# Patient Record
Sex: FEMALE | Race: WHITE | NOT HISPANIC OR LATINO | Employment: OTHER | URBAN - METROPOLITAN AREA
[De-identification: names, ages, dates, MRNs, and addresses within clinical notes are randomized per-mention and may not be internally consistent; named-entity substitution may affect disease eponyms.]

---

## 2017-09-07 ENCOUNTER — SURGERY - HEALTHEAST (OUTPATIENT)
Dept: SURGERY | Facility: CLINIC | Age: 72
End: 2017-09-07

## 2017-09-07 ENCOUNTER — ANESTHESIA - HEALTHEAST (OUTPATIENT)
Dept: SURGERY | Facility: CLINIC | Age: 72
End: 2017-09-07

## 2017-09-22 ASSESSMENT — MIFFLIN-ST. JEOR: SCORE: 1176.74

## 2017-09-23 ASSESSMENT — MIFFLIN-ST. JEOR: SCORE: 1432.11

## 2017-09-24 ENCOUNTER — SURGERY - HEALTHEAST (OUTPATIENT)
Dept: GASTROENTEROLOGY | Facility: CLINIC | Age: 72
End: 2017-09-24

## 2017-09-24 ASSESSMENT — MIFFLIN-ST. JEOR: SCORE: 1456.15

## 2017-09-26 ASSESSMENT — MIFFLIN-ST. JEOR: SCORE: 1453.43

## 2017-09-28 ENCOUNTER — SURGERY - HEALTHEAST (OUTPATIENT)
Dept: SURGERY | Facility: CLINIC | Age: 72
End: 2017-09-28

## 2017-09-28 ENCOUNTER — ANESTHESIA - HEALTHEAST (OUTPATIENT)
Dept: SURGERY | Facility: CLINIC | Age: 72
End: 2017-09-28

## 2017-09-28 ASSESSMENT — MIFFLIN-ST. JEOR: SCORE: 1428.48

## 2017-10-23 ENCOUNTER — HOSPITAL ENCOUNTER (OUTPATIENT)
Dept: MAMMOGRAPHY | Facility: CLINIC | Age: 72
Discharge: HOME | End: 2017-10-23

## 2017-10-23 DIAGNOSIS — Z12.31 VISIT FOR SCREENING MAMMOGRAM: ICD-10-CM

## 2019-03-13 ENCOUNTER — RECORDS - HEALTHEAST (OUTPATIENT)
Dept: ADMINISTRATIVE | Facility: OTHER | Age: 74
End: 2019-03-13

## 2019-03-14 ENCOUNTER — RECORDS - HEALTHEAST (OUTPATIENT)
Dept: ADMINISTRATIVE | Facility: OTHER | Age: 74
End: 2019-03-14

## 2019-03-18 ASSESSMENT — MIFFLIN-ST. JEOR: SCORE: 1376.63

## 2019-03-20 ENCOUNTER — SURGERY - HEALTHEAST (OUTPATIENT)
Dept: SURGERY | Facility: CLINIC | Age: 74
End: 2019-03-20

## 2019-03-20 ENCOUNTER — ANESTHESIA - HEALTHEAST (OUTPATIENT)
Dept: SURGERY | Facility: CLINIC | Age: 74
End: 2019-03-20

## 2019-03-20 ASSESSMENT — MIFFLIN-ST. JEOR: SCORE: 1353.64

## 2019-03-21 ENCOUNTER — HOME CARE/HOSPICE - HEALTHEAST (OUTPATIENT)
Dept: HOME HEALTH SERVICES | Facility: HOME HEALTH | Age: 74
End: 2019-03-21

## 2019-03-25 ENCOUNTER — RECORDS - HEALTHEAST (OUTPATIENT)
Dept: ADMINISTRATIVE | Facility: OTHER | Age: 74
End: 2019-03-25

## 2019-03-25 ENCOUNTER — HOME CARE/HOSPICE - HEALTHEAST (OUTPATIENT)
Dept: HOME HEALTH SERVICES | Facility: HOME HEALTH | Age: 74
End: 2019-03-25

## 2019-03-27 ENCOUNTER — HOME CARE/HOSPICE - HEALTHEAST (OUTPATIENT)
Dept: HOME HEALTH SERVICES | Facility: HOME HEALTH | Age: 74
End: 2019-03-27

## 2019-03-29 ENCOUNTER — HOME CARE/HOSPICE - HEALTHEAST (OUTPATIENT)
Dept: HOME HEALTH SERVICES | Facility: HOME HEALTH | Age: 74
End: 2019-03-29

## 2019-04-01 ENCOUNTER — HOME CARE/HOSPICE - HEALTHEAST (OUTPATIENT)
Dept: HOME HEALTH SERVICES | Facility: HOME HEALTH | Age: 74
End: 2019-04-01

## 2019-04-02 ENCOUNTER — HOME CARE/HOSPICE - HEALTHEAST (OUTPATIENT)
Dept: HOME HEALTH SERVICES | Facility: HOME HEALTH | Age: 74
End: 2019-04-02

## 2019-04-03 ENCOUNTER — HOME CARE/HOSPICE - HEALTHEAST (OUTPATIENT)
Dept: HOME HEALTH SERVICES | Facility: HOME HEALTH | Age: 74
End: 2019-04-03

## 2019-04-04 ENCOUNTER — HOME CARE/HOSPICE - HEALTHEAST (OUTPATIENT)
Dept: HOME HEALTH SERVICES | Facility: HOME HEALTH | Age: 74
End: 2019-04-04

## 2019-04-11 ENCOUNTER — COMMUNICATION - HEALTHEAST (OUTPATIENT)
Dept: PHYSICAL THERAPY | Age: 74
End: 2019-04-11

## 2019-04-11 ENCOUNTER — HOME CARE/HOSPICE - HEALTHEAST (OUTPATIENT)
Dept: HOME HEALTH SERVICES | Facility: HOME HEALTH | Age: 74
End: 2019-04-11

## 2019-04-12 ENCOUNTER — HOME CARE/HOSPICE - HEALTHEAST (OUTPATIENT)
Dept: HOME HEALTH SERVICES | Facility: HOME HEALTH | Age: 74
End: 2019-04-12

## 2019-04-15 ENCOUNTER — HOME CARE/HOSPICE - HEALTHEAST (OUTPATIENT)
Dept: HOME HEALTH SERVICES | Facility: HOME HEALTH | Age: 74
End: 2019-04-15

## 2019-04-17 ENCOUNTER — HOME CARE/HOSPICE - HEALTHEAST (OUTPATIENT)
Dept: HOME HEALTH SERVICES | Facility: HOME HEALTH | Age: 74
End: 2019-04-17

## 2021-03-09 ENCOUNTER — AMBULATORY - HEALTHEAST (OUTPATIENT)
Dept: NURSING | Facility: CLINIC | Age: 76
End: 2021-03-09

## 2021-03-30 ENCOUNTER — AMBULATORY - HEALTHEAST (OUTPATIENT)
Dept: NURSING | Facility: CLINIC | Age: 76
End: 2021-03-30

## 2021-05-24 ENCOUNTER — RECORDS - HEALTHEAST (OUTPATIENT)
Dept: ADMINISTRATIVE | Facility: CLINIC | Age: 76
End: 2021-05-24

## 2021-05-25 ENCOUNTER — RECORDS - HEALTHEAST (OUTPATIENT)
Dept: ADMINISTRATIVE | Facility: CLINIC | Age: 76
End: 2021-05-25

## 2021-05-26 ENCOUNTER — RECORDS - HEALTHEAST (OUTPATIENT)
Dept: ADMINISTRATIVE | Facility: CLINIC | Age: 76
End: 2021-05-26

## 2021-05-28 ENCOUNTER — RECORDS - HEALTHEAST (OUTPATIENT)
Dept: ADMINISTRATIVE | Facility: CLINIC | Age: 76
End: 2021-05-28

## 2021-05-29 ENCOUNTER — RECORDS - HEALTHEAST (OUTPATIENT)
Dept: ADMINISTRATIVE | Facility: CLINIC | Age: 76
End: 2021-05-29

## 2021-05-30 ENCOUNTER — RECORDS - HEALTHEAST (OUTPATIENT)
Dept: ADMINISTRATIVE | Facility: CLINIC | Age: 76
End: 2021-05-30

## 2021-05-31 VITALS — HEIGHT: 68 IN | WEIGHT: 194 LBS | BODY MASS INDEX: 29.4 KG/M2

## 2021-05-31 VITALS — WEIGHT: 165 LBS

## 2021-05-31 VITALS — BODY MASS INDEX: 29.12 KG/M2 | HEIGHT: 69 IN | WEIGHT: 196.6 LBS

## 2021-06-01 ENCOUNTER — RECORDS - HEALTHEAST (OUTPATIENT)
Dept: ADMINISTRATIVE | Facility: CLINIC | Age: 76
End: 2021-06-01

## 2021-06-02 VITALS — WEIGHT: 181 LBS | HEIGHT: 67 IN | BODY MASS INDEX: 28.41 KG/M2

## 2021-06-03 ENCOUNTER — RECORDS - HEALTHEAST (OUTPATIENT)
Dept: ADMINISTRATIVE | Facility: CLINIC | Age: 76
End: 2021-06-03

## 2021-06-12 NOTE — ANESTHESIA PREPROCEDURE EVALUATION
Anesthesia Evaluation      Patient summary reviewed   No history of anesthetic complications     Airway   Mallampati: I  Neck ROM: full   Pulmonary - normal exam    breath sounds clear to auscultation                         Cardiovascular - normal exam  (+) hypertension well controlled, ,     Rhythm: regular  Rate: normal,         Neuro/Psych - negative ROS     Endo/Other    (+) diabetes mellitus type 2 well controlled, hypothyroidism,      GI/Hepatic/Renal - negative ROS           Dental - normal exam   (+) caps                       Anesthesia Plan  Planned anesthetic: MAC    ASA 2     Anesthetic plan and risks discussed with: patient    Post-op plan: routine recovery

## 2021-06-12 NOTE — ANESTHESIA CARE TRANSFER NOTE
Last vitals:   Vitals:    09/07/17 1440   BP: 128/61   Pulse: 74   Resp:    Temp: 37.1  C (98.8  F)   SpO2: 99%     Patient's level of consciousness is awake  Spontaneous respirations: yes  Maintains airway independently: yes  Dentition unchanged: yes  Oropharynx: oropharynx clear of all foreign objects    QCDR Measures:  ASA# 20 - Surgical Safety Checklist: WHO surgical safety checklist completed prior to induction  PQRS# 430 - Adult PONV Prevention: 4558F - Pt received => 2 anti-emetic agents (different classes) preop & intraop  ASA# 8 - Peds PONV Prevention: NA - Not pediatric patient, not GA or 2 or more risk factors NOT present  PQRS# 424 - Melinda-op Temp Management: 4559F - At least one body temp DOCUMENTED => 35.5C or 95.9F within required timeframe  PQRS# 426 - PACU Transfer Protocol: - Transfer of care checklist used  ASA# 14 - Acute Post-op Pain: ASA14B - Patient did NOT experience pain >= 7 out of 10     Transferred to pacu phase 2 with nasal cannula 3 L/min.  VSS

## 2021-06-12 NOTE — ANESTHESIA POSTPROCEDURE EVALUATION
Patient: Belgica Salvador  #3 INTERSTIM STAGE 1  Anesthesia type: MAC    Patient location: Phase II Recovery  Last vitals:   Vitals:    09/07/17 1500   BP: 154/70   Pulse: 74   Resp:    Temp:    SpO2: 95%     Post vital signs: stable  Level of consciousness: awake and responds to simple questions  Post-anesthesia pain: pain controlled  Post-anesthesia nausea and vomiting: no  Pulmonary: unassisted, return to baseline  Cardiovascular: stable and blood pressure at baseline  Hydration: adequate  Anesthetic events: no    QCDR Measures:  ASA# 11 - Melinda-op Cardiac Arrest: ASA11B - Patient did NOT experience unanticipated cardiac arrest  ASA# 12 - Melinda-op Mortality Rate: ASA12B - Patient did NOT die  ASA# 13 - PACU Re-Intubation Rate: NA - No ETT / LMA used for case  ASA# 10 - Composite Anes Safety: ASA10A - No serious adverse event    Additional Notes:RN report

## 2021-06-13 NOTE — ANESTHESIA PREPROCEDURE EVALUATION
Anesthesia Evaluation      No history of anesthetic complications     Airway   Mallampati: II  Neck ROM: full   Pulmonary - negative ROS and normal exam                          Cardiovascular - normal exam  (+) hypertension, ,   Received beta blockers in last 24 hours prior to incision.  ,   Neuro/Psych - negative ROS     Endo/Other    (+) diabetes mellitus type 2 well controlled, hypothyroidism,      GI/Hepatic/Renal - negative ROS           Dental - normal exam                        Anesthesia Plan  Planned anesthetic: MAC    ASA 2   Induction: intravenous   Anesthetic plan and risks discussed with: patient    Post-op plan: routine recovery

## 2021-06-13 NOTE — ANESTHESIA CARE TRANSFER NOTE
Last vitals:   Vitals:    09/28/17 1105   BP: 118/62   Pulse: 65   Resp: 14   Temp: 37.3  C (99.1  F)   SpO2: 97%     Patient's level of consciousness is drowsy  Spontaneous respirations: yes  Maintains airway independently: yes  Dentition unchanged: yes  Oropharynx: oropharynx clear of all foreign objects    QCDR Measures:  ASA# 20 - Surgical Safety Checklist: WHO surgical safety checklist completed prior to induction  PQRS# 430 - Adult PONV Prevention: 4558F - Pt received => 2 anti-emetic agents (different classes) preop & intraop  ASA# 8 - Peds PONV Prevention: NA - Not pediatric patient, not GA or 2 or more risk factors NOT present  PQRS# 424 - Melinda-op Temp Management: 4559F - At least one body temp DOCUMENTED => 35.5C or 95.9F within required timeframe  PQRS# 426 - PACU Transfer Protocol: - Transfer of care checklist used  ASA# 14 - Acute Post-op Pain: ASA14B - Patient did NOT experience pain >= 7 out of 10

## 2021-06-13 NOTE — ANESTHESIA POSTPROCEDURE EVALUATION
Patient: Belgica Salvador  [facility] STAGE 2  Anesthesia type: MAC    Patient location: Phase II Recovery  Last vitals:   Vitals:    09/28/17 1130   BP: 122/59   Pulse: 63   Resp: 16   Temp: 37.3  C (99.1  F)   SpO2: 99%     Post vital signs: stable  Level of consciousness: awake and responds to simple questions  Post-anesthesia pain: pain controlled  Post-anesthesia nausea and vomiting: no  Pulmonary: unassisted, return to baseline  Cardiovascular: stable and blood pressure at baseline  Hydration: adequate  Anesthetic events: no    QCDR Measures:  ASA# 11 - Melinda-op Cardiac Arrest: ASA11B - Patient did NOT experience unanticipated cardiac arrest  ASA# 12 - Melinda-op Mortality Rate: ASA12B - Patient did NOT die  ASA# 13 - PACU Re-Intubation Rate: NA - No ETT / LMA used for case  ASA# 10 - Composite Anes Safety: ASA10A - No serious adverse event    Additional Notes:

## 2021-06-16 PROBLEM — M54.12 CERVICAL RADICULOPATHY AT C8: Status: ACTIVE | Noted: 2019-03-20

## 2021-06-16 PROBLEM — E11.9 DM2 (DIABETES MELLITUS, TYPE 2) (H): Status: ACTIVE | Noted: 2019-03-20

## 2021-06-16 PROBLEM — Z98.84 S/P GASTRIC BYPASS: Chronic | Status: ACTIVE | Noted: 2017-09-22

## 2021-06-16 PROBLEM — K92.1 BLACK STOOL: Status: ACTIVE | Noted: 2017-09-22

## 2021-06-16 PROBLEM — K92.2 UPPER GI BLEED: Status: ACTIVE | Noted: 2017-09-22

## 2021-06-16 PROBLEM — K92.1 MELENA: Status: ACTIVE | Noted: 2017-09-22

## 2021-06-25 NOTE — ANESTHESIA CARE TRANSFER NOTE
Last vitals:   Vitals:    03/20/19 1101   BP: 137/62   Pulse: 67   Resp: 10   Temp: 36.1  C (97  F)   SpO2: 99%     Patient's level of consciousness is awake  Spontaneous respirations: yes  Maintains airway independently: yes  Dentition unchanged: yes  Oropharynx: oropharynx clear of all foreign objects    QCDR Measures:  ASA# 20 - Surgical Safety Checklist: WHO surgical safety checklist completed prior to induction    PQRS# 430 - Adult PONV Prevention: 4558F - Pt received => 2 anti-emetic agents (different classes) preop & intraop  ASA# 8 - Peds PONV Prevention: 4558F - Pt received => 2 anti-emetic agents (different classes) preop & intraop  PQRS# 424 - Melinda-op Temp Management: 4559F - At least one body temp DOCUMENTED => 35.5C or 95.9F within required timeframe  PQRS# 426 - PACU Transfer Protocol: - Transfer of care checklist used  ASA# 14 - Acute Post-op Pain: ASA14B - Patient did NOT experience pain >= 7 out of 10   The patient is Spont Breathing, responding to commands,  Reflexes  Intact.  VSS

## 2021-06-25 NOTE — ANESTHESIA POSTPROCEDURE EVALUATION
Patient: Belgica Salvador  2. LEFT CERVICAL 7 - THORACIC 1 ANTERIOR CERVICAL DECOMPRESSION FUSION REMOVAL OF PLATE AT CERVICAL 5-6 CERVICAL 6-7  Anesthesia type: general    Patient location: PACU  Last vitals:   Vitals:    03/20/19 1320   BP: 109/55   Pulse: 67   Resp: 19   Temp:    SpO2: 93%     Post vital signs: stable  Level of consciousness: awake and responds to simple questions  Post-anesthesia pain: pain controlled  Post-anesthesia nausea and vomiting: no  Pulmonary: unassisted, return to baseline  Cardiovascular: stable and blood pressure at baseline  Hydration: adequate  Anesthetic events: no    QCDR Measures:  ASA# 11 - Melinda-op Cardiac Arrest: ASA11B - Patient did NOT experience unanticipated cardiac arrest  ASA# 12 - Melinda-op Mortality Rate: ASA12B - Patient did NOT die  ASA# 13 - PACU Re-Intubation Rate: ASA13B - Patient did NOT require a new airway mgmt  ASA# 10 - Composite Anes Safety: ASA10A - No serious adverse event    Additional Notes: patient has chronic neck and back pain, and despite multiple pain medications (Dilaudid, Tylenol, Morphine, Magnesium) in PACU, she states her pain has only improved slightly despite that her non-verbals are not consistent with that pain scale rating; on the upside, she has no nausea or vomiting; voice is normal-no dysphonia

## 2021-07-13 ENCOUNTER — RECORDS - HEALTHEAST (OUTPATIENT)
Dept: ADMINISTRATIVE | Facility: CLINIC | Age: 76
End: 2021-07-13

## 2021-07-21 ENCOUNTER — RECORDS - HEALTHEAST (OUTPATIENT)
Dept: ADMINISTRATIVE | Facility: CLINIC | Age: 76
End: 2021-07-21

## 2022-01-31 ENCOUNTER — TRANSFERRED RECORDS (OUTPATIENT)
Dept: HEALTH INFORMATION MANAGEMENT | Facility: CLINIC | Age: 77
End: 2022-01-31
Payer: COMMERCIAL

## 2022-01-31 LAB
ALBUMIN (URINE) MG/SPEC: 10.4 MG/L
ALBUMIN/CREATININE RATIO: 13.5 MG/G CREAT
CREATININE (EXTERNAL): 0.83 MG/DL (ref 0.57–1.11)
CREATININE (URINE): 0.77 G/L
GFR ESTIMATED (EXTERNAL): >60 ML/MIN/1.73M2
GFR ESTIMATED (IF AFRICAN AMERICAN) (EXTERNAL): >60 ML/MIN/1.73M2
GLUCOSE (EXTERNAL): 79 MG/DL (ref 65–100)
HBA1C MFR BLD: 5.8 %
POTASSIUM (EXTERNAL): 4.8 MMOL/L (ref 3.5–5)

## 2022-02-03 ENCOUNTER — MEDICAL CORRESPONDENCE (OUTPATIENT)
Dept: HEALTH INFORMATION MANAGEMENT | Facility: CLINIC | Age: 77
End: 2022-02-03
Payer: COMMERCIAL

## 2022-02-03 ENCOUNTER — TRANSFERRED RECORDS (OUTPATIENT)
Dept: HEALTH INFORMATION MANAGEMENT | Facility: CLINIC | Age: 77
End: 2022-02-03
Payer: COMMERCIAL

## 2022-02-04 ENCOUNTER — MEDICAL CORRESPONDENCE (OUTPATIENT)
Dept: HEALTH INFORMATION MANAGEMENT | Facility: CLINIC | Age: 77
End: 2022-02-04
Payer: COMMERCIAL

## 2022-05-16 ENCOUNTER — HOSPITAL ENCOUNTER (OUTPATIENT)
Facility: CLINIC | Age: 77
End: 2022-05-16
Attending: UROLOGY | Admitting: UROLOGY

## 2022-10-24 RX ORDER — BACLOFEN 10 MG/1
10 TABLET ORAL 3 TIMES DAILY
COMMUNITY

## 2022-10-24 RX ORDER — ROSUVASTATIN CALCIUM 5 MG/1
5 TABLET, COATED ORAL AT BEDTIME
COMMUNITY

## 2022-10-24 RX ORDER — FAMOTIDINE 20 MG/1
20 TABLET, FILM COATED ORAL 2 TIMES DAILY
COMMUNITY

## 2022-10-24 RX ORDER — BUSPIRONE HYDROCHLORIDE 10 MG/1
10 TABLET ORAL 2 TIMES DAILY
COMMUNITY

## 2022-10-24 RX ORDER — LOPERAMIDE HCL 2 MG
2-4 CAPSULE ORAL 3 TIMES DAILY PRN
COMMUNITY
Start: 2023-01-09

## 2022-10-24 RX ORDER — FUROSEMIDE 20 MG
20 TABLET ORAL DAILY
Status: ON HOLD | COMMUNITY
End: 2022-11-10

## 2022-10-24 RX ORDER — DULOXETIN HYDROCHLORIDE 60 MG/1
60 CAPSULE, DELAYED RELEASE ORAL DAILY
COMMUNITY

## 2022-10-24 RX ORDER — ACETAMINOPHEN 325 MG/1
325-650 TABLET ORAL EVERY 6 HOURS PRN
Status: ON HOLD | COMMUNITY
End: 2022-11-10

## 2022-10-24 RX ORDER — CELECOXIB 100 MG/1
100 CAPSULE ORAL DAILY
COMMUNITY
End: 2023-01-10

## 2022-11-01 NOTE — PROVIDER NOTIFICATION
Discharge plan according to [facility]:       10/24/22 1013   Discharge Planning   Patient/Family Anticipates Transition to home   Living Arrangements   People in Home child(debbie), adult   Type of Residence Private Residence   Is your private residence a single family home or apartment? Single family home   Stair Railings, Within Home, Primary railings safe and in good condition;other (see comments)  (Uses chair lift from 1st to 2nd floor)   Bathroom Shower/Tub Walk-in shower   Equipment Currently Used at Home other (see comments)  (chair lift)   Support System   Support Systems Children   Medical Clearance   [facility] Name [facility]

## 2022-11-08 ENCOUNTER — APPOINTMENT (OUTPATIENT)
Dept: RADIOLOGY | Facility: HOSPITAL | Age: 77
End: 2022-11-08
Attending: EMERGENCY MEDICINE
Payer: COMMERCIAL

## 2022-11-08 ENCOUNTER — HOSPITAL ENCOUNTER (EMERGENCY)
Facility: HOSPITAL | Age: 77
Discharge: HOME | End: 2022-11-09
Attending: EMERGENCY MEDICINE | Admitting: EMERGENCY MEDICINE
Payer: COMMERCIAL

## 2022-11-08 DIAGNOSIS — E83.42 HYPOMAGNESEMIA: ICD-10-CM

## 2022-11-08 DIAGNOSIS — E87.5 HYPERKALEMIA: ICD-10-CM

## 2022-11-08 LAB
ANION GAP SERPL CALCULATED.3IONS-SCNC: 11 MMOL/L (ref 7–15)
BASOPHILS # BLD AUTO: 0.1 10E3/UL (ref 0–0.2)
BASOPHILS NFR BLD AUTO: 0 %
BUN SERPL-MCNC: 18.1 MG/DL (ref 8–23)
CALCIUM SERPL-MCNC: 9.1 MG/DL (ref 8.8–10.2)
CHLORIDE SERPL-SCNC: 99 MMOL/L (ref 98–107)
CREAT SERPL-MCNC: 0.84 MG/DL (ref 0.51–0.95)
DEPRECATED HCO3 PLAS-SCNC: 27 MMOL/L (ref 22–29)
EOSINOPHIL # BLD AUTO: 0.1 10E3/UL (ref 0–0.7)
EOSINOPHIL NFR BLD AUTO: 1 %
ERYTHROCYTE [DISTWIDTH] IN BLOOD BY AUTOMATED COUNT: 14.2 % (ref 10–15)
GFR SERPL CREATININE-BSD FRML MDRD: 72 ML/MIN/1.73M2
GLUCOSE BLDC GLUCOMTR-MCNC: 122 MG/DL (ref 70–99)
GLUCOSE BLDC GLUCOMTR-MCNC: 130 MG/DL (ref 70–99)
GLUCOSE BLDC GLUCOMTR-MCNC: 179 MG/DL (ref 70–99)
GLUCOSE SERPL-MCNC: 137 MG/DL (ref 70–99)
HCT VFR BLD AUTO: 40.4 % (ref 35–47)
HGB BLD-MCNC: 13.8 G/DL (ref 11.7–15.7)
HOLD SPECIMEN: NORMAL
HOLD SPECIMEN: NORMAL
IMM GRANULOCYTES # BLD: 0.1 10E3/UL
IMM GRANULOCYTES NFR BLD: 0 %
LYMPHOCYTES # BLD AUTO: 3.1 10E3/UL (ref 0.8–5.3)
LYMPHOCYTES NFR BLD AUTO: 24 %
MAGNESIUM SERPL-MCNC: 1.5 MG/DL (ref 1.7–2.3)
MCH RBC QN AUTO: 32.9 PG (ref 26.5–33)
MCHC RBC AUTO-ENTMCNC: 34.2 G/DL (ref 31.5–36.5)
MCV RBC AUTO: 96 FL (ref 78–100)
MONOCYTES # BLD AUTO: 1.4 10E3/UL (ref 0–1.3)
MONOCYTES NFR BLD AUTO: 11 %
NEUTROPHILS # BLD AUTO: 8.3 10E3/UL (ref 1.6–8.3)
NEUTROPHILS NFR BLD AUTO: 64 %
NRBC # BLD AUTO: 0 10E3/UL
NRBC BLD AUTO-RTO: 0 /100
NT-PROBNP SERPL-MCNC: 1620 PG/ML (ref 0–1800)
PLATELET # BLD AUTO: 364 10E3/UL (ref 150–450)
POTASSIUM SERPL-SCNC: 5.7 MMOL/L (ref 3.4–5.3)
RBC # BLD AUTO: 4.2 10E6/UL (ref 3.8–5.2)
SODIUM SERPL-SCNC: 137 MMOL/L (ref 136–145)
TROPONIN T SERPL HS-MCNC: 15 NG/L
WBC # BLD AUTO: 12.9 10E3/UL (ref 4–11)

## 2022-11-08 PROCEDURE — 83735 ASSAY OF MAGNESIUM: CPT | Performed by: EMERGENCY MEDICINE

## 2022-11-08 PROCEDURE — 36415 COLL VENOUS BLD VENIPUNCTURE: CPT | Performed by: EMERGENCY MEDICINE

## 2022-11-08 PROCEDURE — 93005 ELECTROCARDIOGRAM TRACING: CPT | Performed by: EMERGENCY MEDICINE

## 2022-11-08 PROCEDURE — 85025 COMPLETE CBC W/AUTO DIFF WBC: CPT | Performed by: EMERGENCY MEDICINE

## 2022-11-08 PROCEDURE — 71045 X-RAY EXAM CHEST 1 VIEW: CPT

## 2022-11-08 PROCEDURE — 250N000011 HC RX IP 250 OP 636: Performed by: EMERGENCY MEDICINE

## 2022-11-08 PROCEDURE — 258N000003 HC RX IP 258 OP 636: Performed by: EMERGENCY MEDICINE

## 2022-11-08 PROCEDURE — 84484 ASSAY OF TROPONIN QUANT: CPT | Performed by: EMERGENCY MEDICINE

## 2022-11-08 PROCEDURE — 96361 HYDRATE IV INFUSION ADD-ON: CPT

## 2022-11-08 PROCEDURE — 250N000012 HC RX MED GY IP 250 OP 636 PS 637: Performed by: EMERGENCY MEDICINE

## 2022-11-08 PROCEDURE — 96375 TX/PRO/DX INJ NEW DRUG ADDON: CPT

## 2022-11-08 PROCEDURE — 99285 EMERGENCY DEPT VISIT HI MDM: CPT | Mod: CS,25

## 2022-11-08 PROCEDURE — 80048 BASIC METABOLIC PNL TOTAL CA: CPT | Performed by: EMERGENCY MEDICINE

## 2022-11-08 PROCEDURE — 96365 THER/PROPH/DIAG IV INF INIT: CPT

## 2022-11-08 PROCEDURE — 83880 ASSAY OF NATRIURETIC PEPTIDE: CPT | Performed by: EMERGENCY MEDICINE

## 2022-11-08 PROCEDURE — 258N000001 HC RX 258: Performed by: EMERGENCY MEDICINE

## 2022-11-08 RX ORDER — SODIUM POLYSTYRENE SULFONATE 15 G/60ML
15 SUSPENSION ORAL; RECTAL ONCE
Status: DISCONTINUED | OUTPATIENT
Start: 2022-11-08 | End: 2022-11-09 | Stop reason: HOSPADM

## 2022-11-08 RX ORDER — SODIUM CHLORIDE 9 MG/ML
INJECTION, SOLUTION INTRAVENOUS CONTINUOUS
Status: DISCONTINUED | OUTPATIENT
Start: 2022-11-08 | End: 2022-11-09 | Stop reason: HOSPADM

## 2022-11-08 RX ORDER — DEXTROSE MONOHYDRATE 25 G/50ML
50 INJECTION, SOLUTION INTRAVENOUS ONCE
Status: COMPLETED | OUTPATIENT
Start: 2022-11-08 | End: 2022-11-08

## 2022-11-08 RX ORDER — MAGNESIUM SULFATE HEPTAHYDRATE 40 MG/ML
2 INJECTION, SOLUTION INTRAVENOUS ONCE
Status: COMPLETED | OUTPATIENT
Start: 2022-11-08 | End: 2022-11-08

## 2022-11-08 RX ORDER — FUROSEMIDE 10 MG/ML
20 INJECTION INTRAMUSCULAR; INTRAVENOUS ONCE
Status: COMPLETED | OUTPATIENT
Start: 2022-11-08 | End: 2022-11-08

## 2022-11-08 RX ADMIN — FUROSEMIDE 20 MG: 10 INJECTION, SOLUTION INTRAMUSCULAR; INTRAVENOUS at 22:49

## 2022-11-08 RX ADMIN — SODIUM CHLORIDE 10 UNITS: 9 INJECTION, SOLUTION INTRAVENOUS at 22:49

## 2022-11-08 RX ADMIN — SODIUM CHLORIDE 1000 ML: 9 INJECTION, SOLUTION INTRAVENOUS at 22:48

## 2022-11-08 RX ADMIN — MAGNESIUM SULFATE HEPTAHYDRATE 2 G: 40 INJECTION, SOLUTION INTRAVENOUS at 21:38

## 2022-11-08 RX ADMIN — DEXTROSE MONOHYDRATE 50 ML: 500 INJECTION PARENTERAL at 22:49

## 2022-11-08 ASSESSMENT — ACTIVITIES OF DAILY LIVING (ADL)
ADLS_ACUITY_SCORE: 35
ADLS_ACUITY_SCORE: 35

## 2022-11-08 ASSESSMENT — ENCOUNTER SYMPTOMS
SHORTNESS OF BREATH: 1
APPETITE CHANGE: 0
ABDOMINAL PAIN: 0
NAUSEA: 0
FATIGUE: 1
FLANK PAIN: 0
VOMITING: 0

## 2022-11-09 VITALS
WEIGHT: 179.9 LBS | RESPIRATION RATE: 17 BRPM | DIASTOLIC BLOOD PRESSURE: 67 MMHG | TEMPERATURE: 98.4 F | OXYGEN SATURATION: 98 % | BODY MASS INDEX: 29.04 KG/M2 | SYSTOLIC BLOOD PRESSURE: 144 MMHG | HEART RATE: 66 BPM

## 2022-11-09 LAB
GLUCOSE BLDC GLUCOMTR-MCNC: 114 MG/DL (ref 70–99)
GLUCOSE BLDC GLUCOMTR-MCNC: 80 MG/DL (ref 70–99)
POTASSIUM SERPL-SCNC: 4.7 MMOL/L (ref 3.4–5.3)

## 2022-11-09 PROCEDURE — 96361 HYDRATE IV INFUSION ADD-ON: CPT

## 2022-11-09 PROCEDURE — 84132 ASSAY OF SERUM POTASSIUM: CPT | Performed by: EMERGENCY MEDICINE

## 2022-11-09 PROCEDURE — 258N000003 HC RX IP 258 OP 636: Performed by: EMERGENCY MEDICINE

## 2022-11-09 PROCEDURE — 36415 COLL VENOUS BLD VENIPUNCTURE: CPT | Performed by: EMERGENCY MEDICINE

## 2022-11-09 RX ORDER — SODIUM POLYSTYRENE SULFONATE 15 G/60ML
7.5 SUSPENSION ORAL; RECTAL ONCE
Qty: 30 ML | Refills: 0 | Status: ON HOLD | OUTPATIENT
Start: 2022-11-09 | End: 2022-11-10

## 2022-11-09 RX ORDER — ASPIRIN 81 MG/1
325 TABLET ORAL DAILY
COMMUNITY
Start: 2023-01-09 | End: 2023-08-13

## 2022-11-09 RX ADMIN — SODIUM CHLORIDE: 9 INJECTION, SOLUTION INTRAVENOUS at 00:12

## 2022-11-09 ASSESSMENT — ACTIVITIES OF DAILY LIVING (ADL): ADLS_ACUITY_SCORE: 35

## 2022-11-09 NOTE — TREATMENT PLAN
Orthopedic Surgery Pre-Op Plan: Belgica Salvador  pre-op review. This is NOT an H&P   Surgeon: Dr. Peter    Hospital: [facility]  Name of Surgery: Left Total Knee Arthroplasty   Date of Surgery: 11/10/22  H&P: Completed on 11/7/22 by Dr. Kassie Thompson at [facility].   History of ASA, NSAIDS, vitamin and/or herbal supplements within 10 days: Yes - on ASA 81 mg and Celebrex-ok to remain on these medications judd-operatively as instructed by Dr. Peter.  Fish Oil, Multivitamins- patient instructed to hold these supplements for 7 days before surgery.   History of blood thinners: No    Plan:   1) Discharge Plan: Home morning of POD 1 with assist of Adult Children . Please see Discharge Planning section near bottom of this note for further details.     2) ED visit 11/8/22 - 11/9/22 for hyperkalemia and chest pain: - Patient seen at [facility] for preop H&P on 11/7/22:  Potassium level at that visit came back critically high at 6.4. Patient had potassium of 6.1 on re-check at [facility] on morning of 11/8/22. Patient was advised to go to ED for further evaluation for hyperkalemia. Potassium 5.7 at 8:25 pm in [facility] ED. Patient also complained of some intermittent, sharp, stabbing chest pain lasting less than a minute on presentation to ED. Per patient, this chest pain has been re-occurring for about the last month. (Patient had negative recent NM Stress Test at [facility] on 11/4/22 and unremarkable Echo on 11/7/22).  EKG in ED on 11/8/22: showed sinus rhythm with right bundle branch block. No significant change when compared to previous EKG from 9/24/2017. Troponin T, High Sensitivity: very slightly elevated at 15 in ED.  Magnesium low at 1.5. Patient refused kayexalate in ED so was given 10 units regular Insulin to lower potassium. Potassium level normalized to 4.7 on re-check in ED at 12:09 am on 11/9/22. Patient discharged home from ED in stable condition with recommendation for close  follow up with primary care.  - Update 11/10/22 at 11:00 am: Received addended pre-op H&P from PCP today clearing patient for surgery since potassium level has now normalized to 4.7 following ED visit and cardiac workup for chest pain was negative. Dr. Peter's team updated and we will plan to proceed with surgery tomorrow as planned. Cardiology gave patient prescription to start on ASA 81 mg after surgery.      3) Coronary Artery Disease: Patient had Cardiology Consult on 10/26/22 with Dr. Blancas at [facility] due to finding of coronary calcifications on recent CT of chest, abdomen, pelvis along with patient complaint of intermittent, sharp, stabbing pain to central chest. Dr. Blancas recommended stress test and echocardiogram prior to proceeding with knee surgery. Stress Test and Echo results below:    NM MPI Stress Test 11/4/22:  FINAL CONCLUSIONS    Normal pharmacologic stress perfusion imaging study.    No significant ischemia was suggested by this study.    Normal left ventricular size and systolic function with a calculated LVEF of 63%.    Stress ECG did not show any ST segment change. Stress ECG is negative for myocardial ischemia.    Rare PACs noted during monitored period.     Transthoracic Echo 11/7/22:   1. Normal left ventricular chamber size. Normal left ventricular wall thickness. Normal left   ventricular systolic function. Estimated left    ventricular ejection fraction is 55-60%. Calculated left ventricular ejection fraction   (modified Tijerina technique) is 55 %. No regional    wall motion abnormalities.    2. Normal right ventricular chamber size. Normal right ventricular systolic function.   Estimated right ventricular systolic pressure is 33    mmHg.    3. No significant valvular heart disease.    4. No pericardial effusion.    5. Normal inferior vena cava with normal inspiratory collapse.    6. Aortic sinus of Valsalva is normal in size (3.4 cm) Normal indexed ascending aorta    dimension (3.4 cm, 1.8 cm/m ).    7. When compared to the previous echocardiographic report of 10/16/2018, there has been no   significant change.     Patient cleared for surgery by Cardiology and PCP after negative stress test and normal echocardiogram results above. Cardiology gave patient prescription for ASA 81 mg to start after surgery.     4) Hyperlipidemia: on simvastatin.    5) Hypertension: Well-controlled. Previously on hydrochlorothiazide and furosemide but per PCP notes patient's BP's have been lower since she lost weight so she has been taken off hydrochlorothiazide, furosemide and potassium supplement.     6) Type 2 Diabetes Mellitus: Good recent control on metformin.  Most recent hemoglobin A1c 6.4 on 9/30/2022. I recommend blood glucose checks at least three times a day and at bedtime during hospital stay. Goal BG < 180 to decrease risk for infection and wound healing complications. Nursing to please notify Hospitalist if BG > 180.      7) S/P Gastric Bypass: Agus-en-Y: in 2004.     8) History of Graves' Disease:S/P Partial Thyroid Excision in 2008, now with Hypothyroidism: On levothyroxine.    9) Essential Tremor: on propanolol.     10) GERD: on omeprazole.     Patient appears medically optimized for upcoming surgery. I would recommend Hospitalist Consult to assist with medical management. Please call me below with any questions on this patient.       Review of Systems Notable for: ED visit 11/8/2022 for hyperkalemia and chest pain, coronary artery disease-cleared for surgery by cardiology and PCP after negative stress test and normal echo, hyperlipidemia, hypertension, type 2 diabetes mellitus-good recent control on metformin, s/p gastric bypass in 2004, history of Graves' disease-s/p partial thyroid excision in 2008, now with hypothyroidism, essential tremor, GERD.    Past Medical History:   Past Medical History:   Diagnosis Date     Anxiety      Arthritis      Coronary artery disease       Diabetes (H)      Gastroesophageal reflux disease      Graves disease      Hypertension      Migraine      Muscle spasm      Renal disease      Thrombosis      Thyroid disease      Past Surgical History:   Procedure Laterality Date     ANKLE SURGERY Right     hardware in place     APPENDECTOMY       CERVICAL FUSION N/A 3/20/2019    Procedure: LEFT CERVICAL 7 - THORACIC 1 ANTERIOR CERVICAL DECOMPRESSION FUSION REMOVAL OF PLATE AT CERVICAL 5-6 CERVICAL 6-7;  Surgeon: Richard Talley MD;  Location: [facility] OR;  Service: Spine     ESOPHAGOSCOPY, GASTROSCOPY, DUODENOSCOPY (EGD), COMBINED N/A 9/24/2017    Procedure: ESOPHAGOGASTRODUODENOSCOPY (EGD) with biopsies;  Surgeon: Jorge Palm MD;  Location: [facility] GI;  Service:      HC REVISE MEDIAN N/CARPAL TUNNEL SURG      Description: Neuroplasty Decompression Median Nerve At Carpal Tunnel;  Proc Date: 01/01/2002;     IR AORTIC ARCH 4 VESSEL ANGIOGRAM  5/22/2012     IR CAROTID ANGIOGRAM  5/22/2012     IR CAROTID ANGIOGRAM  5/22/2012     JOINT REPLACEMENT Right 2009    knee     OTHER SURGICAL HISTORY  2013    spleenectomy     OTHER SURGICAL HISTORY Left     melanoma ear     OTHER SURGICAL HISTORY      bladder interstimstage 1 & 2     PANCREAS SURGERY N/A     tail and part of body removed     AR IMPLANT PERIPH/GASTRIC NEUROSTIM/ N/A 9/28/2017    Procedure: INTERSTIM STAGE 2;  Surgeon: Catarino Hurt MD;  Location: [facility] OR;  Service: Gynecology     AR PARTIAL EXCISION THYROID,UNILAT      Description: Thyroid Surgery Sub-Total Thyroidectomy;  Recorded: 09/13/2008;  Comments: for grave's     RELEASE CARPAL TUNNEL Left 06/2018     Z CERV SPINE FUSN,ANTER,BELOW C2      Description: Cervical Vertebral Fusion;  Proc Date: 05/01/2001;  Comments: c5-c7     Z GASTRIC BYPASS,OBESE<100CM JAROD-EN-Y      Description: Gastric Surgery For Morbid Obesity Bypass With Jarod-en-Y;  Proc Date: 09/01/2004;     [facility] KNEE SCOPE,AID POST CRUC REPAIR       Description: Knee Arthroscopy With Posterior Cruciate Ligament Repair;  Recorded: 09/13/2008;       Current Medications:  Patient's Medications   New Prescriptions    No medications on file   Previous Medications    ACETAMINOPHEN (TYLENOL) 325 MG TABLET    Take 325-650 mg by mouth every 6 hours as needed for mild pain    ASPIRIN 81 MG EC TABLET    Take 81 mg by mouth daily    BACLOFEN (LIORESAL) 10 MG TABLET    Take 10 mg by mouth    BUSPIRONE (BUSPAR) 10 MG TABLET    Take 10 mg by mouth    CALCIUM-MAGNESIUM-ZINC ORAL    [CALCIUM-MAGNESIUM-ZINC ORAL] Take 1 tablet by mouth daily.    CELECOXIB (CELEBREX) 100 MG CAPSULE    Take 100 mg by mouth daily Take 1 capsule the day before surgery, skip day of surgery, resume day after surgery until gone    CYANOCOBALAMIN 1,000 MCG/ML INJECTION    [CYANOCOBALAMIN 1,000 MCG/ML INJECTION] Inject 1,000 mcg into the shoulder, thigh, or buttocks every 30 (thirty) days.    CYCLOBENZAPRINE (FLEXERIL) 10 MG TABLET    [CYCLOBENZAPRINE (FLEXERIL) 10 MG TABLET] Take 10 mg by mouth 3 (three) times a day as needed for muscle spasms.    DULOXETINE (CYMBALTA) 60 MG CAPSULE    Take 60 mg by mouth daily    EPINEPHRINE (EPIPEN 2-SHERRI) 0.3 MG/0.3 ML ATIN    [EPINEPHRINE (EPIPEN 2-SHERRI) 0.3 MG/0.3 ML ATIN] Inject 0.3 mg into the shoulder, thigh, or buttocks once as needed (Anaphylaxis).    FAMOTIDINE (PEPCID) 20 MG TABLET    Take 20 mg by mouth    FERROUS SULFATE 325 (65 FE) MG TABLET    [FERROUS SULFATE 325 (65 FE) MG TABLET] Take 1 tablet by mouth daily with breakfast.           FLUTICASONE (FLONASE) 50 MCG/ACTUATION NASAL SPRAY    [FLUTICASONE (FLONASE) 50 MCG/ACTUATION NASAL SPRAY] Apply 2 sprays into each nostril every evening.           FOLIC ACID (FOLVITE) 1 MG TABLET    [FOLIC ACID (FOLVITE) 1 MG TABLET] Take 1 mg by mouth daily.    FUROSEMIDE (LASIX) 20 MG TABLET    Take 20 mg by mouth daily    HYDROCHLOROTHIAZIDE (HYDRODIURIL) 25 MG TABLET    [HYDROCHLOROTHIAZIDE (HYDRODIURIL) 25 MG TABLET]  Take 25 mg by mouth daily.    KETOTIFEN (ZADITOR) 0.025 % OPHTHALMIC SOLUTION    1 drop 2 times daily    LEVOTHYROXINE (SYNTHROID, LEVOTHROID) 88 MCG TABLET    [LEVOTHYROXINE (SYNTHROID, LEVOTHROID) 88 MCG TABLET] Take 88 mcg by mouth Daily at 6:00 am.     LOPERAMIDE (IMODIUM) 2 MG CAPSULE    Take 2 mg by mouth    LORAZEPAM (ATIVAN) 1 MG TABLET    [LORAZEPAM (ATIVAN) 1 MG TABLET] Take 0.5 mg by mouth every morning.           LORAZEPAM (ATIVAN) 1 MG TABLET    [LORAZEPAM (ATIVAN) 1 MG TABLET] Take 0.5 mg by mouth daily as needed for anxiety ((in addition to the 0.5 mg she takes EVERY morning)).           MAGNESIUM OXIDE (MAG-OX) 400 MG (241.3 MG MAGNESIUM) TABLET    [MAGNESIUM OXIDE (MAG-OX) 400 MG (241.3 MG MAGNESIUM) TABLET] Take 800 mg by mouth 2 (two) times a day.    MAGNESIUM OXIDE 250 MG TABS    Take 1 tablet (250 mg) by mouth daily for 7 days    METFORMIN (GLUCOPHAGE) 500 MG TABLET    [METFORMIN (GLUCOPHAGE) 500 MG TABLET] Take 1,000 mg by mouth 2 (two) times a day with meals.    MULTIVITAMIN WITH MINERALS (THERA-M) 9 MG IRON-400 MCG TAB TABLET    [MULTIVITAMIN WITH MINERALS (THERA-M) 9 MG IRON-400 MCG TAB TABLET] Take 1 tablet by mouth daily.    OMEGA-3/DHA/EPA/FISH OIL (FISH OIL-OMEGA-3 FATTY ACIDS) 300-1,000 MG CAPSULE    [OMEGA-3/DHA/EPA/FISH OIL (FISH OIL-OMEGA-3 FATTY ACIDS) 300-1,000 MG CAPSULE] Take 1 g by mouth daily.    OMEPRAZOLE (PRILOSEC) 20 MG CAPSULE    [OMEPRAZOLE (PRILOSEC) 20 MG CAPSULE] Take 1 capsule (20 mg total) by mouth 2 (two) times a day before meals.    OXYCODONE-ACETAMINOPHEN (PERCOCET/ENDOCET) 5-325 MG PER TABLET    [OXYCODONE-ACETAMINOPHEN (PERCOCET/ENDOCET) 5-325 MG PER TABLET] Take 1-2 tablets by mouth every 4 (four) hours as needed for pain.    PROPRANOLOL (INDERAL LA) 160 MG SR CAPSULE    [PROPRANOLOL (INDERAL LA) 160 MG SR CAPSULE] Take 160 mg by mouth daily.           ROSUVASTATIN (CRESTOR) 5 MG TABLET    Take 5 mg by mouth daily    SENNA-DOCUSATE (PERICOLACE) 8.6-50 MG TABLET     [SENNA-DOCUSATE (PERICOLACE) 8.6-50 MG TABLET] Take 1 tablet by mouth 2 (two) times a day.    SIMVASTATIN (ZOCOR) 40 MG TABLET    [SIMVASTATIN (ZOCOR) 40 MG TABLET] Take 40 mg by mouth at bedtime.    SODIUM POLYSTYRENE (KAYEXALATE) 15 GM/60ML SUSPENSION    Take 30 mLs (7.5 g) by mouth once for 1 dose Avoid taking other oral medications 3 hours before or after this medication.    SUCRALFATE (CARAFATE) 1 GRAM TABLET    [SUCRALFATE (CARAFATE) 1 GRAM TABLET] Take 1 g by mouth 2 (two) times a day.           VALACYCLOVIR (VALTREX) 1000 MG TABLET    [VALACYCLOVIR (VALTREX) 1000 MG TABLET] Take 1,000 mg by mouth every 12 (twelve) hours as needed (Cold sore).     VENLAFAXINE (EFFEXOR-XR) 150 MG 24 HR CAPSULE    [VENLAFAXINE (EFFEXOR-XR) 150 MG 24 HR CAPSULE] Take 150 mg by mouth daily.   Modified Medications    No medications on file   Discontinued Medications    No medications on file       ALLERGIES:  Allergies   Allergen Reactions     Glucosamine Anaphylaxis and Rash     Cardiac arrest     Iodine Anaphylaxis     Shellfish Containing Products [Shellfish-Derived Products] Anaphylaxis and Hives     Amitriptyline Other (See Comments)     hallucinations     Buspirone Other (See Comments)     Hallucinations     Ibuprofen Hives     Nsaids      Tramadol Other (See Comments)     hallucinations     Aleve [Naproxen] Hives and Rash     Chlorpheniramine Rash     Pseudoephedrine Rash       Social History  Social History     Tobacco Use     Smoking status: Former     Smokeless tobacco: Never   Substance Use Topics     Alcohol use: Not Currently     Alcohol/week: 1.0 - 2.0 standard drink     Types: 1 - 2 Standard drinks or equivalent per week     Drug use: No       Any Abnormal Recent Diagnostics? Yes  Potassium critically high at 6.4 on 11/7/2022 at [facility], potassium recheck on 11/8/2022 at [facility] was 6.1, referred to ED for further evaluation of hyperkalemia.  Potassium 5.7 on arrival in ED on evening of 11/8/2022.  Potassium  normalized to 4.7 at 12:09 AM on 11/9/2022 following treatment with insulin to decrease potassium level: PCP cleared patient for surgery now that potassium level has normalized.  I recommend close monitoring of potassium level during hospital stay.  Hemoglobin A1c 6.4 on 9/30/2022: Shows good recent control of type 2 diabetes on metformin.  We will monitor BG's closely during hospital stay.  Goal BG <180.    Discharge Planning:   Discharge plan according to [facility]:      Home morning of POD 1 with assist of Adult Children     10/24/22 1013   Discharge Planning   Patient/Family Anticipates Transition to home   Living Arrangements   People in Home child(debbie), adult   Type of Residence Private Residence   Is your private residence a single family home or apartment? Single family home   Stair Railings, Within Home, Primary railings safe and in good condition;other (see comments)  (Uses chair lift from 1st to 2nd floor)   Bathroom Shower/Tub Walk-in shower   Equipment Currently Used at Home other (see comments)  (chair lift)   Support System   Support Systems Children   Medical Clearance   Clinic Name [facility], CNP   Advanced Practice Nurse Navigator- Orthopedics  [facility]   Phone: [phone]

## 2022-11-09 NOTE — ED TRIAGE NOTES
Pt states she had blood drawn yesterday which showed a potassium level of 6.4. Pt states she's had intermittent chest pain and shortness of breath for past month.     Triage Assessment     Row Name 11/08/22 9795       Triage Assessment (Adult)    Airway WDL WDL       Respiratory WDL    Respiratory WDL X  Intermittent shortness of breath       Skin Circulation/Temperature WDL    Skin Circulation/Temperature WDL WDL       Cardiac WDL    Cardiac WDL X;chest pain       Peripheral/Neurovascular WDL    Peripheral Neurovascular WDL WDL       Cognitive/Neuro/Behavioral WDL    Cognitive/Neuro/Behavioral WDL WDL

## 2022-11-09 NOTE — DISCHARGE INSTRUCTIONS
I prescribed the Kayexalate suspension that was ordered in the ER.  This helps to decrease the level of potassium in your body.  You could take the dose tomorrow but make sure that you contact your doctor as he will need to look into the cause of why you are potassium level is elevated today.

## 2022-11-09 NOTE — ED NOTES
"ED Triage Provider Note  [facility] EMERGENCY DEPARTMENT  Encounter Date: Nov 8, 2022    History:  Chief Complaint   Patient presents with     Abnormal Labs     Chest Pain     Shortness of Breath     Belgica Salvador is a 77 year old female who presents to the ED with reported elevated potassium that was drawn for preop knee surgery.  Also with intermittent cp and sob.  CP and sob past month.  Comes and goes.  Being worked up outpatient.  Was also told she had \"clogged arteries\".  Does not know further specifics.  With increased K was recommended she go to ED.  Having cp currently.    Review of Systems:  Cp sob    Exam:  BP (!) 142/66   Pulse 77   Temp 98.4  F (36.9  C) (Oral)   Resp 16   Wt 81.6 kg (179 lb 14.3 oz)   SpO2 98%   BMI 29.04 kg/m    General: No acute distress. Appears stated age.   Cardio: normal rate, extremities well perfused  Resp: Normal work of breathing, grossly normal respiratory rate  Neuro: Alert. Facial movement grossly symmetric. Grossly intact strength.       Medical Decision Making:  Patient arriving to the ED with problem as above. A medical screening exam was performed. Initial differential diagnosis includes but not limited to acs, metabolic derangement, hyperk.    Orders Placed This Encounter   Procedures     Basic metabolic panel     Magnesium     Troponin T, High Sensitivity     Nt probnp inpatient (BNP)     ECG 12-LEAD WITH MUSE (LHE)     Peripheral IV catheter     CBC with platelets differential   nitiated from Triage. The patient is most appropriate to be immediately roomed.       Marino Riggs MD  11/8/2022 at 6:39 PM     Marino Riggs MD  11/08/22 1851    "

## 2022-11-09 NOTE — PROGRESS NOTES
I am evaluating this patient for upcoming Left Total Knee Arthroplasty with Dr. Peter at [facility] on 11/10/22:    - Patient seen at [facility] for preop H&P on 11/7/22:  Potassium level at that visit came back critically high at 6.4. Patient had potassium of 6.1 on re-check at [facility] on morning of 11/8/22. Patient was advised to go to ED for further evaluation for hyperkalemia. Potassium 5.7 at 8:25 pm in [facility] ED. Patient also complained of some intermittent, sharp, stabbing chest pain lasting less than a minute on presentation to ED. Per patient, this chest pain has been re-occurring for about the last month. (Patient had negative recent NM Stress Test at [facility] on 11/4/22 and unremarkable Echo on 11/7/22).  EKG in ED on 11/8/22: showed sinus rhythm with right bundle branch block. No significant change when compared to previous EKG from 9/24/2017. Troponin T, High Sensitivity very slightly elevated at 15 in ED.  Magnesium low at 1.5. Patient refused kayexalate in ED so appears was given 10 units regular Insulin to lower potassium. Potassium level normalized to 4.7 on re-check in ED at 12:09 am on 11/9/22. Patient was discharged home from ED in stable condition with recommendation for close follow up with primary care.  I left message for patient's PCP, Dr. Kassie Thompson, at [facility], to clarify if patient's total knee arthroplasty surgery scheduled for 0730 on 11/10/22 at [facility], should be postponed or if patient is cleared from her standpoint to proceed with surgery. I have asked for call back from PCP clinic today. Waiting for response. Call me below if any questions.     - Update 11/10/22 at 11:00 am: Received addended pre-op H&P from PCP today clearing patient for surgery since potassium level has now normalized to 4.7 following ED visit and cardiac workup for chest pain was negative (negative recent NM Stress Test on 11/4/22 and normal Echo on  11/7/22 both at [place]) as well as ECG and troponin that were stable in ED on 11/8/22. Dr. Peter's team updated and we will plan to proceed with surgery tomorrow as planned. Call me if any questions. Full Treatment Plan note to follow.       FELIZ Clay, CNP   Advanced Practice Nurse Navigator- Orthopedics  [facility]   Office Phone: [phone]  Direct Fax: [phone]

## 2022-11-10 ENCOUNTER — HOSPITAL ENCOUNTER (OUTPATIENT)
Facility: CLINIC | Age: 77
Discharge: HOME | End: 2022-11-12
Attending: ORTHOPAEDIC SURGERY | Admitting: ORTHOPAEDIC SURGERY
Payer: COMMERCIAL

## 2022-11-10 ENCOUNTER — ANESTHESIA (OUTPATIENT)
Dept: SURGERY | Facility: CLINIC | Age: 77
End: 2022-11-10
Payer: COMMERCIAL

## 2022-11-10 ENCOUNTER — APPOINTMENT (OUTPATIENT)
Dept: PHYSICAL THERAPY | Facility: CLINIC | Age: 77
End: 2022-11-10
Payer: COMMERCIAL

## 2022-11-10 ENCOUNTER — ANESTHESIA EVENT (OUTPATIENT)
Dept: SURGERY | Facility: CLINIC | Age: 77
End: 2022-11-10
Payer: COMMERCIAL

## 2022-11-10 DIAGNOSIS — Z96.652 S/P TOTAL KNEE ARTHROPLASTY, LEFT: Primary | ICD-10-CM

## 2022-11-10 PROBLEM — G62.9 NEUROPATHY: Status: ACTIVE | Noted: 2022-11-10

## 2022-11-10 PROBLEM — Z98.890 S/P KNEE SURGERY: Status: ACTIVE | Noted: 2022-11-10

## 2022-11-10 PROBLEM — E78.5 HYPERLIPIDEMIA LDL GOAL <100: Status: ACTIVE | Noted: 2022-11-10

## 2022-11-10 LAB
ANION GAP SERPL CALCULATED.3IONS-SCNC: 13 MMOL/L (ref 5–18)
BUN SERPL-MCNC: 16 MG/DL (ref 8–28)
CALCIUM SERPL-MCNC: 8.3 MG/DL (ref 8.5–10.5)
CHLORIDE BLD-SCNC: 96 MMOL/L (ref 98–107)
CO2 SERPL-SCNC: 23 MMOL/L (ref 22–31)
CREAT SERPL-MCNC: 0.79 MG/DL (ref 0.6–1.1)
CREAT SERPL-MCNC: 0.88 MG/DL (ref 0.6–1.1)
ERYTHROCYTE [DISTWIDTH] IN BLOOD BY AUTOMATED COUNT: 14.2 % (ref 10–15)
GFR SERPL CREATININE-BSD FRML MDRD: 68 ML/MIN/1.73M2
GFR SERPL CREATININE-BSD FRML MDRD: 77 ML/MIN/1.73M2
GLUCOSE BLD-MCNC: 251 MG/DL (ref 70–125)
GLUCOSE BLDC GLUCOMTR-MCNC: 126 MG/DL (ref 70–99)
GLUCOSE BLDC GLUCOMTR-MCNC: 142 MG/DL (ref 70–99)
HCT VFR BLD AUTO: 39.1 % (ref 35–47)
HGB BLD-MCNC: 13.4 G/DL (ref 11.7–15.7)
INR PPP: 0.97 (ref 0.85–1.15)
MCH RBC QN AUTO: 32.4 PG (ref 26.5–33)
MCHC RBC AUTO-ENTMCNC: 34.3 G/DL (ref 31.5–36.5)
MCV RBC AUTO: 94 FL (ref 78–100)
PLATELET # BLD AUTO: 348 10E3/UL (ref 150–450)
POTASSIUM BLD-SCNC: 4.3 MMOL/L (ref 3.5–5)
POTASSIUM BLD-SCNC: 5.2 MMOL/L (ref 3.5–5)
RBC # BLD AUTO: 4.14 10E6/UL (ref 3.8–5.2)
SODIUM SERPL-SCNC: 132 MMOL/L (ref 136–145)
WBC # BLD AUTO: 12.7 10E3/UL (ref 4–11)

## 2022-11-10 PROCEDURE — 710N000010 HC RECOVERY PHASE 1, LEVEL 2, PER MIN: Performed by: ORTHOPAEDIC SURGERY

## 2022-11-10 PROCEDURE — 97161 PT EVAL LOW COMPLEX 20 MIN: CPT | Mod: GP

## 2022-11-10 PROCEDURE — 80048 BASIC METABOLIC PNL TOTAL CA: CPT | Performed by: PHYSICIAN ASSISTANT

## 2022-11-10 PROCEDURE — 250N000013 HC RX MED GY IP 250 OP 250 PS 637: Performed by: STUDENT IN AN ORGANIZED HEALTH CARE EDUCATION/TRAINING PROGRAM

## 2022-11-10 PROCEDURE — 99220 PR INITIAL OBSERVATION CARE,LEVEL III: CPT | Performed by: STUDENT IN AN ORGANIZED HEALTH CARE EDUCATION/TRAINING PROGRAM

## 2022-11-10 PROCEDURE — 258N000001 HC RX 258: Performed by: PHYSICIAN ASSISTANT

## 2022-11-10 PROCEDURE — C1713 ANCHOR/SCREW BN/BN,TIS/BN: HCPCS | Performed by: ORTHOPAEDIC SURGERY

## 2022-11-10 PROCEDURE — 999N000127 HC STATISTIC PERIPHERAL IV START W US GUIDANCE

## 2022-11-10 PROCEDURE — 250N000009 HC RX 250: Performed by: PHYSICIAN ASSISTANT

## 2022-11-10 PROCEDURE — 370N000017 HC ANESTHESIA TECHNICAL FEE, PER MIN: Performed by: ORTHOPAEDIC SURGERY

## 2022-11-10 PROCEDURE — 250N000011 HC RX IP 250 OP 636

## 2022-11-10 PROCEDURE — C1776 JOINT DEVICE (IMPLANTABLE): HCPCS | Performed by: ORTHOPAEDIC SURGERY

## 2022-11-10 PROCEDURE — 250N000013 HC RX MED GY IP 250 OP 250 PS 637

## 2022-11-10 PROCEDURE — 272N000001 HC OR GENERAL SUPPLY STERILE: Performed by: ORTHOPAEDIC SURGERY

## 2022-11-10 PROCEDURE — 258N000003 HC RX IP 258 OP 636

## 2022-11-10 PROCEDURE — 85610 PROTHROMBIN TIME: CPT | Performed by: PHYSICIAN ASSISTANT

## 2022-11-10 PROCEDURE — 258N000003 HC RX IP 258 OP 636: Performed by: ANESTHESIOLOGY

## 2022-11-10 PROCEDURE — 250N000009 HC RX 250: Performed by: NURSE ANESTHETIST, CERTIFIED REGISTERED

## 2022-11-10 PROCEDURE — 250N000009 HC RX 250: Performed by: ANESTHESIOLOGY

## 2022-11-10 PROCEDURE — 97116 GAIT TRAINING THERAPY: CPT | Mod: GP

## 2022-11-10 PROCEDURE — 250N000011 HC RX IP 250 OP 636: Performed by: ANESTHESIOLOGY

## 2022-11-10 PROCEDURE — 85027 COMPLETE CBC AUTOMATED: CPT | Performed by: PHYSICIAN ASSISTANT

## 2022-11-10 PROCEDURE — 250N000013 HC RX MED GY IP 250 OP 250 PS 637: Performed by: PHYSICIAN ASSISTANT

## 2022-11-10 PROCEDURE — 82962 GLUCOSE BLOOD TEST: CPT

## 2022-11-10 PROCEDURE — 360N000077 HC SURGERY LEVEL 4, PER MIN: Performed by: ORTHOPAEDIC SURGERY

## 2022-11-10 PROCEDURE — 97110 THERAPEUTIC EXERCISES: CPT | Mod: GP

## 2022-11-10 PROCEDURE — 36415 COLL VENOUS BLD VENIPUNCTURE: CPT | Performed by: PHYSICIAN ASSISTANT

## 2022-11-10 PROCEDURE — 84132 ASSAY OF SERUM POTASSIUM: CPT | Performed by: PHYSICIAN ASSISTANT

## 2022-11-10 PROCEDURE — 250N000011 HC RX IP 250 OP 636: Performed by: PHYSICIAN ASSISTANT

## 2022-11-10 PROCEDURE — 82565 ASSAY OF CREATININE: CPT | Mod: 91

## 2022-11-10 PROCEDURE — 250N000013 HC RX MED GY IP 250 OP 250 PS 637: Performed by: INTERNAL MEDICINE

## 2022-11-10 PROCEDURE — 36415 COLL VENOUS BLD VENIPUNCTURE: CPT

## 2022-11-10 PROCEDURE — 999N000141 HC STATISTIC PRE-PROCEDURE NURSING ASSESSMENT: Performed by: ORTHOPAEDIC SURGERY

## 2022-11-10 PROCEDURE — 250N000011 HC RX IP 250 OP 636: Performed by: NURSE ANESTHETIST, CERTIFIED REGISTERED

## 2022-11-10 DEVICE — UNIVERSAL TIBIAL BASEPLATE
Type: IMPLANTABLE DEVICE | Site: KNEE | Status: FUNCTIONAL
Brand: TRIATHLON

## 2022-11-10 DEVICE — CRUCIATE RETAINING FEMORAL
Type: IMPLANTABLE DEVICE | Site: KNEE | Status: FUNCTIONAL
Brand: TRIATHLON

## 2022-11-10 DEVICE — PATELLA
Type: IMPLANTABLE DEVICE | Site: KNEE | Status: FUNCTIONAL
Brand: TRIATHLON

## 2022-11-10 DEVICE — TOBRA FULL DOSE ANTIBIOTIC BONE CEMENT, 10 PACK CATALOG NUMBER IS 6197-9-010
Type: IMPLANTABLE DEVICE | Site: KNEE | Status: FUNCTIONAL
Brand: SIMPLEX

## 2022-11-10 DEVICE — TIBIAL BEARING INSERT
Type: IMPLANTABLE DEVICE | Site: KNEE | Status: FUNCTIONAL
Brand: TRIATHLON

## 2022-11-10 RX ORDER — ACETAMINOPHEN 325 MG/1
975 TABLET ORAL ONCE
Status: COMPLETED | OUTPATIENT
Start: 2022-11-10 | End: 2022-11-10

## 2022-11-10 RX ORDER — PROPRANOLOL HYDROCHLORIDE 40 MG/1
80 TABLET ORAL 2 TIMES DAILY
Status: DISCONTINUED | OUTPATIENT
Start: 2022-11-10 | End: 2022-11-10 | Stop reason: DRUGHIGH

## 2022-11-10 RX ORDER — LIDOCAINE 40 MG/G
CREAM TOPICAL
Status: DISCONTINUED | OUTPATIENT
Start: 2022-11-10 | End: 2022-11-12 | Stop reason: HOSPADM

## 2022-11-10 RX ORDER — BISACODYL 10 MG
10 SUPPOSITORY, RECTAL RECTAL DAILY PRN
Status: DISCONTINUED | OUTPATIENT
Start: 2022-11-10 | End: 2022-11-12 | Stop reason: HOSPADM

## 2022-11-10 RX ORDER — ROSUVASTATIN CALCIUM 5 MG/1
5 TABLET, COATED ORAL EVERY EVENING
Status: DISCONTINUED | OUTPATIENT
Start: 2022-11-10 | End: 2022-11-12 | Stop reason: HOSPADM

## 2022-11-10 RX ORDER — ONDANSETRON 2 MG/ML
INJECTION INTRAMUSCULAR; INTRAVENOUS PRN
Status: DISCONTINUED | OUTPATIENT
Start: 2022-11-10 | End: 2022-11-10

## 2022-11-10 RX ORDER — HYDROMORPHONE HCL IN WATER/PF 6 MG/30 ML
0.2 PATIENT CONTROLLED ANALGESIA SYRINGE INTRAVENOUS
Status: DISCONTINUED | OUTPATIENT
Start: 2022-11-10 | End: 2022-11-12 | Stop reason: HOSPADM

## 2022-11-10 RX ORDER — HYDROXYZINE HYDROCHLORIDE 10 MG/1
10 TABLET, FILM COATED ORAL EVERY 6 HOURS PRN
Qty: 30 TABLET | Refills: 0 | Status: SHIPPED | OUTPATIENT
Start: 2022-11-10 | End: 2023-01-10

## 2022-11-10 RX ORDER — LEVOTHYROXINE SODIUM 88 UG/1
88 TABLET ORAL DAILY
Status: DISCONTINUED | OUTPATIENT
Start: 2022-11-10 | End: 2022-11-12 | Stop reason: HOSPADM

## 2022-11-10 RX ORDER — SODIUM CHLORIDE, SODIUM LACTATE, POTASSIUM CHLORIDE, CALCIUM CHLORIDE 600; 310; 30; 20 MG/100ML; MG/100ML; MG/100ML; MG/100ML
INJECTION, SOLUTION INTRAVENOUS CONTINUOUS
Status: DISCONTINUED | OUTPATIENT
Start: 2022-11-10 | End: 2022-11-11

## 2022-11-10 RX ORDER — PROPRANOLOL HYDROCHLORIDE 80 MG/1
160 CAPSULE, EXTENDED RELEASE ORAL DAILY
Status: DISCONTINUED | OUTPATIENT
Start: 2022-11-11 | End: 2022-11-12 | Stop reason: HOSPADM

## 2022-11-10 RX ORDER — NICOTINE POLACRILEX 4 MG
15-30 LOZENGE BUCCAL
Status: DISCONTINUED | OUTPATIENT
Start: 2022-11-10 | End: 2022-11-12 | Stop reason: HOSPADM

## 2022-11-10 RX ORDER — PROPOFOL 10 MG/ML
INJECTION, EMULSION INTRAVENOUS CONTINUOUS PRN
Status: DISCONTINUED | OUTPATIENT
Start: 2022-11-10 | End: 2022-11-10

## 2022-11-10 RX ORDER — LABETALOL HYDROCHLORIDE 5 MG/ML
10 INJECTION, SOLUTION INTRAVENOUS
Status: DISCONTINUED | OUTPATIENT
Start: 2022-11-10 | End: 2022-11-10 | Stop reason: HOSPADM

## 2022-11-10 RX ORDER — DEXAMETHASONE SODIUM PHOSPHATE 4 MG/ML
INJECTION, SOLUTION INTRA-ARTICULAR; INTRALESIONAL; INTRAMUSCULAR; INTRAVENOUS; SOFT TISSUE PRN
Status: DISCONTINUED | OUTPATIENT
Start: 2022-11-10 | End: 2022-11-10

## 2022-11-10 RX ORDER — AMOXICILLIN 250 MG
1-2 CAPSULE ORAL 2 TIMES DAILY
Qty: 30 TABLET | Refills: 0 | Status: SHIPPED | OUTPATIENT
Start: 2022-11-10 | End: 2023-01-10

## 2022-11-10 RX ORDER — HYDROXYZINE HYDROCHLORIDE 10 MG/1
10 TABLET, FILM COATED ORAL EVERY 6 HOURS PRN
Status: DISCONTINUED | OUTPATIENT
Start: 2022-11-10 | End: 2022-11-12 | Stop reason: HOSPADM

## 2022-11-10 RX ORDER — CEFAZOLIN SODIUM 2 G/100ML
2 INJECTION, SOLUTION INTRAVENOUS EVERY 8 HOURS
Status: COMPLETED | OUTPATIENT
Start: 2022-11-10 | End: 2022-11-11

## 2022-11-10 RX ORDER — PROCHLORPERAZINE MALEATE 5 MG
5 TABLET ORAL EVERY 6 HOURS PRN
Status: DISCONTINUED | OUTPATIENT
Start: 2022-11-10 | End: 2022-11-12 | Stop reason: HOSPADM

## 2022-11-10 RX ORDER — HYDRALAZINE HYDROCHLORIDE 20 MG/ML
2.5-5 INJECTION INTRAMUSCULAR; INTRAVENOUS EVERY 10 MIN PRN
Status: DISCONTINUED | OUTPATIENT
Start: 2022-11-10 | End: 2022-11-10 | Stop reason: HOSPADM

## 2022-11-10 RX ORDER — MECLIZINE HCL 12.5 MG 12.5 MG/1
12.5 TABLET ORAL 3 TIMES DAILY PRN
COMMUNITY
Start: 2023-01-09

## 2022-11-10 RX ORDER — BACLOFEN 10 MG/1
10 TABLET ORAL 3 TIMES DAILY
Status: DISCONTINUED | OUTPATIENT
Start: 2022-11-10 | End: 2022-11-12 | Stop reason: HOSPADM

## 2022-11-10 RX ORDER — ONDANSETRON 4 MG/1
4 TABLET, ORALLY DISINTEGRATING ORAL EVERY 6 HOURS PRN
Status: DISCONTINUED | OUTPATIENT
Start: 2022-11-10 | End: 2022-11-12 | Stop reason: HOSPADM

## 2022-11-10 RX ORDER — HYDROMORPHONE HCL IN WATER/PF 6 MG/30 ML
0.2 PATIENT CONTROLLED ANALGESIA SYRINGE INTRAVENOUS EVERY 5 MIN PRN
Status: DISCONTINUED | OUTPATIENT
Start: 2022-11-10 | End: 2022-11-10 | Stop reason: HOSPADM

## 2022-11-10 RX ORDER — ACETAMINOPHEN 325 MG/1
650 TABLET ORAL EVERY 4 HOURS PRN
Status: DISCONTINUED | OUTPATIENT
Start: 2022-11-13 | End: 2022-11-12 | Stop reason: HOSPADM

## 2022-11-10 RX ORDER — NALOXONE HYDROCHLORIDE 0.4 MG/ML
0.4 INJECTION, SOLUTION INTRAMUSCULAR; INTRAVENOUS; SUBCUTANEOUS
Status: DISCONTINUED | OUTPATIENT
Start: 2022-11-10 | End: 2022-11-12 | Stop reason: HOSPADM

## 2022-11-10 RX ORDER — FENTANYL CITRATE 50 UG/ML
50 INJECTION, SOLUTION INTRAMUSCULAR; INTRAVENOUS EVERY 5 MIN PRN
Status: DISCONTINUED | OUTPATIENT
Start: 2022-11-10 | End: 2022-11-10 | Stop reason: HOSPADM

## 2022-11-10 RX ORDER — HYDROMORPHONE HCL IN WATER/PF 6 MG/30 ML
0.4 PATIENT CONTROLLED ANALGESIA SYRINGE INTRAVENOUS
Status: DISCONTINUED | OUTPATIENT
Start: 2022-11-10 | End: 2022-11-12 | Stop reason: HOSPADM

## 2022-11-10 RX ORDER — SENNOSIDES 8.6 MG
650 CAPSULE ORAL
Status: ON HOLD | COMMUNITY
End: 2022-11-11

## 2022-11-10 RX ORDER — OXYCODONE HYDROCHLORIDE 5 MG/1
5 TABLET ORAL EVERY 4 HOURS PRN
Status: DISCONTINUED | OUTPATIENT
Start: 2022-11-10 | End: 2022-11-10 | Stop reason: HOSPADM

## 2022-11-10 RX ORDER — OXYCODONE HYDROCHLORIDE 5 MG/1
10 TABLET ORAL EVERY 4 HOURS PRN
Status: DISCONTINUED | OUTPATIENT
Start: 2022-11-10 | End: 2022-11-12 | Stop reason: HOSPADM

## 2022-11-10 RX ORDER — LIDOCAINE 40 MG/G
CREAM TOPICAL
Status: DISCONTINUED | OUTPATIENT
Start: 2022-11-10 | End: 2022-11-10 | Stop reason: HOSPADM

## 2022-11-10 RX ORDER — ACETAMINOPHEN 325 MG/1
650 TABLET ORAL EVERY 4 HOURS PRN
Qty: 100 TABLET | Refills: 0 | Status: SHIPPED | OUTPATIENT
Start: 2022-11-10 | End: 2023-01-10 | Stop reason: CLARIF

## 2022-11-10 RX ORDER — BUPIVACAINE HYDROCHLORIDE 5 MG/ML
INJECTION, SOLUTION EPIDURAL; INTRACAUDAL
Status: COMPLETED | OUTPATIENT
Start: 2022-11-10 | End: 2022-11-10

## 2022-11-10 RX ORDER — MULTIVIT-MIN/IRON/FOLIC ACID/K 18-600-40
2000 CAPSULE ORAL DAILY
COMMUNITY
Start: 2023-01-09

## 2022-11-10 RX ORDER — NALOXONE HYDROCHLORIDE 0.4 MG/ML
0.2 INJECTION, SOLUTION INTRAMUSCULAR; INTRAVENOUS; SUBCUTANEOUS
Status: DISCONTINUED | OUTPATIENT
Start: 2022-11-10 | End: 2022-11-12 | Stop reason: HOSPADM

## 2022-11-10 RX ORDER — SENNOSIDES 8.6 MG
1300 CAPSULE ORAL 2 TIMES DAILY
Status: ON HOLD | COMMUNITY
End: 2022-11-11

## 2022-11-10 RX ORDER — DEXTROSE MONOHYDRATE 25 G/50ML
25-50 INJECTION, SOLUTION INTRAVENOUS
Status: DISCONTINUED | OUTPATIENT
Start: 2022-11-10 | End: 2022-11-12 | Stop reason: HOSPADM

## 2022-11-10 RX ORDER — HALOPERIDOL 5 MG/ML
1 INJECTION INTRAMUSCULAR
Status: DISCONTINUED | OUTPATIENT
Start: 2022-11-10 | End: 2022-11-10 | Stop reason: HOSPADM

## 2022-11-10 RX ORDER — DULOXETIN HYDROCHLORIDE 60 MG/1
60 CAPSULE, DELAYED RELEASE ORAL DAILY
Status: DISCONTINUED | OUTPATIENT
Start: 2022-11-10 | End: 2022-11-12 | Stop reason: HOSPADM

## 2022-11-10 RX ORDER — MECLIZINE HCL 12.5 MG 12.5 MG/1
12.5-25 TABLET ORAL 3 TIMES DAILY PRN
Status: DISCONTINUED | OUTPATIENT
Start: 2022-11-10 | End: 2022-11-12 | Stop reason: HOSPADM

## 2022-11-10 RX ORDER — KETOROLAC TROMETHAMINE 15 MG/ML
15 INJECTION, SOLUTION INTRAMUSCULAR; INTRAVENOUS EVERY 6 HOURS
Status: ACTIVE | OUTPATIENT
Start: 2022-11-10 | End: 2022-11-11

## 2022-11-10 RX ORDER — ONDANSETRON 2 MG/ML
4 INJECTION INTRAMUSCULAR; INTRAVENOUS EVERY 30 MIN PRN
Status: DISCONTINUED | OUTPATIENT
Start: 2022-11-10 | End: 2022-11-10 | Stop reason: HOSPADM

## 2022-11-10 RX ORDER — SODIUM CHLORIDE, SODIUM LACTATE, POTASSIUM CHLORIDE, CALCIUM CHLORIDE 600; 310; 30; 20 MG/100ML; MG/100ML; MG/100ML; MG/100ML
INJECTION, SOLUTION INTRAVENOUS CONTINUOUS
Status: DISCONTINUED | OUTPATIENT
Start: 2022-11-10 | End: 2022-11-10 | Stop reason: HOSPADM

## 2022-11-10 RX ORDER — TRANEXAMIC ACID 650 MG/1
1950 TABLET ORAL ONCE
Status: COMPLETED | OUTPATIENT
Start: 2022-11-10 | End: 2022-11-10

## 2022-11-10 RX ORDER — CALCIUM CARBONATE 500 MG/1
500 TABLET, CHEWABLE ORAL 4 TIMES DAILY PRN
Status: DISCONTINUED | OUTPATIENT
Start: 2022-11-10 | End: 2022-11-12 | Stop reason: HOSPADM

## 2022-11-10 RX ORDER — OXYCODONE HYDROCHLORIDE 5 MG/1
5 TABLET ORAL EVERY 6 HOURS PRN
Qty: 30 TABLET | Refills: 0 | Status: SHIPPED | OUTPATIENT
Start: 2022-11-10 | End: 2023-01-10

## 2022-11-10 RX ORDER — MAGNESIUM OXIDE 400 MG/1
800 TABLET ORAL 2 TIMES DAILY
Status: DISCONTINUED | OUTPATIENT
Start: 2022-11-10 | End: 2022-11-12 | Stop reason: HOSPADM

## 2022-11-10 RX ORDER — ONDANSETRON 4 MG/1
4 TABLET, ORALLY DISINTEGRATING ORAL EVERY 30 MIN PRN
Status: DISCONTINUED | OUTPATIENT
Start: 2022-11-10 | End: 2022-11-10 | Stop reason: HOSPADM

## 2022-11-10 RX ORDER — CEFAZOLIN SODIUM/WATER 2 G/20 ML
2 SYRINGE (ML) INTRAVENOUS
Status: COMPLETED | OUTPATIENT
Start: 2022-11-10 | End: 2022-11-10

## 2022-11-10 RX ORDER — POLYETHYLENE GLYCOL 3350 17 G/17G
17 POWDER, FOR SOLUTION ORAL DAILY
Status: DISCONTINUED | OUTPATIENT
Start: 2022-11-11 | End: 2022-11-12 | Stop reason: HOSPADM

## 2022-11-10 RX ORDER — OXYCODONE HYDROCHLORIDE 5 MG/1
5 TABLET ORAL EVERY 4 HOURS PRN
Status: DISCONTINUED | OUTPATIENT
Start: 2022-11-10 | End: 2022-11-12 | Stop reason: HOSPADM

## 2022-11-10 RX ORDER — AMOXICILLIN 250 MG
1 CAPSULE ORAL 2 TIMES DAILY
Status: DISCONTINUED | OUTPATIENT
Start: 2022-11-10 | End: 2022-11-12 | Stop reason: HOSPADM

## 2022-11-10 RX ORDER — ONDANSETRON 2 MG/ML
4 INJECTION INTRAMUSCULAR; INTRAVENOUS EVERY 6 HOURS PRN
Status: DISCONTINUED | OUTPATIENT
Start: 2022-11-10 | End: 2022-11-12 | Stop reason: HOSPADM

## 2022-11-10 RX ORDER — FAMOTIDINE 20 MG/1
20 TABLET, FILM COATED ORAL 2 TIMES DAILY
Status: DISCONTINUED | OUTPATIENT
Start: 2022-11-10 | End: 2022-11-12 | Stop reason: HOSPADM

## 2022-11-10 RX ORDER — PANTOPRAZOLE SODIUM 20 MG/1
40 TABLET, DELAYED RELEASE ORAL
Status: DISCONTINUED | OUTPATIENT
Start: 2022-11-10 | End: 2022-11-12 | Stop reason: HOSPADM

## 2022-11-10 RX ORDER — ONDANSETRON 4 MG/1
4-8 TABLET, ORALLY DISINTEGRATING ORAL EVERY 8 HOURS PRN
Qty: 10 TABLET | Refills: 0 | Status: SHIPPED | OUTPATIENT
Start: 2022-11-10 | End: 2023-01-10

## 2022-11-10 RX ORDER — POLYETHYLENE GLYCOL 3350 17 G/17G
1 POWDER, FOR SOLUTION ORAL DAILY
Qty: 7 PACKET | Refills: 0 | Status: SHIPPED | OUTPATIENT
Start: 2022-11-10 | End: 2023-01-10

## 2022-11-10 RX ORDER — ACETAMINOPHEN 325 MG/1
975 TABLET ORAL EVERY 8 HOURS
Status: DISCONTINUED | OUTPATIENT
Start: 2022-11-10 | End: 2022-11-12 | Stop reason: HOSPADM

## 2022-11-10 RX ORDER — FENTANYL CITRATE 50 UG/ML
100 INJECTION, SOLUTION INTRAMUSCULAR; INTRAVENOUS ONCE
Status: COMPLETED | OUTPATIENT
Start: 2022-11-10 | End: 2022-11-10

## 2022-11-10 RX ORDER — LIDOCAINE HYDROCHLORIDE 10 MG/ML
INJECTION, SOLUTION INFILTRATION; PERINEURAL PRN
Status: DISCONTINUED | OUTPATIENT
Start: 2022-11-10 | End: 2022-11-10

## 2022-11-10 RX ORDER — CELECOXIB 100 MG/1
100 CAPSULE ORAL ONCE
Status: COMPLETED | OUTPATIENT
Start: 2022-11-10 | End: 2022-11-10

## 2022-11-10 RX ORDER — CEFAZOLIN SODIUM/WATER 2 G/20 ML
2 SYRINGE (ML) INTRAVENOUS SEE ADMIN INSTRUCTIONS
Status: DISCONTINUED | OUTPATIENT
Start: 2022-11-10 | End: 2022-11-10 | Stop reason: HOSPADM

## 2022-11-10 RX ADMIN — RIVAROXABAN 10 MG: 10 TABLET, FILM COATED ORAL at 21:56

## 2022-11-10 RX ADMIN — SODIUM CHLORIDE, POTASSIUM CHLORIDE, SODIUM LACTATE AND CALCIUM CHLORIDE: 600; 310; 30; 20 INJECTION, SOLUTION INTRAVENOUS at 14:35

## 2022-11-10 RX ADMIN — PROPOFOL 100 MCG/KG/MIN: 10 INJECTION, EMULSION INTRAVENOUS at 10:20

## 2022-11-10 RX ADMIN — CEFAZOLIN SODIUM 2 G: 2 INJECTION, SOLUTION INTRAVENOUS at 18:57

## 2022-11-10 RX ADMIN — POLYETHYLENE GLYCOL AND PROPYLENE GLYCOL 1 DROP: 4; 3 SOLUTION/ DROPS OPHTHALMIC at 19:59

## 2022-11-10 RX ADMIN — BACLOFEN 10 MG: 10 TABLET ORAL at 20:03

## 2022-11-10 RX ADMIN — PANTOPRAZOLE SODIUM 40 MG: 20 TABLET, DELAYED RELEASE ORAL at 16:05

## 2022-11-10 RX ADMIN — TRANEXAMIC ACID 1950 MG: 650 TABLET ORAL at 08:43

## 2022-11-10 RX ADMIN — ROSUVASTATIN CALCIUM 5 MG: 5 TABLET, FILM COATED ORAL at 20:19

## 2022-11-10 RX ADMIN — METFORMIN HYDROCHLORIDE 1000 MG: 500 TABLET, FILM COATED ORAL at 16:06

## 2022-11-10 RX ADMIN — OXYCODONE HYDROCHLORIDE 10 MG: 5 TABLET ORAL at 16:06

## 2022-11-10 RX ADMIN — LIDOCAINE HYDROCHLORIDE 2 ML: 10 INJECTION, SOLUTION INFILTRATION; PERINEURAL at 10:20

## 2022-11-10 RX ADMIN — BACLOFEN 10 MG: 10 TABLET ORAL at 14:36

## 2022-11-10 RX ADMIN — Medication 2 G: at 09:59

## 2022-11-10 RX ADMIN — BUPIVACAINE HYDROCHLORIDE 15 ML: 5 INJECTION, SOLUTION EPIDURAL; INTRACAUDAL; PERINEURAL at 09:53

## 2022-11-10 RX ADMIN — ACETAMINOPHEN 975 MG: 325 TABLET, FILM COATED ORAL at 08:42

## 2022-11-10 RX ADMIN — LEVOTHYROXINE SODIUM 88 MCG: 0.09 TABLET ORAL at 14:36

## 2022-11-10 RX ADMIN — FAMOTIDINE 20 MG: 20 TABLET ORAL at 20:03

## 2022-11-10 RX ADMIN — KETOROLAC TROMETHAMINE 15 MG: 15 INJECTION, SOLUTION INTRAMUSCULAR; INTRAVENOUS at 20:04

## 2022-11-10 RX ADMIN — SENNOSIDES AND DOCUSATE SODIUM 1 TABLET: 50; 8.6 TABLET ORAL at 20:03

## 2022-11-10 RX ADMIN — ONDANSETRON 4 MG: 2 INJECTION INTRAMUSCULAR; INTRAVENOUS at 10:05

## 2022-11-10 RX ADMIN — FENTANYL CITRATE 100 MCG: 50 INJECTION, SOLUTION INTRAMUSCULAR; INTRAVENOUS at 09:52

## 2022-11-10 RX ADMIN — DEXAMETHASONE SODIUM PHOSPHATE 4 MG: 4 INJECTION, SOLUTION INTRA-ARTICULAR; INTRALESIONAL; INTRAMUSCULAR; INTRAVENOUS; SOFT TISSUE at 10:45

## 2022-11-10 RX ADMIN — SODIUM CHLORIDE, POTASSIUM CHLORIDE, SODIUM LACTATE AND CALCIUM CHLORIDE: 600; 310; 30; 20 INJECTION, SOLUTION INTRAVENOUS at 11:34

## 2022-11-10 RX ADMIN — CELECOXIB 100 MG: 100 CAPSULE ORAL at 08:42

## 2022-11-10 RX ADMIN — HYDROMORPHONE HYDROCHLORIDE 0.4 MG: 0.2 INJECTION, SOLUTION INTRAMUSCULAR; INTRAVENOUS; SUBCUTANEOUS at 20:19

## 2022-11-10 RX ADMIN — ONDANSETRON 4 MG: 2 INJECTION INTRAMUSCULAR; INTRAVENOUS at 17:20

## 2022-11-10 RX ADMIN — ACETAMINOPHEN 975 MG: 325 TABLET, FILM COATED ORAL at 19:05

## 2022-11-10 RX ADMIN — SODIUM CHLORIDE, POTASSIUM CHLORIDE, SODIUM LACTATE AND CALCIUM CHLORIDE: 600; 310; 30; 20 INJECTION, SOLUTION INTRAVENOUS at 08:45

## 2022-11-10 RX ADMIN — DULOXETINE HYDROCHLORIDE 60 MG: 60 CAPSULE, DELAYED RELEASE PELLETS ORAL at 14:36

## 2022-11-10 RX ADMIN — MAGNESIUM OXIDE TAB 400 MG (241.3 MG ELEMENTAL MG) 800 MG: 400 (241.3 MG) TAB at 20:03

## 2022-11-10 RX ADMIN — BUPIVACAINE HYDROCHLORIDE 2.4 ML: 5 INJECTION, SOLUTION EPIDURAL; INTRACAUDAL at 10:05

## 2022-11-10 ASSESSMENT — ACTIVITIES OF DAILY LIVING (ADL)
ADLS_ACUITY_SCORE: 35
ADLS_ACUITY_SCORE: 20
ADLS_ACUITY_SCORE: 25
ADLS_ACUITY_SCORE: 21
ADLS_ACUITY_SCORE: 20

## 2022-11-10 NOTE — OP NOTE
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  Severe left knee pain  Type II IDDM    POSTOPERATIVE DIAGNOSIS   Same    PROCEDURE PERFORMED   left total knee arthroplasty, cruciate retaining, universal tibial baseplate, Megan triathlon    IMPLANTS  Bent triathlon #4 cruciate retaining cemented femoral component  Bent triathlon #5 universal tibial baseplate  Megan triathlon #5 x 9 mm CS polyinsert  Megan triathlon A 35 patellar dome    SURGEON  Arnold Peter MD     ASSISTANT   Luis Eduardo Jay, ANASTASIYA  PA-C assist required for patient positioning, soft tissue retraction, instrumentation assist, and patient's safety    ANESTHESIA  Choice      ESTIMATED BLOOD LOSS  Less than 20 cc  Tourniquet time 33 minutes at 225 mmHg    ANTIBIOTICS:  Ancef 2 g IVPB on induction  Ancef 3 g and 3 L for irrigation  Tobramycin PMMA    COMPLICATIONS   None.    Indications:  The patient is a  77 year-old with end-stage arthritis of the  knee and failed the necessary treatments required, meeting the guidelines for medical necessity for treatment with total knee replacement.  We discussed the surgical options as well as the nonoperative care in detail with the use of their history, physical examination, imaging studies, and joint model of a normal knee and a total knee replacement.  They have been provided with appropriate documentation and the hospital-based education guidebook.  After the patient and/or family member read the guidebook I answered their questions.  I instructed them to make an appointment to attend the free preoperative class on joint replacement at [facility].  They received a package containing antiseptic agents including chlorhexidine soap and washcloths for body bathing, and underwent nasal povidone iodine swabbing preoperatively.  The patient acknowledged they understood the risks, benefit and potential  benefits, alternatives, shortcomings, limitations and complications of operative and nonoperative treatment and informed consent for the following surgical procedure was obtained.  Valgus knee with 12 degree flexion contracture and severe tricompartmental degenerative arthrosis.  Aspirin preop      FINDINGS: Grade 4 degenerative changes were noted in all compartments with exposed bone and multiple osteophytes.    Specimens: Bone per routine TKA    DESCRIPTION OF PROCEDURE   After informed consent was obtained from the patient, the operative limb was marked, received IV antibiotics, was taken to the operating room, received anesthetic, and was positioned.  Labs checked.  Consent signed.  Leg marked.  Chart checked.  All pressure points were well padded.      Bump placed under the ipsilateral buttock.  Lower extremity suspended prepped x8 minutes, alcohol washed and ChloraPrep in its entirety including foot and ankle.  Stockinette.  Ioban drapes placed on ALL exposed skin.  Padded tourniquet cuff applied.    We paused for patient identification, limb and procedure confirmation.    After gravity exsanguination tourniquet cuff inflated to 225 mmHg with the knee flexed.  Straight anterior incision made.  Medial parapatellar capsulotomy.  Tricompartmental degenerative change seen.  Synovectomy.  Patella everted.  Lateral partial facetectomy.  Patella measured and osteotomized parallel to the quad tendon/patellar ligament origin.  Sized to a 35 mm.  Patellar plate applied protecting the bone cut.    This was followed by the use of [employer] triathlon jig technique to perform the distal femoral cut.  The femoral cut was made in 3 degrees of valgus with an 8 mm resection taking into account kerf.  Alignment and rotation, and distal resection  were compared to plan and were in agreement.  The cut was made at the level of the trochlear groove.  Distal femur measured to a #4 femoral component.  The axis was determined in the  transverse plane and the 4-in-1 cutting block applied in proper rotation alignment in the transverse plane.  Cuts were optimal.    The external tibial alignment jig was applied to the lower extremity referencing for mm below the high side medially and proper posterior inclination.  The tibial cut was subsequently performed protecting the PCL insertion.  I measured the extension gap and assured that it was rectangular and balanced.  Trial reduction with appropriate components.  The knee extended fully, flexed fully, with appropriate PCL tension, and anatomic patellofemoral tracking.    This was followed by further exposure of the Tibia. The tibia was then sized #5 and marked in appropriate rotation and the Tibia prepared. Lamina spreaders were used to test the balance of the flexion and extension gaps. The posterior osteophytes were cleared and meniscal remnants removed.  Analgesic/anesthetic solution was injected in 0.5 cc aliquots throughout the pericapsular tissue, subperiosteal, VMO and subcutaneous tissue, with care posteriorly.    I then did a trial reduction and made sure that with the final trial the knee would come to full extension but not recurvatum, that it was stable in extension to varus/valgus stressing as well as stable in mid flexion and full flexion.  The tibial component keel was drilled and punched relative to the midportion of the tibial tubercle and proper rotational alignment in the transverse plane.  There was no gapping, no liftoff of the components. The patella tracked well, no tilt or lift off.   I therefore removed the components.  Bleeders and potential bleeders including the inferior lateral geniculate were cauterized.  The knee was then copiously thoroughly irrigated with multiple liters of antibiotic laden irrigant cleaned washed and dried.    I mixed 80 g medium viscosity Simplex tobramycin laden PMMA cement x60 seconds, and PMMA was applied to the undersurface of all components and  all exposed cancellous bone cuts.  I then impacted the Tibia and removed excess cement, impacted the poly insert and confirmed that it was seated and locked. I then cemented and impacted the femur and removed excess cement, lastly the patella was cemented and clamped. Held in 20  of flexion with axial longitudinal compression applied, absolutely still.  Excessive cement was removed.  Tourniquet cuff deflated at 7 minutes cement time.  4 minutes soak with 3.5% aqueous Betadine solution without reaction.  Thorough lavage with multiple liters of antibiotic laden irrigant.    After the cement hardened,  I inspected and removed any retained cement fragments, thoroughly pulse lavaged. I obtained hemostasis and rechecked potential bleeders..   Thorough lavage. Drain was not placed. I closed the wound, in flexion,  with #1 Vicryl in interrupted figure-of-eight fasion on the quadriceps, oversewed this with 0 Vicryl,2-0 vicryl was used to approximate the subcutaneous tissue, and then standard skin closure. Standard dressing was applied, and patient was taken to recovery in stable condition.      COMMENTS: Balanced stable TKA with intact PCL and anatomic patellofemoral tracking without lateral retinacular release     Implant Name Type Inv. Item Serial No.  Lot No. LRB No. Used Action   IMP COMP PATELLA HOWM TRI 35 10MM 5551-L-350 - [id number] Total Joint Component/Insert IMP COMP PATELLA HOWM TRI 35 10MM 5551-L-350  [employer] [id number] Left 1 Implanted   INSERT TIB 5 9MM KN PE CNDRL STAB BRNG TRTHLN STRL LF - [id number] Total Joint Component/Insert INSERT TIB 5 9MM KN PE CNDRL STAB BRNG TRTHLN STRL LF  [employer] [place] [employer] [id number] Left 1 Implanted   IMP INSERT BASEPLATE TIBIAL HOWM TRI 5 5521-B-500 - [id number] Total Joint Component/Insert IMP INSERT BASEPLATE TIBIAL HOWM TRI 5 5521-B-500  [employer] [place] [employer] [id number] Left 1 Implanted   IMP COMP FEM STRK TRIATHLN CR LT 4 5510-F-401 - [id number] Total Joint  Component/Insert IMP COMP FEM STRK TRIATHLN CR LT 4 5510-F-401  [employer] [id number] Left 1 Implanted   BONE CEMENT SIMPLEX W/TOBRAMYCIN 6197-9-001 - [id number] Cement, Bone BONE CEMENT SIMPLEX W/TOBRAMYCIN 6197-9-001  [employer] [id number] Left 2 Implanted       Arnold Peter MD

## 2022-11-10 NOTE — ANESTHESIA PROCEDURE NOTES
Adductor canal Procedure Note    Pre-Procedure   Staff -        Anesthesiologist:  Jodi Ortez MD       Performed By: anesthesiologist       Location: pre-op       Procedure Start/Stop Times: 11/10/2022 9:53 AM and 11/10/2022 9:57 AM       Pre-Anesthestic Checklist: patient identified, IV checked, site marked, risks and benefits discussed, informed consent, monitors and equipment checked, pre-op evaluation, at physician/surgeon's request and post-op pain management  Timeout:       Correct Patient: Yes        Correct Procedure: Yes        Correct Site: Yes        Correct Position: Yes        Correct Laterality: Yes        Site Marked: Yes  Procedure Documentation  Procedure: Adductor canal       Laterality: left       Patient Position: supine       Patient Prep/Sterile Barriers: sterile gloves, mask       Skin prep: Chloraprep       Needle Type: short bevel       Needle Gauge: 21.        Needle Length (Inches): 4        Ultrasound guided       1. Ultrasound was used to identify targeted nerve, plexus, vascular marker, or fascial plane and place a needle adjacent to it in real-time.       2. Ultrasound was used to visualize the spread of anesthetic in close proximity to the above referenced structure.       3. A permanent image is entered into the patient's record.       4. The visualized anatomic structures appeared normal.       5. There were no apparent abnormal pathologic findings.    Assessment/Narrative         The placement was negative for: blood aspirated, painful injection and site bleeding       Paresthesias: No.       Bolus given via needle..        Secured via.        Insertion/Infusion Method: Single Shot       Complications: none       Injection made incrementally with aspirations every 5 mL.    Medication(s) Administered   Bupivacaine 0.5% PF (Infiltration) - Infiltration   15 mL - 11/10/2022 9:53:00 AM  Medication Administration Time: 11/10/2022 9:53 AM      FOR [facility] ([facility])  "ONLY:   Pain Team Contact information: please page the Pain Team Via [facility]. Search \"Pain\". During daytime hours, please page the attending first. At night please page the resident first.    "

## 2022-11-10 NOTE — PROGRESS NOTES
11/10/22 1620   Appointment Info   Signing Clinician's Name / Credentials (PT) Heather Sanabria PT   Quick Adds   Quick Adds Certification   Living Environment   People in Home alone   Current Living Arrangements house  (2 level [facility])   Home Accessibility no concerns;other (see comments)  (stair lift to bedroom on 2nd level; no stairs to enter)   Transportation Anticipated family or friend will provide   Living Environment Comments dtr will be available to assist at d/c   Self-Care   Equipment Currently Used at Home none;other (see comments)  (has cane, rw, 4WW available)   Activity/Exercise/Self-Care Comment independent ADL's/IADL's; has friend to assist with cleaning   General Information   Onset of Illness/Injury or Date of Surgery 11/10/22   Referring Physician Dr. Peter   Patient/Family Therapy Goals Statement (PT) play with my grandkids   Pertinent History of Current Problem (include personal factors and/or comorbidities that impact the POC) s/p L TKA 11/10; PMH of Graves disease, perip. neurop. due to DM, HTN, CAD, HLD, tremor, GERD, gastric bypass   Weight-Bearing Status - LLE weight-bearing as tolerated   Cognition   Affect/Mental Status (Cognition) WFL   Orientation Status (Cognition) oriented x 4   Follows Commands (Cognition) WFL   Range of Motion (ROM)   ROM Comment L knee AROM in supine 10-94   Strength (Manual Muscle Testing)   Strength Comments decreased L knee strength s/p TKA   Bed Mobility   Bed Mobility no deficits identified   Transfers   Transfers sit-stand transfer   Sit-Stand Transfer   Sit-Stand [place] (Transfers) contact guard;verbal cues;nonverbal cues (demo/gesture)   Assistive Device (Sit-Stand Transfers) walker, front-wheeled   Gait/Stairs (Locomotion)   [place] Level (Gait) contact guard;verbal cues   Assistive Device (Gait) walker, front-wheeled   Distance in Feet (Required for LE Total Joints) 10   Pattern (Gait) step-to;3-point   Deviations/Abnormal Patterns (Gait)  antalgic;gait speed decreased;stride length decreased;weight shifting decreased   Clinical Impression   Criteria for Skilled Therapeutic Intervention Yes, treatment indicated   PT Diagnosis (PT) impaired functional mobility   Influenced by the following impairments decreased rom, strength, balance; pain   Functional limitations due to impairments transfers, amb.   Clinical Presentation (PT Evaluation Complexity) Stable/Uncomplicated   Clinical Presentation Rationale pt presents as medically diagnosed   Clinical Decision Making (Complexity) low complexity   Planned Therapy Interventions (PT) balance training;gait training;home exercise program;patient/family education;ROM (range of motion);strengthening;stretching;transfer training   Anticipated Equipment Needs at Discharge (PT) walker, rolling   Risk & Benefits of therapy have been explained evaluation/treatment results reviewed;patient;daughter   PT Total Evaluation Time   PT Eval, Low Complexity Minutes (08147) 10   Plan of Care Review   Plan of Care Reviewed With patient;daughter   Therapy Certification   Start of care date 11/10/22   Certification date from 11/10/22   Certification date to 11/17/22   Medical Diagnosis s/p L TKA   Physical Therapy Goals   PT Frequency 2x/day   PT Predicted Duration/Target Date for Goal Attainment 11/17/22   PT Goals Transfers;Gait;PT Goal 1   PT: Transfers Modified independent;Sit to/from stand;Assistive device   PT: Gait Modified independent;Rolling walker;150 feet   PT: Goal 1 Pt will demonstrate TKA HEP independently with handout.   PT Discharge Planning   PT Plan gait with rw, TKA HEP   PT Discharge Recommendation (DC Rec) home with assist;home with outpatient physical therapy   PT Rationale for DC Rec dtr to assist with IADL's as needed   PT Brief overview of current status cga amb. with rw   Total Session Time   Total Session Time (sum of timed and untimed services) 10   [facility]  OUTPATIENT  PHYSICAL THERAPY EVALUATION  PLAN OF TREATMENT FOR OUTPATIENT REHABILITATION  (COMPLETE FOR INITIAL CLAIMS ONLY)  Patient's Last Name, First Name, M.I.  YOB: 1945  Belgica Salvador                        Provider's Name  [facility] Medical Record No.  [id number]                             Onset Date:  11/10/22   Start of Care Date:  11/10/22   Type:     _X_PT   ___OT   ___SLP Medical Diagnosis:  s/p L TKA              PT Diagnosis:  impaired functional mobility Visits from SOC:  1     See note for plan of treatment, functional goals and certification details    I CERTIFY THE NEED FOR THESE SERVICES FURNISHED UNDER        THIS PLAN OF TREATMENT AND WHILE UNDER MY CARE     (Physician co-signature of this document indicates review and certification of the therapy plan).

## 2022-11-10 NOTE — INTERVAL H&P NOTE
"I have reviewed the surgical (or preoperative) H&P that is linked to this encounter, and examined the patient. There are no significant changes    Clinical Conditions Present on Arrival:  Clinically Significant Risk Factors Present on Admission          # Hyperkalemia: Highest K = 5.7 mmol/L (Ref range: 3.4-5.3) in last 2 days, will monitor as appropriate     # Hypomagnesemia: Lowest Mg = 1.5 mg/dL (Ref range: 1.7-2.3) in last 2 days, will replace as needed       # Overweight: Estimated body mass index is 28.89 kg/m  as calculated from the following:    Height as of this encounter: 1.676 m (5' 6\").    Weight as of this encounter: 81.2 kg (179 lb).       "

## 2022-11-10 NOTE — PROGRESS NOTES
Patient vital signs are at baseline: Yes  Patient able to ambulate as they were prior to admission or with assist devices provided by therapies during their stay:  No,  Reason:  Due to walk - still numb  Patient MUST void prior to discharge:  No,  Reason:  Due to void  Patient able to tolerate oral intake:  Yes  Pain has adequate pain control using Oral analgesics:  Yes  Does patient have an identified :  Yes  Has goal D/C date and time been discussed with patient:  Yes     Pleasant A&O x4. Able to make needs known. Denies pain. Able to move legs now but still numb in lower extremities. Denies urge to void. Last bladder scan 170ml. Tolerating PO intake. Surgical dressing clean, dry and intact.

## 2022-11-10 NOTE — ANESTHESIA PROCEDURE NOTES
"Intrathecal Procedure Note    Pre-Procedure   Staff -        Anesthesiologist:  Jodi Ortez MD       Performed By: anesthesiologist       Location: OR       Procedure Start/Stop Times: 11/10/2022 10:05 AM and 11/10/2022 10:15 AM       Pre-Anesthestic Checklist: patient identified, IV checked, risks and benefits discussed, informed consent, monitors and equipment checked, pre-op evaluation and at physician/surgeon's request  Timeout:       Correct Patient: Yes        Correct Procedure: Yes        Correct Site: Yes        Correct Position: Yes   Procedure Documentation  Procedure: intrathecal       Patient Position: sitting       Patient Prep/Sterile Barriers: sterile gloves, mask, patient draped       Skin prep: Chloraprep       Insertion Site: L2-3. (midline approach).       Needle Gauge: 24.        Spinal Needle Type: Quincke       Introducer used       Introducer: 20 G       # of attempts: 3 and  # of redirects:     Assessment/Narrative         Paresthesias: No.       CSF fluid: clear.       Opening pressure was cmH2O while  Sitting.      Medication(s) Administered   0.5% Bupivacaine PF (Intrathecal) - Intrathecal   2.4 mL - 11/10/2022 10:05:00 AM  Medication Administration Time: 11/10/2022 10:05 AM     Comments:  Multiple attempts at L2/3 and L3/4 with redirections midline and paramedian.      FOR [facility] ([facility]) ONLY:   Pain Team Contact information: please page the Pain Team Via [employer]. Search \"Pain\". During daytime hours, please page the attending first. At night please page the resident first.    "

## 2022-11-10 NOTE — ANESTHESIA POSTPROCEDURE EVALUATION
Patient: Belgica Salvador    Procedure: Procedure(s):  LEFT TOTAL KNEE ARTHROPLASTY       Anesthesia Type:  Spinal    Note:     Postop Pain Control: Uneventful            Sign Out: Well controlled pain   PONV: No   Neuro/Psych: Uneventful            Sign Out: Acceptable/Baseline neuro status   Airway/Respiratory: Uneventful            Sign Out: Acceptable/Baseline resp. status   CV/Hemodynamics: Uneventful            Sign Out: Acceptable CV status; No obvious hypovolemia; No obvious fluid overload   Other NRE: NONE   DID A NON-ROUTINE EVENT OCCUR? No           Last vitals:  Vitals Value Taken Time   /65 11/10/22 1250   Temp     Pulse 54 11/10/22 1256   Resp 24 11/10/22 1256   SpO2 94 % 11/10/22 1256   Vitals shown include unvalidated device data.    Electronically Signed By: Jodi Ortez MD  November 10, 2022  1:31 PM

## 2022-11-10 NOTE — ANESTHESIA CARE TRANSFER NOTE
Patient: Belgica Salvador    Procedure: Procedure(s):  LEFT TOTAL KNEE ARTHROPLASTY       Diagnosis: Osteoarthritis of left knee [M17.12]  Diagnosis Additional Information: No value filed.    Anesthesia Type:   Spinal     Note:  Anesthesia Care Transfer Notewriter  Vitals:  Vitals Value Taken Time   /63    Temp 97.5    Pulse 59 11/10/22 1206   Resp 13 11/10/22 1206   SpO2 97 % 11/10/22 1206   Vitals shown include unvalidated device data.    Electronically Signed By: FELIZ MUNOZ CRNA  November 10, 2022  12:08 PM

## 2022-11-10 NOTE — CONSULTS
[facility]  Consult Note - Hospitalist Service  Date of Admission:  11/10/2022  Consult Requested by: Dr. Peter  Reason for Consult: Medical issues     Assessment & Plan   Belgica Salvadro is a 77 year old female admitted on 11/10/2022. She has a past medical history of Graves' disease, hypothyroidism, peripheral neuropathy 2/2 diabetes, hypertension, status post left total knee arthroplasty by Dr. Peter on 11/10/2022 with an estimated blood loss of 20ccs. internal medicine consulted for chronic issues.      S/P knee surgery    Assessment: a bit hypertensive likely in setting of pain, otherwise stable.     Plan:   -DVT prophylaxis and pain management per primary service  -We will monitor for complications including respiratory concerns, urinary retention, ileus, skin reactions, infections, medication side effects, etc.  -Incentive spirometry   -discharge disposition per primary service  -continue duloxetine  -hold buspar for tonight given prior hx of hallucinations - resume POD1      Graves' Disease    Hypothyroidism (postablative)    Assessment: on levothyroxine 88mcg daily    Plan: -continue      CAD    Assessment: on daily baby asa. 11/4 nuclear medicine test was WNL.    Plan: - hold ASA; dvt ppdx per surgery    - monitor for any chest pain; if so, then Stat Troponin and EKG      Diabetic Peripheral Neuropathy    Type 2 diabetes mellitus (H)    Assessment: on metformin 1000mg bid. Renal function wnl.     Plan: - continue metformin, hypoglycemia protocol      Essential hypertension    Assessment: on propranolol 160mg SR; pressures likely a bit hypertensive in setting of pain but acceptable for inpatient setting; SBPs 150s postoperatively; no headaches or vision changes    Plan: -continue        Hyperlipidemia LDL goal <100    Assessment: on statin    Plan: -continue        The patient's care was discussed with the Bedside Nurse, Patient and Patient's Family.    MD JEFFY Coon  "[facility]  Securely message with the [employer] Web Console (learn more here)  Text page via [facility] Paging/[facility]       Hospitalist Service    Clinically Significant Risk Factors Present on Admission         # Hyperkalemia: Highest K = 5.7 mmol/L (Ref range: 3.4-5.3) in last 2 days, will monitor as appropriate     # Hypomagnesemia: Lowest Mg = 1.5 mg/dL (Ref range: 1.7-2.3) in last 2 days, will replace as needed           # Overweight: Estimated body mass index is 28.89 kg/m  as calculated from the following:    Height as of this encounter: 1.676 m (5' 6\").    Weight as of this encounter: 81.2 kg (179 lb).           ______________________________________________________________________    Chief Complaint   Knee pain     History is obtained from the patient    History of Present Illness   Belgica Salvador is a 77 year old female who has a past medical history of Graves' disease, hypothyroidism, peripheral neuropathy 2/2 diabetes, hypertension, status post left total knee arthroplasty by Dr. Peter on 11/10/2022 with an estimated blood loss of 20ccs. internal medicine consulted for chronic issues.    On evaluation, Patient had no new symptoms or concerns.  Her pain was controlled.  She has no particular questions.  She is on room air.  Patient carlos was at bedside as well.  We discussed home medications.    She is a former smoker. No current EtOH use.     Review of Systems   The 10 point Review of Systems is negative other than noted in the HPI or here.      Past Medical History    I have reviewed this patient's medical history and updated it with pertinent information if needed.   Past Medical History:   Diagnosis Date     Anxiety      Arthritis      Coronary artery disease      Diabetes (H)      Gastroesophageal reflux disease      Graves disease      Hypertension      Migraine      Muscle spasm      Renal disease      Thrombosis      Thyroid disease        Past Surgical History   I have " reviewed this patient's surgical history and updated it with pertinent information if needed.  Past Surgical History:   Procedure Laterality Date     ANKLE SURGERY Right     hardware in place     APPENDECTOMY       CERVICAL FUSION N/A 3/20/2019    Procedure: LEFT CERVICAL 7 - THORACIC 1 ANTERIOR CERVICAL DECOMPRESSION FUSION REMOVAL OF PLATE AT CERVICAL 5-6 CERVICAL 6-7;  Surgeon: Richard Talley MD;  Location: [facility];  Service: Spine     ESOPHAGOSCOPY, GASTROSCOPY, DUODENOSCOPY (EGD), COMBINED N/A 9/24/2017    Procedure: ESOPHAGOGASTRODUODENOSCOPY (EGD) with biopsies;  Surgeon: Jorge Palm MD;  Location: [facility] GI;  Service:      HC REVISE MEDIAN N/CARPAL TUNNEL SURG      Description: Neuroplasty Decompression Median Nerve At Carpal Tunnel;  Proc Date: 01/01/2002;     IR AORTIC ARCH 4 VESSEL ANGIOGRAM  5/22/2012     IR CAROTID ANGIOGRAM  5/22/2012     IR CAROTID ANGIOGRAM  5/22/2012     JOINT REPLACEMENT Right 2009    knee     OTHER SURGICAL HISTORY  2013    spleenectomy     OTHER SURGICAL HISTORY Left     melanoma ear     OTHER SURGICAL HISTORY      bladder interstimstage 1 & 2     PANCREAS SURGERY N/A     tail and part of body removed     IN IMPLANT PERIPH/GASTRIC NEUROSTIM/ N/A 9/28/2017    Procedure: INTERSTIM STAGE 2;  Surgeon: Catarino Hurt MD;  Location: [facility];  Service: Gynecology     IN PARTIAL EXCISION THYROID,UNILAT      Description: Thyroid Surgery Sub-Total Thyroidectomy;  Recorded: 09/13/2008;  Comments: for grave's     RELEASE CARPAL TUNNEL Left 06/2018     Z CERV SPINE FUSN,ANTER,BELOW C2      Description: Cervical Vertebral Fusion;  Proc Date: 05/01/2001;  Comments: c5-c7     ZZ GASTRIC BYPASS,OBESE<100CM JAROD-EN-Y      Description: Gastric Surgery For Morbid Obesity Bypass With Jarod-en-Y;  Proc Date: 09/01/2004;     [facility] KNEE SCOPE,AID POST CRUC REPAIR      Description: Knee Arthroscopy With Posterior Cruciate Ligament Repair;  Recorded:  09/13/2008;       Social History   I have reviewed this patient's social history and updated it with pertinent information if needed.  Social History     Tobacco Use     Smoking status: Former     Smokeless tobacco: Never   Substance Use Topics     Alcohol use: Not Currently     Alcohol/week: 1.0 - 2.0 standard drink     Types: 1 - 2 Standard drinks or equivalent per week     Drug use: No       Family History   I have reviewed this patient's family history and updated it with pertinent information if needed.  Family History   Problem Relation Age of Onset     Breast Cancer Paternal Aunt        Medications   I have reviewed this patient's current medications    Allergies   Allergies   Allergen Reactions     Glucosamine Anaphylaxis and Rash     Cardiac arrest     Iodine Anaphylaxis     Patient can uses skin products such as betadine     Shellfish Containing Products [Shellfish-Derived Products] Anaphylaxis and Hives     Amitriptyline Other (See Comments)     hallucinations     Buspirone Other (See Comments)     Hallucinations     Ibuprofen Hives     Nsaids      Tramadol Other (See Comments)     hallucinations     Aleve [Naproxen] Hives and Rash     Chlorpheniramine Rash     Pseudoephedrine Rash       Physical Exam   Vital Signs: Temp: 97.6  F (36.4  C) Temp src: Oral BP: (!) 155/67 Pulse: 58   Resp: 17 SpO2: 92 % O2 Device: None (Room air) Oxygen Delivery: 8 LPM  Weight: 179 lbs 0 oz      GENERAL:  Alert, appears comfortable, in no acute distress, appears stated age   HEAD:  Normocephalic, without obvious abnormality, atraumatic   EYES:  PERRL, conjunctiva/corneas clear, no scleral icterus, EOM's intact   NOSE: Nares normal, septum midline, mucosa normal, no drainage   THROAT: Lips, mucosa, and tongue normal; teeth and gums normal, mouth moist   NECK: Supple, symmetrical, trachea midline   BACK:   Symmetric, no curvature, ROM normal   LUNGS:   Clear to auscultation bilaterally, no rales, rhonchi, or wheezing,  "symmetric chest rise on inhalation, respirations unlabored, on room air    CHEST WALL:  No tenderness or conspicuous deformity   HEART:  Regular rate and rhythm, S1 and S2 normal, no murmur, rub, or gallop, no conspicuous jugular venous distention noted, peripheral pulses intact    ABDOMEN:   Soft, non-tender, bowel sounds auscultated in all four quadrants, no masses, no organomegaly, no rebound or guarding   EXTREMITIES: S/p left TKA; wrapped and c/d/i; Extremities normal, atraumatic, no cyanosis or edema    SKIN: Dry to touch, no exanthems in the visualized areas   NEURO: Alert, oriented x3, moves all four extremities of their own volition    PSYCH: Cooperative and behavior is appropriate          Data   Results for orders placed or performed during the hospital encounter of 11/10/22 (from the past 24 hour(s))   POC US Guidance Needle Placement    Narrative    Ultrasound was performed as guidance to an anesthesia procedure.  Click   \"PACS images\" hyperlink below to view any stored images.  For specific   procedure details, view procedure note authored by anesthesia.   CBC with platelets   Result Value Ref Range    WBC Count 12.7 (H) 4.0 - 11.0 10e3/uL    RBC Count 4.14 3.80 - 5.20 10e6/uL    Hemoglobin 13.4 11.7 - 15.7 g/dL    Hematocrit 39.1 35.0 - 47.0 %    MCV 94 78 - 100 fL    MCH 32.4 26.5 - 33.0 pg    MCHC 34.3 31.5 - 36.5 g/dL    RDW 14.2 10.0 - 15.0 %    Platelet Count 348 150 - 450 10e3/uL   Potassium   Result Value Ref Range    Potassium 4.3 3.5 - 5.0 mmol/L   Creatinine   Result Value Ref Range    Creatinine 0.79 0.60 - 1.10 mg/dL    GFR Estimate 77 >60 mL/min/1.73m2   INR   Result Value Ref Range    INR 0.97 0.85 - 1.15   Glucose by meter   Result Value Ref Range    GLUCOSE BY METER POCT 142 (H) 70 - 99 mg/dL   Glucose by meter   Result Value Ref Range    GLUCOSE BY METER POCT 126 (H) 70 - 99 mg/dL     "

## 2022-11-10 NOTE — ANESTHESIA PREPROCEDURE EVALUATION
Anesthesia Pre-Procedure Evaluation    Patient: Belgica Salvador   MRN: [id number] : 1945        Procedure : Procedure(s):  LEFT TOTAL KNEE ARTHROPLASTY          Past Medical History:   Diagnosis Date     Anxiety      Arthritis      Coronary artery disease      Diabetes (H)      Gastroesophageal reflux disease      Graves disease      Hypertension      Migraine      Muscle spasm      Renal disease      Thrombosis      Thyroid disease       Past Surgical History:   Procedure Laterality Date     ANKLE SURGERY Right     hardware in place     APPENDECTOMY       CERVICAL FUSION N/A 3/20/2019    Procedure: LEFT CERVICAL 7 - THORACIC 1 ANTERIOR CERVICAL DECOMPRESSION FUSION REMOVAL OF PLATE AT CERVICAL 5-6 CERVICAL 6-7;  Surgeon: Richard Talley MD;  Location: [facility] OR;  Service: Spine     ESOPHAGOSCOPY, GASTROSCOPY, DUODENOSCOPY (EGD), COMBINED N/A 2017    Procedure: ESOPHAGOGASTRODUODENOSCOPY (EGD) with biopsies;  Surgeon: Jorge Palm MD;  Location: [facility] GI;  Service:      HC REVISE MEDIAN N/CARPAL TUNNEL SURG      Description: Neuroplasty Decompression Median Nerve At Carpal Tunnel;  Proc Date: 2002;     IR AORTIC ARCH 4 VESSEL ANGIOGRAM  2012     IR CAROTID ANGIOGRAM  2012     IR CAROTID ANGIOGRAM  2012     JOINT REPLACEMENT Right 2009    knee     OTHER SURGICAL HISTORY  2013    spleenectomy     OTHER SURGICAL HISTORY Left     melanoma ear     OTHER SURGICAL HISTORY      bladder interstimstage 1 & 2     PANCREAS SURGERY N/A     tail and part of body removed     [place] IMPLANT PERIPH/GASTRIC NEUROSTIM/ N/A 2017    Procedure: INTERSTIM STAGE 2;  Surgeon: Catairno Hurt MD;  Location: [facility] OR;  Service: Gynecology     [place] PARTIAL EXCISION THYROID,UNILAT      Description: Thyroid Surgery Sub-Total Thyroidectomy;  Recorded: 2008;  Comments: for grave's     RELEASE CARPAL TUNNEL Left 2018     ZZC CERV SPINE FUSN,ANTER,BELOW C2       Description: Cervical Vertebral Fusion;  Proc Date: 05/01/2001;  Comments: c5-c7     [facility] GASTRIC BYPASS,OBESE<100CM AGUS-EN-Y      Description: Gastric Surgery For Morbid Obesity Bypass With Agus-en-Y;  Proc Date: 09/01/2004;     [facility] KNEE SCOPE,AID POST CRUC REPAIR      Description: Knee Arthroscopy With Posterior Cruciate Ligament Repair;  Recorded: 09/13/2008;      Allergies   Allergen Reactions     Glucosamine Anaphylaxis and Rash     Cardiac arrest     Iodine Anaphylaxis     Shellfish Containing Products [Shellfish-Derived Products] Anaphylaxis and Hives     Amitriptyline Other (See Comments)     hallucinations     Buspirone Other (See Comments)     Hallucinations     Ibuprofen Hives     Nsaids      Tramadol Other (See Comments)     hallucinations     Aleve [Naproxen] Hives and Rash     Chlorpheniramine Rash     Pseudoephedrine Rash      Social History     Tobacco Use     Smoking status: Former     Smokeless tobacco: Never   Substance Use Topics     Alcohol use: Not Currently     Alcohol/week: 1.0 - 2.0 standard drink     Types: 1 - 2 Standard drinks or equivalent per week      Wt Readings from Last 1 Encounters:   11/10/22 81.2 kg (179 lb)        Anesthesia Evaluation            ROS/MED HX  ENT/Pulmonary:  - neg pulmonary ROS     Neurologic:  - neg neurologic ROS     Cardiovascular: Comment: Stess test negative 11/4/22  ECHO 11/7/22:  LVEF 55-60%  RVSP 33    (+) hypertension-----    METS/Exercise Tolerance:     Hematologic:  - neg hematologic  ROS     Musculoskeletal:   (+) arthritis,     GI/Hepatic: Comment: S/p gastric bypass    (+) GERD,     Renal/Genitourinary: Comment: Hyperkalemia: unknown origin, normal today      Endo:     (+) type II DM, thyroid problem,     Psychiatric/Substance Use:  - neg psychiatric ROS     Infectious Disease:  - neg infectious disease ROS     Malignancy:       Other:            Physical Exam    Airway  airway exam normal           Respiratory Devices and Support         Dental  no  notable dental history         Cardiovascular             Pulmonary   pulmonary exam normal                OUTSIDE LABS:  CBC:   Lab Results   Component Value Date    WBC 12.7 (H) 11/10/2022    WBC 12.9 (H) 11/08/2022    HGB 13.4 11/10/2022    HGB 13.8 11/08/2022    HCT 39.1 11/10/2022    HCT 40.4 11/08/2022     11/10/2022     11/08/2022     BMP:   Lab Results   Component Value Date     11/08/2022    POTASSIUM 4.3 11/10/2022    POTASSIUM 4.7 11/09/2022    CHLORIDE 99 11/08/2022    CO2 27 11/08/2022    BUN 18.1 11/08/2022    CR 0.79 11/10/2022    CR 0.84 11/08/2022     (H) 11/09/2022    GLC 80 11/09/2022     COAGS:   Lab Results   Component Value Date    INR 0.97 11/10/2022     POC: No results found for: BGM, HCG, HCGS  HEPATIC: No results found for: ALBUMIN, PROTTOTAL, ALT, AST, GGT, ALKPHOS, BILITOTAL, BILIDIRECT, ARLEEN  OTHER:   Lab Results   Component Value Date    A1C 6.1 03/21/2019    OPHELIA 9.1 11/08/2022    MAG 1.5 (L) 11/08/2022       Anesthesia Plan    ASA Status:  3      Anesthesia Type: Spinal.              Consents    Anesthesia Plan(s) and associated risks, benefits, and realistic alternatives discussed. Questions answered and patient/representative(s) expressed understanding.     - Discussed: Risks, Benefits and Alternatives for BOTH SEDATION and the PROCEDURE were discussed     - Discussed with:  Patient         Postoperative Care    Pain management: Peripheral nerve block (Continuous).        Comments:    Other Comments: Adductor block            Jodi Ortez MD

## 2022-11-10 NOTE — PHARMACY-ADMISSION MEDICATION HISTORY
Pharmacy Note - Admission Medication History    Pertinent Provider Information:    ______________________________________________________________________    Prior To Admission (PTA) med list completed and updated in EMR.       PTA Med List   Medication Sig Last Dose     acetaminophen (TYLENOL 8 HOUR ARTHRITIS PAIN) 650 MG CR tablet Take 1,300 mg by mouth 2 times daily 11/10/2022     acetaminophen (TYLENOL 8 HOUR ARTHRITIS PAIN) 650 MG CR tablet Take 650 mg by mouth daily (before supper) 11/9/2022     aspirin (ASA) 325 MG EC tablet Take 325 mg by mouth daily 11/8/2022     baclofen (LIORESAL) 10 MG tablet Take 10 mg by mouth 3 times daily 11/9/2022     busPIRone (BUSPAR) 10 MG tablet Take 10 mg by mouth 2 times daily 11/9/2022 at AM     CALCIUM-MAGNESIUM-ZINC ORAL [CALCIUM-MAGNESIUM-ZINC ORAL] Take 1 tablet by mouth daily. 11/9/2022 at Am     celecoxib (CELEBREX) 100 MG capsule Take 100 mg by mouth daily Take 1 capsule the day before surgery, skip day of surgery, resume day after surgery until gone 11/9/2022 at AM     DULoxetine (CYMBALTA) 60 MG capsule Take 60 mg by mouth daily 11/9/2022 at AM     EPINEPHrine (EPIPEN 2-SHERRI) 0.3 mg/0.3 mL atIn [EPINEPHRINE (EPIPEN 2-SHERRI) 0.3 MG/0.3 ML ATIN] Inject 0.3 mg into the shoulder, thigh, or buttocks once as needed (Anaphylaxis).      famotidine (PEPCID) 20 MG tablet Take 20 mg by mouth 2 times daily 11/9/2022     ferrous sulfate 325 (65 FE) MG tablet [FERROUS SULFATE 325 (65 FE) MG TABLET] Take 1 tablet by mouth daily with breakfast.        11/9/2022 at AM     fluticasone (FLONASE) 50 mcg/actuation nasal spray Spray 2 sprays into both nostrils daily as needed      folic acid (FOLVITE) 1 MG tablet [FOLIC ACID (FOLVITE) 1 MG TABLET] Take 1 mg by mouth daily. 11/9/2022     ketotifen (ZADITOR) 0.025 % ophthalmic solution Place 1 drop into both eyes 2 times daily as needed      levothyroxine (SYNTHROID, LEVOTHROID) 88 MCG tablet [LEVOTHYROXINE (SYNTHROID, LEVOTHROID) 88 MCG  TABLET] Take 88 mcg by mouth Daily at 6:00 am.  11/9/2022 at AM     loperamide (IMODIUM) 2 MG capsule Take 2 mg by mouth 2 times daily as needed      magnesium oxide (MAG-OX) 400 mg (241.3 mg magnesium) tablet [MAGNESIUM OXIDE (MAG-OX) 400 MG (241.3 MG MAGNESIUM) TABLET] Take 800 mg by mouth 2 (two) times a day. 11/9/2022     meclizine (ANTIVERT) 12.5 MG tablet Take 12.5-25 mg by mouth 3 times daily as needed for dizziness      metFORMIN (GLUCOPHAGE) 500 MG tablet [METFORMIN (GLUCOPHAGE) 500 MG TABLET] Take 1,000 mg by mouth 2 (two) times a day with meals. 11/9/2022     multivitamin with minerals (THERA-M) 9 mg iron-400 mcg Tab tablet [MULTIVITAMIN WITH MINERALS (THERA-M) 9 MG IRON-400 MCG TAB TABLET] Take 1 tablet by mouth daily. 11/9/2022     OMEGA-3/DHA/EPA/FISH OIL (FISH OIL-OMEGA-3 FATTY ACIDS) 300-1,000 mg capsule [OMEGA-3/DHA/EPA/FISH OIL (FISH OIL-OMEGA-3 FATTY ACIDS) 300-1,000 MG CAPSULE] Take 1 g by mouth daily. 10/27/2022     omeprazole (PRILOSEC) 20 MG capsule [OMEPRAZOLE (PRILOSEC) 20 MG CAPSULE] Take 1 capsule (20 mg total) by mouth 2 (two) times a day before meals. 11/9/2022 at AM     propranolol (INDERAL LA) 160 mg SR capsule [PROPRANOLOL (INDERAL LA) 160 MG SR CAPSULE] Take 160 mg by mouth daily.        11/10/2022 at AM     rosuvastatin (CRESTOR) 5 MG tablet Take 5 mg by mouth daily 11/9/2022     valACYclovir (VALTREX) 1000 MG tablet [VALACYCLOVIR (VALTREX) 1000 MG TABLET] Take 1,000 mg by mouth every 12 (twelve) hours as needed (Cold sore).       vitamin B-12 (CYANOCOBALAMIN) 1000 MCG tablet Take 1,000 mcg by mouth daily 11/9/2022 at AM     Vitamin D, Cholecalciferol, 25 MCG (1000 UT) TABS Take 2,000 Units by mouth daily 11/9/2022       Information source(s): Patient and CareEverywhere/SureScripts    Method of interview communication: in-person    Patient was asked about OTC/herbal products specifically.  PTA med list reflects this.    Based on the pharmacist's assessment, the PTA med list  information appears reliable    Allergies were reviewed, assessed, and updated with the patient.      Patient does not use any multi-dose medications prior to admission.     Thank you for the opportunity to participate in the care of this patient.      Renetta Griffin RPH     11/10/2022     10:00 AM

## 2022-11-11 ENCOUNTER — APPOINTMENT (OUTPATIENT)
Dept: PHYSICAL THERAPY | Facility: CLINIC | Age: 77
End: 2022-11-11
Attending: ORTHOPAEDIC SURGERY
Payer: COMMERCIAL

## 2022-11-11 ENCOUNTER — APPOINTMENT (OUTPATIENT)
Dept: OCCUPATIONAL THERAPY | Facility: CLINIC | Age: 77
End: 2022-11-11
Attending: ORTHOPAEDIC SURGERY
Payer: COMMERCIAL

## 2022-11-11 PROBLEM — E87.5 HYPERKALEMIA: Status: ACTIVE | Noted: 2022-11-11

## 2022-11-11 PROBLEM — E87.1 HYPONATREMIA: Status: ACTIVE | Noted: 2022-11-11

## 2022-11-11 LAB
CREAT SERPL-MCNC: 0.82 MG/DL (ref 0.6–1.1)
FASTING STATUS PATIENT QL REPORTED: ABNORMAL
GFR SERPL CREATININE-BSD FRML MDRD: 74 ML/MIN/1.73M2
GLUCOSE BLD-MCNC: 211 MG/DL (ref 70–125)
HGB BLD-MCNC: 11 G/DL (ref 11.7–15.7)
OSMOLALITY UR: 365 MMOL/KG (ref 100–1200)
SODIUM SERPL-SCNC: 128 MMOL/L (ref 136–145)
SODIUM SERPL-SCNC: 129 MMOL/L (ref 136–145)
SODIUM SERPL-SCNC: 130 MMOL/L (ref 136–145)
SODIUM UR-SCNC: 20 MMOL/L

## 2022-11-11 PROCEDURE — 36415 COLL VENOUS BLD VENIPUNCTURE: CPT

## 2022-11-11 PROCEDURE — 84300 ASSAY OF URINE SODIUM: CPT | Performed by: STUDENT IN AN ORGANIZED HEALTH CARE EDUCATION/TRAINING PROGRAM

## 2022-11-11 PROCEDURE — 97116 GAIT TRAINING THERAPY: CPT | Mod: GP

## 2022-11-11 PROCEDURE — 83935 ASSAY OF URINE OSMOLALITY: CPT | Performed by: STUDENT IN AN ORGANIZED HEALTH CARE EDUCATION/TRAINING PROGRAM

## 2022-11-11 PROCEDURE — 97166 OT EVAL MOD COMPLEX 45 MIN: CPT | Mod: GO

## 2022-11-11 PROCEDURE — 250N000011 HC RX IP 250 OP 636

## 2022-11-11 PROCEDURE — 85018 HEMOGLOBIN: CPT

## 2022-11-11 PROCEDURE — 82947 ASSAY GLUCOSE BLOOD QUANT: CPT | Performed by: ORTHOPAEDIC SURGERY

## 2022-11-11 PROCEDURE — 97535 SELF CARE MNGMENT TRAINING: CPT | Mod: GO

## 2022-11-11 PROCEDURE — 99226 PR SUBSEQUENT OBSERVATION CARE,LEVEL III: CPT | Performed by: STUDENT IN AN ORGANIZED HEALTH CARE EDUCATION/TRAINING PROGRAM

## 2022-11-11 PROCEDURE — 36415 COLL VENOUS BLD VENIPUNCTURE: CPT | Performed by: STUDENT IN AN ORGANIZED HEALTH CARE EDUCATION/TRAINING PROGRAM

## 2022-11-11 PROCEDURE — 250N000013 HC RX MED GY IP 250 OP 250 PS 637: Performed by: STUDENT IN AN ORGANIZED HEALTH CARE EDUCATION/TRAINING PROGRAM

## 2022-11-11 PROCEDURE — 82565 ASSAY OF CREATININE: CPT

## 2022-11-11 PROCEDURE — 250N000013 HC RX MED GY IP 250 OP 250 PS 637

## 2022-11-11 PROCEDURE — 97110 THERAPEUTIC EXERCISES: CPT | Mod: GP

## 2022-11-11 PROCEDURE — 84295 ASSAY OF SERUM SODIUM: CPT | Performed by: STUDENT IN AN ORGANIZED HEALTH CARE EDUCATION/TRAINING PROGRAM

## 2022-11-11 RX ADMIN — METFORMIN HYDROCHLORIDE 1000 MG: 500 TABLET, FILM COATED ORAL at 17:09

## 2022-11-11 RX ADMIN — CEFAZOLIN SODIUM 2 G: 2 INJECTION, SOLUTION INTRAVENOUS at 02:34

## 2022-11-11 RX ADMIN — BACLOFEN 10 MG: 10 TABLET ORAL at 21:20

## 2022-11-11 RX ADMIN — HYDROXYZINE HYDROCHLORIDE 10 MG: 10 TABLET ORAL at 21:20

## 2022-11-11 RX ADMIN — HYDROXYZINE HYDROCHLORIDE 10 MG: 10 TABLET ORAL at 00:03

## 2022-11-11 RX ADMIN — BACLOFEN 10 MG: 10 TABLET ORAL at 08:12

## 2022-11-11 RX ADMIN — ACETAMINOPHEN 975 MG: 325 TABLET, FILM COATED ORAL at 02:33

## 2022-11-11 RX ADMIN — MAGNESIUM OXIDE TAB 400 MG (241.3 MG ELEMENTAL MG) 800 MG: 400 (241.3 MG) TAB at 08:12

## 2022-11-11 RX ADMIN — FAMOTIDINE 20 MG: 20 TABLET ORAL at 21:20

## 2022-11-11 RX ADMIN — POLYETHYLENE GLYCOL 3350 17 G: 17 POWDER, FOR SOLUTION ORAL at 08:13

## 2022-11-11 RX ADMIN — PANTOPRAZOLE SODIUM 40 MG: 20 TABLET, DELAYED RELEASE ORAL at 17:09

## 2022-11-11 RX ADMIN — OXYCODONE HYDROCHLORIDE 10 MG: 5 TABLET ORAL at 21:27

## 2022-11-11 RX ADMIN — OXYCODONE HYDROCHLORIDE 10 MG: 5 TABLET ORAL at 17:12

## 2022-11-11 RX ADMIN — FAMOTIDINE 20 MG: 20 TABLET ORAL at 08:12

## 2022-11-11 RX ADMIN — PROPRANOLOL HYDROCHLORIDE 160 MG: 80 CAPSULE, EXTENDED RELEASE ORAL at 09:32

## 2022-11-11 RX ADMIN — ACETAMINOPHEN 975 MG: 325 TABLET, FILM COATED ORAL at 17:09

## 2022-11-11 RX ADMIN — LEVOTHYROXINE SODIUM 88 MCG: 0.09 TABLET ORAL at 06:34

## 2022-11-11 RX ADMIN — BACLOFEN 10 MG: 10 TABLET ORAL at 14:53

## 2022-11-11 RX ADMIN — SENNOSIDES AND DOCUSATE SODIUM 1 TABLET: 50; 8.6 TABLET ORAL at 21:20

## 2022-11-11 RX ADMIN — ACETAMINOPHEN 975 MG: 325 TABLET, FILM COATED ORAL at 09:32

## 2022-11-11 RX ADMIN — DULOXETINE HYDROCHLORIDE 60 MG: 60 CAPSULE, DELAYED RELEASE PELLETS ORAL at 08:12

## 2022-11-11 RX ADMIN — MAGNESIUM OXIDE TAB 400 MG (241.3 MG ELEMENTAL MG) 800 MG: 400 (241.3 MG) TAB at 21:20

## 2022-11-11 RX ADMIN — OXYCODONE HYDROCHLORIDE 10 MG: 5 TABLET ORAL at 00:03

## 2022-11-11 RX ADMIN — OXYCODONE HYDROCHLORIDE 10 MG: 5 TABLET ORAL at 06:34

## 2022-11-11 RX ADMIN — KETOROLAC TROMETHAMINE 15 MG: 15 INJECTION, SOLUTION INTRAMUSCULAR; INTRAVENOUS at 02:34

## 2022-11-11 RX ADMIN — ROSUVASTATIN CALCIUM 5 MG: 5 TABLET, FILM COATED ORAL at 21:20

## 2022-11-11 RX ADMIN — RIVAROXABAN 10 MG: 10 TABLET, FILM COATED ORAL at 17:09

## 2022-11-11 RX ADMIN — SENNOSIDES AND DOCUSATE SODIUM 1 TABLET: 50; 8.6 TABLET ORAL at 08:12

## 2022-11-11 RX ADMIN — METFORMIN HYDROCHLORIDE 1000 MG: 500 TABLET, FILM COATED ORAL at 08:12

## 2022-11-11 RX ADMIN — KETOROLAC TROMETHAMINE 15 MG: 15 INJECTION, SOLUTION INTRAMUSCULAR; INTRAVENOUS at 09:32

## 2022-11-11 RX ADMIN — PANTOPRAZOLE SODIUM 40 MG: 20 TABLET, DELAYED RELEASE ORAL at 06:34

## 2022-11-11 ASSESSMENT — ACTIVITIES OF DAILY LIVING (ADL)
ADLS_ACUITY_SCORE: 27
ADLS_ACUITY_SCORE: 25
ADLS_ACUITY_SCORE: 27
ADLS_ACUITY_SCORE: 27
ADLS_ACUITY_SCORE: 25

## 2022-11-11 NOTE — PROGRESS NOTES
Paged Dr. Marc Rodriguez for new orders for sodium level and what parameters patient has to meet before she can discharge.  Awaiting a call back.  Pt is currently 128 for sodium level.

## 2022-11-11 NOTE — DISCHARGE SUMMARY
ORTHOPEDIC DISCHARGE SUMMARY       Belgica Salvador,  1945, MRN [id number]    Admission Date: 11/10/2022      Admission Diagnoses: Osteoarthritis of left knee [M17.12]  S/P total knee arthroplasty, left [Z96.652]     Discharge Date:  2022    Post-operative Day:  2 Day Post-Op    Reason for Admission: The patient was admitted for the following: Procedure(s):  LEFT TOTAL KNEE ARTHROPLASTY    BRIEF HOSPITAL COURSE   Belgica Salvador is a pleasant 77 year old female who underwent the aforementioned procedure with Dr. Peter on 11/10/2022. There were no intraoperative complications and the patient was transferred to the recovery room and later the orthopedic unit in stable condition. Once the patient reached the orthopedic floor our orthopedic pain protocol was implemented along with the following:    Anticoagulation Medications: Xarelto  Therapy: PT and OT  Activity: WBAT  Bracing: None    Consultations during Admission: Hospitalist service for medical management. Acute blood loss anemia. Hgb 11.0 (pre-op 13.4). Asymptomatic. Follow-up with PCP as outpatient and get repeat hemoglobin in 7-10 days. Patient also had hyponatremia with sodium at 129 on POD #1. Felt to be due to SIADH. It was rechecked prior to discharge and is 131. Patient's potassium was also mildly low at 5.2. Recommend rechecking with PCP at follow up.    COMPLICATIONS/SIGNIFICANT FINDINGS    None    DISCHARGE INFORMATION   Condition at discharge: Good  Discharge destination: Home  Patient was seen by myself on the date of discharge.    FOLLOW UP CARE   Follow up with orthopedics in 7-10 days or sooner should the need arise. Ortho will continue to manage pain control, post op anticoagulation and incision care.     Follow up with your PCP for management of chronic medical problems and to evaluate for post op medical complications including constipation, nausea/vomiting, DVT/PE, anemia, changes in blood pressure, fevers/chills, urinary  "retention and atelectasis/pneumonia.     Subjective   Patient is doing well on POD #1. Pain is well controlled with oral medications. Ambulating. Tolerating oral intake. Voiding.    Physical Exam   /60 (BP Location: Left arm)   Pulse 75   Temp 97.4  F (36.3  C) (Oral)   Resp 18   Ht 1.676 m (5' 6\")   Wt 81.2 kg (179 lb)   SpO2 92%   BMI 28.89 kg/m    The patient is A&Ox3. Appears comfortable, sitting up at bedside.  Sensation is intact to light touch & equal bilaterally in the L2 through S1 dermatomes.  Calves are soft and non-tender.  Dorsiflexion and plantar flexion is intact bilaterally.  Appropriate flexion and extension of the toes bilaterally.   Brisk capillary refill in the toes bilaterally.   Palpable Left dorsalis pedis pulse.  Left knee dressing C/D/I.     Pertinent Results at Discharge     Hemoglobin   Date/Time Value Ref Range Status   11/11/2022 05:46 AM 11.0 (L) 11.7 - 15.7 g/dL Final   11/10/2022 08:01 AM 13.4 11.7 - 15.7 g/dL Final   11/08/2022 08:25 PM 13.8 11.7 - 15.7 g/dL Final     INR   Date/Time Value Ref Range Status   11/10/2022 08:01 AM 0.97 0.85 - 1.15 Final     Platelet Count   Date/Time Value Ref Range Status   11/10/2022 08:01  150 - 450 10e3/uL Final   11/08/2022 08:25  150 - 450 10e3/uL Final       Problem List   Principal Problem:    S/P knee surgery  Active Problems:    Graves' Disease    Hypothyroidism    Diabetic Peripheral Neuropathy    Essential hypertension    Type 2 diabetes mellitus (H)    Hyperlipidemia LDL goal <100    Neuropathy    Hyponatremia      Inna George PA-C/Dr. Peter  [facility]  [phone]  Date: 11/11/2022  Time: 8:45 AM    "

## 2022-11-11 NOTE — PROGRESS NOTES
[facility]    discharge order for home PT placed    Riri Mercedes PA-C  [facility]   [phone]   November 11, 2022 at 12:49 PM

## 2022-11-11 NOTE — PLAN OF CARE
Occupational Therapy Discharge Summary    Reason for therapy discharge:    All goals and outcomes met, no further needs identified.    Progress towards therapy goal(s). See goals on Care Plan in [facility] electronic health record for goal details.  Goals met    Therapy recommendation(s):    No further therapy is recommended.

## 2022-11-11 NOTE — PROGRESS NOTES
Care Management Discharge Note    Discharge Date: 11/11/2022       Discharge Disposition:  Home with OUTPT at home    Discharge Transportation: family or friend will provide    Additional Information:  Reviewed with Muriel CASTORENA. Pt has OUTPT and does not need Home PT, this was in error. Pt to return home. No further needs for discharge.       BAILEE Garnica

## 2022-11-11 NOTE — PROGRESS NOTES
11/11/22 0822   Appointment Info   Signing Clinician's Name / Credentials (OT) SLY Garibay   Quick Adds   Quick Adds Certification   Living Environment   People in Home alone   Current Living Arrangements house   Home Accessibility no concerns  (stair lift)   Transportation Anticipated family or friend will provide   Living Environment Comments Daughter staying w/ pt for almost 2 weeks. Pt has FWW, walkin shower w/ shower chair, RTS   Self-Care   Usual Activity Tolerance good   Current Activity Tolerance good   Equipment Currently Used at Home grab bar, toilet;raised toilet seat;shower chair;walker, rolling   Activity/Exercise/Self-Care Comment Pt IND w/ ADLs and IADLs except heavy housekeeping at baseline   General Information   Onset of Illness/Injury or Date of Surgery 11/10/22   Referring Physician Arnold Peter MD   Additional Occupational Profile Info/Pertinent History of Current Problem TKA   Existing Precautions/Restrictions no known precautions/restrictions   Left Lower Extremity (Weight-bearing Status) weight-bearing as tolerated (WBAT)   Right Lower Extremity (Weight-bearing Status) full weight-bearing (FWB)   Cognitive Status Examination   Orientation Status orientation to person, place and time   Visual Perception   Visual Impairment/Limitations corrective lenses full-time   Sensory   Sensory Comments numbness/neuropathy in BLEs at baseline   Pain Assessment   Patient Currently in Pain Yes, see Vital Sign flowsheet   Posture   Posture not impaired   Range of Motion Comprehensive   General Range of Motion no range of motion deficits identified   Strength Comprehensive (MMT)   General Manual Muscle Testing (MMT) Assessment no strength deficits identified   Muscle Tone Assessment   Muscle Tone Quick Adds No deficits were identified   Coordination   Upper Extremity Coordination No deficits were identified   Bed Mobility   Bed Mobility scooting/bridging;supine-sit;sit-supine   Comment (Bed Mobility)  Pt has adjustable bed - SBA   Transfers   Transfers bed-chair transfer;sit-stand transfer;toilet transfer;shower transfer   Transfer Comments SBA-CGA for transfers   Activities of Daily Living   BADL Assessment/Intervention bathing;lower body dressing;toileting   Bathing Assessment/Intervention   [place] Level (Bathing) contact guard assist   Lower Body Dressing Assessment/Training   [place] Level (Lower Body Dressing) supervision   Toileting   [place] Level (Toileting) supervision   Clinical Impression   Criteria for Skilled Therapeutic Interventions Met (OT) Yes, treatment indicated   OT Diagnosis decreased ADLs   Influenced by the following impairments TKA   OT Problem List-Impairments impacting ADL activity tolerance impaired;mobility;pain   Assessment of Occupational Performance 3-5 Performance Deficits   Identified Performance Deficits LE dressing, bed mobility, all transfers   Planned Therapy Interventions (OT) ADL retraining;bed mobility training;transfer training   Clinical Decision Making Complexity (OT) moderate complexity   Risk & Benefits of therapy have been explained evaluation/treatment results reviewed;patient   OT Total Evaluation Time   OT Eval, Moderate Complexity Minutes (17935) 10   Therapy Certification   Medical Diagnosis TKA   Start of Care Date 11/11/22   Certification date from 11/11/22   Certification date to 12/11/22   OT Goals   Therapy Frequency (OT) One time eval and treatment   OT Predicted Duration/Target Date for Goal Attainment 11/11/22   OT Goals Lower Body Dressing;Transfers;Toilet Transfer/Toileting   OT: Lower Body Dressing Modified independent;Goal Met;Completed   OT: Transfer Modified independent;with assistive device;Goal Met;Completed  (walkin shower)   OT: Toilet Transfer/Toileting Modified independent;toilet transfer;cleaning and garment management;Goal Met;Completed   Interventions   Interventions Quick Adds Self-Care/Home Management   Self-Care/Home  Management   Self-Care/Home Mgmt/ADL, Compensatory, Meal Prep Minutes (34633) 25   Symptoms Noted During/After Treatment (Meal Preparation/Planning Training) increased pain   Treatment Detail/Skilled Intervention Pt instructed on bed mobility techniques w/ HOB elevated to simulate home environment - completed Mod I. Edu on compensatory strategies for LE dressing - completed Mod I. STS w/ SBA and FWW - amb. 10 ft to BR w/ FWW and SBA. Edu on safety technique for RTS/walkin shower transfer - completed Mod I after initial cueing. Pt edu on sleeping position and car transfers - pt verbalized understanding   OT Discharge Planning   OT Plan DC OT   OT Discharge Recommendation (DC Rec) (S)  home with assist   OT Rationale for DC Rec Pt tolerating therapy well. Pt has good setup at home and dtr will be staying for nearly 2 weeks to assist as needed w/ ADLs and IADLs   OT Brief overview of current status Mod I w/ ADLs   Total Session Time   Timed Code Treatment Minutes 25   Total Session Time (sum of timed and untimed services) 35      [facility]  OUTPATIENT OCCUPATIONAL THERAPY  EVALUATION  PLAN OF TREATMENT FOR OUTPATIENT REHABILITATION  (COMPLETE FOR INITIAL CLAIMS ONLY)  Patient's Last Name, First Name, M.I.  YOB: 1945  Belgica Salvador                          Provider's Name  [facility] Medical Record No.  [id number]                             Onset Date:  11/10/22   Start of Care Date:  11/11/22   Type:     ___PT   _X_OT   ___SLP Medical Diagnosis:  TKA                    OT Diagnosis:  decreased ADLs Visits from SOC:  1     See note for plan of treatment, functional goals and certification details    I CERTIFY THE NEED FOR THESE SERVICES FURNISHED UNDER        THIS PLAN OF TREATMENT AND WHILE UNDER MY CARE     (Physician co-signature of this document indicates review and certification of the therapy plan).

## 2022-11-11 NOTE — PROGRESS NOTES
"Addedum: Hospitalist not recommending discharge today. Sodium cont to downtrend to 129. Fluid restriction. Rechecking sodium.    Inna George PA-C on 11/11/2022 at 2:07 PM      Orthopedic Progress Note      Assessment: 1 Day Post-Op  S/P Procedure(s):  LEFT TOTAL KNEE ARTHROPLASTY     Plan:   - Continue PT/OT.   - Weightbearing status: WBAT.  - Anticoagulation: Xarelto in addition to SCDs, zander stockings and early ambulation.  - Acute blood loss anemia. Hgb 11.0 (pre-op 13.4). Asymptomatic. Follow-up with PCP as outpatient.  - Appreciate hospitalist recs. Hyponatremia- repeat sodium this afternoon.  - Discharge planning: Discharge to home today pending progress with PT/OT, improved sodium, clearance from hospitalist. Patient understands not to take aspirin while taking Xarelto. She will take Xarelto x14 days, then resume aspirin.      Subjective:  Pain: Well controlled on Tylenol, oxycodone, baclofen, atarax.  Nausea, Vomiting:  No  Chest pain: No  Lightheadedness, Dizziness:  No  Neuro:  Patient has chronic neuropathy at baseline, but denies new onset numbness or paresthesias in Left lower extremity     Patient is doing well on POD #1. Ambulated in hallways x2 yesterday, tolerating oral intake, voiding & pain is controlled with oral medication. Hgb 11.0 (13.4 pre-op).     Objective:  /60 (BP Location: Left arm)   Pulse 75   Temp 97.4  F (36.3  C) (Oral)   Resp 18   Ht 1.676 m (5' 6\")   Wt 81.2 kg (179 lb)   SpO2 92%   BMI 28.89 kg/m    The patient is A&Ox3. Appears comfortable, sitting up at bedside.  Sensation is intact to light touch & equal bilaterally in the L2 through S1 dermatomes.  Calves are soft and non-tender.  Dorsiflexion and plantar flexion is intact bilaterally.  Appropriate flexion and extension of the toes bilaterally.   Brisk capillary refill in the toes bilaterally.   Palpable Left dorsalis pedis pulse.  Left knee dressing C/D/I.     Pertinent Labs   Lab Results: personally reviewed. "   Lab Results   Component Value Date    INR 0.97 11/10/2022     Lab Results   Component Value Date    WBC 12.7 (H) 11/10/2022    HGB 11.0 (L) 11/11/2022    HCT 39.1 11/10/2022    MCV 94 11/10/2022     11/10/2022     Lab Results   Component Value Date     (L) 11/10/2022    CO2 23 11/10/2022       Report completed by:  Inna George PA-C  [facility]    Date: 11/11/2022  Time: 8:43 AM

## 2022-11-11 NOTE — PROGRESS NOTES
[facility]    Medicine Progress Note - Hospitalist Service    Date of Admission:  11/10/2022    Assessment & Plan   Belgica Salvador is a 77 year old female admitted on 11/10/2022. She has a past medical history of Graves' disease, hypothyroidism, peripheral neuropathy 2/2 diabetes, hypertension, status post left total knee arthroplasty by Dr. Peter on 11/10/2022 with an estimated blood loss of 20ccs. internal medicine consulted for chronic issues. New Hyponatremia noted; fluids stopped as could be SIADH. Rechecking Sodium at 10am; if trend is clearly stable or impoving, then agree with discharge and close follow up and rechecking labs. Otherwise keep trending (ordered for 2pm thereafter). Also recommend rechecking potassium at follow up.    Addendum- Na continued to downtrend to 129; would not recommend discharge.  -Pending urine studies  -in addition to below, adding fluid restriction 1.2L  -follow Na q6hrs after 2p check    Hyponatremia  Assessment: may be SIADH in setting of surgery vs decreased po intake; pt is on LR 125cc/hr per ortho. Will pause and check urine studies.  Plan:  - stop fluids  - check urine osm and urine Na  - may need fluid restriction if SIADH based on above  - check Na at 10am and again at 2pm    - if trend is clearly stable or impoving, then agree with discharge    Hyperkalemia  A/p- mildly at 5.2; recheck at follow up with PCP and/or surgery      S/P knee surgery    Assessment: a bit hypertensive likely in setting of pain, otherwise stable.     Plan:   -DVT prophylaxis and pain management per primary service  -We will monitor for complications including respiratory concerns, urinary retention, ileus, skin reactions, infections, medication side effects, etc.  -Incentive spirometry   -discharge disposition per primary service  -continue duloxetine  -hold buspar for tonight given prior hx of hallucinations - resume POD1      Graves' Disease    Hypothyroidism  "(postablative)    Assessment: on levothyroxine 88mcg daily    Plan: -continue      CAD    Assessment: on daily baby asa. 11/4 nuclear medicine test was WNL.    Plan: - hold ASA; dvt ppdx per surgery    - monitor for any chest pain; if so, then Stat Troponin and EKG      Diabetic Peripheral Neuropathy    Type 2 diabetes mellitus (H)    Assessment: on metformin 1000mg bid. Renal function wnl.     Plan: - continue metformin, hypoglycemia protocol      Essential hypertension    Assessment: on propranolol 160mg SR; pressures likely a bit hypertensive in setting of pain but acceptable for inpatient setting; SBPs 150s postoperatively; no headaches or vision changes    Plan: -continue        Hyperlipidemia LDL goal <100    Assessment: on statin    Plan: -continue            Diet: Advance Diet as Tolerated: Regular Diet Adult  Discharge Instruction - Regular Diet Adult    DVT Prophylaxis: Defer to primary service  Sierra Catheter: Not present  Central Lines: None  Cardiac Monitoring: None  Code Status: Full Code      Disposition Plan      Expected Discharge Date: 11/11/2022,  9:00 AM              The patient's care was discussed with the Patient and physical therapy at bedside while we discussed sodium.    Marc Rodriguez MD  Hospitalist Service  [facility]  Securely message with the Vocera Web Console (learn more here)  Text page via [employer] Paging/Directory         Clinically Significant Risk Factors Present on Admission          # Hyponatremia: Lowest Na = 132 mmol/L (Ref range: 136-145) in last 2 days, will monitor as appropriate              # Overweight: Estimated body mass index is 28.89 kg/m  as calculated from the following:    Height as of this encounter: 1.676 m (5' 6\").    Weight as of this encounter: 81.2 kg (179 lb).           ______________________________________________________________________    Interval History   -no acute interval events  -seen in early morning, no new symptoms or " shortness of breath.  -We discussed disposition per primary team, however we discussed her sodium and importance of ensuring it is not further dropping; noted she had been receiving 125cc/hr of MIVFs (stopped by writer)    -pt agreed with checking sodium and seeing the trend as writer recommended   -passing gas   -All questions answered     Data reviewed today: I reviewed all medications, new labs and imaging results over the last 24 hours. I personally reviewed no images or EKG's today.    Physical Exam   Vital Signs: Temp: 97.8  F (36.6  C) Temp src: Oral BP: 121/58 Pulse: 67   Resp: 17 SpO2: 95 % O2 Device: None (Room air)    Weight: 179 lbs 0 oz    General: alert, oriented, and in no acute distress  Pulmonary: clear to auscultation bilaterally, normal respiratory effort, on room air, no rales or wheezes or evidence of accessory muscle use  CVS: regular rate and rhythm, no murmurs, rubs, or gallops; no blatant jugular venous distention; no extremity edema and extremities are warm to the touch  GI: soft, nontender, BS+, no rebound or guarding, no conspicuous organomegaly   Neuro: nonfocal, moves all extremities of own volition  Psych: appropriate  Msk: s/p left knee surgery, wrapped dressing c/d/i        Data   Recent Labs   Lab 11/11/22  0546 11/10/22  1535 11/10/22  1346 11/10/22  1206 11/10/22  0801 11/09/22  0022 11/09/22  0009 11/08/22  2246 11/08/22 2025   WBC  --   --   --   --  12.7*  --   --   --  12.9*   HGB 11.0*  --   --   --  13.4  --   --   --  13.8   MCV  --   --   --   --  94  --   --   --  96   PLT  --   --   --   --  348  --   --   --  364   INR  --   --   --   --  0.97  --   --   --   --    NA  --  132*  --   --   --   --   --   --  137   POTASSIUM  --  5.2*  --   --  4.3  --  4.7  --  5.7*   CHLORIDE  --  96*  --   --   --   --   --   --  99   CO2  --  23  --   --   --   --   --   --  27   BUN  --  16  --   --   --   --   --   --  18.1   CR 0.82 0.88  --   --  0.79  --   --   --  0.84    ANIONGAP  --  13  --   --   --   --   --   --  11   OPHELIA  --  8.3*  --   --   --   --   --   --  9.1   * 251* 126*   < >  --    < >  --    < > 137*    < > = values in this interval not displayed.     No results found for this or any previous visit (from the past 24 hour(s)).  Medications       acetaminophen  975 mg Oral Q8H     baclofen  10 mg Oral TID     DULoxetine  60 mg Oral Daily     famotidine  20 mg Oral BID     ketorolac  15 mg Intravenous Q6H     levothyroxine  88 mcg Oral Daily     magnesium oxide  800 mg Oral BID     metFORMIN  1,000 mg Oral BID w/meals     pantoprazole  40 mg Oral BID AC     polyethylene glycol  17 g Oral Daily     propranolol ER  160 mg Oral Daily     rivaroxaban ANTICOAGULANT  10 mg Oral Daily with supper     rosuvastatin  5 mg Oral QPM     senna-docusate  1 tablet Oral BID     sodium chloride (PF)  3 mL Intracatheter Q8H

## 2022-11-11 NOTE — PROGRESS NOTES
Physical Therapy Discharge Summary    Reason for therapy discharge:    All goals and outcomes met, no further needs identified.    Progress towards therapy goal(s). See goals on Care Plan in [facility] electronic health record for goal details.  Goals met    Therapy recommendation(s):    Continued therapy is recommended.  Rationale/Recommendations:  OP PT.  Continue home exercise program.

## 2022-11-11 NOTE — PLAN OF CARE
Patient vital signs are at baseline: Yes  Patient able to ambulate as they were prior to admission or with assist devices provided by therapies during their stay:  Yes  Patient MUST void prior to discharge:  Yes  Patient able to tolerate oral intake:  Yes  Pain has adequate pain control using Oral analgesics:  No,  Reason:  Pt reported pain 10/10 in surgical left knee  PRN Oxycodone and  IV dilaudid given with some relief.   Does patient have an identified :  Yes  Has goal D/C date and time been discussed with patient:  Yes  Goal Outcome Evaluation:       Pt A & O X 4. Pain controled with PRN and scheduled pain  medication. Numbness present  in lower extremities as her baseline. Tolerating PO intake. Zofran given once for nausea. Surgical dressing CDI. Voiding and walking. Continue to monitor.

## 2022-11-11 NOTE — PLAN OF CARE
Problem: Plan of Care - These are the overarching goals to be used throughout the patient stay.    Goal: Optimal Comfort and Wellbeing  Outcome: Adequate for Care Transition  Intervention: Monitor Pain and Promote Comfort  Taken 11/11/2022 0810 by Jodi Mora RN  Pain Management Interventions:   medication (see MAR)   distraction   emotional support   repositioned   rest   Goal Outcome Evaluation:  Pt is eager to discharge however her sodium level has yet to level out.  Pt has been ingesting sodium heavy foods in hopes it will help her.  Appears pt will remain admitted until tomorrow or whenever her levels even out.  Currently sodium is 128, she dropped from 129 earlier today and 132 from yesterday.  Pt was asked to move to the PT gym, but she became very anxious and tearful at the thought of sharing a room and not getting good sleep again tonight.  This writer did therapeutic and supportive listening.

## 2022-11-11 NOTE — PLAN OF CARE
Patient vital signs are at baseline: Yes  Patient able to ambulate as they were prior to admission or with assist devices provided by therapies during their stay:  Yes  Patient MUST void prior to discharge:  Yes  Patient able to tolerate oral intake:  Yes  Pain has adequate pain control using Oral analgesics:  Yes  Does patient have an identified :  Yes  Has goal D/C date and time been discussed with patient:  Yes - Alert and oriented, perhaps forgetful at times. CMS at baseline, she has bilateral lower extremity neuropathy and has no sensation to her feet. Otherwise strength and pulses intact. ACE wrap kept in place this AM - per order. TEDS on the right leg. Plan to discharge home with outpatient therapies today.

## 2022-11-12 VITALS
RESPIRATION RATE: 18 BRPM | WEIGHT: 179 LBS | SYSTOLIC BLOOD PRESSURE: 120 MMHG | BODY MASS INDEX: 28.77 KG/M2 | HEART RATE: 70 BPM | OXYGEN SATURATION: 94 % | HEIGHT: 66 IN | DIASTOLIC BLOOD PRESSURE: 72 MMHG | TEMPERATURE: 97.8 F

## 2022-11-12 LAB
FASTING STATUS PATIENT QL REPORTED: YES
GLUCOSE BLD-MCNC: 157 MG/DL (ref 70–125)
GLUCOSE BLDC GLUCOMTR-MCNC: 102 MG/DL (ref 70–99)
HGB BLD-MCNC: 9.6 G/DL (ref 11.7–15.7)
HOLD SPECIMEN: NORMAL
SODIUM SERPL-SCNC: 128 MMOL/L (ref 136–145)
SODIUM SERPL-SCNC: 130 MMOL/L (ref 136–145)
SODIUM SERPL-SCNC: 131 MMOL/L (ref 136–145)

## 2022-11-12 PROCEDURE — 82947 ASSAY GLUCOSE BLOOD QUANT: CPT | Performed by: ORTHOPAEDIC SURGERY

## 2022-11-12 PROCEDURE — 250N000013 HC RX MED GY IP 250 OP 250 PS 637: Performed by: STUDENT IN AN ORGANIZED HEALTH CARE EDUCATION/TRAINING PROGRAM

## 2022-11-12 PROCEDURE — 85018 HEMOGLOBIN: CPT

## 2022-11-12 PROCEDURE — 84295 ASSAY OF SERUM SODIUM: CPT | Performed by: STUDENT IN AN ORGANIZED HEALTH CARE EDUCATION/TRAINING PROGRAM

## 2022-11-12 PROCEDURE — 82962 GLUCOSE BLOOD TEST: CPT

## 2022-11-12 PROCEDURE — 36415 COLL VENOUS BLD VENIPUNCTURE: CPT

## 2022-11-12 PROCEDURE — 36415 COLL VENOUS BLD VENIPUNCTURE: CPT | Performed by: STUDENT IN AN ORGANIZED HEALTH CARE EDUCATION/TRAINING PROGRAM

## 2022-11-12 PROCEDURE — 250N000013 HC RX MED GY IP 250 OP 250 PS 637

## 2022-11-12 PROCEDURE — 99225 PR SUBSEQUENT OBSERVATION CARE,LEVEL II: CPT | Performed by: STUDENT IN AN ORGANIZED HEALTH CARE EDUCATION/TRAINING PROGRAM

## 2022-11-12 RX ADMIN — BACLOFEN 10 MG: 10 TABLET ORAL at 14:58

## 2022-11-12 RX ADMIN — ACETAMINOPHEN 975 MG: 325 TABLET, FILM COATED ORAL at 11:23

## 2022-11-12 RX ADMIN — LEVOTHYROXINE SODIUM 88 MCG: 0.09 TABLET ORAL at 06:36

## 2022-11-12 RX ADMIN — METFORMIN HYDROCHLORIDE 1000 MG: 500 TABLET, FILM COATED ORAL at 09:18

## 2022-11-12 RX ADMIN — FAMOTIDINE 20 MG: 20 TABLET ORAL at 09:17

## 2022-11-12 RX ADMIN — ACETAMINOPHEN 975 MG: 325 TABLET, FILM COATED ORAL at 03:30

## 2022-11-12 RX ADMIN — SENNOSIDES AND DOCUSATE SODIUM 1 TABLET: 50; 8.6 TABLET ORAL at 09:18

## 2022-11-12 RX ADMIN — DULOXETINE HYDROCHLORIDE 60 MG: 60 CAPSULE, DELAYED RELEASE PELLETS ORAL at 09:18

## 2022-11-12 RX ADMIN — OXYCODONE HYDROCHLORIDE 10 MG: 5 TABLET ORAL at 14:57

## 2022-11-12 RX ADMIN — HYDROXYZINE HYDROCHLORIDE 10 MG: 10 TABLET ORAL at 11:23

## 2022-11-12 RX ADMIN — BACLOFEN 10 MG: 10 TABLET ORAL at 09:18

## 2022-11-12 RX ADMIN — PANTOPRAZOLE SODIUM 40 MG: 20 TABLET, DELAYED RELEASE ORAL at 06:36

## 2022-11-12 RX ADMIN — MAGNESIUM OXIDE TAB 400 MG (241.3 MG ELEMENTAL MG) 800 MG: 400 (241.3 MG) TAB at 09:18

## 2022-11-12 RX ADMIN — OXYCODONE HYDROCHLORIDE 10 MG: 5 TABLET ORAL at 09:35

## 2022-11-12 ASSESSMENT — ACTIVITIES OF DAILY LIVING (ADL)
ADLS_ACUITY_SCORE: 21
ADLS_ACUITY_SCORE: 27
ADLS_ACUITY_SCORE: 21
ADLS_ACUITY_SCORE: 27
ADLS_ACUITY_SCORE: 27
ADLS_ACUITY_SCORE: 21
ADLS_ACUITY_SCORE: 21
ADLS_ACUITY_SCORE: 27
ADLS_ACUITY_SCORE: 23

## 2022-11-12 NOTE — PROGRESS NOTES
Care Management Discharge Note    Discharge Date: 11/12/2022     Discharge Disposition:  home    Discharge Services:  Outpatient PT    Discharge Transportation: family or friend will provide    Education Provided on the Discharge Plan: AVS per bedside RN     Persons Notified of Discharge Plans:patient      Additional Information:  Chart reviewed. Patient will be discharge per bedside RN.         Alysha Flores RN

## 2022-11-12 NOTE — PROGRESS NOTES
[facility]    Medicine Progress Note - Hospitalist Service    Date of Admission:  11/10/2022    Assessment & Plan   Belgica Salvador is a 77 year old female admitted on 11/10/2022. She has a past medical history of Graves' disease, hypothyroidism, peripheral neuropathy 2/2 diabetes, hypertension, status post left total knee arthroplasty by Dr. Peter on 11/10/2022 with an estimated blood loss of 20ccs. internal medicine consulted for chronic issues. New Hyponatremia noted AM 11/11; fluids stopped as could be SIADH.  Also recommend rechecking potassium at follow up. Urine studies returned confirming SIADH with urine osmolality 365 and urine Na 20. Discussed with patient if trend is clearly improving then would be okay for discharge with close follow up. Noon recheck pending (it dropped from 132--> 128 yesterday at 3pm; overnight starting to come back up 130->128->130). If noon is still uptrending then okay.     Hyponatremia  Assessment: may be SIADH in setting of surgery vs decreased po intake; pt is on LR 125cc/hr per ortho. Will pause and check urine studies; fluids stopped preliminarily early AM 11/11; labs do confirm SIADH.   Plan:  - stopped fluids (AM 11/11 when Na resulted)  - checked urine osm and urine Na --> consistent with SIADH.  - INCREASE fluid restriction to only 1L (1.2L restrictionstarted yesterday afternoon)  - recheck sodium at noon    - if trend is clearly stable or impoving, then agree with discharge   - if worsening then may get nephrology input regarding safest disposition     Hyperkalemia  A/p- mildly at 5.2; recheck at follow up with PCP and/or surgery      S/P knee surgery    Assessment: a bit hypertensive likely in setting of pain, otherwise stable.     Plan:   -DVT prophylaxis and pain management per primary service  -We will monitor for complications including respiratory concerns, urinary retention, ileus, skin reactions, infections, medication side effects,  etc.  -Incentive spirometry   -discharge disposition per primary service  -continue duloxetine  -hold buspar for tonight given prior hx of hallucinations - resume POD1      Graves' Disease    Hypothyroidism (postablative)    Assessment: on levothyroxine 88mcg daily    Plan: -continue      CAD    Assessment: on daily baby asa. 11/4 nuclear medicine test was WNL.    Plan: - hold ASA; dvt ppdx per surgery    - monitor for any chest pain; if so, then Stat Troponin and EKG      Diabetic Peripheral Neuropathy    Type 2 diabetes mellitus (H)    Assessment: on metformin 1000mg bid. Renal function wnl.     Plan: - continue metformin, hypoglycemia protocol  - low corrective sliding scale       Essential hypertension    Assessment: on propranolol 160mg SR; pressures likely a bit hypertensive in setting of pain but acceptable for inpatient setting; SBPs 150s postoperatively; no headaches or vision changes    Plan: -continue        Hyperlipidemia LDL goal <100    Assessment: on statin    Plan: -continue       Diet: Advance Diet as Tolerated: Regular Diet Adult  Discharge Instruction - Regular Diet Adult  Discharge Instruction - Regular Diet Adult  Fluid restriction 1200 ML FLUID    DVT Prophylaxis: Defer to primary service  Sierra Catheter: Not present  Central Lines: None  Cardiac Monitoring: None  Code Status: Full Code      Disposition Plan      Expected Discharge Date: 11/12/2022,  9:00 AM      Discharge Comments: Na low; stay one more day per [facility]        The patient's care was discussed with the Patient and physical therapy at bedside while we discussed sodium.    Marc Rodriguez MD  Hospitalist Service  [facility]  Securely message with the Vocera Web Console (learn more here)  Text page via [employer] Paging/Directory         Clinically Significant Risk Factors Present on Admission          # Hyponatremia: Lowest Na = 128 mmol/L (Ref range: 136-145) in last 2 days, will monitor as appropriate             "  # Overweight: Estimated body mass index is 28.89 kg/m  as calculated from the following:    Height as of this encounter: 1.676 m (5' 6\").    Weight as of this encounter: 81.2 kg (179 lb).           ______________________________________________________________________    Interval History   -no acute interval events  -seen in early morning, no new symptoms or shortness of breath.  -We discussed disposition per primary team, however we again discussed at-length about her sodium and importance of ensuring it is not further dropping and also about what SIADH means   -explained SIADH in-detail to both patient and her daughter visiting at bedside.   -passing gas   -All questions answered     Data reviewed today: I reviewed all medications, new labs and imaging results over the last 24 hours. I personally reviewed no images or EKG's today.    Physical Exam   Vital Signs: Temp: 97.4  F (36.3  C) Temp src: Oral BP: 130/60 Pulse: 62   Resp: 17 SpO2: 91 % O2 Device: None (Room air)    Weight: 179 lbs 0 oz    General: alert, oriented, and in no acute distress  Pulmonary: clear to auscultation bilaterally, normal respiratory effort, on room air, no rales or wheezes or evidence of accessory muscle use  CVS: regular rate and rhythm, no murmurs, rubs, or gallops; no blatant jugular venous distention; no extremity edema and extremities are warm to the touch  GI: soft, nontender, BS+, no rebound or guarding, no conspicuous organomegaly   Neuro: nonfocal, moves all extremities of own volition  Psych: appropriate  Msk: s/p left knee surgery, wrapped dressing c/d/i        Data   Recent Labs   Lab 11/12/22  0558 11/11/22  2346 11/11/22  2004 11/11/22  1140 11/11/22  0546 11/10/22  1535 11/10/22  1206 11/10/22  0801 11/09/22  0022 11/09/22  0009 11/08/22  2246 11/08/22 2025   WBC  --   --   --   --   --   --   --  12.7*  --   --   --  12.9*   HGB 9.6*  --   --   --  11.0*  --   --  13.4  --   --   --  13.8   MCV  --   --   --   --   -- "   --   --  94  --   --   --  96   PLT  --   --   --   --   --   --   --  348  --   --   --  364   INR  --   --   --   --   --   --   --  0.97  --   --   --   --    * 128* 130*   < >  --  132*  --   --   --   --   --  137   POTASSIUM  --   --   --   --   --  5.2*  --  4.3  --  4.7  --  5.7*   CHLORIDE  --   --   --   --   --  96*  --   --   --   --   --  99   CO2  --   --   --   --   --  23  --   --   --   --   --  27   BUN  --   --   --   --   --  16  --   --   --   --   --  18.1   CR  --   --   --   --  0.82 0.88  --  0.79  --   --   --  0.84   ANIONGAP  --   --   --   --   --  13  --   --   --   --   --  11   OPHELIA  --   --   --   --   --  8.3*  --   --   --   --   --  9.1   *  --   --   --  211* 251*   < >  --    < >  --    < > 137*    < > = values in this interval not displayed.     No results found for this or any previous visit (from the past 24 hour(s)).  Medications       acetaminophen  975 mg Oral Q8H     baclofen  10 mg Oral TID     DULoxetine  60 mg Oral Daily     famotidine  20 mg Oral BID     levothyroxine  88 mcg Oral Daily     magnesium oxide  800 mg Oral BID     metFORMIN  1,000 mg Oral BID w/meals     pantoprazole  40 mg Oral BID AC     polyethylene glycol  17 g Oral Daily     propranolol ER  160 mg Oral Daily     rivaroxaban ANTICOAGULANT  10 mg Oral Daily with supper     rosuvastatin  5 mg Oral QPM     senna-docusate  1 tablet Oral BID     sodium chloride (PF)  3 mL Intracatheter Q8H

## 2022-11-12 NOTE — PLAN OF CARE
Patient vital signs are at baseline: Yes  Patient able to ambulate as they were prior to admission or with assist devices provided by therapies during their stay:  Yes  Patient MUST void prior to discharge:  Yes  Patient able to tolerate oral intake:  Yes  Pain has adequate pain control using Oral analgesics:  Yes  Does patient have an identified :  Yes  Has goal D/C date and time been discussed with patient:  Yes    NA at 130 last check at 6am. Plan is home for discharge. Alert and oriented x4. Sometimes forgetful. PRN oxycodone, ice pack and atarax used for pain management. VSS.

## 2022-11-12 NOTE — PLAN OF CARE
PT DISCHARGED HOME WITH DAUGHTER. STOP LIGHT TOOL AND DISCHARGE INSTRUCTIONS. IV REMOVED BY PATIENT.

## 2022-11-12 NOTE — PROGRESS NOTES
"Ortho Progress Note      Post-operative Day: 2 Days Post-Op  S/P Procedure(s):  LEFT TOTAL KNEE ARTHROPLASTY      Subjective:  Pain: mild  Chest pain, SOB:  no  Nausea, vomiting:  no  Lightheadedness, dizziness:  no  Neuro:  Patient denies new onset numbness or paresthesias    Up in bed, waiting for lunch.  Family in the room.  Pain well controlled.  Has been up ambulating and tolerating well.  Repeat Na draw at noon. No other complaints.     Objective:  /56 (BP Location: Right arm)   Pulse 73   Temp 98  F (36.7  C) (Oral)   Resp 18   Ht 1.676 m (5' 6\")   Wt 81.2 kg (179 lb)   SpO2 94%   BMI 28.89 kg/m    Gen: A&O x 3. NAD. Appears comfortable.   Left knee incision covered.  C/d/i.  No surrounding erythema.  Mild appropriate swelling.   Intact plantar/dorsiflexion of ankle and EHL  Tolerates gentle knee motion  Sensation grossly intact in sural, saphenous, dp, sp and tibial nerve distributions  Distal pulses 2+ and equal bilaterally  Foot warm and well perfused  Brisk cap refill  Calf soft and nontender    Pertinent Labs   Lab Results: personally reviewed.   Lab Results   Component Value Date    INR 0.97 11/10/2022     Lab Results   Component Value Date    WBC 12.7 (H) 11/10/2022    HGB 9.6 (L) 11/12/2022    HCT 39.1 11/10/2022    MCV 94 11/10/2022     11/10/2022     Lab Results   Component Value Date     (L) 11/12/2022    CO2 23 11/10/2022       Assessment: POD2 left TKA by Dr. Peter on 11/10    Plan:   Continue PT/OT  Weightbearing status: WBAT  Pain control as needed  Hyponatremia - trending up 131 (130,128), medicine okay with discharge and close follow up  Anticoagulation: Xarelto in addition to SCDs, zander stockings and early ambulation.  Discharge planning: home today       Report completed by:  Yamila Mullen PA-C  Date: 11/12/2022  Time: 12:52 PM    "

## 2022-12-13 ENCOUNTER — LAB REQUISITION (OUTPATIENT)
Dept: LAB | Facility: CLINIC | Age: 77
End: 2022-12-13
Payer: COMMERCIAL

## 2022-12-13 DIAGNOSIS — Z47.1 AFTERCARE FOLLOWING JOINT REPLACEMENT SURGERY: ICD-10-CM

## 2022-12-13 DIAGNOSIS — M17.12 UNILATERAL PRIMARY OSTEOARTHRITIS, LEFT KNEE: ICD-10-CM

## 2022-12-13 LAB
ALBUMIN SERPL-MCNC: 4 G/DL (ref 3.5–5)
C REACTIVE PROTEIN LHE: 0.5 MG/DL (ref 0–?)
D DIMER PPP FEU-MCNC: 2.44 UG/ML FEU (ref 0–0.5)
ERYTHROCYTE [SEDIMENTATION RATE] IN BLOOD BY WESTERGREN METHOD: 9 MM/HR (ref 0–20)

## 2022-12-13 PROCEDURE — 86140 C-REACTIVE PROTEIN: CPT | Mod: ORL | Performed by: ORTHOPAEDIC SURGERY

## 2022-12-13 PROCEDURE — 36415 COLL VENOUS BLD VENIPUNCTURE: CPT | Mod: ORL | Performed by: ORTHOPAEDIC SURGERY

## 2022-12-13 PROCEDURE — 85379 FIBRIN DEGRADATION QUANT: CPT | Mod: ORL | Performed by: ORTHOPAEDIC SURGERY

## 2022-12-13 PROCEDURE — 82040 ASSAY OF SERUM ALBUMIN: CPT | Mod: ORL | Performed by: ORTHOPAEDIC SURGERY

## 2022-12-13 PROCEDURE — 85652 RBC SED RATE AUTOMATED: CPT | Mod: ORL | Performed by: ORTHOPAEDIC SURGERY

## 2022-12-21 ENCOUNTER — APPOINTMENT (OUTPATIENT)
Dept: RADIOLOGY | Facility: CLINIC | Age: 77
End: 2022-12-21
Attending: EMERGENCY MEDICINE
Payer: COMMERCIAL

## 2022-12-21 ENCOUNTER — HOSPITAL ENCOUNTER (EMERGENCY)
Facility: CLINIC | Age: 77
Discharge: HOME | End: 2022-12-21
Attending: EMERGENCY MEDICINE | Admitting: EMERGENCY MEDICINE
Payer: COMMERCIAL

## 2022-12-21 VITALS
SYSTOLIC BLOOD PRESSURE: 148 MMHG | BODY MASS INDEX: 26.79 KG/M2 | RESPIRATION RATE: 18 BRPM | TEMPERATURE: 98.9 F | HEART RATE: 100 BPM | WEIGHT: 166 LBS | DIASTOLIC BLOOD PRESSURE: 68 MMHG | OXYGEN SATURATION: 97 %

## 2022-12-21 DIAGNOSIS — Z48.89 ENCOUNTER FOR POST SURGICAL WOUND CHECK: ICD-10-CM

## 2022-12-21 LAB
ANION GAP SERPL CALCULATED.3IONS-SCNC: 15 MMOL/L (ref 5–18)
BASOPHILS # BLD AUTO: 0.1 10E3/UL (ref 0–0.2)
BASOPHILS NFR BLD AUTO: 1 %
BUN SERPL-MCNC: 16 MG/DL (ref 8–28)
C REACTIVE PROTEIN LHE: 0.8 MG/DL (ref 0–?)
CALCIUM SERPL-MCNC: 9.8 MG/DL (ref 8.5–10.5)
CHLORIDE BLD-SCNC: 101 MMOL/L (ref 98–107)
CO2 SERPL-SCNC: 23 MMOL/L (ref 22–31)
CREAT SERPL-MCNC: 0.71 MG/DL (ref 0.6–1.1)
EOSINOPHIL # BLD AUTO: 0.1 10E3/UL (ref 0–0.7)
EOSINOPHIL NFR BLD AUTO: 1 %
ERYTHROCYTE [DISTWIDTH] IN BLOOD BY AUTOMATED COUNT: 16.1 % (ref 10–15)
ERYTHROCYTE [SEDIMENTATION RATE] IN BLOOD BY WESTERGREN METHOD: 8 MM/HR (ref 0–20)
GFR SERPL CREATININE-BSD FRML MDRD: 88 ML/MIN/1.73M2
GLUCOSE BLD-MCNC: 121 MG/DL (ref 70–125)
HCT VFR BLD AUTO: 39.1 % (ref 35–47)
HGB BLD-MCNC: 12.6 G/DL (ref 11.7–15.7)
IMM GRANULOCYTES # BLD: 0 10E3/UL
IMM GRANULOCYTES NFR BLD: 0 %
LYMPHOCYTES # BLD AUTO: 2.9 10E3/UL (ref 0.8–5.3)
LYMPHOCYTES NFR BLD AUTO: 28 %
MCH RBC QN AUTO: 32.6 PG (ref 26.5–33)
MCHC RBC AUTO-ENTMCNC: 32.2 G/DL (ref 31.5–36.5)
MCV RBC AUTO: 101 FL (ref 78–100)
MONOCYTES # BLD AUTO: 1.2 10E3/UL (ref 0–1.3)
MONOCYTES NFR BLD AUTO: 11 %
NEUTROPHILS # BLD AUTO: 6.1 10E3/UL (ref 1.6–8.3)
NEUTROPHILS NFR BLD AUTO: 59 %
NRBC # BLD AUTO: 0 10E3/UL
NRBC BLD AUTO-RTO: 0 /100
PLATELET # BLD AUTO: 438 10E3/UL (ref 150–450)
POTASSIUM BLD-SCNC: 4.3 MMOL/L (ref 3.5–5)
RBC # BLD AUTO: 3.86 10E6/UL (ref 3.8–5.2)
SODIUM SERPL-SCNC: 139 MMOL/L (ref 136–145)
WBC # BLD AUTO: 10.4 10E3/UL (ref 4–11)

## 2022-12-21 PROCEDURE — 36415 COLL VENOUS BLD VENIPUNCTURE: CPT | Performed by: EMERGENCY MEDICINE

## 2022-12-21 PROCEDURE — 73562 X-RAY EXAM OF KNEE 3: CPT | Mod: LT

## 2022-12-21 PROCEDURE — 99284 EMERGENCY DEPT VISIT MOD MDM: CPT

## 2022-12-21 PROCEDURE — 85652 RBC SED RATE AUTOMATED: CPT | Performed by: EMERGENCY MEDICINE

## 2022-12-21 PROCEDURE — 85025 COMPLETE CBC W/AUTO DIFF WBC: CPT | Performed by: EMERGENCY MEDICINE

## 2022-12-21 PROCEDURE — 86140 C-REACTIVE PROTEIN: CPT | Performed by: EMERGENCY MEDICINE

## 2022-12-21 PROCEDURE — 82310 ASSAY OF CALCIUM: CPT | Performed by: EMERGENCY MEDICINE

## 2022-12-21 ASSESSMENT — ACTIVITIES OF DAILY LIVING (ADL): ADLS_ACUITY_SCORE: 35

## 2022-12-21 NOTE — ED TRIAGE NOTES
Pt arrives to ED via EMS with c/o ongoing wound to her left due after a total knee replacement on 11/11 by Dr Peter with [facility]. Pt states the swelling, redness, and puss is getting worse. Pt saw Dr Peter and pt states that he didn't think anything needed to be done. Pt has pictures on her cell phone of wound and appears to be wound dehiscence. No fever. Swelling is significant. Rates pain 7/10.      Triage Assessment     Row Name 12/21/22 7105       Triage Assessment (Adult)    Airway WDL WDL       Respiratory WDL    Respiratory WDL WDL       Skin Circulation/Temperature WDL    Skin Circulation/Temperature WDL WDL       Cardiac WDL    Cardiac WDL WDL       Peripheral/Neurovascular WDL    Peripheral Neurovascular WDL WDL       Cognitive/Neuro/Behavioral WDL    Cognitive/Neuro/Behavioral WDL WDL

## 2022-12-22 NOTE — ED PROVIDER NOTES
EMERGENCY DEPARTMENT ENCOUNTER      NAME: Belgica Salvador  AGE: 77 year old female  YOB: 1945  MRN: [id number]  EVALUATION DATE & TIME: 2022  5:42 PM    PCP: Kassie Thompson    ED PROVIDER: Pablo Dunne M.D.      Chief Complaint   Patient presents with     Wound Check     Post-op Problem       FINAL IMPRESSION:  1. Encounter for post surgical wound check        ED COURSE & MEDICAL DECISION MAKIN year old female presents to the Emergency Department for evaluation of concerns about a wound on her left knee.  She is 6 weeks postop from a left total knee replacement with orthopedics here.  She presents with concerns about a wound on her left knee.  This has been developing after some wound dehiscence essentially since immediately after her surgery.  She has normal range of motion of the left knee.  There is a small rim of erythema surrounding multiple sites of wound dehiscence with overlying eschar and fibrinous material at the base.  Minimal beginning of these look to be overtly infected.  Imaging was uploaded to the media tab.  Labs here including white blood cell count, ESR, CRP are all stable and reassuring.  X-rays reveal intact positioning of the hardware and a small effusion.  I do not see any evidence that this is extending to within the joint.  It is difficult to envision that this is significantly changed from her visit to the orthopedics office yesterday.  I did discuss things briefly with our orthopedist on-call who is going to send a message to the patient's primary surgeon to make sure they can follow-up on her concerns but otherwise I do not think there should be any acute change to the management plan from the emergency department here.  Discussed all this with the patient and she was comfortable with the plan for discharge and follow-up.    5:52 PM I met with the patient, obtained history, performed an initial exam, and discussed options and plan for diagnostics and  treatment here in the ED. PPE worn: N95, eye protection, gloves.  6:52 PM Discussed with Dr. Ashby, [facility].  6:55 PM Rechecked and reevaluated the patient. Updated on results and plan for discharge.    Medical Decision Making    History:    Supplemental history from: Documented in HPI, if applicable    External Record(s) reviewed: Documented in HPI, if applicable.    Work Up:    Chart documentation includes differential considered and any EKGs or imaging interpreted by provider.    In additional to work up documented, I considered the following work up: See chart documentation, if applicable.    External consultation:    Discussion of management with another provider: See chart documentation, if applicable    Complicating factors:    Care impacted by chronic illness: None    Care affected by social determinants of health: N/A    Disposition considerations: Discharge. No recommendations on prescription strength medication(s). N/A.        MEDICATIONS GIVEN IN THE EMERGENCY:  Medications - No data to display    NEW PRESCRIPTIONS STARTED AT TODAY'S ER VISIT  New Prescriptions    No medications on file     =================================================================    [facility]    Patient information was obtained from: patient    Use of : N/A    Belgica Salvador is a 77 year old female with a pertinent history of type II DM, HLD, HTN, CAD, s/p left knee arthroplasty 11/10/22, who presents to this ED by EMS for evaluation of right knee wound, concern for infection. Patient s/p total left knee arthroplasty with Dr. Peter of [facility]. Patient reports that over the last few weeks she has been having worsening redness and swelling around the surgical wound on her left knee. Endorses mild associated pain. Patient is concerned about an infection in the knee, but states that she was seen by Dr. Peter's PA yesterday and they were reportedly not too concerned about the wound. She denies any fevers.  Denies any other complaints or concerns.      REVIEW OF SYSTEMS  All systems reviewed and negative except as noted in HPI.    PAST MEDICAL HISTORY:  Past Medical History:   Diagnosis Date     Anxiety      Arthritis      Coronary artery disease      Diabetes (H)      Gastroesophageal reflux disease      Graves disease      Hypertension      Migraine      Muscle spasm      Renal disease      Thrombosis      Thyroid disease        PAST SURGICAL HISTORY:  Past Surgical History:   Procedure Laterality Date     ANKLE SURGERY Right     hardware in place     APPENDECTOMY       ARTHROPLASTY KNEE Left 11/10/2022    Procedure: LEFT TOTAL KNEE ARTHROPLASTY;  Surgeon: Arnold Peter MD;  Location: [facility] Main OR     CERVICAL FUSION N/A 3/20/2019    Procedure: LEFT CERVICAL 7 - THORACIC 1 ANTERIOR CERVICAL DECOMPRESSION FUSION REMOVAL OF PLATE AT CERVICAL 5-6 CERVICAL 6-7;  Surgeon: Richard Talley MD;  Location: [facility] Main OR;  Service: Spine     ESOPHAGOSCOPY, GASTROSCOPY, DUODENOSCOPY (EGD), COMBINED N/A 9/24/2017    Procedure: ESOPHAGOGASTRODUODENOSCOPY (EGD) with biopsies;  Surgeon: Jorge Palm MD;  Location: [facility] GI;  Service:      HC REVISE MEDIAN N/CARPAL TUNNEL SURG      Description: Neuroplasty Decompression Median Nerve At Carpal Tunnel;  Proc Date: 01/01/2002;     IR AORTIC ARCH 4 VESSEL ANGIOGRAM  5/22/2012     IR CAROTID ANGIOGRAM  5/22/2012     IR CAROTID ANGIOGRAM  5/22/2012     JOINT REPLACEMENT Right 2009    knee     OTHER SURGICAL HISTORY  2013    spleenectomy     OTHER SURGICAL HISTORY Left     melanoma ear     OTHER SURGICAL HISTORY      bladder interstimstage 1 & 2     PANCREAS SURGERY N/A     tail and part of body removed     KS IMPLANT PERIPH/GASTRIC NEUROSTIM/ N/A 9/28/2017    Procedure: INTERSTIM STAGE 2;  Surgeon: Catarino Hurt MD;  Location: [facility] OR;  Service: Gynecology     KS PARTIAL EXCISION THYROID,UNILAT      Description: Thyroid Surgery Sub-Total  Thyroidectomy;  Recorded: 09/13/2008;  Comments: for grave's     RELEASE CARPAL TUNNEL Left 06/2018     [facility] CERV SPINE FUSN,ANTER,BELOW C2      Description: Cervical Vertebral Fusion;  Proc Date: 05/01/2001;  Comments: c5-c7     [facility] GASTRIC BYPASS,OBESE<100CM AGUS-EN-Y      Description: Gastric Surgery For Morbid Obesity Bypass With Agus-en-Y;  Proc Date: 09/01/2004;     [facility] KNEE SCOPE,AID POST CRUC REPAIR      Description: Knee Arthroscopy With Posterior Cruciate Ligament Repair;  Recorded: 09/13/2008;       CURRENT MEDICATIONS:    No current facility-administered medications for this encounter.     Current Outpatient Medications   Medication     acetaminophen (TYLENOL) 325 MG tablet     aspirin (ASA) 325 MG EC tablet     baclofen (LIORESAL) 10 MG tablet     busPIRone (BUSPAR) 10 MG tablet     CALCIUM-MAGNESIUM-ZINC ORAL     celecoxib (CELEBREX) 100 MG capsule     DULoxetine (CYMBALTA) 60 MG capsule     EPINEPHrine (EPIPEN 2-SHERRI) 0.3 mg/0.3 mL atIn     famotidine (PEPCID) 20 MG tablet     ferrous sulfate 325 (65 FE) MG tablet     fluticasone (FLONASE) 50 mcg/actuation nasal spray     folic acid (FOLVITE) 1 MG tablet     hydrOXYzine (ATARAX) 10 MG tablet     ketotifen (ZADITOR) 0.025 % ophthalmic solution     levothyroxine (SYNTHROID, LEVOTHROID) 88 MCG tablet     loperamide (IMODIUM) 2 MG capsule     magnesium oxide (MAG-OX) 400 mg (241.3 mg magnesium) tablet     meclizine (ANTIVERT) 12.5 MG tablet     metFORMIN (GLUCOPHAGE) 500 MG tablet     multivitamin with minerals (THERA-M) 9 mg iron-400 mcg Tab tablet     OMEGA-3/DHA/EPA/FISH OIL (FISH OIL-OMEGA-3 FATTY ACIDS) 300-1,000 mg capsule     omeprazole (PRILOSEC) 20 MG capsule     ondansetron (ZOFRAN ODT) 4 MG ODT tab     oxyCODONE (ROXICODONE) 5 MG tablet     polyethylene glycol (MIRALAX) 17 g packet     propranolol (INDERAL LA) 160 mg SR capsule     rivaroxaban ANTICOAGULANT (XARELTO) 10 MG TABS tablet     rosuvastatin (CRESTOR) 5 MG tablet     senna-docusate  (SENOKOT-S/PERICOLACE) 8.6-50 MG tablet     valACYclovir (VALTREX) 1000 MG tablet     vitamin B-12 (CYANOCOBALAMIN) 1000 MCG tablet     Vitamin D, Cholecalciferol, 25 MCG (1000 UT) TABS       ALLERGIES:  Allergies   Allergen Reactions     Glucosamine Anaphylaxis and Rash     Cardiac arrest     Iodine Anaphylaxis     Patient can uses skin products such as betadine     Shellfish Allergy Anaphylaxis, Hives and Rash     Other reaction(s): Cardiac Arrest  Very severe       Shellfish Containing Products [Shellfish-Derived Products] Anaphylaxis and Hives     Amitriptyline Other (See Comments)     hallucinations     Buspirone Other (See Comments)     Hallucinations     Ibuprofen Hives     Nsaids      Nystatin Diarrhea     Stomach cramps       Tramadol Other (See Comments)     hallucinations     Aleve [Naproxen] Hives and Rash     Chlorpheniramine Rash     Pseudoephedrine Rash       FAMILY HISTORY:  Family History   Problem Relation Age of Onset     Breast Cancer Paternal Aunt        SOCIAL HISTORY:   Social History     Socioeconomic History     Marital status:    Tobacco Use     Smoking status: Former     Smokeless tobacco: Never   Substance and Sexual Activity     Alcohol use: Not Currently     Alcohol/week: 1.0 - 2.0 standard drink     Types: 1 - 2 Standard drinks or equivalent per week     Drug use: No       VITALS:  BP (!) 148/68   Pulse 100   Temp 98.4  F (36.9  C) (Oral)   Resp 18   Wt 75.3 kg (166 lb)   SpO2 97%   BMI 26.79 kg/m      PHYSICAL EXAM    Constitutional: Well developed, Well nourished, NAD  HENT: Normocephalic, Atraumatic. Neck Supple.  Eyes: EOMI, Conjunctiva normal.  Respiratory: Breathing comfortably on room air. Speaks full sentences easily. Lungs clear to ascultation.  Cardiovascular: Normal heart rate, Regular rhythm. No peripheral edema.  Abdomen: Soft, nontender  Musculoskeletal: Status post left knee replacement with an anterior incision.  There are a couple of areas of wound  dehiscence and chronic appearing wounds the largest of which is about 2 cm near the midportion of the incision overlying the patella.  There is a fibrinous base and an overlying black eschar in this area.  There is a mild rim of erythema surrounding the entire incision but no warmth induration or fluctuance.  Patient is able to range the affected knee normally.  There is no palpable effusion.  Integument: Warm, Dry.  Neurologic: Alert & awake, Normal motor function, Normal sensory function, No focal deficits noted.   Psychiatric: Cooperative. Affect appropriate.     LAB:  All pertinent labs reviewed and interpreted.  Labs Ordered and Resulted from Time of ED Arrival to Time of ED Departure   CRP INFLAMMATION - Abnormal       Result Value    CRP 0.8 (*)    CBC WITH PLATELETS AND DIFFERENTIAL - Abnormal    WBC Count 10.4      RBC Count 3.86      Hemoglobin 12.6      Hematocrit 39.1       (*)     MCH 32.6      MCHC 32.2      RDW 16.1 (*)     Platelet Count 438      % Neutrophils 59      % Lymphocytes 28      % Monocytes 11      % Eosinophils 1      % Basophils 1      % Immature Granulocytes 0      NRBCs per 100 WBC 0      Absolute Neutrophils 6.1      Absolute Lymphocytes 2.9      Absolute Monocytes 1.2      Absolute Eosinophils 0.1      Absolute Basophils 0.1      Absolute Immature Granulocytes 0.0      Absolute NRBCs 0.0     ERYTHROCYTE SEDIMENTATION RATE AUTO - Normal    Erythrocyte Sedimentation Rate 8     BASIC METABOLIC PANEL - Normal    Sodium 139      Potassium 4.3      Chloride 101      Carbon Dioxide (CO2) 23      Anion Gap 15      Urea Nitrogen 16      Creatinine 0.71      Calcium 9.8      Glucose 121      GFR Estimate 88         RADIOLOGY:  Reviewed all pertinent imaging. Please see official radiology report.  XR Knee Left 3 Views   Final Result   IMPRESSION:   1.  Left total knee arthroplasty. The components are well seated. No fracture or joint malalignment. Small joint effusion.   2.  Soft tissue  irregularity at the anterior margin of the patella, likely representing a wound or ulcer. No soft tissue gas or radiodense foreign body.   3.  Atherosclerotic calcification.          I, Clement Agee, am serving as a scribe to document services personally performed by Dr. Pablo Dunne, based on my observation and the provider's statements to me. I, Pablo Dunne MD attest that Clement Agee is acting in a scribe capacity, has observed my performance of the services and has documented them in accordance with my direction.    Pablo Dunne M.D.  Emergency Medicine  [facility] EMERGENCY ROOM  [address] [id number] [phone]  Dept: [phone]     Pablo Dunne MD  12/21/22 1930

## 2022-12-22 NOTE — DISCHARGE INSTRUCTIONS
You were seen in the emergency department at [facility] for concerns about a left knee postoperative wound.  We obtained some lab tests and an x-ray here.  The x-ray showed good position of the hardware and your inflammatory markers and lab tests all appear stable at this time.  We did discuss your case briefly with the orthopedist on-call Dr. Ashby who is going to try to route a message to Dr. Simms's clinic to get back to you about these concerns.  Right now we do not see any evidence of severe infection extending into the knee joint and the rest of your evaluation looks stable.  We the neck steps would be to follow-up in the Ortho clinic to decide on a wound care plan.  If you have not heard anything  by the end of the day tomorrow, please call them to set up follow-up

## 2023-01-06 PROBLEM — R10.13 EPIGASTRIC PAIN: Status: ACTIVE | Noted: 2017-12-12

## 2023-01-06 PROBLEM — K57.30 DIVERTICULAR DISEASE OF LARGE INTESTINE: Status: ACTIVE | Noted: 2017-10-13

## 2023-01-06 PROBLEM — E11.42 DIABETIC PERIPHERAL NEUROPATHY (H): Status: ACTIVE | Noted: 2018-02-16

## 2023-01-06 PROBLEM — K28.9 GASTROJEJUNAL ULCER WITHOUT HEMORRHAGE, PERFORATION, OR OBSTRUCTION: Status: ACTIVE | Noted: 2020-09-08

## 2023-01-06 PROBLEM — G63 POLYNEUROPATHY ASSOCIATED WITH UNDERLYING DISEASE (H): Status: ACTIVE | Noted: 2019-01-07

## 2023-01-06 PROBLEM — I89.0 LYMPHEDEMA OF RIGHT ARM: Status: ACTIVE | Noted: 2021-04-16

## 2023-01-06 PROBLEM — N17.9 ACUTE RENAL FAILURE (H): Status: ACTIVE | Noted: 2023-01-06

## 2023-01-06 PROBLEM — G56.03 CARPAL TUNNEL SYNDROME, BILATERAL UPPER LIMBS: Status: ACTIVE | Noted: 2018-05-22

## 2023-01-06 PROBLEM — M17.9 OSTEOARTHRITIS OF KNEE: Status: ACTIVE | Noted: 2023-01-06

## 2023-01-06 PROBLEM — M41.24 OTHER IDIOPATHIC SCOLIOSIS, THORACIC REGION: Status: ACTIVE | Noted: 2021-04-16

## 2023-01-06 PROBLEM — Z86.718 HISTORY OF DVT OF LOWER EXTREMITY: Status: ACTIVE | Noted: 2020-07-11

## 2023-01-06 PROBLEM — N32.81 OVERACTIVE BLADDER: Status: ACTIVE | Noted: 2017-06-28

## 2023-01-06 PROBLEM — K21.00 GASTRO-ESOPHAGEAL REFLUX DISEASE WITH ESOPHAGITIS: Status: ACTIVE | Noted: 2020-09-10

## 2023-01-06 PROBLEM — E11.3293 MILD NONPROLIFERATIVE DIABETIC RETINOPATHY OF BOTH EYES WITHOUT MACULAR EDEMA ASSOCIATED WITH TYPE 2 DIABETES MELLITUS (H): Status: ACTIVE | Noted: 2022-06-15

## 2023-01-06 PROBLEM — M62.561 RIGHT CALF ATROPHY: Status: ACTIVE | Noted: 2021-04-16

## 2023-01-06 PROBLEM — K52.9 JEJUNITIS: Status: ACTIVE | Noted: 2017-09-25

## 2023-01-06 PROBLEM — M79.18 MYOFASCIAL PAIN SYNDROME: Status: ACTIVE | Noted: 2020-02-27

## 2023-01-06 PROBLEM — I25.10 CAD IN NATIVE ARTERY: Status: ACTIVE | Noted: 2022-11-07

## 2023-01-06 PROBLEM — M70.60 GREATER TROCHANTERIC BURSITIS: Status: ACTIVE | Noted: 2019-12-16

## 2023-01-06 PROBLEM — Z96.651 HISTORY OF TOTAL KNEE REPLACEMENT, RIGHT: Status: ACTIVE | Noted: 2019-12-16

## 2023-01-06 PROBLEM — M75.02 ADHESIVE CAPSULITIS OF BOTH SHOULDERS: Status: ACTIVE | Noted: 2017-03-09

## 2023-01-06 PROBLEM — E65 CENTRAL OBESITY: Status: ACTIVE | Noted: 2023-01-06

## 2023-01-06 PROBLEM — E11.8 COMPLICATION OF DIABETES MELLITUS (H): Status: ACTIVE | Noted: 2023-01-06

## 2023-01-06 PROBLEM — K52.9 NONINFECTIOUS GASTROENTERITIS: Status: ACTIVE | Noted: 2017-09-25

## 2023-01-06 PROBLEM — R19.5 ABNORMAL FECES: Status: ACTIVE | Noted: 2017-10-17

## 2023-01-06 PROBLEM — M25.532 LEFT WRIST PAIN: Status: ACTIVE | Noted: 2018-06-01

## 2023-01-06 PROBLEM — K21.9 GASTROESOPHAGEAL REFLUX DISEASE WITHOUT ESOPHAGITIS: Status: ACTIVE | Noted: 2020-09-08

## 2023-01-06 PROBLEM — C43.9 MALIGNANT MELANOMA (H): Status: ACTIVE | Noted: 2023-01-06

## 2023-01-06 PROBLEM — M75.01 ADHESIVE CAPSULITIS OF BOTH SHOULDERS: Status: ACTIVE | Noted: 2017-03-09

## 2023-01-09 ENCOUNTER — TRANSITIONAL CARE UNIT VISIT (OUTPATIENT)
Dept: GERIATRICS | Facility: CLINIC | Age: 78
End: 2023-01-09
Payer: COMMERCIAL

## 2023-01-09 VITALS
OXYGEN SATURATION: 98 % | DIASTOLIC BLOOD PRESSURE: 63 MMHG | BODY MASS INDEX: 26.79 KG/M2 | SYSTOLIC BLOOD PRESSURE: 130 MMHG | TEMPERATURE: 97.7 F | RESPIRATION RATE: 18 BRPM | HEIGHT: 66 IN | HEART RATE: 60 BPM

## 2023-01-09 DIAGNOSIS — E11.01 TYPE 2 DIABETES MELLITUS WITH HYPEROSMOLAR COMA, WITHOUT LONG-TERM CURRENT USE OF INSULIN (H): ICD-10-CM

## 2023-01-09 DIAGNOSIS — G89.4 CHRONIC PAIN SYNDROME: ICD-10-CM

## 2023-01-09 DIAGNOSIS — I10 PRIMARY HYPERTENSION: ICD-10-CM

## 2023-01-09 DIAGNOSIS — E02 SUBCLINICAL IODINE-DEFICIENCY HYPOTHYROIDISM: ICD-10-CM

## 2023-01-09 DIAGNOSIS — I25.10 CORONARY ARTERY DISEASE INVOLVING NATIVE CORONARY ARTERY OF NATIVE HEART WITHOUT ANGINA PECTORIS: ICD-10-CM

## 2023-01-09 DIAGNOSIS — K21.9 GASTROESOPHAGEAL REFLUX DISEASE WITHOUT ESOPHAGITIS: ICD-10-CM

## 2023-01-09 DIAGNOSIS — S81.802D WOUND OF LEFT LOWER EXTREMITY, SUBSEQUENT ENCOUNTER: Primary | ICD-10-CM

## 2023-01-09 DIAGNOSIS — Z96.652 HISTORY OF TOTAL KNEE ARTHROPLASTY, LEFT: ICD-10-CM

## 2023-01-09 PROBLEM — S81.802A LEG WOUND, LEFT: Status: ACTIVE | Noted: 2022-12-30

## 2023-01-09 PROCEDURE — 99309 SBSQ NF CARE MODERATE MDM 30: CPT | Performed by: NURSE PRACTITIONER

## 2023-01-09 RX ORDER — DOXYCYCLINE HYCLATE 100 MG
100 TABLET ORAL 2 TIMES DAILY
COMMUNITY
Start: 2023-01-09 | End: 2023-01-21

## 2023-01-09 RX ORDER — SUCRALFATE 1 G/1
1 TABLET ORAL 4 TIMES DAILY
COMMUNITY
Start: 2023-01-09 | End: 2023-08-13

## 2023-01-09 RX ORDER — ACETAMINOPHEN 325 MG/1
650 TABLET ORAL 3 TIMES DAILY
COMMUNITY
Start: 2023-01-09 | End: 2023-08-13

## 2023-01-09 RX ORDER — HYDROCODONE BITARTRATE AND ACETAMINOPHEN 5; 325 MG/1; MG/1
1 TABLET ORAL EVERY 4 HOURS PRN
COMMUNITY
Start: 2023-01-09 | End: 2023-08-13

## 2023-01-09 RX ORDER — LISINOPRIL 20 MG/1
20 TABLET ORAL DAILY
Status: ON HOLD | COMMUNITY
Start: 2023-01-09 | End: 2023-08-18

## 2023-01-09 RX ORDER — MULTIVITAMIN
1 TABLET ORAL DAILY
COMMUNITY
Start: 2023-01-09 | End: 2023-10-31

## 2023-01-09 NOTE — LETTER
1/9/2023        RE: Belgica Salvador  [address] [address]         HEALTH GERIATRIC SERVICES    Code Status:  FULL CODE   Visit Type:   Chief Complaint   Patient presents with     TCU Admission     [facility] 12/30/2022 - 1/6/2023     [place]  [facility] [id number]               HPI: Belgica Salvador is a 77 year old female who I am seeing today for admit to the TCU.  Patient recently hospitalized on 12/30/2022 at [facility] for left leg wound.  Past medical history includes type 2 diabetes mellitus, GERD, anxiety, coronary artery disease, tremor, hypothyroidism and hypertension.  Patient with a history of left total knee replacement on November 11, 2022.  Postoperatively she had persistent wound dehiscence and nonhealing wound.  She underwent I&D and was discharged with VAC placement for wound management.  She continues on oral antibiotics.    Transitional Care Course:  Today patient sitting up in bed.  She continues with wound dehiscence of the left knee status post I&D with VAC placement in place.  Pain control with Vicodin.  She does have underlying chronic pain syndrome and has been taking Vicodin at home.  Appetite varies.  She is having regular bowel movements.  Currently afebrile.  She is somewhat discouraged that this is gone on for quite some time.      Assessment/Plan:     (S81.132D) Wound of left lower extremity, subsequent encounter  (primary encounter diagnosis)  (Z96.652) History of total knee arthroplasty, left  Comment: Patient with a history of left total knee arthroplasty on 11/11/2022.  Patient underwent I&D of the left knee wound.  She continues on oral antibiotics.  She has a wound VAC in place.  Plan: Continue Augmentin and doxycycline until 1/14/2023.  Follow-up CBC, CRP and sed rate and BMP.  Weightbearing as tolerated.  Continue wound VAC changing 3 times weekly.  Pain control with Vicodin.    (E11.01) Type 2 diabetes mellitus with hyperosmolar  coma, without long-term current use of insulin (H)  Comment: Blood sugar satisfactory.  Hemoglobin A1c 5.1 on 12/31/2022.  Plan: Continue metformin.  Monitor daily.    (G89.4) Chronic pain syndrome  Comment: Patient took Vicodin at home.    Plan: Continue.    (K21.9) Gastroesophageal reflux disease without esophagitis  Plan: Continue omeprazole daily.  She has as needed omeprazole which I will discontinue.  Also on Carafate.    (I10) Primary hypertension  Comment: Blood pressure appears satisfactory.  Plan: Continue lisinopril.  Follow-up BMP.    (I25.10) Coronary artery disease involving native coronary artery of native heart without angina pectoris  Comment: Denies any shortness of breath or chest pain.  Plan: Continue aspirin and statin.    (E02) Subclinical iodine-deficiency hypothyroidism  Comment: TSH on 9/30 0.50.  Plan: Continue supplement.      Active Ambulatory Problems     Diagnosis Date Noted     Type 2 Diabetes Mellitus      Malignant Melanoma Of The Ear      Graves' Disease      Hypothyroidism      Diabetic Peripheral Neuropathy      Hyperlipidemia      Allergic Reaction To Shellfish      Upper GI bleed 09/22/2017     Black stool 09/22/2017     S/P gastric bypass 09/22/2017     Melena 09/22/2017     Cervical radiculopathy at C8 03/20/2019     DM2 (diabetes mellitus, type 2) (H) 03/20/2019     Essential hypertension      S/P knee surgery 11/10/2022     Type 2 diabetes mellitus (H) 11/10/2022     Hyperlipidemia LDL goal <100 11/10/2022     Neuropathy 11/10/2022     Hyponatremia 11/11/2022     Hyperkalemia 11/11/2022     Abnormal feces 10/17/2017     Acute renal failure (H) 01/06/2023     Anxiety disorder 07/26/2011     Adhesive capsulitis of both shoulders 03/09/2017     B12 deficiency 07/26/2011     CAD in native artery 11/07/2022     Carpal tunnel syndrome, bilateral upper limbs 05/22/2018     Central obesity 01/06/2023     Chronic pain disorder 09/07/2012     Complication of diabetes mellitus (H)  01/06/2023     Diabetic peripheral neuropathy (H) 02/16/2018     Diverticular disease of large intestine 10/13/2017     Epigastric pain 12/12/2017     Gastro-esophageal reflux disease with esophagitis 09/10/2020     Gastroesophageal reflux disease without esophagitis 09/08/2020     Gastrojejunal ulcer without hemorrhage, perforation, or obstruction 09/08/2020     Greater trochanteric bursitis 12/16/2019     Mild nonproliferative diabetic retinopathy of both eyes without macular edema associated with type 2 diabetes mellitus (H) 06/15/2022     Malignant melanoma (H) 01/06/2023     Lymphedema of right arm 04/16/2021     Low magnesium levels 12/15/2014     Left wrist pain 06/01/2018     Jejunitis 09/25/2017     Iron deficiency 12/15/2014     History of total knee replacement, right 12/16/2019     History of DVT of lower extremity 07/11/2020     Myofascial pain syndrome 02/27/2020     Noninfectious gastroenteritis 09/25/2017     Osteoarthritis of knee 01/06/2023     Other idiopathic scoliosis, thoracic region 04/16/2021     Overactive bladder 06/28/2017     Pancreatic cyst 04/18/2013     Polyneuropathy associated with underlying disease (H) 01/07/2019     Postablative hypothyroidism 07/26/2011     Right calf atrophy 04/16/2021     Vitamin D deficiency 06/12/2015     Leg wound, left 12/30/2022     Resolved Ambulatory Problems     Diagnosis Date Noted     No Resolved Ambulatory Problems     Past Medical History:   Diagnosis Date     Anxiety      Arthritis      Coronary artery disease      Diabetes (H)      Gastroesophageal reflux disease      Graves disease      Hypertension      Migraine      Muscle spasm      Renal disease      Thrombosis      Thyroid disease      Allergies   Allergen Reactions     Glucosamine Anaphylaxis and Rash     Cardiac arrest     Ibuprofen Hives     Iodine Angioedema, Anaphylaxis and Hives     Patient can uses skin products such as betadine - See shellfish allergy       Naproxen Hives and Rash      Shellfish Allergy Anaphylaxis, Hives and Rash     Cardiac arrest - very severe     Shellfish-Derived Products Anaphylaxis, Hives and Rash     Cardiac arrest - very severe       Amitriptyline Visual Disturbance     Hallucinations     Buspirone Visual Disturbance     Hallucinations     Nystatin Nausea, Diarrhea, Cramps and GI Disturbance     Tramadol Visual Disturbance     Hallucinations     Chlorpheniramine Hives and Rash     Nsaids Unknown     Pseudoephedrine Hives and Rash       All Meds and Allergies reviewed in the record at the facility and is the most up-to-date.    Post Discharge Medication Reconciliation Status: discharge medications reconciled, continue medications without change  Current Outpatient Medications   Medication Sig     acetaminophen (TYLENOL) 325 MG tablet Take 650 mg by mouth 3 times daily     amoxicillin-clavulanate (AUGMENTIN) 875-125 MG tablet Take 1 tablet by mouth 2 times daily For 8 days - ending 1/14/2023     aspirin 81 MG EC tablet Take 325 mg by mouth daily     baclofen (LIORESAL) 10 MG tablet Take 10 mg by mouth 3 times daily     busPIRone (BUSPAR) 10 MG tablet Take 10 mg by mouth 2 times daily     Carboxymethylcellulose Sodium 0.25 % SOLN Place 1 drop into both eyes 2 times daily     doxycycline hyclate (VIBRA-TABS) 100 MG tablet Take 100 mg by mouth 2 times daily     DULoxetine (CYMBALTA) 60 MG capsule Take 60 mg by mouth daily     EPINEPHrine (EPIPEN 2-SHERRI) 0.3 mg/0.3 mL atIn [EPINEPHRINE (EPIPEN 2-SHERRI) 0.3 MG/0.3 ML ATIN] Inject 0.3 mg into the shoulder, thigh, or buttocks once as needed (Anaphylaxis).     famotidine (PEPCID) 20 MG tablet Take 20 mg by mouth 2 times daily     ferrous sulfate 325 (65 FE) MG tablet [FERROUS SULFATE 325 (65 FE) MG TABLET] Take 1 tablet by mouth daily with breakfast.            fluticasone (FLONASE) 50 mcg/actuation nasal spray Spray 2 sprays into both nostrils daily     folic acid (FOLVITE) 1 MG tablet [FOLIC ACID (FOLVITE) 1 MG TABLET] Take 1 mg  by mouth daily.     HYDROcodone-acetaminophen (NORCO) 5-325 MG tablet Take 1 tablet by mouth every 4 hours as needed for pain     levothyroxine (SYNTHROID, LEVOTHROID) 88 MCG tablet [LEVOTHYROXINE (SYNTHROID, LEVOTHROID) 88 MCG TABLET] Take 88 mcg by mouth Daily at 6:00 am.      lisinopril (ZESTRIL) 20 MG tablet Take 20 mg by mouth daily     loperamide (IMODIUM) 2 MG capsule Take 2-4 mg by mouth 3 times daily as needed for diarrhea Take 1 capsule with first loose stool and take 2 capsules after each subsequent loose stool     magnesium oxide (MAG-OX) 400 mg (241.3 mg magnesium) tablet [MAGNESIUM OXIDE (MAG-OX) 400 MG (241.3 MG MAGNESIUM) TABLET] Take 800 mg by mouth 2 (two) times a day.     meclizine (ANTIVERT) 12.5 MG tablet Take 12.5 mg by mouth 3 times daily as needed for dizziness     metFORMIN (GLUCOPHAGE) 500 MG tablet [METFORMIN (GLUCOPHAGE) 500 MG TABLET] Take 1,000 mg by mouth 2 (two) times a day with meals.     Multiple vitamin TABS Take 1 tablet by mouth daily     omeprazole (PRILOSEC) 20 MG DR capsule Take 20 mg by mouth daily as needed     polyethylene glycol-propylene glycol (SYSTANE ULTRA) 0.4-0.3 % SOLN ophthalmic solution Place 1 drop into both eyes 2 times daily     propranolol (INDERAL LA) 160 mg SR capsule [PROPRANOLOL (INDERAL LA) 160 MG SR CAPSULE] Take 160 mg by mouth daily.            rosuvastatin (CRESTOR) 5 MG tablet Take 5 mg by mouth daily     sucralfate (CARAFATE) 1 GM tablet Take 1 g by mouth 4 times daily     valACYclovir (VALTREX) 1000 MG tablet Take 2,000 mg by mouth 2 times daily as needed     Vitamin D (Cholecalciferol) 50 MCG (2000 UT) CAPS Take 2,000 Units by mouth daily     acetaminophen (TYLENOL) 325 MG tablet Take 2 tablets (650 mg) by mouth every 4 hours as needed for other (mild pain) (Patient not taking: Reported on 1/9/2023)     CALCIUM-MAGNESIUM-ZINC ORAL [CALCIUM-MAGNESIUM-ZINC ORAL] Take 1 tablet by mouth daily. (Patient not taking: Reported on 1/9/2023)     celecoxib  (CELEBREX) 100 MG capsule Take 100 mg by mouth daily Take 1 capsule the day before surgery, skip day of surgery, resume day after surgery until gone (Patient not taking: Reported on 1/9/2023)     hydrOXYzine (ATARAX) 10 MG tablet Take 1 tablet (10 mg) by mouth every 6 hours as needed for other (with pain, moderate pain) (Patient not taking: Reported on 1/9/2023)     ketotifen (ZADITOR) 0.025 % ophthalmic solution Place 1 drop into both eyes 2 times daily as needed (Patient not taking: Reported on 1/9/2023)     multivitamin with minerals (THERA-M) 9 mg iron-400 mcg Tab tablet [MULTIVITAMIN WITH MINERALS (THERA-M) 9 MG IRON-400 MCG TAB TABLET] Take 1 tablet by mouth daily. (Patient not taking: Reported on 1/9/2023)     OMEGA-3/DHA/EPA/FISH OIL (FISH OIL-OMEGA-3 FATTY ACIDS) 300-1,000 mg capsule [OMEGA-3/DHA/EPA/FISH OIL (FISH OIL-OMEGA-3 FATTY ACIDS) 300-1,000 MG CAPSULE] Take 1 g by mouth daily. (Patient not taking: Reported on 1/9/2023)     omeprazole (PRILOSEC) 20 MG capsule [OMEPRAZOLE (PRILOSEC) 20 MG CAPSULE] Take 1 capsule (20 mg total) by mouth 2 (two) times a day before meals. (Patient not taking: Reported on 1/9/2023)     ondansetron (ZOFRAN ODT) 4 MG ODT tab Take 1-2 tablets (4-8 mg) by mouth every 8 hours as needed for nausea Dissolve ON the tongue. (Patient not taking: Reported on 1/9/2023)     oxyCODONE (ROXICODONE) 5 MG tablet Take 1 tablet (5 mg) by mouth every 6 hours as needed for severe pain (Severe Pain Only.  MDD: 6 tablets max per day.) (Patient not taking: Reported on 1/9/2023)     polyethylene glycol (MIRALAX) 17 g packet Take 17 g by mouth daily (Patient not taking: Reported on 1/9/2023)     rivaroxaban ANTICOAGULANT (XARELTO) 10 MG TABS tablet Take 1 tablet (10 mg) by mouth daily (with dinner) (Patient not taking: Reported on 1/9/2023)     senna-docusate (SENOKOT-S/PERICOLACE) 8.6-50 MG tablet Take 1-2 tablets by mouth 2 times daily Take while on oral narcotics to prevent or treat  "constipation. (Patient not taking: Reported on 1/9/2023)     vitamin B-12 (CYANOCOBALAMIN) 1000 MCG tablet Take 1,000 mcg by mouth daily (Patient not taking: Reported on 1/9/2023)     No current facility-administered medications for this visit.       REVIEW OF SYSTEMS:   10 point review of systems reviewed and pertinent positives in the HPI.     PHYSICAL EXAMINATION:  Physical Exam     Vital signs: /63   Pulse 60   Temp 97.7  F (36.5  C)   Resp 18   Ht 1.676 m (5' 6\")   SpO2 98%   BMI 26.79 kg/m    General: Awake, Alert, lying in bed, oriented x3, appropriately, follows simple commands, conversant  HEENT: Pink conjunctiva, anicteric sclerae, moist oral mucosa  NECK: Supple  CVS:  S1  S2, without murmur or gallop.   LUNG: Clear to auscultation, No wheezes, rales or rhonci.  BACK: No kyphosis of the thoracic spine  ABDOMEN: Soft, nontender to palpation, with positive bowel sounds  EXTREMITIES: Moves both upper and lower extremities with some limitation to the left lower extremity, trace pedal edema greater on the operative side, no calf tenderness  SKIN: Wound VAC removed to the left knee with 4 areas of dehiscence.  Area appears clean.  Some granulation and some slough noted.  No periwound redness.  No warmth.  NEUROLOGIC: Intact, pulses palpable  PSYCHIATRIC: Cognition intact      Labs:  All labs reviewed in the nursing home record and Epic   @  Lab Results   Component Value Date    WBC 10.4 12/21/2022     Lab Results   Component Value Date    RBC 3.86 12/21/2022     Lab Results   Component Value Date    HGB 12.6 12/21/2022     Lab Results   Component Value Date    HCT 39.1 12/21/2022     Lab Results   Component Value Date     12/21/2022     Lab Results   Component Value Date    MCH 32.6 12/21/2022     Lab Results   Component Value Date    MCHC 32.2 12/21/2022     Lab Results   Component Value Date    RDW 16.1 12/21/2022     Lab Results   Component Value Date     12/21/2022        @Last " Comprehensive Metabolic Panel:  Sodium   Date Value Ref Range Status   12/21/2022 139 136 - 145 mmol/L Final     Potassium   Date Value Ref Range Status   12/21/2022 4.3 3.5 - 5.0 mmol/L Final     Chloride   Date Value Ref Range Status   12/21/2022 101 98 - 107 mmol/L Final     Carbon Dioxide (CO2)   Date Value Ref Range Status   12/21/2022 23 22 - 31 mmol/L Final     Anion Gap   Date Value Ref Range Status   12/21/2022 15 5 - 18 mmol/L Final     Glucose   Date Value Ref Range Status   12/21/2022 121 70 - 125 mg/dL Final     Urea Nitrogen   Date Value Ref Range Status   12/21/2022 16 8 - 28 mg/dL Final     Creatinine   Date Value Ref Range Status   12/21/2022 0.71 0.60 - 1.10 mg/dL Final     GFR Estimate   Date Value Ref Range Status   12/21/2022 88 >60 mL/min/1.73m2 Final     Comment:     Effective December 21, 2021 eGFRcr in adults is calculated using the 2021 CKD-EPI creatinine equation which includes age and gender (Raiza et al., NEJM, DOI: 10.1056/RFTZie6180729)     Calcium   Date Value Ref Range Status   12/21/2022 9.8 8.5 - 10.5 mg/dL Final     Okay for PT OT eval and treat.  Follow-up CBC, ESR and CRP and BMP.    35 minutes spent for this visit with greater than 50% time spent face-to-face with patient as well as extensively reviewing the records and collaborating with nursing staff and therapy.    This note has been dictated using voice recognition software. Any grammatical or context distortions are unintentional and inherent to the software    Electronically signed by: Neeta Brito CNP         Sincerely,        Neeta Brito NP

## 2023-01-09 NOTE — PROGRESS NOTES
[facility] GERIATRIC SERVICES    Code Status:  FULL CODE   Visit Type:   Chief Complaint   Patient presents with     TCU Admission     Gratis 12/30/2022 - 1/6/2023     Facility:  [facility] ([facility]) [[id number]]               HPI: Belgica Salvador is a 77 year old female who I am seeing today for admit to the TCU.  Patient recently hospitalized on 12/30/2022 at [facility] for left leg wound.  Past medical history includes type 2 diabetes mellitus, GERD, anxiety, coronary artery disease, tremor, hypothyroidism and hypertension.  Patient with a history of left total knee replacement on November 11, 2022.  Postoperatively she had persistent wound dehiscence and nonhealing wound.  She underwent I&D and was discharged with VAC placement for wound management.  She continues on oral antibiotics.    Transitional Care Course:  Today patient sitting up in bed.  She continues with wound dehiscence of the left knee status post I&D with VAC placement in place.  Pain control with Vicodin.  She does have underlying chronic pain syndrome and has been taking Vicodin at home.  Appetite varies.  She is having regular bowel movements.  Currently afebrile.  She is somewhat discouraged that this is gone on for quite some time.      Assessment/Plan:     (S81.802D) Wound of left lower extremity, subsequent encounter  (primary encounter diagnosis)  (Z96.652) History of total knee arthroplasty, left  Comment: Patient with a history of left total knee arthroplasty on 11/11/2022.  Patient underwent I&D of the left knee wound.  She continues on oral antibiotics.  She has a wound VAC in place.  Plan: Continue Augmentin and doxycycline until 1/14/2023.  Follow-up CBC, CRP and sed rate and BMP.  Weightbearing as tolerated.  Continue wound VAC changing 3 times weekly.  Pain control with Vicodin.    (E11.01) Type 2 diabetes mellitus with hyperosmolar coma, without long-term current use of insulin (H)  Comment: Blood sugar satisfactory.   Hemoglobin A1c 5.1 on 12/31/2022.  Plan: Continue metformin.  Monitor daily.    (G89.4) Chronic pain syndrome  Comment: Patient took Vicodin at home.    Plan: Continue.    (K21.9) Gastroesophageal reflux disease without esophagitis  Plan: Continue omeprazole daily.  She has as needed omeprazole which I will discontinue.  Also on Carafate.    (I10) Primary hypertension  Comment: Blood pressure appears satisfactory.  Plan: Continue lisinopril.  Follow-up BMP.    (I25.10) Coronary artery disease involving native coronary artery of native heart without angina pectoris  Comment: Denies any shortness of breath or chest pain.  Plan: Continue aspirin and statin.    (E02) Subclinical iodine-deficiency hypothyroidism  Comment: TSH on 9/30 0.50.  Plan: Continue supplement.      Active Ambulatory Problems     Diagnosis Date Noted     Type 2 Diabetes Mellitus      Malignant Melanoma Of The Ear      Graves' Disease      Hypothyroidism      Diabetic Peripheral Neuropathy      Hyperlipidemia      Allergic Reaction To Shellfish      Upper GI bleed 09/22/2017     Black stool 09/22/2017     S/P gastric bypass 09/22/2017     Melena 09/22/2017     Cervical radiculopathy at C8 03/20/2019     DM2 (diabetes mellitus, type 2) (H) 03/20/2019     Essential hypertension      S/P knee surgery 11/10/2022     Type 2 diabetes mellitus (H) 11/10/2022     Hyperlipidemia LDL goal <100 11/10/2022     Neuropathy 11/10/2022     Hyponatremia 11/11/2022     Hyperkalemia 11/11/2022     Abnormal feces 10/17/2017     Acute renal failure (H) 01/06/2023     Anxiety disorder 07/26/2011     Adhesive capsulitis of both shoulders 03/09/2017     B12 deficiency 07/26/2011     CAD in native artery 11/07/2022     Carpal tunnel syndrome, bilateral upper limbs 05/22/2018     Central obesity 01/06/2023     Chronic pain disorder 09/07/2012     Complication of diabetes mellitus (H) 01/06/2023     Diabetic peripheral neuropathy (H) 02/16/2018     Diverticular disease of  large intestine 10/13/2017     Epigastric pain 12/12/2017     Gastro-esophageal reflux disease with esophagitis 09/10/2020     Gastroesophageal reflux disease without esophagitis 09/08/2020     Gastrojejunal ulcer without hemorrhage, perforation, or obstruction 09/08/2020     Greater trochanteric bursitis 12/16/2019     Mild nonproliferative diabetic retinopathy of both eyes without macular edema associated with type 2 diabetes mellitus (H) 06/15/2022     Malignant melanoma (H) 01/06/2023     Lymphedema of right arm 04/16/2021     Low magnesium levels 12/15/2014     Left wrist pain 06/01/2018     Jejunitis 09/25/2017     Iron deficiency 12/15/2014     History of total knee replacement, right 12/16/2019     History of DVT of lower extremity 07/11/2020     Myofascial pain syndrome 02/27/2020     Noninfectious gastroenteritis 09/25/2017     Osteoarthritis of knee 01/06/2023     Other idiopathic scoliosis, thoracic region 04/16/2021     Overactive bladder 06/28/2017     Pancreatic cyst 04/18/2013     Polyneuropathy associated with underlying disease (H) 01/07/2019     Postablative hypothyroidism 07/26/2011     Right calf atrophy 04/16/2021     Vitamin D deficiency 06/12/2015     Leg wound, left 12/30/2022     Resolved Ambulatory Problems     Diagnosis Date Noted     No Resolved Ambulatory Problems     Past Medical History:   Diagnosis Date     Anxiety      Arthritis      Coronary artery disease      Diabetes (H)      Gastroesophageal reflux disease      Graves disease      Hypertension      Migraine      Muscle spasm      Renal disease      Thrombosis      Thyroid disease      Allergies   Allergen Reactions     Glucosamine Anaphylaxis and Rash     Cardiac arrest     Ibuprofen Hives     Iodine Angioedema, Anaphylaxis and Hives     Patient can uses skin products such as betadine - See shellfish allergy       Naproxen Hives and Rash     Shellfish Allergy Anaphylaxis, Hives and Rash     Cardiac arrest - very severe      Shellfish-Derived Products Anaphylaxis, Hives and Rash     Cardiac arrest - very severe       Amitriptyline Visual Disturbance     Hallucinations     Buspirone Visual Disturbance     Hallucinations     Nystatin Nausea, Diarrhea, Cramps and GI Disturbance     Tramadol Visual Disturbance     Hallucinations     Chlorpheniramine Hives and Rash     Nsaids Unknown     Pseudoephedrine Hives and Rash       All Meds and Allergies reviewed in the record at the facility and is the most up-to-date.    Post Discharge Medication Reconciliation Status: discharge medications reconciled, continue medications without change  Current Outpatient Medications   Medication Sig     acetaminophen (TYLENOL) 325 MG tablet Take 650 mg by mouth 3 times daily     amoxicillin-clavulanate (AUGMENTIN) 875-125 MG tablet Take 1 tablet by mouth 2 times daily For 8 days - ending 1/14/2023     aspirin 81 MG EC tablet Take 325 mg by mouth daily     baclofen (LIORESAL) 10 MG tablet Take 10 mg by mouth 3 times daily     busPIRone (BUSPAR) 10 MG tablet Take 10 mg by mouth 2 times daily     Carboxymethylcellulose Sodium 0.25 % SOLN Place 1 drop into both eyes 2 times daily     doxycycline hyclate (VIBRA-TABS) 100 MG tablet Take 100 mg by mouth 2 times daily     DULoxetine (CYMBALTA) 60 MG capsule Take 60 mg by mouth daily     EPINEPHrine (EPIPEN 2-SHERRI) 0.3 mg/0.3 mL atIn [EPINEPHRINE (EPIPEN 2-SHERRI) 0.3 MG/0.3 ML ATIN] Inject 0.3 mg into the shoulder, thigh, or buttocks once as needed (Anaphylaxis).     famotidine (PEPCID) 20 MG tablet Take 20 mg by mouth 2 times daily     ferrous sulfate 325 (65 FE) MG tablet [FERROUS SULFATE 325 (65 FE) MG TABLET] Take 1 tablet by mouth daily with breakfast.            fluticasone (FLONASE) 50 mcg/actuation nasal spray Spray 2 sprays into both nostrils daily     folic acid (FOLVITE) 1 MG tablet [FOLIC ACID (FOLVITE) 1 MG TABLET] Take 1 mg by mouth daily.     HYDROcodone-acetaminophen (NORCO) 5-325 MG tablet Take 1 tablet by  mouth every 4 hours as needed for pain     levothyroxine (SYNTHROID, LEVOTHROID) 88 MCG tablet [LEVOTHYROXINE (SYNTHROID, LEVOTHROID) 88 MCG TABLET] Take 88 mcg by mouth Daily at 6:00 am.      lisinopril (ZESTRIL) 20 MG tablet Take 20 mg by mouth daily     loperamide (IMODIUM) 2 MG capsule Take 2-4 mg by mouth 3 times daily as needed for diarrhea Take 1 capsule with first loose stool and take 2 capsules after each subsequent loose stool     magnesium oxide (MAG-OX) 400 mg (241.3 mg magnesium) tablet [MAGNESIUM OXIDE (MAG-OX) 400 MG (241.3 MG MAGNESIUM) TABLET] Take 800 mg by mouth 2 (two) times a day.     meclizine (ANTIVERT) 12.5 MG tablet Take 12.5 mg by mouth 3 times daily as needed for dizziness     metFORMIN (GLUCOPHAGE) 500 MG tablet [METFORMIN (GLUCOPHAGE) 500 MG TABLET] Take 1,000 mg by mouth 2 (two) times a day with meals.     Multiple vitamin TABS Take 1 tablet by mouth daily     omeprazole (PRILOSEC) 20 MG DR capsule Take 20 mg by mouth daily as needed     polyethylene glycol-propylene glycol (SYSTANE ULTRA) 0.4-0.3 % SOLN ophthalmic solution Place 1 drop into both eyes 2 times daily     propranolol (INDERAL LA) 160 mg SR capsule [PROPRANOLOL (INDERAL LA) 160 MG SR CAPSULE] Take 160 mg by mouth daily.            rosuvastatin (CRESTOR) 5 MG tablet Take 5 mg by mouth daily     sucralfate (CARAFATE) 1 GM tablet Take 1 g by mouth 4 times daily     valACYclovir (VALTREX) 1000 MG tablet Take 2,000 mg by mouth 2 times daily as needed     Vitamin D (Cholecalciferol) 50 MCG (2000 UT) CAPS Take 2,000 Units by mouth daily     acetaminophen (TYLENOL) 325 MG tablet Take 2 tablets (650 mg) by mouth every 4 hours as needed for other (mild pain) (Patient not taking: Reported on 1/9/2023)     CALCIUM-MAGNESIUM-ZINC ORAL [CALCIUM-MAGNESIUM-ZINC ORAL] Take 1 tablet by mouth daily. (Patient not taking: Reported on 1/9/2023)     celecoxib (CELEBREX) 100 MG capsule Take 100 mg by mouth daily Take 1 capsule the day before  surgery, skip day of surgery, resume day after surgery until gone (Patient not taking: Reported on 1/9/2023)     hydrOXYzine (ATARAX) 10 MG tablet Take 1 tablet (10 mg) by mouth every 6 hours as needed for other (with pain, moderate pain) (Patient not taking: Reported on 1/9/2023)     ketotifen (ZADITOR) 0.025 % ophthalmic solution Place 1 drop into both eyes 2 times daily as needed (Patient not taking: Reported on 1/9/2023)     multivitamin with minerals (THERA-M) 9 mg iron-400 mcg Tab tablet [MULTIVITAMIN WITH MINERALS (THERA-M) 9 MG IRON-400 MCG TAB TABLET] Take 1 tablet by mouth daily. (Patient not taking: Reported on 1/9/2023)     OMEGA-3/DHA/EPA/FISH OIL (FISH OIL-OMEGA-3 FATTY ACIDS) 300-1,000 mg capsule [OMEGA-3/DHA/EPA/FISH OIL (FISH OIL-OMEGA-3 FATTY ACIDS) 300-1,000 MG CAPSULE] Take 1 g by mouth daily. (Patient not taking: Reported on 1/9/2023)     omeprazole (PRILOSEC) 20 MG capsule [OMEPRAZOLE (PRILOSEC) 20 MG CAPSULE] Take 1 capsule (20 mg total) by mouth 2 (two) times a day before meals. (Patient not taking: Reported on 1/9/2023)     ondansetron (ZOFRAN ODT) 4 MG ODT tab Take 1-2 tablets (4-8 mg) by mouth every 8 hours as needed for nausea Dissolve ON the tongue. (Patient not taking: Reported on 1/9/2023)     oxyCODONE (ROXICODONE) 5 MG tablet Take 1 tablet (5 mg) by mouth every 6 hours as needed for severe pain (Severe Pain Only.  MDD: 6 tablets max per day.) (Patient not taking: Reported on 1/9/2023)     polyethylene glycol (MIRALAX) 17 g packet Take 17 g by mouth daily (Patient not taking: Reported on 1/9/2023)     rivaroxaban ANTICOAGULANT (XARELTO) 10 MG TABS tablet Take 1 tablet (10 mg) by mouth daily (with dinner) (Patient not taking: Reported on 1/9/2023)     senna-docusate (SENOKOT-S/PERICOLACE) 8.6-50 MG tablet Take 1-2 tablets by mouth 2 times daily Take while on oral narcotics to prevent or treat constipation. (Patient not taking: Reported on 1/9/2023)     vitamin B-12 (CYANOCOBALAMIN)  "1000 MCG tablet Take 1,000 mcg by mouth daily (Patient not taking: Reported on 1/9/2023)     No current facility-administered medications for this visit.       REVIEW OF SYSTEMS:   10 point review of systems reviewed and pertinent positives in the HPI.     PHYSICAL EXAMINATION:  Physical Exam     Vital signs: /63   Pulse 60   Temp 97.7  F (36.5  C)   Resp 18   Ht 1.676 m (5' 6\")   SpO2 98%   BMI 26.79 kg/m    General: Awake, Alert, lying in bed, oriented x3, appropriately, follows simple commands, conversant  HEENT: Pink conjunctiva, anicteric sclerae, moist oral mucosa  NECK: Supple  CVS:  S1  S2, without murmur or gallop.   LUNG: Clear to auscultation, No wheezes, rales or rhonci.  BACK: No kyphosis of the thoracic spine  ABDOMEN: Soft, nontender to palpation, with positive bowel sounds  EXTREMITIES: Moves both upper and lower extremities with some limitation to the left lower extremity, trace pedal edema greater on the operative side, no calf tenderness  SKIN: Wound VAC removed to the left knee with 4 areas of dehiscence.  Area appears clean.  Some granulation and some slough noted.  No periwound redness.  No warmth.  NEUROLOGIC: Intact, pulses palpable  PSYCHIATRIC: Cognition intact      Labs:  All labs reviewed in the nursing home record and [facility]   @  Lab Results   Component Value Date    WBC 10.4 12/21/2022     Lab Results   Component Value Date    RBC 3.86 12/21/2022     Lab Results   Component Value Date    HGB 12.6 12/21/2022     Lab Results   Component Value Date    HCT 39.1 12/21/2022     Lab Results   Component Value Date     12/21/2022     Lab Results   Component Value Date    MCH 32.6 12/21/2022     Lab Results   Component Value Date    MCHC 32.2 12/21/2022     Lab Results   Component Value Date    RDW 16.1 12/21/2022     Lab Results   Component Value Date     12/21/2022        @Last Comprehensive Metabolic Panel:  Sodium   Date Value Ref Range Status   12/21/2022 139 136 - " 145 mmol/L Final     Potassium   Date Value Ref Range Status   12/21/2022 4.3 3.5 - 5.0 mmol/L Final     Chloride   Date Value Ref Range Status   12/21/2022 101 98 - 107 mmol/L Final     Carbon Dioxide (CO2)   Date Value Ref Range Status   12/21/2022 23 22 - 31 mmol/L Final     Anion Gap   Date Value Ref Range Status   12/21/2022 15 5 - 18 mmol/L Final     Glucose   Date Value Ref Range Status   12/21/2022 121 70 - 125 mg/dL Final     Urea Nitrogen   Date Value Ref Range Status   12/21/2022 16 8 - 28 mg/dL Final     Creatinine   Date Value Ref Range Status   12/21/2022 0.71 0.60 - 1.10 mg/dL Final     GFR Estimate   Date Value Ref Range Status   12/21/2022 88 >60 mL/min/1.73m2 Final     Comment:     Effective December 21, 2021 eGFRcr in adults is calculated using the 2021 CKD-EPI creatinine equation which includes age and gender (Raiza et al., NEJM, DOI: 10.1056/MWBFxe9044561)     Calcium   Date Value Ref Range Status   12/21/2022 9.8 8.5 - 10.5 mg/dL Final     Okay for PT OT eval and treat.  Follow-up CBC, ESR and CRP and BMP.    35 minutes spent for this visit with greater than 50% time spent face-to-face with patient as well as extensively reviewing the records and collaborating with nursing staff and therapy.    This note has been dictated using voice recognition software. Any grammatical or context distortions are unintentional and inherent to the software    Electronically signed by: Neeta Brito, CNP

## 2023-01-10 ENCOUNTER — LAB REQUISITION (OUTPATIENT)
Dept: LAB | Facility: CLINIC | Age: 78
End: 2023-01-10
Payer: COMMERCIAL

## 2023-01-10 DIAGNOSIS — Z47.89 ENCOUNTER FOR OTHER ORTHOPEDIC AFTERCARE: ICD-10-CM

## 2023-01-11 ENCOUNTER — DISCHARGE SUMMARY NURSING HOME (OUTPATIENT)
Dept: GERIATRICS | Facility: CLINIC | Age: 78
End: 2023-01-11
Payer: COMMERCIAL

## 2023-01-11 VITALS
DIASTOLIC BLOOD PRESSURE: 62 MMHG | OXYGEN SATURATION: 97 % | TEMPERATURE: 97.6 F | HEART RATE: 54 BPM | RESPIRATION RATE: 16 BRPM | SYSTOLIC BLOOD PRESSURE: 137 MMHG | HEIGHT: 66 IN | WEIGHT: 170.6 LBS | BODY MASS INDEX: 27.42 KG/M2

## 2023-01-11 DIAGNOSIS — G89.4 CHRONIC PAIN SYNDROME: ICD-10-CM

## 2023-01-11 DIAGNOSIS — E02 SUBCLINICAL IODINE-DEFICIENCY HYPOTHYROIDISM: ICD-10-CM

## 2023-01-11 DIAGNOSIS — I25.10 CORONARY ARTERY DISEASE INVOLVING NATIVE CORONARY ARTERY OF NATIVE HEART WITHOUT ANGINA PECTORIS: ICD-10-CM

## 2023-01-11 DIAGNOSIS — I10 PRIMARY HYPERTENSION: ICD-10-CM

## 2023-01-11 DIAGNOSIS — K21.9 GASTROESOPHAGEAL REFLUX DISEASE WITHOUT ESOPHAGITIS: ICD-10-CM

## 2023-01-11 DIAGNOSIS — S81.802D WOUND OF LEFT LOWER EXTREMITY, SUBSEQUENT ENCOUNTER: Primary | ICD-10-CM

## 2023-01-11 DIAGNOSIS — Z96.652 HISTORY OF TOTAL KNEE ARTHROPLASTY, LEFT: ICD-10-CM

## 2023-01-11 DIAGNOSIS — E11.01 TYPE 2 DIABETES MELLITUS WITH HYPEROSMOLAR COMA, WITHOUT LONG-TERM CURRENT USE OF INSULIN (H): ICD-10-CM

## 2023-01-11 PROCEDURE — 99316 NF DSCHRG MGMT 30 MIN+: CPT | Performed by: NURSE PRACTITIONER

## 2023-01-11 RX ORDER — LACTOBACILLUS RHAMNOSUS GG 10B CELL
1 CAPSULE ORAL 2 TIMES DAILY
COMMUNITY
End: 2023-08-13

## 2023-01-11 NOTE — LETTER
1/11/2023        RE: Belgica Salvador  [address] [facility] [id number]         [facility]    Code Status:  FULL CODE   Visit Type:   Chief Complaint   Patient presents with     TCU Discharge     Facility:  [facility]) [[id number]]           HPI: Belgica Salvador is a 77 year old female who I am seeing today for discharge from the TCU.  Patient recently hospitalized on 12/30/2022 at [facility] for left leg wound.  Past medical history includes type 2 diabetes mellitus, GERD, anxiety, coronary artery disease, tremor, hypothyroidism and hypertension.  Patient with a history of left total knee replacement on November 11, 2022.  Postoperatively she had persistent wound dehiscence and nonhealing wound.  She underwent I&D and was discharged with VAC placement for wound management.  She continues on oral antibiotics until 1/14/23.     Transitional Care Course:  Today patient sitting up in bed.  Left TKA with wound dehiscence who underwent I&D.  She continues on VAC placement.  Pain controlled with Vicodin and Tylenol.  She reports some upset stomach.  No diarrhea.  She is on doxycycline and Augmentin until 1/14/2023.  Currently afebrile.  Laboratory pending for tomorrow.  Insurance has cut her so she will need to discharge.      Assessment/Plan: Reviewed with changes    (S81.952D) Wound of left lower extremity, subsequent encounter  (primary encounter diagnosis)  (Z96.652) History of total knee arthroplasty, left  Comment: Patient with a history of left total knee arthroplasty on 11/11/2022.  Patient underwent I&D of the left knee wound.  She continues on oral antibiotics.  She has a wound VAC in place.  Today patient complaining of some upset stomach with the antibiotics.  She continues on this until 1/14/2023.  She does have GERD.  Continues on PPI.  Plan: Continue Augmentin and doxycycline until 1/14/2023.  Follow-up CBC, CRP and sed rate and BMP in the a.m.   Weightbearing as tolerated.  Continue wound VAC changing 3 times weekly.  Pain control with Vicodin.  I will send with 10 tabs.  Continue Tylenol 3 times daily.    (E11.01) Type 2 diabetes mellitus with hyperosmolar coma, without long-term current use of insulin (H)  Comment: Blood sugar satisfactory.  Hemoglobin A1c 5.1 on 12/31/2022.  Plan: Continue metformin.  Monitor daily.    (G89.4) Chronic pain syndrome  Plan: Continue Vicodin.    (K21.9) Gastroesophageal reflux disease without esophagitis  Plan: Continue omeprazole daily.  She has as needed omeprazole which I will discontinue.  Also on Carafate.  I will also add probiotic given that she is on antibiotics.    (I10) Primary hypertension  Comment: Blood pressure appears satisfactory.  Plan: Continue lisinopril.     (I25.10) Coronary artery disease involving native coronary artery of native heart without angina pectoris  Comment: Denies any shortness of breath or chest pain.  Plan: Continue aspirin and statin.    (E02) Subclinical iodine-deficiency hypothyroidism  Comment: TSH on 9/30 0.50.  Plan: Continue supplement.      Active Ambulatory Problems     Diagnosis Date Noted     Type 2 Diabetes Mellitus      Malignant Melanoma Of The Ear      Graves' Disease      Hypothyroidism      Diabetic Peripheral Neuropathy      Hyperlipidemia      Allergic Reaction To Shellfish      Upper GI bleed 09/22/2017     Black stool 09/22/2017     S/P gastric bypass 09/22/2017     Melena 09/22/2017     Cervical radiculopathy at C8 03/20/2019     DM2 (diabetes mellitus, type 2) (H) 03/20/2019     Essential hypertension      S/P knee surgery 11/10/2022     Type 2 diabetes mellitus (H) 11/10/2022     Hyperlipidemia LDL goal <100 11/10/2022     Neuropathy 11/10/2022     Hyponatremia 11/11/2022     Hyperkalemia 11/11/2022     Abnormal feces 10/17/2017     Acute renal failure (H) 01/06/2023     Anxiety disorder 07/26/2011     Adhesive capsulitis of both shoulders 03/09/2017     B12  deficiency 07/26/2011     CAD in native artery 11/07/2022     Carpal tunnel syndrome, bilateral upper limbs 05/22/2018     Central obesity 01/06/2023     Chronic pain disorder 09/07/2012     Complication of diabetes mellitus (H) 01/06/2023     Diabetic peripheral neuropathy (H) 02/16/2018     Diverticular disease of large intestine 10/13/2017     Epigastric pain 12/12/2017     Gastro-esophageal reflux disease with esophagitis 09/10/2020     Gastroesophageal reflux disease without esophagitis 09/08/2020     Gastrojejunal ulcer without hemorrhage, perforation, or obstruction 09/08/2020     Greater trochanteric bursitis 12/16/2019     Mild nonproliferative diabetic retinopathy of both eyes without macular edema associated with type 2 diabetes mellitus (H) 06/15/2022     Malignant melanoma (H) 01/06/2023     Lymphedema of right arm 04/16/2021     Low magnesium levels 12/15/2014     Left wrist pain 06/01/2018     Jejunitis 09/25/2017     Iron deficiency 12/15/2014     History of total knee replacement, right 12/16/2019     History of DVT of lower extremity 07/11/2020     Myofascial pain syndrome 02/27/2020     Noninfectious gastroenteritis 09/25/2017     Osteoarthritis of knee 01/06/2023     Other idiopathic scoliosis, thoracic region 04/16/2021     Overactive bladder 06/28/2017     Pancreatic cyst 04/18/2013     Polyneuropathy associated with underlying disease (H) 01/07/2019     Postablative hypothyroidism 07/26/2011     Right calf atrophy 04/16/2021     Vitamin D deficiency 06/12/2015     Leg wound, left 12/30/2022     Resolved Ambulatory Problems     Diagnosis Date Noted     No Resolved Ambulatory Problems     Past Medical History:   Diagnosis Date     Anxiety      Arthritis      Coronary artery disease      Diabetes (H)      Gastroesophageal reflux disease      Graves disease      Hypertension      Migraine      Muscle spasm      Renal disease      Thrombosis      Thyroid disease      Allergies   Allergen Reactions      Glucosamine Anaphylaxis and Rash     Cardiac arrest     Ibuprofen Hives     Iodine Angioedema, Anaphylaxis and Hives     Patient can uses skin products such as betadine - See shellfish allergy       Naproxen Hives and Rash     Shellfish Allergy Anaphylaxis, Hives and Rash     Cardiac arrest - very severe     Shellfish-Derived Products Anaphylaxis, Hives and Rash     Cardiac arrest - very severe       Amitriptyline Visual Disturbance     Hallucinations     Buspirone Visual Disturbance     Hallucinations     Nystatin Nausea, Diarrhea, Cramps and GI Disturbance     Tramadol Visual Disturbance     Hallucinations     Chlorpheniramine Hives and Rash     Nsaids Unknown     Pseudoephedrine Hives and Rash       All Meds and Allergies reviewed in the record at the facility and is the most up-to-date.    Post Discharge Medication Reconciliation Status: discharge medications reconciled, continue medications without change  Current Outpatient Medications   Medication Sig     lactobacillus rhamnosus, GG, (CULTURELL) capsule Take 1 capsule by mouth 2 times daily     acetaminophen (TYLENOL) 325 MG tablet Take 650 mg by mouth 3 times daily     amoxicillin-clavulanate (AUGMENTIN) 875-125 MG tablet Take 1 tablet by mouth 2 times daily For 8 days - ending 1/14/2023     aspirin 81 MG EC tablet Take 325 mg by mouth daily     baclofen (LIORESAL) 10 MG tablet Take 10 mg by mouth 3 times daily     busPIRone (BUSPAR) 10 MG tablet Take 10 mg by mouth 2 times daily     Carboxymethylcellulose Sodium 0.25 % SOLN Place 1 drop into both eyes 2 times daily     doxycycline hyclate (VIBRA-TABS) 100 MG tablet Take 100 mg by mouth 2 times daily     DULoxetine (CYMBALTA) 60 MG capsule Take 60 mg by mouth daily     EPINEPHrine (EPIPEN 2-SHERRI) 0.3 mg/0.3 mL atIn [EPINEPHRINE (EPIPEN 2-SHERRI) 0.3 MG/0.3 ML ATIN] Inject 0.3 mg into the shoulder, thigh, or buttocks once as needed (Anaphylaxis).     famotidine (PEPCID) 20 MG tablet Take 20 mg by mouth 2  times daily     ferrous sulfate 325 (65 FE) MG tablet [FERROUS SULFATE 325 (65 FE) MG TABLET] Take 1 tablet by mouth daily with breakfast.            fluticasone (FLONASE) 50 mcg/actuation nasal spray Spray 2 sprays into both nostrils daily     folic acid (FOLVITE) 1 MG tablet [FOLIC ACID (FOLVITE) 1 MG TABLET] Take 1 mg by mouth daily.     HYDROcodone-acetaminophen (NORCO) 5-325 MG tablet Take 1 tablet by mouth every 4 hours as needed for pain     levothyroxine (SYNTHROID, LEVOTHROID) 88 MCG tablet [LEVOTHYROXINE (SYNTHROID, LEVOTHROID) 88 MCG TABLET] Take 88 mcg by mouth Daily at 6:00 am.      lisinopril (ZESTRIL) 20 MG tablet Take 20 mg by mouth daily     loperamide (IMODIUM) 2 MG capsule Take 2-4 mg by mouth 3 times daily as needed for diarrhea Take 1 capsule with first loose stool and take 2 capsules after each subsequent loose stool     magnesium oxide (MAG-OX) 400 mg (241.3 mg magnesium) tablet [MAGNESIUM OXIDE (MAG-OX) 400 MG (241.3 MG MAGNESIUM) TABLET] Take 800 mg by mouth 2 (two) times a day.     meclizine (ANTIVERT) 12.5 MG tablet Take 12.5 mg by mouth 3 times daily as needed for dizziness     metFORMIN (GLUCOPHAGE) 500 MG tablet [METFORMIN (GLUCOPHAGE) 500 MG TABLET] Take 1,000 mg by mouth 2 (two) times a day with meals.     Multiple vitamin TABS Take 1 tablet by mouth daily     omeprazole (PRILOSEC) 20 MG capsule [OMEPRAZOLE (PRILOSEC) 20 MG CAPSULE] Take 1 capsule (20 mg total) by mouth 2 (two) times a day before meals. (Patient not taking: Reported on 1/9/2023)     omeprazole (PRILOSEC) 20 MG DR capsule Take 20 mg by mouth daily as needed     polyethylene glycol-propylene glycol (SYSTANE ULTRA) 0.4-0.3 % SOLN ophthalmic solution Place 1 drop into both eyes 2 times daily     propranolol (INDERAL LA) 160 mg SR capsule [PROPRANOLOL (INDERAL LA) 160 MG SR CAPSULE] Take 160 mg by mouth daily.            rosuvastatin (CRESTOR) 5 MG tablet Take 5 mg by mouth daily     sucralfate (CARAFATE) 1 GM tablet Take  "1 g by mouth 4 times daily     valACYclovir (VALTREX) 1000 MG tablet Take 2,000 mg by mouth 2 times daily as needed     Vitamin D (Cholecalciferol) 50 MCG (2000 UT) CAPS Take 2,000 Units by mouth daily     No current facility-administered medications for this visit.       REVIEW OF SYSTEMS:   10 point review of systems reviewed and pertinent positives in the HPI.     PHYSICAL EXAMINATION:  Physical Exam     Vital signs: /62   Pulse 54   Temp 97.6  F (36.4  C)   Resp 16   Ht 1.676 m (5' 6\")   Wt 77.4 kg (170 lb 9.6 oz)   SpO2 97%   BMI 27.54 kg/m    General: Awake, Alert, lying in bed, oriented x3, appropriately, follows simple commands, conversant  HEENT: Pink conjunctiva, anicteric sclerae, moist oral mucosa  NECK: Supple  CVS:  S1  S2, without murmur or gallop.   LUNG: Clear to auscultation, No wheezes, rales or rhonci.  BACK: No kyphosis of the thoracic spine  ABDOMEN: Soft, nontender to palpation, with positive bowel sounds  EXTREMITIES: Moves both upper and lower extremities with some limitation to the left lower extremity, trace pedal edema greater on the operative side, no calf tenderness  SKIN: Wound VAC removed to the left knee with 4 areas of dehiscence.  Area appears clean.  Some granulation and some slough noted.  No periwound redness.  No warmth.  NEUROLOGIC: Intact, pulses palpable  PSYCHIATRIC: Cognition intact      Labs:  All labs reviewed in the nursing home record and Epic   @  Lab Results   Component Value Date    WBC 10.4 12/21/2022     Lab Results   Component Value Date    RBC 3.86 12/21/2022     Lab Results   Component Value Date    HGB 12.6 12/21/2022     Lab Results   Component Value Date    HCT 39.1 12/21/2022     Lab Results   Component Value Date     12/21/2022     Lab Results   Component Value Date    MCH 32.6 12/21/2022     Lab Results   Component Value Date    MCHC 32.2 12/21/2022     Lab Results   Component Value Date    RDW 16.1 12/21/2022     Lab Results "   Component Value Date     12/21/2022        @Last Comprehensive Metabolic Panel:  Sodium   Date Value Ref Range Status   12/21/2022 139 136 - 145 mmol/L Final     Potassium   Date Value Ref Range Status   12/21/2022 4.3 3.5 - 5.0 mmol/L Final     Chloride   Date Value Ref Range Status   12/21/2022 101 98 - 107 mmol/L Final     Carbon Dioxide (CO2)   Date Value Ref Range Status   12/21/2022 23 22 - 31 mmol/L Final     Anion Gap   Date Value Ref Range Status   12/21/2022 15 5 - 18 mmol/L Final     Glucose   Date Value Ref Range Status   12/21/2022 121 70 - 125 mg/dL Final     Urea Nitrogen   Date Value Ref Range Status   12/21/2022 16 8 - 28 mg/dL Final     Creatinine   Date Value Ref Range Status   12/21/2022 0.71 0.60 - 1.10 mg/dL Final     GFR Estimate   Date Value Ref Range Status   12/21/2022 88 >60 mL/min/1.73m2 Final     Comment:     Effective December 21, 2021 eGFRcr in adults is calculated using the 2021 CKD-EPI creatinine equation which includes age and gender (Raiza et al., NEJM, DOI: 10.1056/[id number])     Calcium   Date Value Ref Range Status   12/21/2022 9.8 8.5 - 10.5 mg/dL Final     Patient's insurance has cut her.  Okay to DC home with current meds and treatments.  Home PT, OT, home health aide and RN for medication management/ wound VAC change 3/weekly.  Follow-up with primary care provider in 1 week.  Follow-up outpatient with Dr. Griffin on 1/17/2023.  Continue VAC dressing changes 3 times weekly.    DISCHARGE PLAN/FACE TO FACE:  I certify that this patient is under my care and that I, or a nurse practitioner or physician's assistant working with me, had a face-to-face encounter that meets the physician face-to-face encounter requirements with this patient.       I certify that, based on my findings, the following services are medically necessary home health services.    My clinical findings support the need for the above skilled services.    This patient is homebound because: Patient  status post left TKA with wound dehiscence who recently underwent I&D.  She continues on VAC therapy.    The patient is, or has been, under my care and I have initiated the establishment of the plan of care. This patient will be followed by a physician who will periodically review the plan of care.    Total time spent for this visit was 35 minutes greater than 50% time spent face-to-face patient reviewing discharge medications, home care services and follow-ups as well as collaborating with nursing staff and .    This note has been dictated using voice recognition software. Any grammatical or context distortions are unintentional and inherent to the software    Electronically signed by: Neeta Brito CNP         Sincerely,        Neeta Brito NP

## 2023-01-12 ENCOUNTER — LAB REQUISITION (OUTPATIENT)
Dept: LAB | Facility: CLINIC | Age: 78
End: 2023-01-12
Payer: COMMERCIAL

## 2023-01-12 ENCOUNTER — TRANSITIONAL CARE UNIT VISIT (OUTPATIENT)
Dept: GERIATRICS | Facility: CLINIC | Age: 78
End: 2023-01-12
Payer: COMMERCIAL

## 2023-01-12 VITALS
BODY MASS INDEX: 27.42 KG/M2 | DIASTOLIC BLOOD PRESSURE: 80 MMHG | OXYGEN SATURATION: 99 % | HEIGHT: 66 IN | HEART RATE: 54 BPM | SYSTOLIC BLOOD PRESSURE: 144 MMHG | TEMPERATURE: 97.7 F | RESPIRATION RATE: 16 BRPM | WEIGHT: 170.6 LBS

## 2023-01-12 DIAGNOSIS — I10 PRIMARY HYPERTENSION: ICD-10-CM

## 2023-01-12 DIAGNOSIS — E02 SUBCLINICAL IODINE-DEFICIENCY HYPOTHYROIDISM: ICD-10-CM

## 2023-01-12 DIAGNOSIS — I25.10 CORONARY ARTERY DISEASE INVOLVING NATIVE CORONARY ARTERY OF NATIVE HEART WITHOUT ANGINA PECTORIS: ICD-10-CM

## 2023-01-12 DIAGNOSIS — E11.01 TYPE 2 DIABETES MELLITUS WITH HYPEROSMOLAR COMA, WITHOUT LONG-TERM CURRENT USE OF INSULIN (H): ICD-10-CM

## 2023-01-12 DIAGNOSIS — G89.4 CHRONIC PAIN SYNDROME: ICD-10-CM

## 2023-01-12 DIAGNOSIS — Z96.652 HISTORY OF TOTAL KNEE ARTHROPLASTY, LEFT: ICD-10-CM

## 2023-01-12 DIAGNOSIS — S81.802D WOUND OF LEFT LOWER EXTREMITY, SUBSEQUENT ENCOUNTER: Primary | ICD-10-CM

## 2023-01-12 DIAGNOSIS — T84.54XD INFECTION AND INFLAMMATORY REACTION DUE TO INTERNAL LEFT KNEE PROSTHESIS, SUBSEQUENT ENCOUNTER: ICD-10-CM

## 2023-01-12 DIAGNOSIS — K21.9 GASTROESOPHAGEAL REFLUX DISEASE WITHOUT ESOPHAGITIS: ICD-10-CM

## 2023-01-12 LAB
ANION GAP SERPL CALCULATED.3IONS-SCNC: 13 MMOL/L (ref 7–15)
ATRIAL RATE - MUSE: 71 BPM
BASOPHILS # BLD AUTO: 0.1 10E3/UL (ref 0–0.2)
BASOPHILS NFR BLD AUTO: 1 %
BUN SERPL-MCNC: 14.3 MG/DL (ref 8–23)
CALCIUM SERPL-MCNC: 9.1 MG/DL (ref 8.8–10.2)
CHLORIDE SERPL-SCNC: 106 MMOL/L (ref 98–107)
CREAT SERPL-MCNC: 0.71 MG/DL (ref 0.51–0.95)
CRP SERPL-MCNC: <3 MG/L
DEPRECATED HCO3 PLAS-SCNC: 24 MMOL/L (ref 22–29)
DIASTOLIC BLOOD PRESSURE - MUSE: NORMAL MMHG
EOSINOPHIL # BLD AUTO: 0.2 10E3/UL (ref 0–0.7)
EOSINOPHIL NFR BLD AUTO: 1 %
ERYTHROCYTE [DISTWIDTH] IN BLOOD BY AUTOMATED COUNT: 14.5 % (ref 10–15)
GFR SERPL CREATININE-BSD FRML MDRD: 88 ML/MIN/1.73M2
GLUCOSE SERPL-MCNC: 100 MG/DL (ref 70–99)
HCT VFR BLD AUTO: 40.1 % (ref 35–47)
HGB BLD-MCNC: 12.8 G/DL (ref 11.7–15.7)
IMM GRANULOCYTES # BLD: 0 10E3/UL
IMM GRANULOCYTES NFR BLD: 0 %
INTERPRETATION ECG - MUSE: NORMAL
LYMPHOCYTES # BLD AUTO: 3 10E3/UL (ref 0.8–5.3)
LYMPHOCYTES NFR BLD AUTO: 27 %
MCH RBC QN AUTO: 32.5 PG (ref 26.5–33)
MCHC RBC AUTO-ENTMCNC: 31.9 G/DL (ref 31.5–36.5)
MCV RBC AUTO: 102 FL (ref 78–100)
MONOCYTES # BLD AUTO: 1.4 10E3/UL (ref 0–1.3)
MONOCYTES NFR BLD AUTO: 12 %
NEUTROPHILS # BLD AUTO: 6.7 10E3/UL (ref 1.6–8.3)
NEUTROPHILS NFR BLD AUTO: 59 %
NRBC # BLD AUTO: 0 10E3/UL
NRBC BLD AUTO-RTO: 0 /100
P AXIS - MUSE: 60 DEGREES
PLATELET # BLD AUTO: 386 10E3/UL (ref 150–450)
POTASSIUM SERPL-SCNC: 4.2 MMOL/L (ref 3.4–5.3)
PR INTERVAL - MUSE: 150 MS
QRS DURATION - MUSE: 122 MS
QT - MUSE: 430 MS
QTC - MUSE: 467 MS
R AXIS - MUSE: 76 DEGREES
RBC # BLD AUTO: 3.94 10E6/UL (ref 3.8–5.2)
SODIUM SERPL-SCNC: 143 MMOL/L (ref 136–145)
SYSTOLIC BLOOD PRESSURE - MUSE: NORMAL MMHG
T AXIS - MUSE: 62 DEGREES
VENTRICULAR RATE- MUSE: 71 BPM
WBC # BLD AUTO: 11.3 10E3/UL (ref 4–11)

## 2023-01-12 PROCEDURE — 99315 NF DSCHRG MGMT 30 MIN/LESS: CPT | Performed by: NURSE PRACTITIONER

## 2023-01-12 PROCEDURE — 86140 C-REACTIVE PROTEIN: CPT | Mod: ORL | Performed by: NURSE PRACTITIONER

## 2023-01-12 PROCEDURE — P9604 ONE-WAY ALLOW PRORATED TRIP: HCPCS | Mod: ORL | Performed by: NURSE PRACTITIONER

## 2023-01-12 PROCEDURE — 36415 COLL VENOUS BLD VENIPUNCTURE: CPT | Mod: ORL | Performed by: NURSE PRACTITIONER

## 2023-01-12 PROCEDURE — 80048 BASIC METABOLIC PNL TOTAL CA: CPT | Mod: ORL | Performed by: NURSE PRACTITIONER

## 2023-01-12 PROCEDURE — 85025 COMPLETE CBC W/AUTO DIFF WBC: CPT | Mod: ORL | Performed by: NURSE PRACTITIONER

## 2023-01-12 NOTE — LETTER
1/12/2023        RE: Belgica Salvador  [address] [facility] [id number]         HEALTH GERIATRIC SERVICES    Code Status:  FULL CODE   Visit Type:   Chief Complaint   Patient presents with     TCU Follow Up     Facility:  [facility]) [[id number]]           HPI: Belgica Salvador is a 77 year old female who I am seeing today for discharge from the TCU.  Patient recently hospitalized on 12/30/2022 at [facility] for left leg wound.  Past medical history includes type 2 diabetes mellitus, GERD, anxiety, coronary artery disease, tremor, hypothyroidism and hypertension.  Patient with a history of left total knee replacement on November 11, 2022.  Postoperatively she had persistent wound dehiscence and nonhealing wound.  She underwent I&D and was discharged with VAC placement for wound management.  She continues on oral antibiotics until 1/14/23.     Transitional Care Course:  Today patient sitting up in bed. Left TKA with wound dehiscence who underwent I&D.  She continues on VAC placement. Pt with slightly elevated WBC of 11.3 and CRP of 16.0 despite being on dual coverage antibiotics. She is a febrile. She Continues on Doxycyline and Augmentin until 1/14/2023. Dr. Turner initially did her TKA however she has a follow up with plastics (Dr. Griffin) on 1/17/22. I talked with pt daughter today with concerns with laboratory despite antibiotic use. Suggested follow up with ortho and or imaging to rule out bone infection. Her daughter reports she has been seen by Dr. Alex Baker ([facility]).       Assessment/Plan: Reviewed with changes    (S81.262D) Wound of left lower extremity, subsequent encounter  (primary encounter diagnosis)  (Z96.652) History of total knee arthroplasty, left  Comment: Patient with a history of left total knee arthroplasty on 11/11/2022.  Patient underwent I&D of the left knee wound.  She continues on oral antibiotics.  She has a wound VAC in place.  Today patient  complaining of some upset stomach with the antibiotics.  She continues on this until 1/14/2023.  She does have GERD.  Continues on PPI.  Plan: Extend Augmentin and doxycycline until 1/21/2023.  Follow-up CBC, CRP on Monday. Weightbearing as tolerated.  Continue wound VAC changing 3 times weekly.  Pain control with Vicodin.  I will send with 10 tabs.  Continue Tylenol 3 times daily.    (E11.01) Type 2 diabetes mellitus with hyperosmolar coma, without long-term current use of insulin (H)  Comment: Blood sugar satisfactory.  Hemoglobin A1c 5.1 on 12/31/2022.  Plan: Continue metformin.  Monitor daily.    (G89.4) Chronic pain syndrome  Plan: Continue Vicodin.    (K21.9) Gastroesophageal reflux disease without esophagitis  Plan: Continue omeprazole and Carafate daily. Probiotics added while on antibiotics.     (I10) Primary hypertension  Comment: Blood pressure appears satisfactory.  Plan: Continue lisinopril.     (I25.10) Coronary artery disease involving native coronary artery of native heart without angina pectoris  Comment: Denies any shortness of breath or chest pain.  Plan: Continue aspirin and statin.    (E02) Subclinical iodine-deficiency hypothyroidism  Comment: TSH on 9/30 0.50.  Plan: Continue supplement.      Active Ambulatory Problems     Diagnosis Date Noted     Type 2 Diabetes Mellitus      Malignant Melanoma Of The Ear      Graves' Disease      Hypothyroidism      Diabetic Peripheral Neuropathy      Hyperlipidemia      Allergic Reaction To Shellfish      Upper GI bleed 09/22/2017     Black stool 09/22/2017     S/P gastric bypass 09/22/2017     Melena 09/22/2017     Cervical radiculopathy at C8 03/20/2019     DM2 (diabetes mellitus, type 2) (H) 03/20/2019     Essential hypertension      S/P knee surgery 11/10/2022     Type 2 diabetes mellitus (H) 11/10/2022     Hyperlipidemia LDL goal <100 11/10/2022     Neuropathy 11/10/2022     Hyponatremia 11/11/2022     Hyperkalemia 11/11/2022     Abnormal feces  10/17/2017     Acute renal failure (H) 01/06/2023     Anxiety disorder 07/26/2011     Adhesive capsulitis of both shoulders 03/09/2017     B12 deficiency 07/26/2011     CAD in native artery 11/07/2022     Carpal tunnel syndrome, bilateral upper limbs 05/22/2018     Central obesity 01/06/2023     Chronic pain disorder 09/07/2012     Complication of diabetes mellitus (H) 01/06/2023     Diabetic peripheral neuropathy (H) 02/16/2018     Diverticular disease of large intestine 10/13/2017     Epigastric pain 12/12/2017     Gastro-esophageal reflux disease with esophagitis 09/10/2020     Gastroesophageal reflux disease without esophagitis 09/08/2020     Gastrojejunal ulcer without hemorrhage, perforation, or obstruction 09/08/2020     Greater trochanteric bursitis 12/16/2019     Mild nonproliferative diabetic retinopathy of both eyes without macular edema associated with type 2 diabetes mellitus (H) 06/15/2022     Malignant melanoma (H) 01/06/2023     Lymphedema of right arm 04/16/2021     Low magnesium levels 12/15/2014     Left wrist pain 06/01/2018     Jejunitis 09/25/2017     Iron deficiency 12/15/2014     History of total knee replacement, right 12/16/2019     History of DVT of lower extremity 07/11/2020     Myofascial pain syndrome 02/27/2020     Noninfectious gastroenteritis 09/25/2017     Osteoarthritis of knee 01/06/2023     Other idiopathic scoliosis, thoracic region 04/16/2021     Overactive bladder 06/28/2017     Pancreatic cyst 04/18/2013     Polyneuropathy associated with underlying disease (H) 01/07/2019     Postablative hypothyroidism 07/26/2011     Right calf atrophy 04/16/2021     Vitamin D deficiency 06/12/2015     Leg wound, left 12/30/2022     Resolved Ambulatory Problems     Diagnosis Date Noted     No Resolved Ambulatory Problems     Past Medical History:   Diagnosis Date     Anxiety      Arthritis      Coronary artery disease      Diabetes (H)      Gastroesophageal reflux disease      Graves disease       Hypertension      Migraine      Muscle spasm      Renal disease      Thrombosis      Thyroid disease      Allergies   Allergen Reactions     Glucosamine Anaphylaxis and Rash     Cardiac arrest     Ibuprofen Hives     Iodine Angioedema, Anaphylaxis and Hives     Patient can uses skin products such as betadine - See shellfish allergy       Naproxen Hives and Rash     Shellfish Allergy Anaphylaxis, Hives and Rash     Cardiac arrest - very severe     Shellfish-Derived Products Anaphylaxis, Hives and Rash     Cardiac arrest - very severe       Amitriptyline Visual Disturbance     Hallucinations     Buspirone Visual Disturbance     Hallucinations     Nystatin Nausea, Diarrhea, Cramps and GI Disturbance     Tramadol Visual Disturbance     Hallucinations     Chlorpheniramine Hives and Rash     Nsaids Unknown     Pseudoephedrine Hives and Rash       All Meds and Allergies reviewed in the record at the facility and is the most up-to-date.    Post Discharge Medication Reconciliation Status: discharge medications reconciled, continue medications without change  Current Outpatient Medications   Medication Sig     acetaminophen (TYLENOL) 325 MG tablet Take 650 mg by mouth 3 times daily     amoxicillin-clavulanate (AUGMENTIN) 875-125 MG tablet Take 1 tablet by mouth 2 times daily For 8 days - ending 1/14/2023     aspirin 81 MG EC tablet Take 325 mg by mouth daily     baclofen (LIORESAL) 10 MG tablet Take 10 mg by mouth 3 times daily     busPIRone (BUSPAR) 10 MG tablet Take 10 mg by mouth 2 times daily     Carboxymethylcellulose Sodium 0.25 % SOLN Place 1 drop into both eyes 2 times daily     doxycycline hyclate (VIBRA-TABS) 100 MG tablet Take 100 mg by mouth 2 times daily     DULoxetine (CYMBALTA) 60 MG capsule Take 60 mg by mouth daily     EPINEPHrine (EPIPEN 2-SHERRI) 0.3 mg/0.3 mL atIn [EPINEPHRINE (EPIPEN 2-SHERRI) 0.3 MG/0.3 ML ATIN] Inject 0.3 mg into the shoulder, thigh, or buttocks once as needed (Anaphylaxis).      famotidine (PEPCID) 20 MG tablet Take 20 mg by mouth 2 times daily     ferrous sulfate 325 (65 FE) MG tablet [FERROUS SULFATE 325 (65 FE) MG TABLET] Take 1 tablet by mouth daily with breakfast.            fluticasone (FLONASE) 50 mcg/actuation nasal spray Spray 2 sprays into both nostrils daily     folic acid (FOLVITE) 1 MG tablet [FOLIC ACID (FOLVITE) 1 MG TABLET] Take 1 mg by mouth daily.     HYDROcodone-acetaminophen (NORCO) 5-325 MG tablet Take 1 tablet by mouth every 4 hours as needed for pain     lactobacillus rhamnosus, GG, (CULTURELL) capsule Take 1 capsule by mouth 2 times daily     levothyroxine (SYNTHROID, LEVOTHROID) 88 MCG tablet [LEVOTHYROXINE (SYNTHROID, LEVOTHROID) 88 MCG TABLET] Take 88 mcg by mouth Daily at 6:00 am.      lisinopril (ZESTRIL) 20 MG tablet Take 20 mg by mouth daily     loperamide (IMODIUM) 2 MG capsule Take 2-4 mg by mouth 3 times daily as needed for diarrhea Take 1 capsule with first loose stool and take 2 capsules after each subsequent loose stool     magnesium oxide (MAG-OX) 400 mg (241.3 mg magnesium) tablet [MAGNESIUM OXIDE (MAG-OX) 400 MG (241.3 MG MAGNESIUM) TABLET] Take 800 mg by mouth 2 (two) times a day.     meclizine (ANTIVERT) 12.5 MG tablet Take 12.5 mg by mouth 3 times daily as needed for dizziness     metFORMIN (GLUCOPHAGE) 500 MG tablet [METFORMIN (GLUCOPHAGE) 500 MG TABLET] Take 1,000 mg by mouth 2 (two) times a day with meals.     Multiple vitamin TABS Take 1 tablet by mouth daily     omeprazole (PRILOSEC) 20 MG capsule [OMEPRAZOLE (PRILOSEC) 20 MG CAPSULE] Take 1 capsule (20 mg total) by mouth 2 (two) times a day before meals. (Patient not taking: Reported on 1/9/2023)     omeprazole (PRILOSEC) 20 MG DR capsule Take 20 mg by mouth daily as needed     polyethylene glycol-propylene glycol (SYSTANE ULTRA) 0.4-0.3 % SOLN ophthalmic solution Place 1 drop into both eyes 2 times daily     propranolol (INDERAL LA) 160 mg SR capsule [PROPRANOLOL (INDERAL LA) 160 MG SR  "CAPSULE] Take 160 mg by mouth daily.            rosuvastatin (CRESTOR) 5 MG tablet Take 5 mg by mouth daily     sucralfate (CARAFATE) 1 GM tablet Take 1 g by mouth 4 times daily     valACYclovir (VALTREX) 1000 MG tablet Take 2,000 mg by mouth 2 times daily as needed     Vitamin D (Cholecalciferol) 50 MCG (2000 UT) CAPS Take 2,000 Units by mouth daily     No current facility-administered medications for this visit.       REVIEW OF SYSTEMS:   10 point review of systems reviewed and pertinent positives in the HPI.     PHYSICAL EXAMINATION:  Physical Exam     Vital signs: BP (!) 144/80   Pulse 54   Temp 97.7  F (36.5  C)   Resp 16   Ht 1.676 m (5' 6\")   Wt 77.4 kg (170 lb 9.6 oz)   SpO2 99%   BMI 27.54 kg/m    General: Awake, Alert, lying in bed, oriented x3, appropriately, follows simple commands, conversant  HEENT: Pink conjunctiva, anicteric sclerae, moist oral mucosa  NECK: Supple  CVS:  S1  S2, without murmur or gallop.   LUNG: Clear to auscultation, No wheezes, rales or rhonci.  BACK: No kyphosis of the thoracic spine  ABDOMEN: Soft, nontender to palpation, with positive bowel sounds  EXTREMITIES: Moves both upper and lower extremities with some limitation to the left lower extremity, trace pedal edema greater on the operative side, no calf tenderness  SKIN: Wound VAC removed to the left knee with 4 areas of dehiscence.  Area appears clean.  Some granulation and some slough noted.  No periwound redness.  No warmth.  NEUROLOGIC: Intact, pulses palpable  PSYCHIATRIC: Cognition intact      Labs:  All labs reviewed in the nursing home record and Epic   @  Lab Results   Component Value Date    WBC 10.4 12/21/2022     Lab Results   Component Value Date    RBC 3.86 12/21/2022     Lab Results   Component Value Date    HGB 12.6 12/21/2022     Lab Results   Component Value Date    HCT 39.1 12/21/2022     Lab Results   Component Value Date     12/21/2022     Lab Results   Component Value Date    MCH 32.6 " 12/21/2022     Lab Results   Component Value Date    MCHC 32.2 12/21/2022     Lab Results   Component Value Date    RDW 16.1 12/21/2022     Lab Results   Component Value Date     12/21/2022        @Last Comprehensive Metabolic Panel:  Sodium   Date Value Ref Range Status   01/12/2023 143 136 - 145 mmol/L Final     Potassium   Date Value Ref Range Status   01/12/2023 4.2 3.4 - 5.3 mmol/L Final   12/21/2022 4.3 3.5 - 5.0 mmol/L Final     Chloride   Date Value Ref Range Status   01/12/2023 106 98 - 107 mmol/L Final   12/21/2022 101 98 - 107 mmol/L Final     Carbon Dioxide (CO2)   Date Value Ref Range Status   01/12/2023 24 22 - 29 mmol/L Final   12/21/2022 23 22 - 31 mmol/L Final     Anion Gap   Date Value Ref Range Status   01/12/2023 13 7 - 15 mmol/L Final   12/21/2022 15 5 - 18 mmol/L Final     Glucose   Date Value Ref Range Status   01/12/2023 100 (H) 70 - 99 mg/dL Final   12/21/2022 121 70 - 125 mg/dL Final     Urea Nitrogen   Date Value Ref Range Status   01/12/2023 14.3 8.0 - 23.0 mg/dL Final   12/21/2022 16 8 - 28 mg/dL Final     Creatinine   Date Value Ref Range Status   01/12/2023 0.71 0.51 - 0.95 mg/dL Final     GFR Estimate   Date Value Ref Range Status   01/12/2023 88 >60 mL/min/1.73m2 Final     Comment:     Effective December 21, 2021 eGFRcr in adults is calculated using the 2021 CKD-EPI creatinine equation which includes age and gender (Raiza et al., NEJM, DOI: 10.1056/NCLJpb7976841)     Calcium   Date Value Ref Range Status   01/12/2023 9.1 8.8 - 10.2 mg/dL Final     Patient's insurance has cut her.  Okay to DC home with current meds and treatments.  Home PT, OT, home health aide and RN for medication management/ wound VAC change 3/weekly.  Follow-up with primary care provider in 1 week.  Follow-up outpatient with Dr. Griffin on 1/17/2023.  Continue VAC dressing changes 3 times weekly.    DISCHARGE PLAN/FACE TO FACE:  I certify that this patient is under my care and that I, or a nurse  practitioner or physician's assistant working with me, had a face-to-face encounter that meets the physician face-to-face encounter requirements with this patient.       I certify that, based on my findings, the following services are medically necessary home health services.    My clinical findings support the need for the above skilled services.    This patient is homebound because: Patient status post left TKA with wound dehiscence who recently underwent I&D.  She continues on VAC therapy.    The patient is, or has been, under my care and I have initiated the establishment of the plan of care. This patient will be followed by a physician who will periodically review the plan of care.    Total time spent for this visit was 35 minutes greater than 50% time spent face-to-face patient reviewing discharge medications, home care services and follow-ups as well as collaborating with nursing staff and .    This note has been dictated using voice recognition software. Any grammatical or context distortions are unintentional and inherent to the software    Electronically signed by: Neeta Brito CNP         Sincerely,        Neeta Brito NP

## 2023-01-12 NOTE — PROGRESS NOTES
[facility] GERIATRIC SERVICES    Code Status:  FULL CODE   Visit Type:   Chief Complaint   Patient presents with     TCU Discharge     Facility:  [facility] ([facility]) [[id number]]           HPI: Belgica Salvador is a 77 year old female who I am seeing today for discharge from the TCU.  Patient recently hospitalized on 12/30/2022 at [facility] for left leg wound.  Past medical history includes type 2 diabetes mellitus, GERD, anxiety, coronary artery disease, tremor, hypothyroidism and hypertension.  Patient with a history of left total knee replacement on November 11, 2022.  Postoperatively she had persistent wound dehiscence and nonhealing wound.  She underwent I&D and was discharged with VAC placement for wound management.  She continues on oral antibiotics until 1/14/23.     Transitional Care Course:  Today patient sitting up in bed.  Left TKA with wound dehiscence who underwent I&D.  She continues on VAC placement.  Pain controlled with Vicodin and Tylenol.  She reports some upset stomach.  No diarrhea.  She is on doxycycline and Augmentin until 1/14/2023.  Currently afebrile.  Laboratory pending for tomorrow.  Insurance has cut her so she will need to discharge.      Assessment/Plan: Reviewed with changes    (S81.802D) Wound of left lower extremity, subsequent encounter  (primary encounter diagnosis)  (Z96.652) History of total knee arthroplasty, left  Comment: Patient with a history of left total knee arthroplasty on 11/11/2022.  Patient underwent I&D of the left knee wound.  She continues on oral antibiotics.  She has a wound VAC in place.  Today patient complaining of some upset stomach with the antibiotics.  She continues on this until 1/14/2023.  She does have GERD.  Continues on PPI.  Plan: Continue Augmentin and doxycycline until 1/14/2023.  Follow-up CBC, CRP and sed rate and BMP in the a.m.  Weightbearing as tolerated.  Continue wound VAC changing 3 times weekly.  Pain control with Vicodin.  I  will send with 10 tabs.  Continue Tylenol 3 times daily.    (E11.01) Type 2 diabetes mellitus with hyperosmolar coma, without long-term current use of insulin (H)  Comment: Blood sugar satisfactory.  Hemoglobin A1c 5.1 on 12/31/2022.  Plan: Continue metformin.  Monitor daily.    (G89.4) Chronic pain syndrome  Plan: Continue Vicodin.    (K21.9) Gastroesophageal reflux disease without esophagitis  Plan: Continue omeprazole daily.  She has as needed omeprazole which I will discontinue.  Also on Carafate.  I will also add probiotic given that she is on antibiotics.    (I10) Primary hypertension  Comment: Blood pressure appears satisfactory.  Plan: Continue lisinopril.     (I25.10) Coronary artery disease involving native coronary artery of native heart without angina pectoris  Comment: Denies any shortness of breath or chest pain.  Plan: Continue aspirin and statin.    (E02) Subclinical iodine-deficiency hypothyroidism  Comment: TSH on 9/30 0.50.  Plan: Continue supplement.      Active Ambulatory Problems     Diagnosis Date Noted     Type 2 Diabetes Mellitus      Malignant Melanoma Of The Ear      Graves' Disease      Hypothyroidism      Diabetic Peripheral Neuropathy      Hyperlipidemia      Allergic Reaction To Shellfish      Upper GI bleed 09/22/2017     Black stool 09/22/2017     S/P gastric bypass 09/22/2017     Melena 09/22/2017     Cervical radiculopathy at C8 03/20/2019     DM2 (diabetes mellitus, type 2) (H) 03/20/2019     Essential hypertension      S/P knee surgery 11/10/2022     Type 2 diabetes mellitus (H) 11/10/2022     Hyperlipidemia LDL goal <100 11/10/2022     Neuropathy 11/10/2022     Hyponatremia 11/11/2022     Hyperkalemia 11/11/2022     Abnormal feces 10/17/2017     Acute renal failure (H) 01/06/2023     Anxiety disorder 07/26/2011     Adhesive capsulitis of both shoulders 03/09/2017     B12 deficiency 07/26/2011     CAD in native artery 11/07/2022     Carpal tunnel syndrome, bilateral upper limbs  05/22/2018     Central obesity 01/06/2023     Chronic pain disorder 09/07/2012     Complication of diabetes mellitus (H) 01/06/2023     Diabetic peripheral neuropathy (H) 02/16/2018     Diverticular disease of large intestine 10/13/2017     Epigastric pain 12/12/2017     Gastro-esophageal reflux disease with esophagitis 09/10/2020     Gastroesophageal reflux disease without esophagitis 09/08/2020     Gastrojejunal ulcer without hemorrhage, perforation, or obstruction 09/08/2020     Greater trochanteric bursitis 12/16/2019     Mild nonproliferative diabetic retinopathy of both eyes without macular edema associated with type 2 diabetes mellitus (H) 06/15/2022     Malignant melanoma (H) 01/06/2023     Lymphedema of right arm 04/16/2021     Low magnesium levels 12/15/2014     Left wrist pain 06/01/2018     Jejunitis 09/25/2017     Iron deficiency 12/15/2014     History of total knee replacement, right 12/16/2019     History of DVT of lower extremity 07/11/2020     Myofascial pain syndrome 02/27/2020     Noninfectious gastroenteritis 09/25/2017     Osteoarthritis of knee 01/06/2023     Other idiopathic scoliosis, thoracic region 04/16/2021     Overactive bladder 06/28/2017     Pancreatic cyst 04/18/2013     Polyneuropathy associated with underlying disease (H) 01/07/2019     Postablative hypothyroidism 07/26/2011     Right calf atrophy 04/16/2021     Vitamin D deficiency 06/12/2015     Leg wound, left 12/30/2022     Resolved Ambulatory Problems     Diagnosis Date Noted     No Resolved Ambulatory Problems     Past Medical History:   Diagnosis Date     Anxiety      Arthritis      Coronary artery disease      Diabetes (H)      Gastroesophageal reflux disease      Graves disease      Hypertension      Migraine      Muscle spasm      Renal disease      Thrombosis      Thyroid disease      Allergies   Allergen Reactions     Glucosamine Anaphylaxis and Rash     Cardiac arrest     Ibuprofen Hives     Iodine Angioedema,  Anaphylaxis and Hives     Patient can uses skin products such as betadine - See shellfish allergy       Naproxen Hives and Rash     Shellfish Allergy Anaphylaxis, Hives and Rash     Cardiac arrest - very severe     Shellfish-Derived Products Anaphylaxis, Hives and Rash     Cardiac arrest - very severe       Amitriptyline Visual Disturbance     Hallucinations     Buspirone Visual Disturbance     Hallucinations     Nystatin Nausea, Diarrhea, Cramps and GI Disturbance     Tramadol Visual Disturbance     Hallucinations     Chlorpheniramine Hives and Rash     Nsaids Unknown     Pseudoephedrine Hives and Rash       All Meds and Allergies reviewed in the record at the facility and is the most up-to-date.    Post Discharge Medication Reconciliation Status: discharge medications reconciled, continue medications without change  Current Outpatient Medications   Medication Sig     lactobacillus rhamnosus, GG, (CULTURELL) capsule Take 1 capsule by mouth 2 times daily     acetaminophen (TYLENOL) 325 MG tablet Take 650 mg by mouth 3 times daily     amoxicillin-clavulanate (AUGMENTIN) 875-125 MG tablet Take 1 tablet by mouth 2 times daily For 8 days - ending 1/14/2023     aspirin 81 MG EC tablet Take 325 mg by mouth daily     baclofen (LIORESAL) 10 MG tablet Take 10 mg by mouth 3 times daily     busPIRone (BUSPAR) 10 MG tablet Take 10 mg by mouth 2 times daily     Carboxymethylcellulose Sodium 0.25 % SOLN Place 1 drop into both eyes 2 times daily     doxycycline hyclate (VIBRA-TABS) 100 MG tablet Take 100 mg by mouth 2 times daily     DULoxetine (CYMBALTA) 60 MG capsule Take 60 mg by mouth daily     EPINEPHrine (EPIPEN 2-SHERRI) 0.3 mg/0.3 mL atIn [EPINEPHRINE (EPIPEN 2-SHERRI) 0.3 MG/0.3 ML ATIN] Inject 0.3 mg into the shoulder, thigh, or buttocks once as needed (Anaphylaxis).     famotidine (PEPCID) 20 MG tablet Take 20 mg by mouth 2 times daily     ferrous sulfate 325 (65 FE) MG tablet [FERROUS SULFATE 325 (65 FE) MG TABLET] Take 1  tablet by mouth daily with breakfast.            fluticasone (FLONASE) 50 mcg/actuation nasal spray Spray 2 sprays into both nostrils daily     folic acid (FOLVITE) 1 MG tablet [FOLIC ACID (FOLVITE) 1 MG TABLET] Take 1 mg by mouth daily.     HYDROcodone-acetaminophen (NORCO) 5-325 MG tablet Take 1 tablet by mouth every 4 hours as needed for pain     levothyroxine (SYNTHROID, LEVOTHROID) 88 MCG tablet [LEVOTHYROXINE (SYNTHROID, LEVOTHROID) 88 MCG TABLET] Take 88 mcg by mouth Daily at 6:00 am.      lisinopril (ZESTRIL) 20 MG tablet Take 20 mg by mouth daily     loperamide (IMODIUM) 2 MG capsule Take 2-4 mg by mouth 3 times daily as needed for diarrhea Take 1 capsule with first loose stool and take 2 capsules after each subsequent loose stool     magnesium oxide (MAG-OX) 400 mg (241.3 mg magnesium) tablet [MAGNESIUM OXIDE (MAG-OX) 400 MG (241.3 MG MAGNESIUM) TABLET] Take 800 mg by mouth 2 (two) times a day.     meclizine (ANTIVERT) 12.5 MG tablet Take 12.5 mg by mouth 3 times daily as needed for dizziness     metFORMIN (GLUCOPHAGE) 500 MG tablet [METFORMIN (GLUCOPHAGE) 500 MG TABLET] Take 1,000 mg by mouth 2 (two) times a day with meals.     Multiple vitamin TABS Take 1 tablet by mouth daily     omeprazole (PRILOSEC) 20 MG capsule [OMEPRAZOLE (PRILOSEC) 20 MG CAPSULE] Take 1 capsule (20 mg total) by mouth 2 (two) times a day before meals. (Patient not taking: Reported on 1/9/2023)     omeprazole (PRILOSEC) 20 MG DR capsule Take 20 mg by mouth daily as needed     polyethylene glycol-propylene glycol (SYSTANE ULTRA) 0.4-0.3 % SOLN ophthalmic solution Place 1 drop into both eyes 2 times daily     propranolol (INDERAL LA) 160 mg SR capsule [PROPRANOLOL (INDERAL LA) 160 MG SR CAPSULE] Take 160 mg by mouth daily.            rosuvastatin (CRESTOR) 5 MG tablet Take 5 mg by mouth daily     sucralfate (CARAFATE) 1 GM tablet Take 1 g by mouth 4 times daily     valACYclovir (VALTREX) 1000 MG tablet Take 2,000 mg by mouth 2 times  "daily as needed     Vitamin D (Cholecalciferol) 50 MCG (2000 UT) CAPS Take 2,000 Units by mouth daily     No current facility-administered medications for this visit.       REVIEW OF SYSTEMS:   10 point review of systems reviewed and pertinent positives in the HPI.     PHYSICAL EXAMINATION:  Physical Exam     Vital signs: /62   Pulse 54   Temp 97.6  F (36.4  C)   Resp 16   Ht 1.676 m (5' 6\")   Wt 77.4 kg (170 lb 9.6 oz)   SpO2 97%   BMI 27.54 kg/m    General: Awake, Alert, lying in bed, oriented x3, appropriately, follows simple commands, conversant  HEENT: Pink conjunctiva, anicteric sclerae, moist oral mucosa  NECK: Supple  CVS:  S1  S2, without murmur or gallop.   LUNG: Clear to auscultation, No wheezes, rales or rhonci.  BACK: No kyphosis of the thoracic spine  ABDOMEN: Soft, nontender to palpation, with positive bowel sounds  EXTREMITIES: Moves both upper and lower extremities with some limitation to the left lower extremity, trace pedal edema greater on the operative side, no calf tenderness  SKIN: Wound VAC removed to the left knee with 4 areas of dehiscence.  Area appears clean.  Some granulation and some slough noted.  No periwound redness.  No warmth.  NEUROLOGIC: Intact, pulses palpable  PSYCHIATRIC: Cognition intact      Labs:  All labs reviewed in the nursing home record and Epic   @  Lab Results   Component Value Date    WBC 10.4 12/21/2022     Lab Results   Component Value Date    RBC 3.86 12/21/2022     Lab Results   Component Value Date    HGB 12.6 12/21/2022     Lab Results   Component Value Date    HCT 39.1 12/21/2022     Lab Results   Component Value Date     12/21/2022     Lab Results   Component Value Date    MCH 32.6 12/21/2022     Lab Results   Component Value Date    MCHC 32.2 12/21/2022     Lab Results   Component Value Date    RDW 16.1 12/21/2022     Lab Results   Component Value Date     12/21/2022        @Last Comprehensive Metabolic Panel:  Sodium   Date Value " Ref Range Status   12/21/2022 139 136 - 145 mmol/L Final     Potassium   Date Value Ref Range Status   12/21/2022 4.3 3.5 - 5.0 mmol/L Final     Chloride   Date Value Ref Range Status   12/21/2022 101 98 - 107 mmol/L Final     Carbon Dioxide (CO2)   Date Value Ref Range Status   12/21/2022 23 22 - 31 mmol/L Final     Anion Gap   Date Value Ref Range Status   12/21/2022 15 5 - 18 mmol/L Final     Glucose   Date Value Ref Range Status   12/21/2022 121 70 - 125 mg/dL Final     Urea Nitrogen   Date Value Ref Range Status   12/21/2022 16 8 - 28 mg/dL Final     Creatinine   Date Value Ref Range Status   12/21/2022 0.71 0.60 - 1.10 mg/dL Final     GFR Estimate   Date Value Ref Range Status   12/21/2022 88 >60 mL/min/1.73m2 Final     Comment:     Effective December 21, 2021 eGFRcr in adults is calculated using the 2021 CKD-EPI creatinine equation which includes age and gender (Raiza et al., NEJ, DOI: 10.1056/ZNWIlu4090442)     Calcium   Date Value Ref Range Status   12/21/2022 9.8 8.5 - 10.5 mg/dL Final     Patient's insurance has cut her.  Okay to DC home with current meds and treatments.  Home PT, OT, home health aide and RN for medication management/ wound VAC change 3/weekly.  Follow-up with primary care provider in 1 week.  Follow-up outpatient with Dr. Griffin on 1/17/2023.  Continue VAC dressing changes 3 times weekly.    DISCHARGE PLAN/FACE TO FACE:  I certify that this patient is under my care and that I, or a nurse practitioner or physician's assistant working with me, had a face-to-face encounter that meets the physician face-to-face encounter requirements with this patient.       I certify that, based on my findings, the following services are medically necessary home health services.    My clinical findings support the need for the above skilled services.    This patient is homebound because: Patient status post left TKA with wound dehiscence who recently underwent I&D.  She continues on VAC therapy.    The  patient is, or has been, under my care and I have initiated the establishment of the plan of care. This patient will be followed by a physician who will periodically review the plan of care.    Total time spent for this visit was 35 minutes greater than 50% time spent face-to-face patient reviewing discharge medications, home care services and follow-ups as well as collaborating with nursing staff and .    This note has been dictated using voice recognition software. Any grammatical or context distortions are unintentional and inherent to the software    Electronically signed by: Neeta Brito CNP

## 2023-01-13 LAB
CRP SERPL-MCNC: 16 MG/L
ERYTHROCYTE [DISTWIDTH] IN BLOOD BY AUTOMATED COUNT: 14.4 % (ref 10–15)
HCT VFR BLD AUTO: 42.2 % (ref 35–47)
HGB BLD-MCNC: 13.3 G/DL (ref 11.7–15.7)
MCH RBC QN AUTO: 32.8 PG (ref 26.5–33)
MCHC RBC AUTO-ENTMCNC: 31.5 G/DL (ref 31.5–36.5)
MCV RBC AUTO: 104 FL (ref 78–100)
PLATELET # BLD AUTO: 389 10E3/UL (ref 150–450)
RBC # BLD AUTO: 4.06 10E6/UL (ref 3.8–5.2)
WBC # BLD AUTO: 11.1 10E3/UL (ref 4–11)

## 2023-01-13 PROCEDURE — 36415 COLL VENOUS BLD VENIPUNCTURE: CPT | Mod: ORL | Performed by: FAMILY MEDICINE

## 2023-01-13 PROCEDURE — 86140 C-REACTIVE PROTEIN: CPT | Mod: ORL | Performed by: FAMILY MEDICINE

## 2023-01-13 PROCEDURE — 85027 COMPLETE CBC AUTOMATED: CPT | Mod: ORL | Performed by: FAMILY MEDICINE

## 2023-01-13 PROCEDURE — P9603 ONE-WAY ALLOW PRORATED MILES: HCPCS | Mod: ORL | Performed by: FAMILY MEDICINE

## 2023-01-15 NOTE — PROGRESS NOTES
JEFFY [facility] GERIATRIC SERVICES    Code Status:  FULL CODE   Visit Type:   Chief Complaint   Patient presents with     TCU Follow Up     Facility:  [facility] ([facility]) [[id number]]           HPI: Belgica Salvador is a 77 year old female who I am seeing today for discharge from the TCU.  Patient recently hospitalized on 12/30/2022 at [facility] for left leg wound.  Past medical history includes type 2 diabetes mellitus, GERD, anxiety, coronary artery disease, tremor, hypothyroidism and hypertension.  Patient with a history of left total knee replacement on November 11, 2022.  Postoperatively she had persistent wound dehiscence and nonhealing wound.  She underwent I&D and was discharged with VAC placement for wound management.  She continues on oral antibiotics until 1/14/23.     Transitional Care Course:  Today patient sitting up in bed. Left TKA with wound dehiscence who underwent I&D.  She continues on VAC placement. Pt with slightly elevated WBC of 11.3 and CRP of 16.0 despite being on dual coverage antibiotics. She is a febrile. She Continues on Doxycyline and Augmentin until 1/14/2023. Dr. Turner initially did her TKA however she has a follow up with plastics (Dr. Griffin) on 1/17/22. I talked with pt daughter today with concerns with laboratory despite antibiotic use. Suggested follow up with ortho and or imaging to rule out bone infection. Her daughter reports she has been seen by Dr. Alex Baker ([facility]).       Assessment/Plan: Reviewed with changes    (S81.802D) Wound of left lower extremity, subsequent encounter  (primary encounter diagnosis)  (Z96.652) History of total knee arthroplasty, left  Comment: Patient with a history of left total knee arthroplasty on 11/11/2022.  Patient underwent I&D of the left knee wound.  She continues on oral antibiotics.  She has a wound VAC in place.  Today patient complaining of some upset stomach with the antibiotics.  She continues on this until 1/14/2023.   She does have GERD.  Continues on PPI.  Plan: Extend Augmentin and doxycycline until 1/21/2023.  Follow-up CBC, CRP on Monday. Weightbearing as tolerated.  Continue wound VAC changing 3 times weekly.  Pain control with Vicodin.  I will send with 10 tabs.  Continue Tylenol 3 times daily.    (E11.01) Type 2 diabetes mellitus with hyperosmolar coma, without long-term current use of insulin (H)  Comment: Blood sugar satisfactory.  Hemoglobin A1c 5.1 on 12/31/2022.  Plan: Continue metformin.  Monitor daily.    (G89.4) Chronic pain syndrome  Plan: Continue Vicodin.    (K21.9) Gastroesophageal reflux disease without esophagitis  Plan: Continue omeprazole and Carafate daily. Probiotics added while on antibiotics.     (I10) Primary hypertension  Comment: Blood pressure appears satisfactory.  Plan: Continue lisinopril.     (I25.10) Coronary artery disease involving native coronary artery of native heart without angina pectoris  Comment: Denies any shortness of breath or chest pain.  Plan: Continue aspirin and statin.    (E02) Subclinical iodine-deficiency hypothyroidism  Comment: TSH on 9/30 0.50.  Plan: Continue supplement.      Active Ambulatory Problems     Diagnosis Date Noted     Type 2 Diabetes Mellitus      Malignant Melanoma Of The Ear      Graves' Disease      Hypothyroidism      Diabetic Peripheral Neuropathy      Hyperlipidemia      Allergic Reaction To Shellfish      Upper GI bleed 09/22/2017     Black stool 09/22/2017     S/P gastric bypass 09/22/2017     Melena 09/22/2017     Cervical radiculopathy at C8 03/20/2019     DM2 (diabetes mellitus, type 2) (H) 03/20/2019     Essential hypertension      S/P knee surgery 11/10/2022     Type 2 diabetes mellitus (H) 11/10/2022     Hyperlipidemia LDL goal <100 11/10/2022     Neuropathy 11/10/2022     Hyponatremia 11/11/2022     Hyperkalemia 11/11/2022     Abnormal feces 10/17/2017     Acute renal failure (H) 01/06/2023     Anxiety disorder 07/26/2011     Adhesive  capsulitis of both shoulders 03/09/2017     B12 deficiency 07/26/2011     CAD in native artery 11/07/2022     Carpal tunnel syndrome, bilateral upper limbs 05/22/2018     Central obesity 01/06/2023     Chronic pain disorder 09/07/2012     Complication of diabetes mellitus (H) 01/06/2023     Diabetic peripheral neuropathy (H) 02/16/2018     Diverticular disease of large intestine 10/13/2017     Epigastric pain 12/12/2017     Gastro-esophageal reflux disease with esophagitis 09/10/2020     Gastroesophageal reflux disease without esophagitis 09/08/2020     Gastrojejunal ulcer without hemorrhage, perforation, or obstruction 09/08/2020     Greater trochanteric bursitis 12/16/2019     Mild nonproliferative diabetic retinopathy of both eyes without macular edema associated with type 2 diabetes mellitus (H) 06/15/2022     Malignant melanoma (H) 01/06/2023     Lymphedema of right arm 04/16/2021     Low magnesium levels 12/15/2014     Left wrist pain 06/01/2018     Jejunitis 09/25/2017     Iron deficiency 12/15/2014     History of total knee replacement, right 12/16/2019     History of DVT of lower extremity 07/11/2020     Myofascial pain syndrome 02/27/2020     Noninfectious gastroenteritis 09/25/2017     Osteoarthritis of knee 01/06/2023     Other idiopathic scoliosis, thoracic region 04/16/2021     Overactive bladder 06/28/2017     Pancreatic cyst 04/18/2013     Polyneuropathy associated with underlying disease (H) 01/07/2019     Postablative hypothyroidism 07/26/2011     Right calf atrophy 04/16/2021     Vitamin D deficiency 06/12/2015     Leg wound, left 12/30/2022     Resolved Ambulatory Problems     Diagnosis Date Noted     No Resolved Ambulatory Problems     Past Medical History:   Diagnosis Date     Anxiety      Arthritis      Coronary artery disease      Diabetes (H)      Gastroesophageal reflux disease      Graves disease      Hypertension      Migraine      Muscle spasm      Renal disease      Thrombosis       Thyroid disease      Allergies   Allergen Reactions     Glucosamine Anaphylaxis and Rash     Cardiac arrest     Ibuprofen Hives     Iodine Angioedema, Anaphylaxis and Hives     Patient can uses skin products such as betadine - See shellfish allergy       Naproxen Hives and Rash     Shellfish Allergy Anaphylaxis, Hives and Rash     Cardiac arrest - very severe     Shellfish-Derived Products Anaphylaxis, Hives and Rash     Cardiac arrest - very severe       Amitriptyline Visual Disturbance     Hallucinations     Buspirone Visual Disturbance     Hallucinations     Nystatin Nausea, Diarrhea, Cramps and GI Disturbance     Tramadol Visual Disturbance     Hallucinations     Chlorpheniramine Hives and Rash     Nsaids Unknown     Pseudoephedrine Hives and Rash       All Meds and Allergies reviewed in the record at the facility and is the most up-to-date.    Post Discharge Medication Reconciliation Status: discharge medications reconciled, continue medications without change  Current Outpatient Medications   Medication Sig     acetaminophen (TYLENOL) 325 MG tablet Take 650 mg by mouth 3 times daily     amoxicillin-clavulanate (AUGMENTIN) 875-125 MG tablet Take 1 tablet by mouth 2 times daily For 8 days - ending 1/14/2023     aspirin 81 MG EC tablet Take 325 mg by mouth daily     baclofen (LIORESAL) 10 MG tablet Take 10 mg by mouth 3 times daily     busPIRone (BUSPAR) 10 MG tablet Take 10 mg by mouth 2 times daily     Carboxymethylcellulose Sodium 0.25 % SOLN Place 1 drop into both eyes 2 times daily     doxycycline hyclate (VIBRA-TABS) 100 MG tablet Take 100 mg by mouth 2 times daily     DULoxetine (CYMBALTA) 60 MG capsule Take 60 mg by mouth daily     EPINEPHrine (EPIPEN 2-SHERRI) 0.3 mg/0.3 mL atIn [EPINEPHRINE (EPIPEN 2-SHERRI) 0.3 MG/0.3 ML ATIN] Inject 0.3 mg into the shoulder, thigh, or buttocks once as needed (Anaphylaxis).     famotidine (PEPCID) 20 MG tablet Take 20 mg by mouth 2 times daily     ferrous sulfate 325 (65 FE)  MG tablet [FERROUS SULFATE 325 (65 FE) MG TABLET] Take 1 tablet by mouth daily with breakfast.            fluticasone (FLONASE) 50 mcg/actuation nasal spray Spray 2 sprays into both nostrils daily     folic acid (FOLVITE) 1 MG tablet [FOLIC ACID (FOLVITE) 1 MG TABLET] Take 1 mg by mouth daily.     HYDROcodone-acetaminophen (NORCO) 5-325 MG tablet Take 1 tablet by mouth every 4 hours as needed for pain     lactobacillus rhamnosus, GG, (CULTURELL) capsule Take 1 capsule by mouth 2 times daily     levothyroxine (SYNTHROID, LEVOTHROID) 88 MCG tablet [LEVOTHYROXINE (SYNTHROID, LEVOTHROID) 88 MCG TABLET] Take 88 mcg by mouth Daily at 6:00 am.      lisinopril (ZESTRIL) 20 MG tablet Take 20 mg by mouth daily     loperamide (IMODIUM) 2 MG capsule Take 2-4 mg by mouth 3 times daily as needed for diarrhea Take 1 capsule with first loose stool and take 2 capsules after each subsequent loose stool     magnesium oxide (MAG-OX) 400 mg (241.3 mg magnesium) tablet [MAGNESIUM OXIDE (MAG-OX) 400 MG (241.3 MG MAGNESIUM) TABLET] Take 800 mg by mouth 2 (two) times a day.     meclizine (ANTIVERT) 12.5 MG tablet Take 12.5 mg by mouth 3 times daily as needed for dizziness     metFORMIN (GLUCOPHAGE) 500 MG tablet [METFORMIN (GLUCOPHAGE) 500 MG TABLET] Take 1,000 mg by mouth 2 (two) times a day with meals.     Multiple vitamin TABS Take 1 tablet by mouth daily     omeprazole (PRILOSEC) 20 MG capsule [OMEPRAZOLE (PRILOSEC) 20 MG CAPSULE] Take 1 capsule (20 mg total) by mouth 2 (two) times a day before meals. (Patient not taking: Reported on 1/9/2023)     omeprazole (PRILOSEC) 20 MG DR capsule Take 20 mg by mouth daily as needed     polyethylene glycol-propylene glycol (SYSTANE ULTRA) 0.4-0.3 % SOLN ophthalmic solution Place 1 drop into both eyes 2 times daily     propranolol (INDERAL LA) 160 mg SR capsule [PROPRANOLOL (INDERAL LA) 160 MG SR CAPSULE] Take 160 mg by mouth daily.            rosuvastatin (CRESTOR) 5 MG tablet Take 5 mg by mouth  "daily     sucralfate (CARAFATE) 1 GM tablet Take 1 g by mouth 4 times daily     valACYclovir (VALTREX) 1000 MG tablet Take 2,000 mg by mouth 2 times daily as needed     Vitamin D (Cholecalciferol) 50 MCG (2000 UT) CAPS Take 2,000 Units by mouth daily     No current facility-administered medications for this visit.       REVIEW OF SYSTEMS:   10 point review of systems reviewed and pertinent positives in the HPI.     PHYSICAL EXAMINATION:  Physical Exam     Vital signs: BP (!) 144/80   Pulse 54   Temp 97.7  F (36.5  C)   Resp 16   Ht 1.676 m (5' 6\")   Wt 77.4 kg (170 lb 9.6 oz)   SpO2 99%   BMI 27.54 kg/m    General: Awake, Alert, lying in bed, oriented x3, appropriately, follows simple commands, conversant  HEENT: Pink conjunctiva, anicteric sclerae, moist oral mucosa  NECK: Supple  CVS:  S1  S2, without murmur or gallop.   LUNG: Clear to auscultation, No wheezes, rales or rhonci.  BACK: No kyphosis of the thoracic spine  ABDOMEN: Soft, nontender to palpation, with positive bowel sounds  EXTREMITIES: Moves both upper and lower extremities with some limitation to the left lower extremity, trace pedal edema greater on the operative side, no calf tenderness  SKIN: Wound VAC removed to the left knee with 4 areas of dehiscence.  Area appears clean.  Some granulation and some slough noted.  No periwound redness.  No warmth.  NEUROLOGIC: Intact, pulses palpable  PSYCHIATRIC: Cognition intact      Labs:  All labs reviewed in the nursing home record and [facility]   @  Lab Results   Component Value Date    WBC 10.4 12/21/2022     Lab Results   Component Value Date    RBC 3.86 12/21/2022     Lab Results   Component Value Date    HGB 12.6 12/21/2022     Lab Results   Component Value Date    HCT 39.1 12/21/2022     Lab Results   Component Value Date     12/21/2022     Lab Results   Component Value Date    MCH 32.6 12/21/2022     Lab Results   Component Value Date    MCHC 32.2 12/21/2022     Lab Results   Component Value " Date    RDW 16.1 12/21/2022     Lab Results   Component Value Date     12/21/2022        @Last Comprehensive Metabolic Panel:  Sodium   Date Value Ref Range Status   01/12/2023 143 136 - 145 mmol/L Final     Potassium   Date Value Ref Range Status   01/12/2023 4.2 3.4 - 5.3 mmol/L Final   12/21/2022 4.3 3.5 - 5.0 mmol/L Final     Chloride   Date Value Ref Range Status   01/12/2023 106 98 - 107 mmol/L Final   12/21/2022 101 98 - 107 mmol/L Final     Carbon Dioxide (CO2)   Date Value Ref Range Status   01/12/2023 24 22 - 29 mmol/L Final   12/21/2022 23 22 - 31 mmol/L Final     Anion Gap   Date Value Ref Range Status   01/12/2023 13 7 - 15 mmol/L Final   12/21/2022 15 5 - 18 mmol/L Final     Glucose   Date Value Ref Range Status   01/12/2023 100 (H) 70 - 99 mg/dL Final   12/21/2022 121 70 - 125 mg/dL Final     Urea Nitrogen   Date Value Ref Range Status   01/12/2023 14.3 8.0 - 23.0 mg/dL Final   12/21/2022 16 8 - 28 mg/dL Final     Creatinine   Date Value Ref Range Status   01/12/2023 0.71 0.51 - 0.95 mg/dL Final     GFR Estimate   Date Value Ref Range Status   01/12/2023 88 >60 mL/min/1.73m2 Final     Comment:     Effective December 21, 2021 eGFRcr in adults is calculated using the 2021 CKD-EPI creatinine equation which includes age and gender (Raiza et al., NEJM, DOI: 10.1056/EYBRxk5125727)     Calcium   Date Value Ref Range Status   01/12/2023 9.1 8.8 - 10.2 mg/dL Final     Patient's insurance has cut her.  Okay to DC home with current meds and treatments.  Home PT, OT, home health aide and RN for medication management/ wound VAC change 3/weekly.  Follow-up with primary care provider in 1 week.  Follow-up outpatient with Dr. Griffin on 1/17/2023.  Continue VAC dressing changes 3 times weekly.    DISCHARGE PLAN/FACE TO FACE:  I certify that this patient is under my care and that I, or a nurse practitioner or physician's assistant working with me, had a face-to-face encounter that meets the physician  face-to-face encounter requirements with this patient.       I certify that, based on my findings, the following services are medically necessary home health services.    My clinical findings support the need for the above skilled services.    This patient is homebound because: Patient status post left TKA with wound dehiscence who recently underwent I&D.  She continues on VAC therapy.    The patient is, or has been, under my care and I have initiated the establishment of the plan of care. This patient will be followed by a physician who will periodically review the plan of care.    Total time spent for this visit was 35 minutes greater than 50% time spent face-to-face patient reviewing discharge medications, home care services and follow-ups as well as collaborating with nursing staff and .    This note has been dictated using voice recognition software. Any grammatical or context distortions are unintentional and inherent to the software    Electronically signed by: Neeta Brito CNP

## 2023-02-05 ENCOUNTER — HEALTH MAINTENANCE LETTER (OUTPATIENT)
Age: 78
End: 2023-02-05

## 2023-08-06 ENCOUNTER — HEALTH MAINTENANCE LETTER (OUTPATIENT)
Age: 78
End: 2023-08-06

## 2023-08-11 ENCOUNTER — APPOINTMENT (OUTPATIENT)
Dept: RADIOLOGY | Facility: CLINIC | Age: 78
End: 2023-08-11
Attending: EMERGENCY MEDICINE
Payer: COMMERCIAL

## 2023-08-11 ENCOUNTER — HOSPITAL ENCOUNTER (EMERGENCY)
Facility: CLINIC | Age: 78
Discharge: HOME | End: 2023-08-11
Attending: EMERGENCY MEDICINE | Admitting: EMERGENCY MEDICINE
Payer: COMMERCIAL

## 2023-08-11 VITALS
WEIGHT: 242 LBS | BODY MASS INDEX: 38.89 KG/M2 | TEMPERATURE: 97.6 F | RESPIRATION RATE: 18 BRPM | HEART RATE: 103 BPM | DIASTOLIC BLOOD PRESSURE: 74 MMHG | SYSTOLIC BLOOD PRESSURE: 160 MMHG | HEIGHT: 66 IN | OXYGEN SATURATION: 97 %

## 2023-08-11 DIAGNOSIS — S42.412A CLOSED SUPRACONDYLAR FRACTURE OF LEFT HUMERUS, INITIAL ENCOUNTER: ICD-10-CM

## 2023-08-11 PROCEDURE — 29105 APPLICATION LONG ARM SPLINT: CPT | Mod: LT

## 2023-08-11 PROCEDURE — 99284 EMERGENCY DEPT VISIT MOD MDM: CPT | Mod: 25

## 2023-08-11 PROCEDURE — 250N000013 HC RX MED GY IP 250 OP 250 PS 637: Performed by: EMERGENCY MEDICINE

## 2023-08-11 PROCEDURE — 73080 X-RAY EXAM OF ELBOW: CPT | Mod: LT

## 2023-08-11 RX ORDER — ACETAMINOPHEN 325 MG/1
650 TABLET ORAL ONCE
Status: COMPLETED | OUTPATIENT
Start: 2023-08-11 | End: 2023-08-11

## 2023-08-11 RX ORDER — BACLOFEN 10 MG/1
20 TABLET ORAL ONCE
Status: COMPLETED | OUTPATIENT
Start: 2023-08-11 | End: 2023-08-11

## 2023-08-11 RX ORDER — HYDROCODONE BITARTRATE AND ACETAMINOPHEN 5; 325 MG/1; MG/1
1 TABLET ORAL ONCE
Status: COMPLETED | OUTPATIENT
Start: 2023-08-11 | End: 2023-08-11

## 2023-08-11 RX ORDER — OXYCODONE HYDROCHLORIDE 5 MG/1
5 TABLET ORAL ONCE
Status: COMPLETED | OUTPATIENT
Start: 2023-08-11 | End: 2023-08-11

## 2023-08-11 RX ORDER — HYDROCODONE BITARTRATE AND ACETAMINOPHEN 5; 325 MG/1; MG/1
1 TABLET ORAL EVERY 6 HOURS PRN
Qty: 10 TABLET | Refills: 0 | Status: SHIPPED | OUTPATIENT
Start: 2023-08-11 | End: 2023-08-13

## 2023-08-11 RX ADMIN — HYDROCODONE BITARTRATE AND ACETAMINOPHEN 1 TABLET: 5; 325 TABLET ORAL at 01:09

## 2023-08-11 RX ADMIN — BACLOFEN 20 MG: 10 TABLET ORAL at 02:28

## 2023-08-11 RX ADMIN — OXYCODONE HYDROCHLORIDE 5 MG: 5 TABLET ORAL at 02:24

## 2023-08-11 RX ADMIN — ACETAMINOPHEN 650 MG: 325 TABLET ORAL at 01:09

## 2023-08-11 ASSESSMENT — ACTIVITIES OF DAILY LIVING (ADL)
ADLS_ACUITY_SCORE: 35

## 2023-08-11 NOTE — ED NOTES
"Walked with the patient to the waiting room to wait for the cab. Patient reports dizziness and appears unsteady. Had to guide and hold the patient's hand when ambulating to maintain steadiness. Patient reports having canes and walkers at home but does not use these since \"I don't walk very far and I can just hold on to the walls in my house everything is close\".     Walked the patient back to the room due to unsafe ambulating conditions. EDP aware.    "

## 2023-08-11 NOTE — ED TRIAGE NOTES
Patient reports tripping while attempting to reach her phone at around 0000, landed on left arm on to wood floor. Now C/O left elbow pain. No obvious deformities noted, bruise on left elbow visible. Movement to left elbow impaired, CMS down left arm intact.   No LOC, no blood thinning medication, did not hit head.     Triage Assessment       Row Name 08/11/23 0100       Triage Assessment (Adult)    Airway WDL WDL       Respiratory WDL    Respiratory WDL WDL       Skin Circulation/Temperature WDL    Skin Circulation/Temperature WDL WDL       Cardiac WDL    Cardiac WDL WDL       Peripheral/Neurovascular WDL    Peripheral Neurovascular WDL WDL       Cognitive/Neuro/Behavioral WDL    Cognitive/Neuro/Behavioral WDL WDL

## 2023-08-11 NOTE — ED PROVIDER NOTES
EMERGENCY DEPARTMENT ENCOUNTER      NAME: Belgica Salvador  AGE: 77 year old female  YOB: 1945  MRN: [id number]  EVALUATION DATE & TIME: 8/11/2023 12:56 AM    PCP: Kassie Thompson    ED PROVIDER: Zuri De Oliveira M.D.      Chief Complaint   Patient presents with    Fall    Elbow Injury         FINAL IMPRESSION:  1. Closed supracondylar fracture of left humerus, initial encounter        MEDICAL DECISION MAKING:    Pertinent Labs & Imaging studies reviewed. (See chart for details)  ED Course as of 08/11/23 0413   Fri Aug 11, 2023   0105 Afebrile with vital signs here with some mild hypertension, otherwise unremarkable.  Patient coming in with injury to left elbow.  Story is as above.    Exam for patient here without any evidence of head trauma.  No midline neck tenderness.  No tenderness to her shoulder, she does have some tenderness to her lateral elbow with palpation with bruising noted in this area as well as some swelling.  No significant medial tenderness.  No tenderness over palpation of the olecranon.  Does have tenderness with any pronation supination of her left upper extremity.  There is no wrist tenderness or forearm tenderness.  Appropriate radial and ulnar pulses distally.    Patient here with isolated injury after trip and fall.  Did not strike her head or lose consciousness.  Not on any blood thinner medications except aspirin.  Plan for x-rays, she has taken Norco in the past with good pain relief so we will give Norco here for pain control.   0254 X-ray here shows a displaced transverse supracondylar fracture of the distal humerus with fracture fragment anteriorly displaced by 0.7 cm.  I called and spoke with orthopedics regarding this.  They states that she may need surgery in the future but at this point does not anything emergent.  Would be fine for a splint and a sling and follow-up in the office.  Updated patient as to these plans.  Placed a long-arm splint and in a shoulder sling  and she tolerated procedure well.  Plan for discharge to home, close follow-up with [facility].   0327 Patient did receive multiple pain medications here.  We did get her up to try to discharge her she was feeling slightly unsteady on her feet.  Decided to keep patient in the room and watch her to make sure that she is able to walk without issue.     Patient slept for about an hour, was able to get up and ambulate to the bathroom.  She is walking at her baseline.  She still requesting discharge.  Will discharge home, follow-up with [facility].    Medical Decision Making    History:  Supplemental history from: Documented in chart, if applicable  External Record(s) reviewed: Documented in chart, if applicable.    Work Up:  Chart documentation includes differential considered and any EKGs or imaging independently interpreted by provider, where specified.  In additional to work up documented, I considered the following work up: Documented in chart, if applicable.    External consultation:  Discussion of management with another provider: Documented in chart, if applicable    Complicating factors:  Care impacted by chronic illness: Diabetes, Hyperlipidemia, Hypertension, and Other: CAD  Care affected by social determinants of health: N/A    Disposition considerations: Discharge. I prescribed additional prescription strength medication(s) as charted. See documentation for any additional details.          Critical care: 0 minutes excluding separately billable procedures.  Includes bedside management, time reviewing test results, review of records, discussing the case with staff, documenting the medical record and time spent with family members (or surrogate decision makers) discussing specific treatment issues.          ED COURSE:  1:00 AM I met with the patient, obtained history, performed an initial exam, and discussed options and plan for diagnostics and treatment here in the ED.   2:34 AM I spoke with  Dr. Thompson from [facility].      The importance of close follow up was discussed. We reviewed warning signs and symptoms, and I instructed Ms. Salvador to return to the emergency department immediately if she develops any new or worsening symptoms. I provided additional verbal discharge instructions. Ms. Salvador expressed understanding and agreement with this plan of care, her questions were answered, and she was discharged in stable condition.     MEDICATIONS GIVEN IN THE EMERGENCY:  Medications   HYDROcodone-acetaminophen (NORCO) 5-325 MG per tablet 1 tablet (1 tablet Oral $Given 8/11/23 0109)   acetaminophen (TYLENOL) tablet 650 mg (650 mg Oral $Given 8/11/23 0109)   baclofen (LIORESAL) tablet 20 mg (20 mg Oral $Given 8/11/23 0228)   oxyCODONE (ROXICODONE) tablet 5 mg (5 mg Oral $Given 8/11/23 0224)       NEW PRESCRIPTIONS STARTED AT TODAY'S ER VISIT:  New Prescriptions    HYDROCODONE-ACETAMINOPHEN (NORCO) 5-325 MG TABLET    Take 1 tablet by mouth every 6 hours as needed for severe pain          =================================================================    HPI    Patient information was obtained from: Patient    Use of : N/A     Belgica Salvador is a 77 year old female with a history of CAD, DM II, chronic pain, hyperlipidemia, and HTN who presents, via EMS, with a fall and elbow injury.    The patient reports here with left elbow pain following a mechanical fall where she tripped this evening going to the bathroom.  She denies any syncope or hitting her head during the fall.  She reports this happened about one hour and she has had left elbow pain since falling.  She denies any left wrist pain. No other complaints at this time.    REVIEW OF SYSTEMS   Review of Systems   Musculoskeletal:         Positive for left elbow pain  Negative for left wrist pain   Neurological:  Negative for syncope.   All other systems reviewed and are negative.    PAST MEDICAL HISTORY:  Past Medical History:   Diagnosis  Date    Anxiety     Arthritis     Coronary artery disease     Diabetes (H)     Gastroesophageal reflux disease     Graves disease     Hypertension     Migraine     Muscle spasm     Renal disease     Thrombosis     Thyroid disease        PAST SURGICAL HISTORY:  Past Surgical History:   Procedure Laterality Date    ANKLE SURGERY Right     hardware in place    APPENDECTOMY      ARTHROPLASTY KNEE Left 11/10/2022    Procedure: LEFT TOTAL KNEE ARTHROPLASTY;  Surgeon: Arnold Peter MD;  Location: [facility] Main OR    CERVICAL FUSION N/A 3/20/2019    Procedure: LEFT CERVICAL 7 - THORACIC 1 ANTERIOR CERVICAL DECOMPRESSION FUSION REMOVAL OF PLATE AT CERVICAL 5-6 CERVICAL 6-7;  Surgeon: Richard Talley MD;  Location: [facility] OR;  Service: Spine    ESOPHAGOSCOPY, GASTROSCOPY, DUODENOSCOPY (EGD), COMBINED N/A 9/24/2017    Procedure: ESOPHAGOGASTRODUODENOSCOPY (EGD) with biopsies;  Surgeon: Jorge Palm MD;  Location: [facility];  Service:     HC REVISE MEDIAN N/CARPAL TUNNEL SURG      Description: Neuroplasty Decompression Median Nerve At Carpal Tunnel;  Proc Date: 01/01/2002;    IR AORTIC ARCH 4 VESSEL ANGIOGRAM  5/22/2012    IR CAROTID ANGIOGRAM  5/22/2012    IR CAROTID ANGIOGRAM  5/22/2012    JOINT REPLACEMENT Right 2009    knee    OTHER SURGICAL HISTORY  2013    spleenectomy    OTHER SURGICAL HISTORY Left     melanoma ear    OTHER SURGICAL HISTORY      bladder interstimstage 1 & 2    PANCREAS SURGERY N/A     tail and part of body removed    NE IMPLANT PERIPH/GASTRIC NEUROSTIM/ N/A 9/28/2017    Procedure: INTERSTIM STAGE 2;  Surgeon: Catarino Hurt MD;  Location: [facility];  Service: Gynecology    NE PARTIAL EXCISION THYROID,UNILAT      Description: Thyroid Surgery Sub-Total Thyroidectomy;  Recorded: 09/13/2008;  Comments: for grave's    RELEASE CARPAL TUNNEL Left 06/2018    ZZC CERV SPINE FUSN,ANTER,BELOW C2      Description: Cervical Vertebral Fusion;  Proc Date: 05/01/2001;  Comments:  c5-c7    [facility] GASTRIC BYPASS,OBESE<100CM AGUS-EN-Y      Description: Gastric Surgery For Morbid Obesity Bypass With Agus-en-Y;  Proc Date: 09/01/2004;    [facility] KNEE SCOPE,AID POST CRUC REPAIR      Description: Knee Arthroscopy With Posterior Cruciate Ligament Repair;  Recorded: 09/13/2008;       CURRENT MEDICATIONS:    No current facility-administered medications for this encounter.    Current Outpatient Medications:     HYDROcodone-acetaminophen (NORCO) 5-325 MG tablet, Take 1 tablet by mouth every 6 hours as needed for severe pain, Disp: 10 tablet, Rfl: 0    acetaminophen (TYLENOL) 325 MG tablet, Take 650 mg by mouth 3 times daily, Disp: , Rfl:     aspirin 81 MG EC tablet, Take 325 mg by mouth daily, Disp: , Rfl:     baclofen (LIORESAL) 10 MG tablet, Take 10 mg by mouth 3 times daily, Disp: , Rfl:     busPIRone (BUSPAR) 10 MG tablet, Take 10 mg by mouth 2 times daily, Disp: , Rfl:     Carboxymethylcellulose Sodium 0.25 % SOLN, Place 1 drop into both eyes 2 times daily, Disp: , Rfl:     DULoxetine (CYMBALTA) 60 MG capsule, Take 60 mg by mouth daily, Disp: , Rfl:     EPINEPHrine (EPIPEN 2-SHERRI) 0.3 mg/0.3 mL atIn, [EPINEPHRINE (EPIPEN 2-SHERRI) 0.3 MG/0.3 ML ATIN] Inject 0.3 mg into the shoulder, thigh, or buttocks once as needed (Anaphylaxis)., Disp: , Rfl:     famotidine (PEPCID) 20 MG tablet, Take 20 mg by mouth 2 times daily, Disp: , Rfl:     ferrous sulfate 325 (65 FE) MG tablet, [FERROUS SULFATE 325 (65 FE) MG TABLET] Take 1 tablet by mouth daily with breakfast.       , Disp: , Rfl:     fluticasone (FLONASE) 50 mcg/actuation nasal spray, Spray 2 sprays into both nostrils daily, Disp: , Rfl:     folic acid (FOLVITE) 1 MG tablet, [FOLIC ACID (FOLVITE) 1 MG TABLET] Take 1 mg by mouth daily., Disp: , Rfl:     HYDROcodone-acetaminophen (NORCO) 5-325 MG tablet, Take 1 tablet by mouth every 4 hours as needed for pain, Disp: , Rfl:     lactobacillus rhamnosus, GG, (CULTURELL) capsule, Take 1 capsule by mouth 2 times daily,  Disp: , Rfl:     levothyroxine (SYNTHROID, LEVOTHROID) 88 MCG tablet, [LEVOTHYROXINE (SYNTHROID, LEVOTHROID) 88 MCG TABLET] Take 88 mcg by mouth Daily at 6:00 am. , Disp: , Rfl:     lisinopril (ZESTRIL) 20 MG tablet, Take 20 mg by mouth daily, Disp: , Rfl:     loperamide (IMODIUM) 2 MG capsule, Take 2-4 mg by mouth 3 times daily as needed for diarrhea Take 1 capsule with first loose stool and take 2 capsules after each subsequent loose stool, Disp: , Rfl:     magnesium oxide (MAG-OX) 400 mg (241.3 mg magnesium) tablet, [MAGNESIUM OXIDE (MAG-OX) 400 MG (241.3 MG MAGNESIUM) TABLET] Take 800 mg by mouth 2 (two) times a day., Disp: , Rfl:     meclizine (ANTIVERT) 12.5 MG tablet, Take 12.5 mg by mouth 3 times daily as needed for dizziness, Disp: , Rfl:     metFORMIN (GLUCOPHAGE) 500 MG tablet, [METFORMIN (GLUCOPHAGE) 500 MG TABLET] Take 1,000 mg by mouth 2 (two) times a day with meals., Disp: , Rfl:     Multiple vitamin TABS, Take 1 tablet by mouth daily, Disp: , Rfl:     omeprazole (PRILOSEC) 20 MG capsule, [OMEPRAZOLE (PRILOSEC) 20 MG CAPSULE] Take 1 capsule (20 mg total) by mouth 2 (two) times a day before meals. (Patient not taking: Reported on 1/9/2023), Disp: 60 capsule, Rfl: 1    omeprazole (PRILOSEC) 20 MG DR capsule, Take 20 mg by mouth daily as needed, Disp: , Rfl:     polyethylene glycol-propylene glycol (SYSTANE ULTRA) 0.4-0.3 % SOLN ophthalmic solution, Place 1 drop into both eyes 2 times daily, Disp: , Rfl:     propranolol (INDERAL LA) 160 mg SR capsule, [PROPRANOLOL (INDERAL LA) 160 MG SR CAPSULE] Take 160 mg by mouth daily.       , Disp: , Rfl:     rosuvastatin (CRESTOR) 5 MG tablet, Take 5 mg by mouth daily, Disp: , Rfl:     sucralfate (CARAFATE) 1 GM tablet, Take 1 g by mouth 4 times daily, Disp: , Rfl:     valACYclovir (VALTREX) 1000 MG tablet, Take 2,000 mg by mouth 2 times daily as needed, Disp: , Rfl:     Vitamin D (Cholecalciferol) 50 MCG (2000 UT) CAPS, Take 2,000 Units by mouth daily, Disp: ,  "Rfl:     ALLERGIES:  Allergies   Allergen Reactions    Glucosamine Anaphylaxis and Rash     Cardiac arrest    Ibuprofen Hives    Iodine Angioedema, Anaphylaxis and Hives     Patient can uses skin products such as betadine - See shellfish allergy      Naproxen Hives and Rash    Shellfish Allergy Anaphylaxis, Hives and Rash     Cardiac arrest - very severe    Shellfish-Derived Products Anaphylaxis, Hives and Rash     Cardiac arrest - very severe      Amitriptyline Visual Disturbance     Hallucinations    Buspirone Visual Disturbance     Hallucinations    Nystatin Nausea, Diarrhea, Cramps and GI Disturbance    Tramadol Visual Disturbance     Hallucinations    Chlorpheniramine Hives and Rash    Nsaids Unknown    Pseudoephedrine Hives and Rash       FAMILY HISTORY:  Family History   Problem Relation Age of Onset    Breast Cancer Paternal Aunt        SOCIAL HISTORY:   Social History     Socioeconomic History    Marital status:    Tobacco Use    Smoking status: Former    Smokeless tobacco: Never   Substance and Sexual Activity    Alcohol use: Not Currently     Alcohol/week: 1.0 - 2.0 standard drink of alcohol     Types: 1 - 2 Standard drinks or equivalent per week    Drug use: No       PHYSICAL EXAM:    Vitals: BP (!) 160/74   Pulse 103   Temp 97.6  F (36.4  C) (Oral)   Resp 18   Ht 1.676 m (5' 6\")   Wt 109.8 kg (242 lb)   SpO2 97%   BMI 39.06 kg/m     General:. Alert and interactive, comfortable appearing.  HENT: Oropharynx without erythema or exudates. MMM.  TMs clear bilaterally. patient here without any evidence of head trauma.   Eyes: Pupils mid-sized and equally reactive.   Neck: Full AROM.  No midline tenderness to palpation.  Cardiovascular: Regular rate and rhythm. Peripheral pulses 2+ bilaterally.  Chest/Pulmonary: Normal work of breathing. Lung sounds clear and equal throughout, no wheezes or crackles. No chest wall tenderness or deformities.  Abdomen: Soft, nondistended. Nontender without " guarding or rebound.  Back/Spine: No CVA or midline tenderness.  Extremities: Normal ROM of all major joints. No tenderness to her shoulder, she does have some tenderness to her left lateral elbow with palpation with bruising noted in this area as well as some swelling. No significant medial tenderness. No tenderness over palpation of the olecranon. Does have tenderness with any pronation supination of her left upper extremity. There is no wrist tenderness or forearm tenderness.  No lower extremity edema.   Skin: Warm and dry. Normal skin color.   Neuro: Speech clear. CNs grossly intact. Moves all extremities appropriately. Strength and sensation grossly intact to all extremities.   Psych: Normal affect/mood, cooperative, memory appropriate.     LAB:  All pertinent labs reviewed and interpreted.  Labs Ordered and Resulted from Time of ED Arrival to Time of ED Departure - No data to display    RADIOLOGY:  Elbow  XR, G/E 3 views, left   Final Result   IMPRESSION:       Displaced transverse supracondylar fracture of the distal humerus. The distal fracture fragment is anteriorly displaced by 0.7 cm and medially displaced by 0.4 cm.      Elbow joint effusion.      No dislocation.          EKG:  See MDM      PROCEDURES:     PROCEDURE: Splint Placement   INDICATIONS: Left humeral fracture   PROCEDURE PROVIDER: Dr Alpa De Oliveira   NOTE:  A long arm splint made of Orthoglass was applied to the Left upper extremity by the above provider. As noted in the physical exam, distal CMS was intact prior to placement. The splint was checked and the fit was adjusted to ensure proper positioning after placement. Sensation and circulation, as well as motor function, are unchanged after splint placement and the patient is more comfortable with the splint in place.        I, Rickey Skaggs, am serving as a scribe to document services personally performed by Dr. Zuri De Oliveira  based on my observation and the provider's statements to me. I,  Zuri De Oliveira MD attest that Rickey Skaggs is acting in a scribe capacity, has observed my performance of the services and has documented them in accordance with my direction.      Zuri De Oliveira M.D.  Emergency Medicine  [facility] EMERGENCY ROOM  [address] [address]  [phone]  Dept: [phone]      Zuri De Oliveira MD  08/11/23 0301       Zuri De Oliveira MD  08/11/23 0649

## 2023-08-11 NOTE — ED NOTES
Attempted to ambulate the patient again. Reports dizziness has improved and appears to be more steady on feet, however still has shuffling gait and attempting to use the walls to guide self. Instructed to the patient the importance of using prescribed walkers, canes, and appropriate footwear at home for safety. Patient verbalizes understanding.

## 2023-08-11 NOTE — DISCHARGE INSTRUCTIONS
Please take Tylenol at home for pain control.  Augment with the Norco as needed for pain.  Please call and make an appointment with [facility] to follow-up for cast placement or repeat x-rays for possible surgery.  Return for any new or worsening symptoms.

## 2023-08-11 NOTE — ED NOTES
Patient ambulated to the bathroom and down the hallway, denies dizziness and appears more stable on feet however still shuffling gait. Reports this is normal at home. Re-enforced the importance of safety at home including using walkers and appropriate footwear. Patient would like to go home and feels comfortable at this time now that dizziness has subsided. Would like to get a cab.     [employer] called.

## 2023-08-11 NOTE — ED NOTES
Road test in [place] and to bathroom. Pt ambulates unsteady; shuffles and uses the walls for stability and guidance. Pt states that this is base line for movement.

## 2023-08-13 ENCOUNTER — APPOINTMENT (OUTPATIENT)
Dept: RADIOLOGY | Facility: CLINIC | Age: 78
DRG: 511 | End: 2023-08-13
Attending: EMERGENCY MEDICINE
Payer: COMMERCIAL

## 2023-08-13 ENCOUNTER — APPOINTMENT (OUTPATIENT)
Dept: CT IMAGING | Facility: CLINIC | Age: 78
DRG: 511 | End: 2023-08-13
Attending: EMERGENCY MEDICINE
Payer: COMMERCIAL

## 2023-08-13 ENCOUNTER — HOSPITAL ENCOUNTER (INPATIENT)
Facility: CLINIC | Age: 78
LOS: 3 days | Discharge: NURSING, REHAB OR LONG-TERM CARE | DRG: 511 | End: 2023-08-18
Attending: EMERGENCY MEDICINE | Admitting: FAMILY MEDICINE
Payer: COMMERCIAL

## 2023-08-13 DIAGNOSIS — E11.9 TYPE 2 DIABETES MELLITUS (H): Primary | ICD-10-CM

## 2023-08-13 DIAGNOSIS — I10 ESSENTIAL HYPERTENSION: ICD-10-CM

## 2023-08-13 DIAGNOSIS — R29.6 FALLS FREQUENTLY: ICD-10-CM

## 2023-08-13 DIAGNOSIS — S42.412A CLOSED SUPRACONDYLAR FRACTURE OF LEFT ELBOW, INITIAL ENCOUNTER: ICD-10-CM

## 2023-08-13 DIAGNOSIS — N30.00 ACUTE CYSTITIS WITHOUT HEMATURIA: ICD-10-CM

## 2023-08-13 LAB
ALBUMIN UR-MCNC: 20 MG/DL
ANION GAP SERPL CALCULATED.3IONS-SCNC: 12 MMOL/L (ref 5–18)
APPEARANCE UR: CLEAR
ATRIAL RATE - MUSE: 103 BPM
BACTERIA #/AREA URNS HPF: ABNORMAL /HPF
BASOPHILS # BLD MANUAL: 0 10E3/UL (ref 0–0.2)
BASOPHILS NFR BLD MANUAL: 0 %
BILIRUB UR QL STRIP: NEGATIVE
BUN SERPL-MCNC: 24 MG/DL (ref 8–28)
CALCIUM SERPL-MCNC: 9.3 MG/DL (ref 8.5–10.5)
CHLORIDE BLD-SCNC: 103 MMOL/L (ref 98–107)
CO2 SERPL-SCNC: 22 MMOL/L (ref 22–31)
COLOR UR AUTO: YELLOW
CREAT SERPL-MCNC: 0.87 MG/DL (ref 0.6–1.1)
DIASTOLIC BLOOD PRESSURE - MUSE: 70 MMHG
EOSINOPHIL # BLD MANUAL: 0.1 10E3/UL (ref 0–0.7)
EOSINOPHIL NFR BLD MANUAL: 1 %
ERYTHROCYTE [DISTWIDTH] IN BLOOD BY AUTOMATED COUNT: 13.6 % (ref 10–15)
GFR SERPL CREATININE-BSD FRML MDRD: 69 ML/MIN/1.73M2
GLUCOSE BLD-MCNC: 113 MG/DL (ref 70–125)
GLUCOSE UR STRIP-MCNC: NEGATIVE MG/DL
HCT VFR BLD AUTO: 38.4 % (ref 35–47)
HGB BLD-MCNC: 12.8 G/DL (ref 11.7–15.7)
HGB UR QL STRIP: NEGATIVE
HYALINE CASTS: 7 /LPF
INTERPRETATION ECG - MUSE: NORMAL
KETONES UR STRIP-MCNC: ABNORMAL MG/DL
LEUKOCYTE ESTERASE UR QL STRIP: ABNORMAL
LYMPHOCYTES # BLD MANUAL: 3 10E3/UL (ref 0.8–5.3)
LYMPHOCYTES NFR BLD MANUAL: 23 %
MAGNESIUM SERPL-MCNC: 1.5 MG/DL (ref 1.8–2.6)
MCH RBC QN AUTO: 33.1 PG (ref 26.5–33)
MCHC RBC AUTO-ENTMCNC: 33.3 G/DL (ref 31.5–36.5)
MCV RBC AUTO: 99 FL (ref 78–100)
MONOCYTES # BLD MANUAL: 1.7 10E3/UL (ref 0–1.3)
MONOCYTES NFR BLD MANUAL: 13 %
MUCOUS THREADS #/AREA URNS LPF: PRESENT /LPF
NEUTROPHILS # BLD MANUAL: 8.3 10E3/UL (ref 1.6–8.3)
NEUTROPHILS NFR BLD MANUAL: 63 %
NITRATE UR QL: NEGATIVE
P AXIS - MUSE: 66 DEGREES
PH UR STRIP: 5.5 [PH] (ref 5–7)
PLAT MORPH BLD: ABNORMAL
PLATELET # BLD AUTO: 304 10E3/UL (ref 150–450)
POTASSIUM BLD-SCNC: 5.3 MMOL/L (ref 3.5–5)
PR INTERVAL - MUSE: 124 MS
QRS DURATION - MUSE: 124 MS
QT - MUSE: 356 MS
QTC - MUSE: 466 MS
R AXIS - MUSE: 30 DEGREES
RBC # BLD AUTO: 3.87 10E6/UL (ref 3.8–5.2)
RBC MORPH BLD: ABNORMAL
RBC URINE: 2 /HPF
SODIUM SERPL-SCNC: 137 MMOL/L (ref 136–145)
SP GR UR STRIP: 1.03 (ref 1–1.03)
SYSTOLIC BLOOD PRESSURE - MUSE: 147 MMHG
T AXIS - MUSE: 26 DEGREES
TROPONIN I SERPL-MCNC: <0.01 NG/ML (ref 0–0.29)
UROBILINOGEN UR STRIP-MCNC: 2 MG/DL
VENTRICULAR RATE- MUSE: 103 BPM
WBC # BLD AUTO: 13.2 10E3/UL (ref 4–11)
WBC URINE: 24 /HPF

## 2023-08-13 PROCEDURE — 36415 COLL VENOUS BLD VENIPUNCTURE: CPT | Performed by: FAMILY MEDICINE

## 2023-08-13 PROCEDURE — 80048 BASIC METABOLIC PNL TOTAL CA: CPT | Performed by: EMERGENCY MEDICINE

## 2023-08-13 PROCEDURE — 250N000011 HC RX IP 250 OP 636: Mod: JZ | Performed by: EMERGENCY MEDICINE

## 2023-08-13 PROCEDURE — 73090 X-RAY EXAM OF FOREARM: CPT | Mod: LT

## 2023-08-13 PROCEDURE — 84484 ASSAY OF TROPONIN QUANT: CPT | Performed by: EMERGENCY MEDICINE

## 2023-08-13 PROCEDURE — 96374 THER/PROPH/DIAG INJ IV PUSH: CPT

## 2023-08-13 PROCEDURE — 83036 HEMOGLOBIN GLYCOSYLATED A1C: CPT | Performed by: FAMILY MEDICINE

## 2023-08-13 PROCEDURE — 72131 CT LUMBAR SPINE W/O DYE: CPT

## 2023-08-13 PROCEDURE — 85007 BL SMEAR W/DIFF WBC COUNT: CPT | Performed by: EMERGENCY MEDICINE

## 2023-08-13 PROCEDURE — 83735 ASSAY OF MAGNESIUM: CPT | Performed by: EMERGENCY MEDICINE

## 2023-08-13 PROCEDURE — 99223 1ST HOSP IP/OBS HIGH 75: CPT | Performed by: FAMILY MEDICINE

## 2023-08-13 PROCEDURE — 99285 EMERGENCY DEPT VISIT HI MDM: CPT | Mod: 25

## 2023-08-13 PROCEDURE — 250N000013 HC RX MED GY IP 250 OP 250 PS 637: Performed by: FAMILY MEDICINE

## 2023-08-13 PROCEDURE — 85027 COMPLETE CBC AUTOMATED: CPT | Performed by: EMERGENCY MEDICINE

## 2023-08-13 PROCEDURE — 36415 COLL VENOUS BLD VENIPUNCTURE: CPT | Performed by: EMERGENCY MEDICINE

## 2023-08-13 PROCEDURE — G0378 HOSPITAL OBSERVATION PER HR: HCPCS

## 2023-08-13 PROCEDURE — 93005 ELECTROCARDIOGRAM TRACING: CPT | Performed by: EMERGENCY MEDICINE

## 2023-08-13 PROCEDURE — 73060 X-RAY EXAM OF HUMERUS: CPT | Mod: LT

## 2023-08-13 PROCEDURE — 81001 URINALYSIS AUTO W/SCOPE: CPT | Performed by: EMERGENCY MEDICINE

## 2023-08-13 PROCEDURE — 71046 X-RAY EXAM CHEST 2 VIEWS: CPT

## 2023-08-13 PROCEDURE — 87086 URINE CULTURE/COLONY COUNT: CPT | Performed by: EMERGENCY MEDICINE

## 2023-08-13 PROCEDURE — 70450 CT HEAD/BRAIN W/O DYE: CPT

## 2023-08-13 RX ORDER — MAGNESIUM OXIDE 400 MG/1
800 TABLET ORAL 2 TIMES DAILY
Status: DISCONTINUED | OUTPATIENT
Start: 2023-08-13 | End: 2023-08-18 | Stop reason: HOSPADM

## 2023-08-13 RX ORDER — NALOXONE HYDROCHLORIDE 0.4 MG/ML
0.2 INJECTION, SOLUTION INTRAMUSCULAR; INTRAVENOUS; SUBCUTANEOUS
Status: DISCONTINUED | OUTPATIENT
Start: 2023-08-13 | End: 2023-08-18 | Stop reason: HOSPADM

## 2023-08-13 RX ORDER — PANTOPRAZOLE SODIUM 40 MG/1
40 TABLET, DELAYED RELEASE ORAL
Status: DISCONTINUED | OUTPATIENT
Start: 2023-08-14 | End: 2023-08-18 | Stop reason: HOSPADM

## 2023-08-13 RX ORDER — PROCHLORPERAZINE 25 MG
12.5 SUPPOSITORY, RECTAL RECTAL EVERY 12 HOURS PRN
Status: DISCONTINUED | OUTPATIENT
Start: 2023-08-13 | End: 2023-08-18 | Stop reason: HOSPADM

## 2023-08-13 RX ORDER — ONDANSETRON 4 MG/1
4 TABLET, ORALLY DISINTEGRATING ORAL EVERY 6 HOURS PRN
Status: DISCONTINUED | OUTPATIENT
Start: 2023-08-13 | End: 2023-08-18 | Stop reason: HOSPADM

## 2023-08-13 RX ORDER — DULOXETIN HYDROCHLORIDE 60 MG/1
60 CAPSULE, DELAYED RELEASE ORAL DAILY
Status: DISCONTINUED | OUTPATIENT
Start: 2023-08-14 | End: 2023-08-18 | Stop reason: HOSPADM

## 2023-08-13 RX ORDER — CEFTRIAXONE 2 G/1
2 INJECTION, POWDER, FOR SOLUTION INTRAMUSCULAR; INTRAVENOUS EVERY 24 HOURS
Status: DISCONTINUED | OUTPATIENT
Start: 2023-08-14 | End: 2023-08-15

## 2023-08-13 RX ORDER — ACETAMINOPHEN 325 MG/1
650 TABLET ORAL 3 TIMES DAILY
Status: DISCONTINUED | OUTPATIENT
Start: 2023-08-13 | End: 2023-08-16

## 2023-08-13 RX ORDER — NALOXONE HYDROCHLORIDE 0.4 MG/ML
0.4 INJECTION, SOLUTION INTRAMUSCULAR; INTRAVENOUS; SUBCUTANEOUS
Status: DISCONTINUED | OUTPATIENT
Start: 2023-08-13 | End: 2023-08-18 | Stop reason: HOSPADM

## 2023-08-13 RX ORDER — CARBOXYMETHYLCELLULOSE SODIUM 5 MG/ML
1 SOLUTION/ DROPS OPHTHALMIC 2 TIMES DAILY
Status: DISCONTINUED | OUTPATIENT
Start: 2023-08-13 | End: 2023-08-18 | Stop reason: HOSPADM

## 2023-08-13 RX ORDER — ACETAMINOPHEN 325 MG/1
650 TABLET ORAL 3 TIMES DAILY
Status: CANCELLED | OUTPATIENT
Start: 2023-08-13

## 2023-08-13 RX ORDER — OXYCODONE HYDROCHLORIDE 5 MG/1
5 TABLET ORAL EVERY 4 HOURS PRN
Status: DISCONTINUED | OUTPATIENT
Start: 2023-08-13 | End: 2023-08-16

## 2023-08-13 RX ORDER — FOLIC ACID 1 MG/1
1 TABLET ORAL DAILY
Status: DISCONTINUED | OUTPATIENT
Start: 2023-08-14 | End: 2023-08-18 | Stop reason: HOSPADM

## 2023-08-13 RX ORDER — MECLIZINE HCL 12.5 MG 12.5 MG/1
12.5 TABLET ORAL 3 TIMES DAILY PRN
Status: DISCONTINUED | OUTPATIENT
Start: 2023-08-13 | End: 2023-08-18 | Stop reason: HOSPADM

## 2023-08-13 RX ORDER — SENNOSIDES 8.6 MG
650 CAPSULE ORAL 3 TIMES DAILY
Status: ON HOLD | COMMUNITY
End: 2023-08-18

## 2023-08-13 RX ORDER — LISINOPRIL 20 MG/1
20 TABLET ORAL DAILY
Status: DISCONTINUED | OUTPATIENT
Start: 2023-08-14 | End: 2023-08-18 | Stop reason: HOSPADM

## 2023-08-13 RX ORDER — PROCHLORPERAZINE MALEATE 5 MG
5 TABLET ORAL EVERY 6 HOURS PRN
Status: DISCONTINUED | OUTPATIENT
Start: 2023-08-13 | End: 2023-08-18 | Stop reason: HOSPADM

## 2023-08-13 RX ORDER — ASPIRIN 325 MG
650 TABLET, DELAYED RELEASE (ENTERIC COATED) ORAL 2 TIMES DAILY
COMMUNITY
End: 2023-10-31

## 2023-08-13 RX ORDER — FAMOTIDINE 20 MG/1
20 TABLET, FILM COATED ORAL 2 TIMES DAILY
Status: DISCONTINUED | OUTPATIENT
Start: 2023-08-13 | End: 2023-08-18 | Stop reason: HOSPADM

## 2023-08-13 RX ORDER — VITAMIN B COMPLEX
2000 TABLET ORAL DAILY
Status: DISCONTINUED | OUTPATIENT
Start: 2023-08-14 | End: 2023-08-18 | Stop reason: HOSPADM

## 2023-08-13 RX ORDER — ONDANSETRON 2 MG/ML
4 INJECTION INTRAMUSCULAR; INTRAVENOUS EVERY 6 HOURS PRN
Status: DISCONTINUED | OUTPATIENT
Start: 2023-08-13 | End: 2023-08-18 | Stop reason: HOSPADM

## 2023-08-13 RX ORDER — LEVOTHYROXINE SODIUM 88 UG/1
88 TABLET ORAL DAILY
Status: DISCONTINUED | OUTPATIENT
Start: 2023-08-14 | End: 2023-08-18 | Stop reason: HOSPADM

## 2023-08-13 RX ORDER — NICOTINE POLACRILEX 4 MG
15-30 LOZENGE BUCCAL
Status: DISCONTINUED | OUTPATIENT
Start: 2023-08-13 | End: 2023-08-18 | Stop reason: HOSPADM

## 2023-08-13 RX ORDER — BUSPIRONE HYDROCHLORIDE 10 MG/1
10 TABLET ORAL 2 TIMES DAILY
Status: DISCONTINUED | OUTPATIENT
Start: 2023-08-13 | End: 2023-08-18 | Stop reason: HOSPADM

## 2023-08-13 RX ORDER — ASPIRIN 325 MG
650 TABLET, DELAYED RELEASE (ENTERIC COATED) ORAL 2 TIMES DAILY
Status: DISCONTINUED | OUTPATIENT
Start: 2023-08-13 | End: 2023-08-18 | Stop reason: HOSPADM

## 2023-08-13 RX ORDER — BACLOFEN 10 MG/1
10 TABLET ORAL 3 TIMES DAILY
Status: DISCONTINUED | OUTPATIENT
Start: 2023-08-13 | End: 2023-08-18 | Stop reason: HOSPADM

## 2023-08-13 RX ORDER — CEFTRIAXONE 2 G/1
2 INJECTION, POWDER, FOR SOLUTION INTRAMUSCULAR; INTRAVENOUS ONCE
Status: COMPLETED | OUTPATIENT
Start: 2023-08-13 | End: 2023-08-13

## 2023-08-13 RX ORDER — FERROUS SULFATE 325(65) MG
325 TABLET ORAL
Status: DISCONTINUED | OUTPATIENT
Start: 2023-08-14 | End: 2023-08-18 | Stop reason: HOSPADM

## 2023-08-13 RX ORDER — DEXTROSE MONOHYDRATE 25 G/50ML
25-50 INJECTION, SOLUTION INTRAVENOUS
Status: DISCONTINUED | OUTPATIENT
Start: 2023-08-13 | End: 2023-08-18 | Stop reason: HOSPADM

## 2023-08-13 RX ORDER — SODIUM CHLORIDE 9 MG/ML
INJECTION, SOLUTION INTRAVENOUS CONTINUOUS
Status: DISCONTINUED | OUTPATIENT
Start: 2023-08-13 | End: 2023-08-18 | Stop reason: HOSPADM

## 2023-08-13 RX ORDER — ROSUVASTATIN CALCIUM 5 MG/1
5 TABLET, COATED ORAL AT BEDTIME
Status: DISCONTINUED | OUTPATIENT
Start: 2023-08-13 | End: 2023-08-18 | Stop reason: HOSPADM

## 2023-08-13 RX ADMIN — CEFTRIAXONE SODIUM 2 G: 2 INJECTION, POWDER, FOR SOLUTION INTRAMUSCULAR; INTRAVENOUS at 20:19

## 2023-08-13 RX ADMIN — Medication 800 MG: at 22:37

## 2023-08-13 RX ADMIN — ACETAMINOPHEN 650 MG: 325 TABLET ORAL at 22:34

## 2023-08-13 RX ADMIN — OXYCODONE HYDROCHLORIDE 5 MG: 5 TABLET ORAL at 22:35

## 2023-08-13 RX ADMIN — FAMOTIDINE 20 MG: 20 TABLET, FILM COATED ORAL at 22:40

## 2023-08-13 ASSESSMENT — ENCOUNTER SYMPTOMS
BACK PAIN: 1
SHORTNESS OF BREATH: 1

## 2023-08-13 ASSESSMENT — ACTIVITIES OF DAILY LIVING (ADL)
ADLS_ACUITY_SCORE: 35
DEPENDENT_IADLS:: INDEPENDENT
ADLS_ACUITY_SCORE: 35

## 2023-08-13 NOTE — ED PROVIDER NOTES
EMERGENCY DEPARTMENT ENCOUNTER      NAME: Belgica Salvador  AGE: 77 year old female  YOB: 1945  MRN: [id number]  EVALUATION DATE & TIME: 8/13/2023  3:40 PM    PCP: Kassie Thompson    ED PROVIDER: Marino Riggs MD      Chief Complaint   Patient presents with    Fall    Arm Pain     FINAL IMPRESSION:  1. Acute cystitis without hematuria    2. Falls frequently    3. Closed supracondylar fracture of left elbow, initial encounter      ED COURSE & MEDICAL DECISION MAKING:    Pertinent Labs & Imaging studies reviewed. (See chart for details)  77 year old female presents to the Emergency Department for evaluation of fall and reactivation of elbow injury.  Patient evaluated in the Emergency Department few days prior for a fall sustaining a supracondylar fracture she is scheduled for surgery later this month.  She has had some continued unsteadiness and had a fall again with difficulty getting up and ultimately was brought to the emergency room for assessment.  She complained of increased pain and swelling to the vicinity of her fracture.  On examination she had a brace to the left upper extremity.  Reviewed the emergency department note and imaging from her visit 2 days ago.  Demonstrating a supracondylar fracture.  She was also complaining of a headache and low back pain.  Back pain seems isolated to the lumbar region distally.  CT scan head negative CT scan lumbar spine negative x-rays demonstrating her known fracture her distal neurovascular status was intact by clinical examination.  I had a long discussion with the patient and her daughter it is concerning that she is having these increased falls.  She is not relating chest pain or near syncopal symptoms but the patient is a relatively poor historian and the history and symptoms that she relates are quite vague and I am not confident that she is understanding exactly why she is falling.  We broadened her work-up to include laboratory testing and  urinalysis urinalysis was concerning for infection.  I also discussed the case with her orthopedic surgeon with recommendations from the surgeon for admission to the hospital they will plan to escalate her surgical intervention on her elbow to help facilitate progression of her care.  Given all of the above our plan of care at this point is for admission to the hospital on antibiotics with plan orthopedic consultation and surgical intervention on the supracondylar fracture I updated the patient and family on test results and plan of care.       3:49 PM I introduced myself to the patient, obtained patient history, performed a physical exam, and discussed plan for ED workup including potential diagnostic laboratory/imaging studies and interventions.  5:42 PM I rechecked the patient and updated them on laboratory and imaging results.    At the conclusion of the encounter I discussed the results of all of the tests and the disposition. The questions were answered. The patient or family acknowledged understanding and was agreeable with the care plan.     Medical Decision Making    History:  Supplemental history from: Documented in chart, if applicable  External Record(s) reviewed: Documented in chart, if applicable.    Work Up:  Chart documentation includes differential considered and any EKGs or imaging independently interpreted by provider, where specified.  In additional to work up documented, I considered the following work up: Documented in chart, if applicable.    External consultation:  Discussion of management with another provider: Dr. Santamaria, orthopedic surgery and admitting hospitalist    Complicating factors:  Care impacted by chronic illness: Chronic Pain, Heart Disease, Hyperlipidemia, and Hypertension  Care affected by social determinants of health: N/A    Disposition considerations: Admit.        MEDICATIONS GIVEN IN THE EMERGENCY:  Medications   cefTRIAXone (ROCEPHIN) 1 g in lidocaine injection (has no  "administration in time range)       NEW PRESCRIPTIONS STARTED AT TODAY'S ER VISIT  New Prescriptions    No medications on file          =================================================================    HPI    Patient information was obtained from: Patient     Use of : N/A         Belgica Salvador is a 77 year old female with a pertinent history of CAD, DM II, chronic pain, hyperlipidemia, and HTN  who presents to this ED by EMS for evaluation of fall.    Patient woke up in the middle of the night to use the bathroom and says that \"I just fell\". Patient doesn't think she hit her head and denies LOC. Patient endorses left arm pain, shoulder,  and lower back pain. Patient also reports that she has had shortness of breath for the past few days and says it has been progressively getting worse. Patient denies chest pain.     REVIEW OF SYSTEMS   Review of Systems   Respiratory:  Positive for shortness of breath.    Cardiovascular:  Negative for chest pain.   Musculoskeletal:  Positive for back pain (Lower).        Positive for left arm and shoulder pain    All other systems reviewed and are negative.       PAST MEDICAL HISTORY:  Past Medical History:   Diagnosis Date    Anxiety     Arthritis     Coronary artery disease     Diabetes (H)     Gastroesophageal reflux disease     Graves disease     Hypertension     Migraine     Muscle spasm     Renal disease     Thrombosis     Thyroid disease        PAST SURGICAL HISTORY:  Past Surgical History:   Procedure Laterality Date    ANKLE SURGERY Right     hardware in place    APPENDECTOMY      ARTHROPLASTY KNEE Left 11/10/2022    Procedure: LEFT TOTAL KNEE ARTHROPLASTY;  Surgeon: Arnold Peter MD;  Location: [facility]    CERVICAL FUSION N/A 3/20/2019    Procedure: LEFT CERVICAL 7 - THORACIC 1 ANTERIOR CERVICAL DECOMPRESSION FUSION REMOVAL OF PLATE AT CERVICAL 5-6 CERVICAL 6-7;  Surgeon: Richard Talley MD;  Location: [facility];  Service: Spine    " ESOPHAGOSCOPY, GASTROSCOPY, DUODENOSCOPY (EGD), COMBINED N/A 9/24/2017    Procedure: ESOPHAGOGASTRODUODENOSCOPY (EGD) with biopsies;  Surgeon: Jorge Palm MD;  Location: [facility];  Service:     HC REVISE MEDIAN N/CARPAL TUNNEL SURG      Description: Neuroplasty Decompression Median Nerve At Carpal Tunnel;  Proc Date: 01/01/2002;    IR AORTIC ARCH 4 VESSEL ANGIOGRAM  5/22/2012    IR CAROTID ANGIOGRAM  5/22/2012    IR CAROTID ANGIOGRAM  5/22/2012    JOINT REPLACEMENT Right 2009    knee    OTHER SURGICAL HISTORY  2013    spleenectomy    OTHER SURGICAL HISTORY Left     melanoma ear    OTHER SURGICAL HISTORY      bladder interstimstage 1 & 2    PANCREAS SURGERY N/A     tail and part of body removed    KS IMPLANT PERIPH/GASTRIC NEUROSTIM/ N/A 9/28/2017    Procedure: INTERSTIM STAGE 2;  Surgeon: Catarino Hurt MD;  Location: [facility];  Service: Gynecology    KS PARTIAL EXCISION THYROID,UNILAT      Description: Thyroid Surgery Sub-Total Thyroidectomy;  Recorded: 09/13/2008;  Comments: for grave's    RELEASE CARPAL TUNNEL Left 06/2018    [facility] CERV SPINE FUSN,ANTER,BELOW C2      Description: Cervical Vertebral Fusion;  Proc Date: 05/01/2001;  Comments: c5-c7    [facility] GASTRIC BYPASS,OBESE<100CM JAROD-EN-Y      Description: Gastric Surgery For Morbid Obesity Bypass With Jarod-en-Y;  Proc Date: 09/01/2004;    [facility] KNEE SCOPE,AID POST CRUC REPAIR      Description: Knee Arthroscopy With Posterior Cruciate Ligament Repair;  Recorded: 09/13/2008;           CURRENT MEDICATIONS:    acetaminophen (TYLENOL) 325 MG tablet  aspirin 81 MG EC tablet  baclofen (LIORESAL) 10 MG tablet  busPIRone (BUSPAR) 10 MG tablet  Carboxymethylcellulose Sodium 0.25 % SOLN  DULoxetine (CYMBALTA) 60 MG capsule  EPINEPHrine (EPIPEN 2-SHERRI) 0.3 mg/0.3 mL atIn  famotidine (PEPCID) 20 MG tablet  ferrous sulfate 325 (65 FE) MG tablet  fluticasone (FLONASE) 50 mcg/actuation nasal spray  folic acid (FOLVITE) 1 MG  tablet  HYDROcodone-acetaminophen (NORCO) 5-325 MG tablet  HYDROcodone-acetaminophen (NORCO) 5-325 MG tablet  lactobacillus rhamnosus, GG, (CULTURELL) capsule  levothyroxine (SYNTHROID, LEVOTHROID) 88 MCG tablet  lisinopril (ZESTRIL) 20 MG tablet  loperamide (IMODIUM) 2 MG capsule  magnesium oxide (MAG-OX) 400 mg (241.3 mg magnesium) tablet  meclizine (ANTIVERT) 12.5 MG tablet  metFORMIN (GLUCOPHAGE) 500 MG tablet  Multiple vitamin TABS  omeprazole (PRILOSEC) 20 MG capsule  omeprazole (PRILOSEC) 20 MG DR capsule  polyethylene glycol-propylene glycol (SYSTANE ULTRA) 0.4-0.3 % SOLN ophthalmic solution  propranolol (INDERAL LA) 160 mg SR capsule  rosuvastatin (CRESTOR) 5 MG tablet  sucralfate (CARAFATE) 1 GM tablet  valACYclovir (VALTREX) 1000 MG tablet  Vitamin D (Cholecalciferol) 50 MCG (2000 UT) CAPS        ALLERGIES:  Allergies   Allergen Reactions    Glucosamine Anaphylaxis and Rash     Cardiac arrest    Ibuprofen Hives    Iodine Angioedema, Anaphylaxis and Hives     Patient can uses skin products such as betadine - See shellfish allergy      Naproxen Hives and Rash    Shellfish Allergy Anaphylaxis, Hives and Rash     Cardiac arrest - very severe    Shellfish-Derived Products Anaphylaxis, Hives and Rash     Cardiac arrest - very severe      Amitriptyline Visual Disturbance     Hallucinations    Buspirone Visual Disturbance     Hallucinations    Nystatin Nausea, Diarrhea, Cramps and GI Disturbance    Tramadol Visual Disturbance     Hallucinations    Chlorpheniramine Hives and Rash    Nsaids Unknown    Pseudoephedrine Hives and Rash       FAMILY HISTORY:  Family History   Problem Relation Age of Onset    Breast Cancer Paternal Aunt        SOCIAL HISTORY:   Social History     Socioeconomic History    Marital status:      Spouse name: None    Number of children: None    Years of education: None    Highest education level: None   Tobacco Use    Smoking status: Former    Smokeless tobacco: Never   Substance and  "Sexual Activity    Alcohol use: Not Currently     Alcohol/week: 1.0 - 2.0 standard drink of alcohol     Types: 1 - 2 Standard drinks or equivalent per week    Drug use: No       VITALS:  BP (!) 182/75   Pulse 98   Temp 98.1  F (36.7  C) (Oral)   Resp 16   Ht 1.676 m (5' 6\")   Wt 108.9 kg (240 lb)   SpO2 96%   BMI 38.74 kg/m      PHYSICAL EXAM    PHYSICAL EXAM    Constitutional: Well developed, Well nourished, NAD  HENT: Normocephalic, Atraumatic, Bilateral external ears normal, Oropharynx normal, mucous membranes moist, Nose normal. Neck-  Normal range of motion, No tenderness, Supple, No stridor.   Eyes: PERRL, EOMI, Conjunctiva normal, No discharge.   Respiratory: Normal breath sounds, No respiratory distress, No wheezing, Speaks full sentences easily. No cough.   Cardiovascular: Normal heart rate, Regular rhythm, No murmurs Chest wall nontender.    GI:  Soft, No tenderness, No masses, No flank tenderness. No rebound or guarding.  : No cva tenderness    Musculoskeletal: Brace to the left upper extremity there is significant soft tissue swelling centered on the left elbow 2+ radial pulse normal capillary refill to the distal fingertips incision and motor function appears to be intact.  Integument: Warm, Dry, No erythema, No rash. No petechiae.   Neurologic: Alert & oriented x 3, Normal motor function, Normal sensory function, No focal deficits noted.   Psychiatric: Affect normal, Judgment normal, Mood normal. Cooperative.     LAB:  All pertinent labs reviewed and interpreted.  Results for orders placed or performed during the hospital encounter of 08/13/23   Head CT w/o contrast    Impression    IMPRESSION:  1.  No acute intracranial process.   2.  Age-related changes described above.     Lumbar spine CT w/o contrast    Impression     IMPRESSION:  1.  No acute fracture.  2.  Degenerative changes described above.  3.  L1-L2, L3-L4 severe central canal stenosis. L2-L3 high-grade severe central canal " stenosis.  4.  Partially visualized sacral stimulator.   Chest XR,  PA & LAT    Impression    IMPRESSION: Heart size and vascularity are normal. Shallow inspiration. No focal consolidation, pneumothorax nor pleural effusion.   Humerus XR,  G/E 2 views, left    Impression    IMPRESSION: Supracondylar fracture of the distal aspect of the humerus. The remainder of the humerus is negative. Degenerative change at the glenohumeral joint.   Radius/Ulna XR,  PA &LAT, left    Impression    IMPRESSION: Acute mildly displaced transcondylar fracture of the distal humerus. No additional fractures.   UA with Microscopic reflex to Culture    Specimen: Urine, Clean Catch   Result Value Ref Range    Color Urine Yellow Colorless, Straw, Light Yellow, Yellow    Appearance Urine Clear Clear    Glucose Urine Negative Negative mg/dL    Bilirubin Urine Negative Negative    Ketones Urine Trace (A) Negative mg/dL    Specific Gravity Urine 1.032 (H) 1.001 - 1.030    Blood Urine Negative Negative    pH Urine 5.5 5.0 - 7.0    Protein Albumin Urine 20 (A) Negative mg/dL    Urobilinogen Urine 2.0 (A) <2.0 mg/dL    Nitrite Urine Negative Negative    Leukocyte Esterase Urine 500 Zahraa/uL (A) Negative    Bacteria Urine Many (A) None Seen /HPF    Mucus Urine Present (A) None Seen /LPF    RBC Urine 2 <=2 /HPF    WBC Urine 24 (H) <=5 /HPF    Hyaline Casts Urine 7 (H) <=2 /LPF   CBC with platelets and differential   Result Value Ref Range    WBC Count 13.2 (H) 4.0 - 11.0 10e3/uL    RBC Count 3.87 3.80 - 5.20 10e6/uL    Hemoglobin 12.8 11.7 - 15.7 g/dL    Hematocrit 38.4 35.0 - 47.0 %    MCV 99 78 - 100 fL    MCH 33.1 (H) 26.5 - 33.0 pg    MCHC 33.3 31.5 - 36.5 g/dL    RDW 13.6 10.0 - 15.0 %    Platelet Count 304 150 - 450 10e3/uL   ECG 12-LEAD WITH MUSE (LHE)   Result Value Ref Range    Systolic Blood Pressure 147 mmHg    Diastolic Blood Pressure 70 mmHg    Ventricular Rate 103 BPM    Atrial Rate 103 BPM    OR Interval 124 ms    QRS Duration 124 ms    QT  356 ms    QTc 466 ms    P Axis 66 degrees    R AXIS 30 degrees    T Axis 26 degrees    Interpretation ECG       Sinus tachycardia  Right bundle branch block  Abnormal ECG  When compared with ECG of 08-NOV-2022 18:50,  No significant change was found  Confirmed by SEE ED PROVIDER NOTE FOR, ECG INTERPRETATION ([id number]),  SOPHIE IRBY ([id number]) on 8/13/2023 4:56:20 PM         RADIOLOGY:  Reviewed all pertinent imaging. Please see official radiology report.  Head CT w/o contrast   Final Result   IMPRESSION:   1.  No acute intracranial process.    2.  Age-related changes described above.         Lumbar spine CT w/o contrast   Final Result    IMPRESSION:   1.  No acute fracture.   2.  Degenerative changes described above.   3.  L1-L2, L3-L4 severe central canal stenosis. L2-L3 high-grade severe central canal stenosis.   4.  Partially visualized sacral stimulator.      Radius/Ulna XR,  PA &LAT, left   Final Result   IMPRESSION: Acute mildly displaced transcondylar fracture of the distal humerus. No additional fractures.      Humerus XR,  G/E 2 views, left   Final Result   IMPRESSION: Supracondylar fracture of the distal aspect of the humerus. The remainder of the humerus is negative. Degenerative change at the glenohumeral joint.      Chest XR,  PA & LAT   Final Result   IMPRESSION: Heart size and vascularity are normal. Shallow inspiration. No focal consolidation, pneumothorax nor pleural effusion.        EKG:    Performed at: 13-AUG-2023 16:07:39      Impression: Sinus tachycardia. Right bundle branch block     Rate: 103  Rhythm: sinus tachycardia  Axis: 30  HI Interval: 124  QRS Interval: 124  QTc Interval: 466  ST Changes: None  Comparison: When compared with ECG of 08-NOV-2022 18:50, No significant change was found    I have independently reviewed and interpreted the EKG(s) documented above.    I, Siddhartha Maradiaga, am serving as a scribe to document services personally performed by Dr. Marino Riggs MD based on  my observation and the provider's statements to me. I, Dr. Marino Riggs MD, attest that Siddhartha Maradiaga is acting in a scribe capacity, has observed my performance of the services and has documented them in accordance with my direction.    Marino Riggs MD  [facility] EMERGENCY ROOM  [address] [facility] [address]  [phone]       Marino Riggs MD  08/13/23 [id number]

## 2023-08-13 NOTE — ED NOTES
Bed: [facility]-06  Expected date: 8/13/23  Expected time:   Means of arrival: Ambulance  Comments:  76 yo female fall fractured elbow

## 2023-08-13 NOTE — ED TRIAGE NOTES
Pt BIB EMS for a fall with increased left elbow pain. The pt fell on Thursday and she sustained a left elbow fracture. She is currently wearing a brace. On Saturday the pt fell on her left side and back.and was stuck on the floor for about 30 minutes. Today the pt noticed increased swelling on the left elbow. She is c/o lower back pain and a headache.      Triage Assessment       Row Name 08/13/23 1548       Triage Assessment (Adult)    Airway WDL WDL       Respiratory WDL    Respiratory WDL WDL       Skin Circulation/Temperature WDL    Skin Circulation/Temperature WDL WDL       Cardiac WDL    Cardiac WDL WDL       Peripheral/Neurovascular WDL    Peripheral Neurovascular WDL WDL       Cognitive/Neuro/Behavioral WDL    Cognitive/Neuro/Behavioral WDL WDL

## 2023-08-14 LAB
ANION GAP SERPL CALCULATED.3IONS-SCNC: 10 MMOL/L (ref 5–18)
BUN SERPL-MCNC: 15 MG/DL (ref 8–28)
CALCIUM SERPL-MCNC: 8.7 MG/DL (ref 8.5–10.5)
CHLORIDE BLD-SCNC: 103 MMOL/L (ref 98–107)
CO2 SERPL-SCNC: 25 MMOL/L (ref 22–31)
CREAT SERPL-MCNC: 0.66 MG/DL (ref 0.6–1.1)
ERYTHROCYTE [DISTWIDTH] IN BLOOD BY AUTOMATED COUNT: 13.5 % (ref 10–15)
GFR SERPL CREATININE-BSD FRML MDRD: 90 ML/MIN/1.73M2
GLUCOSE BLD-MCNC: 126 MG/DL (ref 70–125)
GLUCOSE BLDC GLUCOMTR-MCNC: 117 MG/DL (ref 70–99)
GLUCOSE BLDC GLUCOMTR-MCNC: 128 MG/DL (ref 70–99)
GLUCOSE BLDC GLUCOMTR-MCNC: 149 MG/DL (ref 70–99)
GLUCOSE BLDC GLUCOMTR-MCNC: 86 MG/DL (ref 70–99)
GLUCOSE BLDC GLUCOMTR-MCNC: 92 MG/DL (ref 70–99)
GLUCOSE BLDC GLUCOMTR-MCNC: 96 MG/DL (ref 70–99)
HBA1C MFR BLD: 5.8 %
HCT VFR BLD AUTO: 37.9 % (ref 35–47)
HGB BLD-MCNC: 12.8 G/DL (ref 11.7–15.7)
INR PPP: 0.96 (ref 0.85–1.15)
MAGNESIUM SERPL-MCNC: 2.1 MG/DL (ref 1.8–2.6)
MCH RBC QN AUTO: 33 PG (ref 26.5–33)
MCHC RBC AUTO-ENTMCNC: 33.8 G/DL (ref 31.5–36.5)
MCV RBC AUTO: 98 FL (ref 78–100)
PLATELET # BLD AUTO: 315 10E3/UL (ref 150–450)
POTASSIUM BLD-SCNC: 4.1 MMOL/L (ref 3.5–5)
RBC # BLD AUTO: 3.88 10E6/UL (ref 3.8–5.2)
SODIUM SERPL-SCNC: 138 MMOL/L (ref 136–145)
WBC # BLD AUTO: 8.8 10E3/UL (ref 4–11)

## 2023-08-14 PROCEDURE — 83735 ASSAY OF MAGNESIUM: CPT | Performed by: FAMILY MEDICINE

## 2023-08-14 PROCEDURE — 99232 SBSQ HOSP IP/OBS MODERATE 35: CPT | Performed by: FAMILY MEDICINE

## 2023-08-14 PROCEDURE — 85027 COMPLETE CBC AUTOMATED: CPT | Performed by: FAMILY MEDICINE

## 2023-08-14 PROCEDURE — 250N000013 HC RX MED GY IP 250 OP 250 PS 637: Performed by: FAMILY MEDICINE

## 2023-08-14 PROCEDURE — 96376 TX/PRO/DX INJ SAME DRUG ADON: CPT

## 2023-08-14 PROCEDURE — 36415 COLL VENOUS BLD VENIPUNCTURE: CPT | Performed by: FAMILY MEDICINE

## 2023-08-14 PROCEDURE — G0378 HOSPITAL OBSERVATION PER HR: HCPCS

## 2023-08-14 PROCEDURE — 258N000003 HC RX IP 258 OP 636: Performed by: FAMILY MEDICINE

## 2023-08-14 PROCEDURE — 250N000011 HC RX IP 250 OP 636: Mod: JZ | Performed by: FAMILY MEDICINE

## 2023-08-14 PROCEDURE — 80048 BASIC METABOLIC PNL TOTAL CA: CPT | Performed by: FAMILY MEDICINE

## 2023-08-14 PROCEDURE — 85610 PROTHROMBIN TIME: CPT | Performed by: FAMILY MEDICINE

## 2023-08-14 PROCEDURE — 96375 TX/PRO/DX INJ NEW DRUG ADDON: CPT

## 2023-08-14 PROCEDURE — 82962 GLUCOSE BLOOD TEST: CPT

## 2023-08-14 RX ORDER — MAGNESIUM SULFATE 4 G/50ML
4 INJECTION INTRAVENOUS ONCE
Status: COMPLETED | OUTPATIENT
Start: 2023-08-14 | End: 2023-08-14

## 2023-08-14 RX ORDER — LORAZEPAM 0.5 MG/1
0.5 TABLET ORAL EVERY 6 HOURS PRN
Status: DISCONTINUED | OUTPATIENT
Start: 2023-08-14 | End: 2023-08-18 | Stop reason: HOSPADM

## 2023-08-14 RX ADMIN — LEVOTHYROXINE SODIUM 88 MCG: 0.09 TABLET ORAL at 08:45

## 2023-08-14 RX ADMIN — BUSPIRONE HYDROCHLORIDE 10 MG: 10 TABLET ORAL at 08:45

## 2023-08-14 RX ADMIN — OXYCODONE HYDROCHLORIDE 5 MG: 5 TABLET ORAL at 03:21

## 2023-08-14 RX ADMIN — Medication 2000 UNITS: at 08:41

## 2023-08-14 RX ADMIN — MAGNESIUM SULFATE HEPTAHYDRATE 4 G: 4 INJECTION, SOLUTION INTRAVENOUS at 10:44

## 2023-08-14 RX ADMIN — FOLIC ACID 1 MG: 1 TABLET ORAL at 08:41

## 2023-08-14 RX ADMIN — CARBOXYMETHYLCELLULOSE SODIUM 1 DROP: 5 SOLUTION/ DROPS OPHTHALMIC at 08:41

## 2023-08-14 RX ADMIN — OXYCODONE HYDROCHLORIDE 5 MG: 5 TABLET ORAL at 17:01

## 2023-08-14 RX ADMIN — FERROUS SULFATE TAB 325 MG (65 MG ELEMENTAL FE) 325 MG: 325 (65 FE) TAB at 08:42

## 2023-08-14 RX ADMIN — ACETAMINOPHEN 650 MG: 325 TABLET ORAL at 20:27

## 2023-08-14 RX ADMIN — ACETAMINOPHEN 650 MG: 325 TABLET ORAL at 15:43

## 2023-08-14 RX ADMIN — BACLOFEN 10 MG: 10 TABLET ORAL at 08:45

## 2023-08-14 RX ADMIN — Medication 1 MG: at 00:43

## 2023-08-14 RX ADMIN — SODIUM CHLORIDE: 9 INJECTION, SOLUTION INTRAVENOUS at 10:07

## 2023-08-14 RX ADMIN — OXYCODONE HYDROCHLORIDE 5 MG: 5 TABLET ORAL at 21:01

## 2023-08-14 RX ADMIN — LISINOPRIL 20 MG: 20 TABLET ORAL at 08:42

## 2023-08-14 RX ADMIN — BACLOFEN 10 MG: 10 TABLET ORAL at 00:32

## 2023-08-14 RX ADMIN — SODIUM CHLORIDE: 9 INJECTION, SOLUTION INTRAVENOUS at 02:08

## 2023-08-14 RX ADMIN — BACLOFEN 10 MG: 10 TABLET ORAL at 20:27

## 2023-08-14 RX ADMIN — Medication 800 MG: at 20:27

## 2023-08-14 RX ADMIN — FAMOTIDINE 20 MG: 20 TABLET, FILM COATED ORAL at 08:42

## 2023-08-14 RX ADMIN — Medication 800 MG: at 08:41

## 2023-08-14 RX ADMIN — PANTOPRAZOLE SODIUM 40 MG: 40 TABLET, DELAYED RELEASE ORAL at 15:43

## 2023-08-14 RX ADMIN — BUSPIRONE HYDROCHLORIDE 10 MG: 10 TABLET ORAL at 00:32

## 2023-08-14 RX ADMIN — ACETAMINOPHEN 650 MG: 325 TABLET ORAL at 08:42

## 2023-08-14 RX ADMIN — DULOXETINE HYDROCHLORIDE 60 MG: 60 CAPSULE, DELAYED RELEASE PELLETS ORAL at 08:41

## 2023-08-14 RX ADMIN — LORAZEPAM 0.5 MG: 0.5 TABLET ORAL at 10:43

## 2023-08-14 RX ADMIN — ROSUVASTATIN CALCIUM 5 MG: 5 TABLET, FILM COATED ORAL at 20:27

## 2023-08-14 RX ADMIN — SODIUM CHLORIDE: 9 INJECTION, SOLUTION INTRAVENOUS at 23:03

## 2023-08-14 RX ADMIN — BUSPIRONE HYDROCHLORIDE 10 MG: 10 TABLET ORAL at 20:27

## 2023-08-14 RX ADMIN — ROSUVASTATIN CALCIUM 5 MG: 5 TABLET, FILM COATED ORAL at 00:33

## 2023-08-14 RX ADMIN — CARBOXYMETHYLCELLULOSE SODIUM 1 DROP: 5 SOLUTION/ DROPS OPHTHALMIC at 20:27

## 2023-08-14 RX ADMIN — OXYCODONE HYDROCHLORIDE 5 MG: 5 TABLET ORAL at 12:44

## 2023-08-14 RX ADMIN — OXYCODONE HYDROCHLORIDE 5 MG: 5 TABLET ORAL at 08:41

## 2023-08-14 RX ADMIN — CARBOXYMETHYLCELLULOSE SODIUM 1 DROP: 5 SOLUTION/ DROPS OPHTHALMIC at 00:44

## 2023-08-14 RX ADMIN — BACLOFEN 10 MG: 10 TABLET ORAL at 15:43

## 2023-08-14 RX ADMIN — CEFTRIAXONE SODIUM 2 G: 2 INJECTION, POWDER, FOR SOLUTION INTRAMUSCULAR; INTRAVENOUS at 20:27

## 2023-08-14 RX ADMIN — FAMOTIDINE 20 MG: 20 TABLET, FILM COATED ORAL at 20:27

## 2023-08-14 ASSESSMENT — ACTIVITIES OF DAILY LIVING (ADL)
ADLS_ACUITY_SCORE: 38
ADLS_ACUITY_SCORE: 35
ADLS_ACUITY_SCORE: 37
ADLS_ACUITY_SCORE: 35
ADLS_ACUITY_SCORE: 37
ADLS_ACUITY_SCORE: 35

## 2023-08-14 NOTE — PROGRESS NOTES
Patient vital signs are at baseline: Yes  Patient able to ambulate as they were prior to admission or with assist devices provided by therapies during their stay:  Yes  Patient MUST void prior to discharge:  Yes  Patient able to tolerate oral intake:  Yes  Pain has adequate pain control using Oral analgesics:  Yes PRN oxy   Does patient have an identified :  Yes Family at bedside   Has goal D/C date and time been discussed with patient:  Yes, procedure scheduled for 8/15

## 2023-08-14 NOTE — H&P
"[facility]    History and Physical - Hospitalist Service       Date of Admission:  8/13/2023    Assessment & Plan      Belgica Salvador is a 77 year old female admitted on 8/13/2023. She is to the ER after a fall.  In the ER she had multiple imaging studies which were unremarkable except for a known humerus fracture.  She was found to have a UTI.  Orthopedics felt that she should be admitted to the hospital for repair of the complicated fracture.      Recurrent falls  Likely due to acute cystitis  Falls precautions  Will need PT and OT evaluation post procedure  May need TCU    Acute cystitis  Rocephin  Await culture  Tailor antibiotics    Hypomagnesemia/mild hyperkalemia  IV fluids and replacement protocol  Repeat BMP in a.m.    Hypothyroidism    Essential hypertension  Home meds with hold parameters    Remote history of DVT/provoked    Type 2 diabetes  Hold metformin  Correction insulin    Peripheral neuropathy    Vitamin D deficiency    Anxiety disorder       Diet: Combination Diet Regular Diet; Moderate Consistent Carb (60 g CHO per Meal) Diet; 2 gm NA Diet  NPO for Medical/Clinical Reasons Except for: Meds, Ice Chips    DVT Prophylaxis: Pneumatic Compression Devices  Sierra Catheter: Not present  Lines: None     Cardiac Monitoring: None  Code Status: No CPR- Do NOT Intubate            # Hypomagnesemia: Lowest Mg = 1.5 mg/dL in last 2 days, will replace as needed     # Drug Induced Platelet Defect: home medication list includes an antiplatelet medication   # Hypertension: Noted on problem list      # Obesity: Estimated body mass index is 38.74 kg/m  as calculated from the following:    Height as of this encounter: 1.676 m (5' 6\").    Weight as of this encounter: 108.9 kg (240 lb).            Disposition Plan      Expected Discharge Date: 08/14/2023                  Wayne Saunders MD  Hospitalist Service  [facility]  Securely message with [employer] (more " info)  Text page via [facility] Paging/Directory     ______________________________________________________________________    Chief Complaint   Fall    History is obtained from the patient, review of the EHR, direct discussion with admitting provider    History of Present Illness   Belgica Salvador is a 77 year old female who Zentz to the emergency department after a fall.  She had fallen and been seen in the ER a few days prior.  She had been found to have a supracondylar fracture of the elbow.  She was discharged.  She continued to have unsteadiness and had a repeat fall.  She returned to the emergency department.  There she was found to have a UTI.  She had multiple imaging studies which were unremarkable.  An imaging of the elbow revealed no change reportedly in the fracture.  Orthopedics recommended admission to the hospital as plan for operative repair has been moved forward.  Patient is in agreement.  She has concerns about getting an IV started but we were ultimately able to do so.  She is having minimal pain.  Will need to work with PT and OT post procedure.      Past Medical History    Past Medical History:   Diagnosis Date    Anxiety     Arthritis     Coronary artery disease     Diabetes (H)     Gastroesophageal reflux disease     Graves disease     Hypertension     Migraine     Muscle spasm     Renal disease     Thrombosis     Thyroid disease        Past Surgical History   Past Surgical History:   Procedure Laterality Date    ANKLE SURGERY Right     hardware in place    APPENDECTOMY      ARTHROPLASTY KNEE Left 11/10/2022    Procedure: LEFT TOTAL KNEE ARTHROPLASTY;  Surgeon: Arnold Peter MD;  Location: [facility]    CERVICAL FUSION N/A 3/20/2019    Procedure: LEFT CERVICAL 7 - THORACIC 1 ANTERIOR CERVICAL DECOMPRESSION FUSION REMOVAL OF PLATE AT CERVICAL 5-6 CERVICAL 6-7;  Surgeon: Richard Talley MD;  Location: [facility];  Service: Spine    ESOPHAGOSCOPY, GASTROSCOPY, DUODENOSCOPY (EGD),  COMBINED N/A 9/24/2017    Procedure: ESOPHAGOGASTRODUODENOSCOPY (EGD) with biopsies;  Surgeon: Jorge Palm MD;  Location: [facility];  Service:     HC REVISE MEDIAN N/CARPAL TUNNEL SURG      Description: Neuroplasty Decompression Median Nerve At Carpal Tunnel;  Proc Date: 01/01/2002;    IR AORTIC ARCH 4 VESSEL ANGIOGRAM  5/22/2012    IR CAROTID ANGIOGRAM  5/22/2012    IR CAROTID ANGIOGRAM  5/22/2012    JOINT REPLACEMENT Right 2009    knee    OTHER SURGICAL HISTORY  2013    spleenectomy    OTHER SURGICAL HISTORY Left     melanoma ear    OTHER SURGICAL HISTORY      bladder interstimstage 1 & 2    PANCREAS SURGERY N/A     tail and part of body removed    TX IMPLANT PERIPH/GASTRIC NEUROSTIM/ N/A 9/28/2017    Procedure: INTERSTIM STAGE 2;  Surgeon: Catarino Hurt MD;  Location: [facility] OR;  Service: Gynecology    TX PARTIAL EXCISION THYROID,UNILAT      Description: Thyroid Surgery Sub-Total Thyroidectomy;  Recorded: 09/13/2008;  Comments: for grave's    RELEASE CARPAL TUNNEL Left 06/2018    [facility] CERV SPINE FUSN,ANTER,BELOW C2      Description: Cervical Vertebral Fusion;  Proc Date: 05/01/2001;  Comments: c5-c7    [facility] GASTRIC BYPASS,OBESE<100CM JAROD-EN-Y      Description: Gastric Surgery For Morbid Obesity Bypass With Jarod-en-Y;  Proc Date: 09/01/2004;    [facility] KNEE SCOPE,AID POST CRUC REPAIR      Description: Knee Arthroscopy With Posterior Cruciate Ligament Repair;  Recorded: 09/13/2008;       Prior to Admission Medications   Prior to Admission Medications   Prescriptions Last Dose Informant Patient Reported? Taking?   Carboxymethylcellulose Sodium 0.25 % SOLN 8/12/2023  Yes Yes   Sig: Place 1 drop into both eyes 2 times daily   DULoxetine (CYMBALTA) 60 MG capsule 8/13/2023  Yes Yes   Sig: Take 60 mg by mouth daily   EPINEPHrine (EPIPEN 2-SHERRI) 0.3 mg/0.3 mL atIn never used  Yes Yes   Sig: [EPINEPHRINE (EPIPEN 2-SHERRI) 0.3 MG/0.3 ML ATIN] Inject 0.3 mg into the shoulder, thigh, or buttocks once  as needed (Anaphylaxis).   Multiple vitamin TABS 8/13/2023  Yes Yes   Sig: Take 1 tablet by mouth daily   Vitamin D (Cholecalciferol) 50 MCG (2000 UT) CAPS 8/13/2023  Yes Yes   Sig: Take 2,000 Units by mouth daily   acetaminophen (TYLENOL) 650 MG CR tablet 8/13/2023 at 1400  Yes Yes   Sig: Take 650 mg by mouth 3 times daily   aspirin (ASA) 325 MG EC tablet 8/13/2023 at pm  Yes Yes   Sig: Take 650 mg by mouth 2 times daily   baclofen (LIORESAL) 10 MG tablet 8/13/2023 at pm  Yes Yes   Sig: Take 10 mg by mouth 3 times daily   busPIRone (BUSPAR) 10 MG tablet 8/13/2023 at pm  Yes Yes   Sig: Take 10 mg by mouth 2 times daily   famotidine (PEPCID) 20 MG tablet 8/13/2023 at am  Yes Yes   Sig: Take 20 mg by mouth 2 times daily   ferrous sulfate 325 (65 FE) MG tablet 8/13/2023  Yes Yes   Sig: [FERROUS SULFATE 325 (65 FE) MG TABLET] Take 1 tablet by mouth daily with breakfast.          fluticasone (FLONASE) 50 mcg/actuation nasal spray More than a month  Yes Yes   Sig: Spray 2 sprays into both nostrils daily as needed   folic acid (FOLVITE) 1 MG tablet 8/13/2023  Yes Yes   Sig: [FOLIC ACID (FOLVITE) 1 MG TABLET] Take 1 mg by mouth daily.   levothyroxine (SYNTHROID, LEVOTHROID) 88 MCG tablet 8/13/2023  Yes Yes   Sig: [LEVOTHYROXINE (SYNTHROID, LEVOTHROID) 88 MCG TABLET] Take 88 mcg by mouth Daily at 6:00 am.    lisinopril (ZESTRIL) 20 MG tablet 8/13/2023  Yes Yes   Sig: Take 20 mg by mouth daily   loperamide (IMODIUM) 2 MG capsule More than a month  Yes Yes   Sig: Take 2-4 mg by mouth 3 times daily as needed for diarrhea Take 1 capsule with first loose stool and take 2 capsules after each subsequent loose stool   magnesium oxide (MAG-OX) 400 mg (241.3 mg magnesium) tablet 8/13/2023 at pm  Yes Yes   Sig: [MAGNESIUM OXIDE (MAG-OX) 400 MG (241.3 MG MAGNESIUM) TABLET] Take 800 mg by mouth 2 (two) times a day.   meclizine (ANTIVERT) 12.5 MG tablet 8/6/2023  Yes Yes   Sig: Take 12.5 mg by mouth 3 times daily as needed for dizziness    metFORMIN (GLUCOPHAGE) 500 MG tablet 8/13/2023 at am  Yes Yes   Sig: [METFORMIN (GLUCOPHAGE) 500 MG TABLET] Take 1,000 mg by mouth 2 (two) times a day with meals.   omeprazole (PRILOSEC) 20 MG DR capsule 8/13/2023  Yes Yes   Sig: Take 40 mg by mouth daily   polyethylene glycol-propylene glycol (SYSTANE ULTRA) 0.4-0.3 % SOLN ophthalmic solution 8/13/2023  Yes Yes   Sig: Place 1 drop into both eyes 2 times daily as needed   rosuvastatin (CRESTOR) 5 MG tablet 8/12/2023  Yes Yes   Sig: Take 5 mg by mouth At Bedtime   valACYclovir (VALTREX) 1000 MG tablet More than a month  Yes Yes   Sig: Take 2,000 mg by mouth 2 times daily as needed      Facility-Administered Medications: None        Review of Systems    The 10 point Review of Systems is negative other than noted in the HPI or here.     Social History   I have reviewed this patient's social history and updated it with pertinent information if needed.  Social History     Tobacco Use    Smoking status: Former    Smokeless tobacco: Never   Substance Use Topics    Alcohol use: Not Currently     Alcohol/week: 1.0 - 2.0 standard drink of alcohol     Types: 1 - 2 Standard drinks or equivalent per week    Drug use: No         Family History   I have reviewed this patient's family history and updated it with pertinent information if needed.  Family History   Problem Relation Age of Onset    Breast Cancer Paternal Aunt          Allergies   Allergies   Allergen Reactions    Glucosamine Anaphylaxis and Rash     Cardiac arrest    Ibuprofen Hives    Iodine Angioedema, Anaphylaxis and Hives     Patient can uses skin products such as betadine - See shellfish allergy      Naproxen Hives and Rash    Shellfish Allergy Anaphylaxis, Hives and Rash     Cardiac arrest - very severe    Shellfish-Derived Products Anaphylaxis, Hives and Rash     Cardiac arrest - very severe      Amitriptyline Visual Disturbance     Hallucinations    Buspirone Visual Disturbance     Hallucinations    Nystatin  Nausea, Diarrhea, Cramps and GI Disturbance    Tramadol Visual Disturbance     Hallucinations    Chlorpheniramine Hives and Rash    Nsaids Unknown    Pseudoephedrine Hives and Rash        Physical Exam   Vital Signs: Temp: 98.2  F (36.8  C) Temp src: Oral BP: 120/80 Pulse: 96   Resp: 18 SpO2: 96 % O2 Device: None (Room air)    Weight: 240 lbs 0 oz    General Appearance: No apparent distress  Respiratory: Clear to auscultation bilaterally  Cardiovascular: Regular  GI: Soft and nontender with positive bowel sounds  Skin: Visualized skin is normal  Other: Left arm is edematous and a significant immobilizer brace      77 MINUTES SPENT BY ME on the date of service doing chart review, history, exam, documentation & further activities per the note.      Data     I have personally reviewed the following data over the past 24 hrs:    13.2 (H)  \   12.8   / 304     137 103 24 /  113   5.3 (H) 22 0.87 \       Imaging results reviewed over the past 24 hrs:   Recent Results (from the past 24 hour(s))   Chest XR,  PA & LAT    Narrative    EXAM: XR CHEST 2 VIEWS  LOCATION: [facility]  DATE: 8/13/2023    INDICATION: Shortness of breath.  COMPARISON: 11/08/2022      Impression    IMPRESSION: Heart size and vascularity are normal. Shallow inspiration. No focal consolidation, pneumothorax nor pleural effusion.   Humerus XR,  G/E 2 views, left    Narrative    EXAM: XR HUMERUS LEFT G/E 2 VIEWS  LOCATION: [facility]  DATE: 8/13/2023    INDICATION: Fall, known frx, worsened swelling  COMPARISON: None.      Impression    IMPRESSION: Supracondylar fracture of the distal aspect of the humerus. The remainder of the humerus is negative. Degenerative change at the glenohumeral joint.   Radius/Ulna XR,  PA &LAT, left    Narrative    EXAM: XR FOREARM LEFT 2 VIEWS  LOCATION: [facility]  DATE: 8/13/2023    INDICATION: known frx, fall worse pain and swelling  COMPARISON:  None.      Impression    IMPRESSION: Acute mildly displaced transcondylar fracture of the distal humerus. No additional fractures.   Lumbar spine CT w/o contrast    Narrative    EXAM: CT LUMBAR SPINE W/O CONTRAST  LOCATION: [facility]  DATE: 8/13/2023    INDICATION: low back pain after fall.  COMPARISON: None.  TECHNIQUE: Routine CT Lumbar Spine without IV contrast. Multiplanar reformats. Dose reduction techniques were used.     FINDINGS:  VERTEBRA: No acute fracture.  No acute post traumatic subluxations.   Normal vertebral body heights.    Partially visualized sacral neural stimulator.    CANAL/FORAMINA: Multilevel spondylosis result in various levels and degrees of central canal stenosis and neural foraminal stenosis. L1-L2, L3-L4 severe central canal stenosis. L2-L3 high-grade severe central canal stenosis.   Several levels demonstrate   degenerative grade 1 subluxations. Thoracolumbar moderate dextroscoliosis. Mid to lower lumbar moderate levoscoliosis. Bilateral SI degenerative joint disease.    PARASPINAL: Partially visualized high attenuation material in the region of the stomach likely reflects a suture row when correlated with CT abdomen pelvis 09/22/2017.        Impression     IMPRESSION:  1.  No acute fracture.  2.  Degenerative changes described above.  3.  L1-L2, L3-L4 severe central canal stenosis. L2-L3 high-grade severe central canal stenosis.  4.  Partially visualized sacral stimulator.   Head CT w/o contrast    Narrative    EXAM: CT HEAD W/O CONTRAST  LOCATION: [facility]  DATE: 8/13/2023    INDICATION: fall headache  COMPARISON: None.  TECHNIQUE: Routine CT Head without IV contrast. Multiplanar reformats. Dose reduction techniques were used.    FINDINGS:  INTRACRANIAL CONTENTS: No intracranial hemorrhage, extraaxial collection, or mass effect.  No CT evidence of acute infarct. Mild presumed chronic small vessel ischemic changes. Mild generalized  volume loss. No hydrocephalus.     VISUALIZED ORBITS/SINUSES/MASTOIDS: No intraorbital abnormality. No paranasal sinus mucosal disease. No middle ear or mastoid effusion.    BONES/SOFT TISSUES: No acute abnormality.      Impression    IMPRESSION:  1.  No acute intracranial process.   2.  Age-related changes described above.

## 2023-08-14 NOTE — PHARMACY-ADMISSION MEDICATION HISTORY
Pharmacist Admission Medication History    Admission medication history is complete. The information provided in this note is only as accurate as the sources available at the time of the update.    Medication reconciliation/reorder completed by provider prior to medication history? No    Information Source(s): Patient and CareEverywhere/SureScripts via in-person and with the help of patient's daughter, Ariadne    Pertinent Information:  Patient expressed concern to me about the high number of medications she is using, lack of pain control, and cellulitis of L knee.  She is interested in meeting with an [place] pharmacist at [facility]---I added a consult to the discharge orders for this.  For starters, she probably doesn't need both famotidine and omeprazole.  She should also cut her aspirin usage back to 81mg qday especially since she has had falls.    Changes made to PTA medication list:  Added: aspirin 650mg bid (is NOT using asa 81mg qday prescription)  Deleted: propranolol, Vicodin, sucralfate, probiotic  Changed: omeprazole is used 40mg qday    Medication Affordability:  Not including over the counter (OTC) medications, was there a time in the past 3 months when you did not take your medications as prescribed because of cost?: No    Allergies reviewed with patient and updates made in EHR: yes    Medications available for use during hospital stay: NONE.     Medication History Completed By: Landry Chaidez RPH 8/13/2023 8:14 PM    PTA Med List   Medication Sig Last Dose    acetaminophen (TYLENOL) 650 MG CR tablet Take 650 mg by mouth 3 times daily 8/13/2023 at 1400    aspirin (ASA) 325 MG EC tablet Take 650 mg by mouth 2 times daily 8/13/2023 at pm    baclofen (LIORESAL) 10 MG tablet Take 10 mg by mouth 3 times daily 8/13/2023 at pm    busPIRone (BUSPAR) 10 MG tablet Take 10 mg by mouth 2 times daily 8/13/2023 at pm    Carboxymethylcellulose Sodium 0.25 % SOLN Place 1 drop into both eyes 2 times daily 8/12/2023     DULoxetine (CYMBALTA) 60 MG capsule Take 60 mg by mouth daily 8/13/2023    EPINEPHrine (EPIPEN 2-SHERRI) 0.3 mg/0.3 mL atIn [EPINEPHRINE (EPIPEN 2-SHERRI) 0.3 MG/0.3 ML ATIN] Inject 0.3 mg into the shoulder, thigh, or buttocks once as needed (Anaphylaxis). never used    famotidine (PEPCID) 20 MG tablet Take 20 mg by mouth 2 times daily 8/13/2023 at am    ferrous sulfate 325 (65 FE) MG tablet [FERROUS SULFATE 325 (65 FE) MG TABLET] Take 1 tablet by mouth daily with breakfast.        8/13/2023    fluticasone (FLONASE) 50 mcg/actuation nasal spray Spray 2 sprays into both nostrils daily as needed More than a month    folic acid (FOLVITE) 1 MG tablet [FOLIC ACID (FOLVITE) 1 MG TABLET] Take 1 mg by mouth daily. 8/13/2023    levothyroxine (SYNTHROID, LEVOTHROID) 88 MCG tablet [LEVOTHYROXINE (SYNTHROID, LEVOTHROID) 88 MCG TABLET] Take 88 mcg by mouth Daily at 6:00 am.  8/13/2023    lisinopril (ZESTRIL) 20 MG tablet Take 20 mg by mouth daily 8/13/2023    loperamide (IMODIUM) 2 MG capsule Take 2-4 mg by mouth 3 times daily as needed for diarrhea Take 1 capsule with first loose stool and take 2 capsules after each subsequent loose stool More than a month    magnesium oxide (MAG-OX) 400 mg (241.3 mg magnesium) tablet [MAGNESIUM OXIDE (MAG-OX) 400 MG (241.3 MG MAGNESIUM) TABLET] Take 800 mg by mouth 2 (two) times a day. 8/13/2023 at pm    meclizine (ANTIVERT) 12.5 MG tablet Take 12.5 mg by mouth 3 times daily as needed for dizziness 8/6/2023    metFORMIN (GLUCOPHAGE) 500 MG tablet [METFORMIN (GLUCOPHAGE) 500 MG TABLET] Take 1,000 mg by mouth 2 (two) times a day with meals. 8/13/2023 at am    Multiple vitamin TABS Take 1 tablet by mouth daily 8/13/2023    omeprazole (PRILOSEC) 20 MG DR capsule Take 40 mg by mouth daily 8/13/2023    polyethylene glycol-propylene glycol (SYSTANE ULTRA) 0.4-0.3 % SOLN ophthalmic solution Place 1 drop into both eyes 2 times daily as needed 8/13/2023    rosuvastatin (CRESTOR) 5 MG tablet Take 5 mg by mouth  At Bedtime 8/12/2023    valACYclovir (VALTREX) 1000 MG tablet Take 2,000 mg by mouth 2 times daily as needed More than a month    Vitamin D (Cholecalciferol) 50 MCG (2000 UT) CAPS Take 2,000 Units by mouth daily 8/13/2023

## 2023-08-14 NOTE — ED NOTES
Dr levine talked with pt and pt and abner both agreed it would be okay to put IV in right hand due to fx of left arm and need for surgery. IV insertion in hand with 1 attempt no signs of irritation or swelling to right arm present.

## 2023-08-14 NOTE — PLAN OF CARE
PRIMARY DIAGNOSIS: fracture of left elbow after fall  OUTPATIENT/OBSERVATION GOALS TO BE MET BEFORE DISCHARGE:  ADLs back to baseline: No    Activity and level of assistance: Up with standby assistance.    Pain status: Improved-controlled with oral pain medications, elevation and cold application..    Return to near baseline physical activity: No     Discharge Planner Nurse      Please review provider order for any additional goals.   Nurse to notify provider when observation goals have been met and patient is ready for discharge.Goal Outcome Evaluation:

## 2023-08-14 NOTE — CONSULTS
ORTHOPEDIC CONSULTATION    Consultation  Belgica Salvador,  1945, MRN [id number]    [facility]  Type 2 diabetes mellitus (H) [E11.9]  Falls frequently [R29.6]  Acute cystitis without hematuria [N30.00]  Closed supracondylar fracture of left elbow, initial encounter [S42.412A]    PCP: Kassie Thompson, [phone]   Code status:  No CPR- Do NOT Intubate       Extended Emergency Contact Information  Primary Emergency Contact: Vivi Mcdaniel   [facility]  Home Phone: [phone]  Relation: Sister  Secondary Emergency Contact: Ariadne Gautam   [place]  Mobile Phone: [phone]  Relation: Daughter         IMPRESSION:  Closed supracondylar fracture of the left elbow, initial encounter  Falls at home     PLAN:  This patient was discussed with Dr. Santamaria, on-call surgeon for [facility] and they are in agreement with the following plan.     - Surgery tomorrow at 5:30pm  - NPO at midnight  - Pain control, would not hesitate to order pain consult  - Can eat today    Thank you for including [facility] in the care of Belgica Salvador. It has been a pleasure participating in Belgica's care.        CHIEF COMPLAINT: Closed supracondylar fracture of left elbow, initial encounter    HISTORY OF PRESENT ILLNESS:  The patient is seen in orthopedic consultation at the request of Dr. Saunders.  The patient is a 77 year old female with moderate pain of the left  elbow. The patient reports that she fractured her left elbow last week followed up with [facility] as an outpatient with Dr. Santamaria.  She was scheduled to have outpatient surgery to fix her elbow this Wednesday the 16th with Dr. Santamaria at her surgery center.  Unfortunately she fell at home last night and presented back to the ER due to increased falls in the inability to safely care for self at home.  At this time she was admitted for frequent falls and acute UTI primarily secondarily is her supracondylar fracture of her left elbow.  I spoke with   Rosalio today she will have surgery tomorrow to repair her elbow while she is inpatient.  She is forgetful during our visit today but notes that her pain is 10 out of 10.  Would not hesitate to get pain consult for pain management    PAST MEDICAL HISTORY:  Past Medical History:   Diagnosis Date    Anxiety     Arthritis     Coronary artery disease     Diabetes (H)     Gastroesophageal reflux disease     Graves disease     Hypertension     Migraine     Muscle spasm     Renal disease     Thrombosis     Thyroid disease           ALLERGIES:   Review of patient's allergies indicates   Allergies   Allergen Reactions    Glucosamine Anaphylaxis and Rash     Cardiac arrest    Ibuprofen Hives    Iodine Angioedema, Anaphylaxis and Hives     Patient can uses skin products such as betadine - See shellfish allergy      Naproxen Hives and Rash    Shellfish Allergy Anaphylaxis, Hives and Rash     Cardiac arrest - very severe    Shellfish-Derived Products Anaphylaxis, Hives and Rash     Cardiac arrest - very severe      Amitriptyline Visual Disturbance     Hallucinations    Buspirone Visual Disturbance     Hallucinations    Nystatin Nausea, Diarrhea, Cramps and GI Disturbance    Tramadol Visual Disturbance     Hallucinations    Chlorpheniramine Hives and Rash    Nsaids Unknown    Pseudoephedrine Hives and Rash         MEDICATIONS UPON ADMISSION:  Medications were reviewed.  They include:   Medications Prior to Admission   Medication Sig Dispense Refill Last Dose    acetaminophen (TYLENOL) 650 MG CR tablet Take 650 mg by mouth 3 times daily   8/13/2023 at 1400    aspirin (ASA) 325 MG EC tablet Take 650 mg by mouth 2 times daily   8/13/2023 at pm    baclofen (LIORESAL) 10 MG tablet Take 10 mg by mouth 3 times daily   8/13/2023 at pm    busPIRone (BUSPAR) 10 MG tablet Take 10 mg by mouth 2 times daily   8/13/2023 at pm    Carboxymethylcellulose Sodium 0.25 % SOLN Place 1 drop into both eyes 2 times daily   8/12/2023    DULoxetine  (CYMBALTA) 60 MG capsule Take 60 mg by mouth daily   8/13/2023    EPINEPHrine (EPIPEN 2-SHERRI) 0.3 mg/0.3 mL atIn [EPINEPHRINE (EPIPEN 2-SHERRI) 0.3 MG/0.3 ML ATIN] Inject 0.3 mg into the shoulder, thigh, or buttocks once as needed (Anaphylaxis).   never used    famotidine (PEPCID) 20 MG tablet Take 20 mg by mouth 2 times daily   8/13/2023 at am    ferrous sulfate 325 (65 FE) MG tablet [FERROUS SULFATE 325 (65 FE) MG TABLET] Take 1 tablet by mouth daily with breakfast.          8/13/2023    fluticasone (FLONASE) 50 mcg/actuation nasal spray Spray 2 sprays into both nostrils daily as needed   More than a month    folic acid (FOLVITE) 1 MG tablet [FOLIC ACID (FOLVITE) 1 MG TABLET] Take 1 mg by mouth daily.   8/13/2023    levothyroxine (SYNTHROID, LEVOTHROID) 88 MCG tablet [LEVOTHYROXINE (SYNTHROID, LEVOTHROID) 88 MCG TABLET] Take 88 mcg by mouth Daily at 6:00 am.    8/13/2023    lisinopril (ZESTRIL) 20 MG tablet Take 20 mg by mouth daily   8/13/2023    loperamide (IMODIUM) 2 MG capsule Take 2-4 mg by mouth 3 times daily as needed for diarrhea Take 1 capsule with first loose stool and take 2 capsules after each subsequent loose stool   More than a month    magnesium oxide (MAG-OX) 400 mg (241.3 mg magnesium) tablet [MAGNESIUM OXIDE (MAG-OX) 400 MG (241.3 MG MAGNESIUM) TABLET] Take 800 mg by mouth 2 (two) times a day.   8/13/2023 at pm    meclizine (ANTIVERT) 12.5 MG tablet Take 12.5 mg by mouth 3 times daily as needed for dizziness   8/6/2023    metFORMIN (GLUCOPHAGE) 500 MG tablet [METFORMIN (GLUCOPHAGE) 500 MG TABLET] Take 1,000 mg by mouth 2 (two) times a day with meals.   8/13/2023 at am    Multiple vitamin TABS Take 1 tablet by mouth daily   8/13/2023    omeprazole (PRILOSEC) 20 MG DR capsule Take 40 mg by mouth daily   8/13/2023    polyethylene glycol-propylene glycol (SYSTANE ULTRA) 0.4-0.3 % SOLN ophthalmic solution Place 1 drop into both eyes 2 times daily as needed   8/13/2023    rosuvastatin (CRESTOR) 5 MG tablet  Take 5 mg by mouth At Bedtime   8/12/2023    valACYclovir (VALTREX) 1000 MG tablet Take 2,000 mg by mouth 2 times daily as needed   More than a month    Vitamin D (Cholecalciferol) 50 MCG (2000 UT) CAPS Take 2,000 Units by mouth daily   8/13/2023         SOCIAL HISTORY:   she  reports that she has quit smoking. She has never used smokeless tobacco. She reports that she does not currently use alcohol after a past usage of about 1.0 - 2.0 standard drink of alcohol per week. She reports that she does not use drugs.    FAMILY HISTORY:  family history includes Breast Cancer in her paternal aunt.      REVIEW OF SYSTEMS:   Reviewed with patient. See HPI, otherwise negative       PHYSICAL EXAMINATION:  Vitals: Temp:  [97.3  F (36.3  C)-98.6  F (37  C)] 97.3  F (36.3  C)  Pulse:  [] 92  Resp:  [16-18] 18  BP: (120-182)/(61-80) 125/67  SpO2:  [93 %-98 %] 96 %  General: On examination, the patient is resting comfortably, NAD, awake, and alert and oriented to person, place, time, and, and general circumstances   SKIN: There is no evidence of erythema, swelling, induration or laceration.  There is a elbow T scope brace locked in 90 degrees of flexion present.  Pulses:  radial pulse is intact and equal bilaterally  Sensation: intact and equal bilaterally to the distal upper extremities.  Tenderness: Tenderness to palpation over the left elbow no tenderness to the forearm.  There is a brace in place and she notes tenderness at the connection spots there is gauze at these areas  ROM: Deferred  Motor: Able to make full fist, thumbs up without difficulty  Contralateral side= Full range of motion, Negative joint instability findings, 5/5 motor groups about the joint, Non-tender.       RADIOGRAPHIC EVALUATION:  Personally reviewed    PERTINENT LABS:  Lab Results: personally reviewed.   @BRIEFLAB[NA,K,CL,CO2,BUN,CREATININE,GLUCOSE,GLUCOSE @  Lab Results   Component Value Date    WBC 8.8 08/14/2023    HGB 12.8 08/14/2023    HCT  37.9 08/14/2023    MCV 98 08/14/2023     08/14/2023         Cathryn Rebolledo PA-C, ANASTASIYA  Date: 8/14/2023  Time: 10:54 AM    CC1:   Wayne Saunders MD    CC2:   Kassie Thompson

## 2023-08-14 NOTE — PROGRESS NOTES
"[facility]    Medicine Progress Note - Hospitalist Service    Date of Admission:  8/13/2023    Assessment & Plan   Belgica Salvador is a 77 year old female admitted on 8/13/2023. She is to the ER after a fall.  In the ER she had multiple imaging studies which were unremarkable except for a known humerus fracture.  She was found to have a UTI.  Orthopedics felt that she should be admitted to the hospital for repair of the complicated fracture.        Recurrent falls  Likely due to acute cystitis  Falls precautions  Will need PT and OT evaluation post procedure  May need TCU    Left elbow fracture  Plan for operative repair 8/15  Pain control     Acute cystitis  Rocephin  Await culture  Tailor antibiotics     Hypomagnesemia/mild hyperkalemia  IV fluids and replacement protocol  Improved     Hypothyroidism     Essential hypertension  Home meds with hold parameters     Remote history of DVT/provoked     Type 2 diabetes  Hold metformin  Correction insulin     Peripheral neuropathy     Vitamin D deficiency     Anxiety disorder    Acute leukocytosis  Due to demargination  Resolved       Diet: NPO for Medical/Clinical Reasons Except for: Meds, Ice Chips  Combination Diet Regular Diet; Moderate Consistent Carb (60 g CHO per Meal) Diet; 2 gm NA Diet    DVT Prophylaxis: Defer to primary service  Sierra Catheter: Not present  Lines: None     Cardiac Monitoring: None  Code Status: No CPR- Do NOT Intubate      Clinically Significant Risk Factors Present on Admission            # Hypomagnesemia: Lowest Mg = 1.5 mg/dL in last 2 days, will replace as needed     # Drug Induced Platelet Defect: home medication list includes an antiplatelet medication   # Hypertension: Noted on problem list      # Overweight: Estimated body mass index is 28.13 kg/m  as calculated from the following:    Height as of this encounter: 1.676 m (5' 6\").    Weight as of this encounter: 79.1 kg (174 lb 4.8 oz).          "     Disposition Plan      Expected Discharge Date: 08/17/2023        Discharge Comments: surgery pending for 5:30PM 8/15          Wayne Saunders MD  Hospitalist Service  [facility]  Securely message with [employer] (more info)  Text page via [employer] Paging/Directory   ______________________________________________________________________    Interval History   Patient is doing well.  No fevers or chills.  No nausea or vomiting.  No chest pain.  Eating without issues.  Having problems with some pain.  She is tearful.  Reassured her.  Verbalizes understanding with plan for surgery tomorrow.    Physical Exam   Vital Signs: Temp: 98.8  F (37.1  C) Temp src: Oral BP: 116/67 Pulse: 82   Resp: 18 SpO2: 97 % O2 Device: None (Room air)    Weight: 174 lbs 4.8 oz    General Appearance: No apparent distress  Respiratory: Clear to auscultation bilaterally  Cardiovascular: Regular rate and rhythm  GI: Soft and nontender  Skin: Skin is normal  Other: Tearful    Medical Decision Making       38 MINUTES SPENT BY ME on the date of service doing chart review, history, exam, documentation & further activities per the note.      Data     I have personally reviewed the following data over the past 24 hrs:    8.8  \   12.8   / 315     138 103 15 /  96   4.1 25 0.66 \     TSH: N/A T4: N/A A1C: 5.8 (H)     INR:  0.96 PTT:  N/A   D-dimer:  N/A Fibrinogen:  N/A       Imaging results reviewed over the past 24 hrs:   Recent Results (from the past 24 hour(s))   Chest XR,  PA & LAT    Narrative    EXAM: XR CHEST 2 VIEWS  LOCATION: [facility]  DATE: 8/13/2023    INDICATION: Shortness of breath.  COMPARISON: 11/08/2022      Impression    IMPRESSION: Heart size and vascularity are normal. Shallow inspiration. No focal consolidation, pneumothorax nor pleural effusion.   Humerus XR,  G/E 2 views, left    Narrative    EXAM: XR HUMERUS LEFT G/E 2 VIEWS  LOCATION: [facility]  DATE:  8/13/2023    INDICATION: Fall, known frx, worsened swelling  COMPARISON: None.      Impression    IMPRESSION: Supracondylar fracture of the distal aspect of the humerus. The remainder of the humerus is negative. Degenerative change at the glenohumeral joint.   Radius/Ulna XR,  PA &LAT, left    Narrative    EXAM: XR FOREARM LEFT 2 VIEWS  LOCATION: [facility]  DATE: 8/13/2023    INDICATION: known frx, fall worse pain and swelling  COMPARISON: None.      Impression    IMPRESSION: Acute mildly displaced transcondylar fracture of the distal humerus. No additional fractures.   Lumbar spine CT w/o contrast    Narrative    EXAM: CT LUMBAR SPINE W/O CONTRAST  LOCATION: [facility]  DATE: 8/13/2023    INDICATION: low back pain after fall.  COMPARISON: None.  TECHNIQUE: Routine CT Lumbar Spine without IV contrast. Multiplanar reformats. Dose reduction techniques were used.     FINDINGS:  VERTEBRA: No acute fracture.  No acute post traumatic subluxations.   Normal vertebral body heights.    Partially visualized sacral neural stimulator.    CANAL/FORAMINA: Multilevel spondylosis result in various levels and degrees of central canal stenosis and neural foraminal stenosis. L1-L2, L3-L4 severe central canal stenosis. L2-L3 high-grade severe central canal stenosis.   Several levels demonstrate   degenerative grade 1 subluxations. Thoracolumbar moderate dextroscoliosis. Mid to lower lumbar moderate levoscoliosis. Bilateral SI degenerative joint disease.    PARASPINAL: Partially visualized high attenuation material in the region of the stomach likely reflects a suture row when correlated with CT abdomen pelvis 09/22/2017.        Impression     IMPRESSION:  1.  No acute fracture.  2.  Degenerative changes described above.  3.  L1-L2, L3-L4 severe central canal stenosis. L2-L3 high-grade severe central canal stenosis.  4.  Partially visualized sacral stimulator.   Head CT w/o contrast     Narrative    EXAM: CT HEAD W/O CONTRAST  LOCATION: [facility]  DATE: 8/13/2023    INDICATION: fall headache  COMPARISON: None.  TECHNIQUE: Routine CT Head without IV contrast. Multiplanar reformats. Dose reduction techniques were used.    FINDINGS:  INTRACRANIAL CONTENTS: No intracranial hemorrhage, extraaxial collection, or mass effect.  No CT evidence of acute infarct. Mild presumed chronic small vessel ischemic changes. Mild generalized volume loss. No hydrocephalus.     VISUALIZED ORBITS/SINUSES/MASTOIDS: No intraorbital abnormality. No paranasal sinus mucosal disease. No middle ear or mastoid effusion.    BONES/SOFT TISSUES: No acute abnormality.      Impression    IMPRESSION:  1.  No acute intracranial process.   2.  Age-related changes described above.

## 2023-08-14 NOTE — PLAN OF CARE
Goal Outcome Evaluation:         PRIMARY DIAGNOSIS: fracture of left elbow after fall  OUTPATIENT/OBSERVATION GOALS TO BE MET BEFORE DISCHARGE:  ADLs back to baseline: No    Activity and level of assistance: Up with standby assistance.    Pain status: Improved-controlled with oral pain medications, elevation of ULE, and application of cold.    Return to near baseline physical activity: No     Discharge Planner Nurse     Please review provider order for any additional goals.   Nurse to notify provider when observation goals have been met and patient is ready for discharge.

## 2023-08-14 NOTE — CONSULTS
Care Management Initial Consult    General Information  Assessment completed with: Patient, Children, daughter Heide at bedside  Type of CM/SW Visit: Initial Assessment    Primary Care Provider verified and updated as needed: Yes   Readmission within the last 30 days: no previous admission in last 30 days      Reason for Consult: discharge planning  Advance Care Planning: Advance Care Planning Reviewed:  (states has HCD at home)        Communication Assessment  Patient's communication style: spoken language (English or Bilingual)        Cognitive  Cognitive/Neuro/Behavioral: able to provide history, somewhat tearful and reported anxiety about the recent falls and having a safe discharge plan.                       Living Environment:   People in home: alone     Current living Arrangements: house ([facility] level with chair lift for stairs. Handicap accessible.)      Able to return to prior arrangements:  (anticipate need for TCU placement)     Family/Social Support:  Care provided by: self  Provides care for: no one, unable/limited ability to care for self  Marital Status:   Children (daughter Heide and Jaclyn)          Description of Support System: Supportive, Involved       Current Resources:   Patient receiving home care services: No  Community Resources:    Equipment/supplies currently used at home:  (chair lifts, cane and walker, left arm currently in sling, Rx glasses.)    Employment/Financial:  Employment Status: retired     Employment/ Comments: no  or VA  Financial Concerns:     Referral to Financial Worker: No       Does the patient's insurance plan have a 3 day qualifying hospital stay waiver?  Yes   Will the waiver be used for post-acute placement? Yes    Lifestyle & Psychosocial Needs:  Social Determinants of Health     Tobacco Use: Medium Risk (8/13/2023)    Patient History     Smoking Tobacco Use: Former     Smokeless Tobacco Use: Never     Passive Exposure: Not on  "file   Alcohol Use: Not on file   Financial Resource Strain: Not on file   Food Insecurity: Not on file   Transportation Needs: Not on file   Physical Activity: Not on file   Stress: Not on file   Social Connections: Not on file   Intimate Partner Violence: Not on file   Depression: Not on file   Housing Stability: Not on file       Functional Status:  Prior to admission patient needed assistance:   Dependent ADLs:: Independent, Ambulation-walker, Ambulation-cane (mostly been doing sponge bathing)  Dependent IADLs:: Independent  Assesssment of Functional Status: Not at baseline with ADL Functioning, Not at baseline with mobility, Not at  functional baseline    Mental Health Status:  Mental Health Status:  (denies)       Chemical Dependency Status:  Chemical Dependency Status:  (denies)             Values/Beliefs:  Spiritual, Cultural Beliefs, Muslim Practices, Values that affect care: no               Additional Information:  Chart reviewed.     CM provided MOON/observation notice and education to patient and daughter at bedside. Has [facility]. Both verbalized understanding and patient signed. Original in chart and copy given to patient.     Patient lives alone in split level home. Does have stair lift for chairs and handicap accessible. Patient is independent at baseline, including driving. Does have cane and walker available for use as needed. No private duty or skilled HC services. PCP confirmed. Unclear if patient is on elderly waiver but does have [facility] dual [facility] and possibly [facility] Care Coordinator but did not have details and indicated \"seems to always be changing\". Per chart review, noted Celeste Schmitt [phone] with Siri (CM provided contact information to patient/daughter and CM left VM with Care Coordinator requesting return call to  to further discuss discharge plans).     Patient seen in ED on 8/11 after a fall resulting in fracture and discharged home with left arm splint/sling. " Returned to ER today via EMS after another fall.     Ortho consult and PT/OT pending. Anticipated need for TCU placement prior to return home.     CM provided patient/daughter with TCU list and medicare.gov info to review facilities ratings. CM provided additional senior resources today daughter per request. CM sent referral to [facility] TCU per patient/daughter request. At this time, patient/daughter would like to hold off on making any other selections. They are aware CM will follow up with them on the floor.     Patient would like for her daughter Heide to be included in discharge planning.   Transport TBD- daughter vs HE WC transport (CM explained option available if safety concerns with getting patient safely into and out of the car).     Francisca Mae, RN

## 2023-08-14 NOTE — PROGRESS NOTES
Care Management Follow Up    Length of Stay (days): 0    Expected Discharge Date: 08/15/2023      Anticipated Discharge Disposition: Transitional Care  Disposition Comments: Surgery and PT/OT pending. Anticipate D/C to TCU via daughter vs HE transport.  Anticipated Discharge Services:  (TCU)  Patient/family educated on Medicare website which has current facility and service quality ratings: yes  Education Provided on the Discharge Plan: Yes  Patient/Family in Agreement with the Plan: yes    Referrals Placed by ALEX/ASH: Post Acute Facilities    Additional Information:  Pt to have surgery tomorrow evening. ALEX spoke with Rosa at [facility] who is currently going to follow along for possible placement needs.     BAILEE Garnica

## 2023-08-14 NOTE — PROGRESS NOTES
Patient vital signs are at baseline: Yes  Patient able to ambulate as they were prior to admission or with assist devices provided by therapies during their stay:  Yes w/gb  Patient MUST void prior to discharge:  Yes  Patient able to tolerate oral intake:  Yes  Pain has adequate pain control using Oral analgesics:  Yes PRN oxy   Does patient have an identified :  Yes Family at bedside   Has goal D/C date and time been discussed with patient:  Yes, procedure scheduled for 8/15- NPO at midnight. Herber Obrien on 8/14/2023 at 6:50 PM

## 2023-08-15 ENCOUNTER — ANCILLARY PROCEDURE (OUTPATIENT)
Dept: ULTRASOUND IMAGING | Facility: CLINIC | Age: 78
End: 2023-08-15
Attending: ANESTHESIOLOGY
Payer: COMMERCIAL

## 2023-08-15 ENCOUNTER — ANESTHESIA (OUTPATIENT)
Dept: SURGERY | Facility: CLINIC | Age: 78
DRG: 511 | End: 2023-08-15
Payer: COMMERCIAL

## 2023-08-15 ENCOUNTER — ANESTHESIA EVENT (OUTPATIENT)
Dept: SURGERY | Facility: CLINIC | Age: 78
DRG: 511 | End: 2023-08-15
Payer: COMMERCIAL

## 2023-08-15 PROBLEM — E11.9 TYPE 2 DIABETES MELLITUS (H): Status: ACTIVE | Noted: 2022-11-10

## 2023-08-15 LAB
BACTERIA UR CULT: ABNORMAL
GLUCOSE BLDC GLUCOMTR-MCNC: 102 MG/DL (ref 70–99)
GLUCOSE BLDC GLUCOMTR-MCNC: 207 MG/DL (ref 70–99)
GLUCOSE BLDC GLUCOMTR-MCNC: 62 MG/DL (ref 70–99)
GLUCOSE BLDC GLUCOMTR-MCNC: 80 MG/DL (ref 70–99)
GLUCOSE BLDC GLUCOMTR-MCNC: 80 MG/DL (ref 70–99)
GLUCOSE BLDC GLUCOMTR-MCNC: 82 MG/DL (ref 70–99)
MAGNESIUM SERPL-MCNC: 1.7 MG/DL (ref 1.8–2.6)

## 2023-08-15 PROCEDURE — 99232 SBSQ HOSP IP/OBS MODERATE 35: CPT | Performed by: FAMILY MEDICINE

## 2023-08-15 PROCEDURE — 120N000001 HC R&B MED SURG/OB

## 2023-08-15 PROCEDURE — 250N000013 HC RX MED GY IP 250 OP 250 PS 637: Performed by: HOSPITALIST

## 2023-08-15 PROCEDURE — 250N000012 HC RX MED GY IP 250 OP 636 PS 637: Performed by: FAMILY MEDICINE

## 2023-08-15 PROCEDURE — 250N000013 HC RX MED GY IP 250 OP 250 PS 637: Performed by: FAMILY MEDICINE

## 2023-08-15 PROCEDURE — 82962 GLUCOSE BLOOD TEST: CPT

## 2023-08-15 PROCEDURE — 36415 COLL VENOUS BLD VENIPUNCTURE: CPT | Performed by: FAMILY MEDICINE

## 2023-08-15 PROCEDURE — 83735 ASSAY OF MAGNESIUM: CPT | Performed by: FAMILY MEDICINE

## 2023-08-15 PROCEDURE — G0378 HOSPITAL OBSERVATION PER HR: HCPCS

## 2023-08-15 RX ORDER — SIMETHICONE 80 MG
80 TABLET,CHEWABLE ORAL EVERY 6 HOURS PRN
Status: DISCONTINUED | OUTPATIENT
Start: 2023-08-15 | End: 2023-08-18 | Stop reason: HOSPADM

## 2023-08-15 RX ORDER — CEFAZOLIN SODIUM 2 G/100ML
2 INJECTION, SOLUTION INTRAVENOUS SEE ADMIN INSTRUCTIONS
Status: DISCONTINUED | OUTPATIENT
Start: 2023-08-15 | End: 2023-08-15 | Stop reason: HOSPADM

## 2023-08-15 RX ORDER — ACETAMINOPHEN 325 MG/1
975 TABLET ORAL ONCE
Status: DISCONTINUED | OUTPATIENT
Start: 2023-08-15 | End: 2023-08-15 | Stop reason: HOSPADM

## 2023-08-15 RX ORDER — CEFDINIR 300 MG/1
300 CAPSULE ORAL EVERY 12 HOURS SCHEDULED
Status: DISCONTINUED | OUTPATIENT
Start: 2023-08-15 | End: 2023-08-18 | Stop reason: HOSPADM

## 2023-08-15 RX ORDER — FENTANYL CITRATE 50 UG/ML
100 INJECTION, SOLUTION INTRAMUSCULAR; INTRAVENOUS ONCE
Status: DISCONTINUED | OUTPATIENT
Start: 2023-08-15 | End: 2023-08-15 | Stop reason: HOSPADM

## 2023-08-15 RX ORDER — LIDOCAINE 40 MG/G
CREAM TOPICAL
Status: DISCONTINUED | OUTPATIENT
Start: 2023-08-15 | End: 2023-08-15 | Stop reason: HOSPADM

## 2023-08-15 RX ORDER — SODIUM CHLORIDE, SODIUM LACTATE, POTASSIUM CHLORIDE, CALCIUM CHLORIDE 600; 310; 30; 20 MG/100ML; MG/100ML; MG/100ML; MG/100ML
INJECTION, SOLUTION INTRAVENOUS CONTINUOUS
Status: DISCONTINUED | OUTPATIENT
Start: 2023-08-15 | End: 2023-08-15 | Stop reason: HOSPADM

## 2023-08-15 RX ORDER — CEFAZOLIN SODIUM 2 G/100ML
2 INJECTION, SOLUTION INTRAVENOUS
Status: DISCONTINUED | OUTPATIENT
Start: 2023-08-15 | End: 2023-08-15 | Stop reason: HOSPADM

## 2023-08-15 RX ADMIN — Medication 2000 UNITS: at 10:22

## 2023-08-15 RX ADMIN — Medication 800 MG: at 20:29

## 2023-08-15 RX ADMIN — OXYCODONE HYDROCHLORIDE 5 MG: 5 TABLET ORAL at 18:44

## 2023-08-15 RX ADMIN — ACETAMINOPHEN 650 MG: 325 TABLET ORAL at 20:30

## 2023-08-15 RX ADMIN — Medication 800 MG: at 10:20

## 2023-08-15 RX ADMIN — CEFDINIR 300 MG: 300 CAPSULE ORAL at 20:31

## 2023-08-15 RX ADMIN — INSULIN ASPART 1 UNITS: 100 INJECTION, SOLUTION INTRAVENOUS; SUBCUTANEOUS at 20:42

## 2023-08-15 RX ADMIN — CARBOXYMETHYLCELLULOSE SODIUM 1 DROP: 5 SOLUTION/ DROPS OPHTHALMIC at 20:30

## 2023-08-15 RX ADMIN — FERROUS SULFATE TAB 325 MG (65 MG ELEMENTAL FE) 325 MG: 325 (65 FE) TAB at 10:26

## 2023-08-15 RX ADMIN — ACETAMINOPHEN 650 MG: 325 TABLET ORAL at 14:38

## 2023-08-15 RX ADMIN — BACLOFEN 10 MG: 10 TABLET ORAL at 10:22

## 2023-08-15 RX ADMIN — LORAZEPAM 0.5 MG: 0.5 TABLET ORAL at 04:10

## 2023-08-15 RX ADMIN — BUSPIRONE HYDROCHLORIDE 10 MG: 10 TABLET ORAL at 20:30

## 2023-08-15 RX ADMIN — CARBOXYMETHYLCELLULOSE SODIUM 1 DROP: 5 SOLUTION/ DROPS OPHTHALMIC at 10:32

## 2023-08-15 RX ADMIN — DULOXETINE HYDROCHLORIDE 60 MG: 60 CAPSULE, DELAYED RELEASE PELLETS ORAL at 10:25

## 2023-08-15 RX ADMIN — OXYCODONE HYDROCHLORIDE 5 MG: 5 TABLET ORAL at 01:15

## 2023-08-15 RX ADMIN — FAMOTIDINE 20 MG: 20 TABLET, FILM COATED ORAL at 10:26

## 2023-08-15 RX ADMIN — ROSUVASTATIN CALCIUM 5 MG: 5 TABLET, FILM COATED ORAL at 20:30

## 2023-08-15 RX ADMIN — ACETAMINOPHEN 650 MG: 325 TABLET ORAL at 10:27

## 2023-08-15 RX ADMIN — FAMOTIDINE 20 MG: 20 TABLET, FILM COATED ORAL at 20:30

## 2023-08-15 RX ADMIN — SIMETHICONE 80 MG: 80 TABLET, CHEWABLE ORAL at 21:20

## 2023-08-15 RX ADMIN — OXYCODONE HYDROCHLORIDE 5 MG: 5 TABLET ORAL at 06:00

## 2023-08-15 RX ADMIN — LISINOPRIL 20 MG: 20 TABLET ORAL at 10:25

## 2023-08-15 RX ADMIN — OXYCODONE HYDROCHLORIDE 5 MG: 5 TABLET ORAL at 23:10

## 2023-08-15 RX ADMIN — BACLOFEN 10 MG: 10 TABLET ORAL at 20:29

## 2023-08-15 RX ADMIN — PANTOPRAZOLE SODIUM 40 MG: 40 TABLET, DELAYED RELEASE ORAL at 07:01

## 2023-08-15 RX ADMIN — BACLOFEN 10 MG: 10 TABLET ORAL at 14:41

## 2023-08-15 RX ADMIN — OXYCODONE HYDROCHLORIDE 5 MG: 5 TABLET ORAL at 14:39

## 2023-08-15 RX ADMIN — LEVOTHYROXINE SODIUM 88 MCG: 0.09 TABLET ORAL at 10:27

## 2023-08-15 RX ADMIN — FOLIC ACID 1 MG: 1 TABLET ORAL at 10:21

## 2023-08-15 RX ADMIN — BUSPIRONE HYDROCHLORIDE 10 MG: 10 TABLET ORAL at 10:27

## 2023-08-15 ASSESSMENT — ACTIVITIES OF DAILY LIVING (ADL)
ADLS_ACUITY_SCORE: 40
ADLS_ACUITY_SCORE: 44
ADLS_ACUITY_SCORE: 40
ADLS_ACUITY_SCORE: 40
ADLS_ACUITY_SCORE: 37
ADLS_ACUITY_SCORE: 38
ADLS_ACUITY_SCORE: 39
ADLS_ACUITY_SCORE: 37
ADLS_ACUITY_SCORE: 37
ADLS_ACUITY_SCORE: 39
ADLS_ACUITY_SCORE: 44
ADLS_ACUITY_SCORE: 39

## 2023-08-15 NOTE — PROGRESS NOTES
A&Ox4 but forgetful. Up 1a. Prn oxycodone given for pain in LUE with some relief. Left arm remains in orthotic. Pt transported to surgery around 1600 but returned to floor shortly after due to surgery being cancelled. New OR time 1530 tomorrow. BG 62 after being npo all day, juice provided and pt ate dinner. Oncoming RN will reassess.     Pt has InterStim implant- will need to be turnt off prior to surgery tomorrow. Device controller in room.

## 2023-08-15 NOTE — PLAN OF CARE
Goal Outcome Evaluation:      Plan of Care Reviewed With: patient, family    Patient is alert and oriented, but occasionally forgetful.. she is able to communicate her needs to staff. Patient was NPO for the entire shift for surgery scheduled at 5:40 pm. Left hand is swollen and ice had been applied throughout the day. Fingers are cool to the touch and left arm has been in the immobilizer and elevated on a pillow. Patient was medicated with shceduled Tylenol and one dose of Oxycodone 5 mg prn. Dr. Alpa Godinez from [facility] was contacted re; INTERSTIM device for Fecal and Urinary Incontinence. Device should be turned off for surgey, as any use of Cauterization could harm the battery. Patient's sister, Vivi will be bringing in the equipment to turn off device.

## 2023-08-15 NOTE — PROGRESS NOTES
"[facility]    Medicine Progress Note - Hospitalist Service    Date of Admission:  8/13/2023    Assessment & Plan   Belgica Salvador is a 77 year old female admitted on 8/13/2023. She is to the ER after a fall.  In the ER she had multiple imaging studies which were unremarkable except for a known humerus fracture.  She was found to have a UTI.  Orthopedics felt that she should be admitted to the hospital for repair of the complicated fracture.  Operative repair 8/15 p.m.        Recurrent falls  Likely due to acute cystitis  Falls precautions  Will need PT and OT evaluation post procedure  Suspect will need TCU     Left elbow fracture  Plan for operative repair 8/15  Pain control is adequate     Acute cystitis  Rocephin  Culture with E. coli  Changed to oral Omnicef 8/15     Hypomagnesemia/mild hyperkalemia  IV fluids and replacement protocol  Improved     Hypothyroidism     Essential hypertension  Home meds with hold parameters     Remote history of DVT/provoked  Likely direct oral anticoagulation post procedure     Type 2 diabetes  Hold metformin  Correction insulin     Peripheral neuropathy     Vitamin D deficiency     Anxiety disorder     Acute leukocytosis  Due to demargination  Resolved          Diet: NPO per Anesthesia Guidelines for Procedure/Surgery Except for: Meds    DVT Prophylaxis: Defer to primary service  Sierra Catheter: Not present  Lines: None     Cardiac Monitoring: None  Code Status: No CPR- Do NOT Intubate      Clinically Significant Risk Factors Present on Admission            # Hypomagnesemia: Lowest Mg = 1.5 mg/dL in last 2 days, will replace as needed     # Drug Induced Platelet Defect: home medication list includes an antiplatelet medication   # Hypertension: Noted on problem list      # Overweight: Estimated body mass index is 28.5 kg/m  as calculated from the following:    Height as of this encounter: 1.676 m (5' 6\").    Weight as of this encounter: 80.1 kg (176 lb " 9.6 oz).              Disposition Plan      Expected Discharge Date: 08/17/2023        Discharge Comments: surgery pending for 5:30PM 8/15          Wayne Saunders MD  Hospitalist Service  [facility]  Securely message with Team [employer] Mobile (more info)  Text page via [facility] Paging/Directory   ______________________________________________________________________    Interval History   Doing better this morning than yesterday.  Not having any swelling in the right arm and left arm is still quite swollen.  Still having some pain.  Improved.  Eating yesterday without issues.  N.p.o. today.  Urinating normally.  Understands plan for surgery.  Will need to work with PT and OT post procedure.    Physical Exam   Vital Signs: Temp: 97.9  F (36.6  C) Temp src: Oral BP: (!) 159/75 Pulse: 78   Resp: 18 SpO2: 94 % O2 Device: None (Room air)    Weight: 176 lbs 9.6 oz    GENERAL:  Alert, appears comfortable, in no acute distress, appears stated age   HEAD:  Normocephalic, without obvious abnormality, atraumatic   EYES:  PERRL, conjunctiva/corneas clear, no scleral icterus, EOM's intact   LUNGS:   Clear to auscultation bilaterally, no rales, rhonchi, or wheezing, symmetric chest rise on inhalation, respirations unlabored   HEART:  Regular rate and rhythm, S1 and S2 normal, no murmur, rub, or gallop    ABDOMEN:   Soft, non-tender, bowel sounds active all four quadrants, no masses, no organomegaly, no rebound or guarding   EXTREMITIES: Extremities normal, atraumatic, no cyanosis or edema    SKIN: Dry to touch, no exanthems in the visualized areas   NEURO: Alert, oriented x 4, moves all four extremities freely/spontaneously   PSYCH: Cooperative, behavior is appropriate        37 MINUTES SPENT BY ME on the date of service doing chart review, history, exam, documentation & further activities per the note.      Data         Imaging results reviewed over the past 24 hrs:   Recent Results (from the past 24 hour(s))   POC US  "Guidance Needle Placement    Narrative    Ultrasound was performed as guidance to an anesthesia procedure.  Click   \"PACS images\" hyperlink below to view any stored images.  For specific   procedure details, view procedure note authored by anesthesia.     "

## 2023-08-15 NOTE — PROGRESS NOTES
"PRIMARY DIAGNOSIS: \"GENERIC\" NURSING  OUTPATIENT/OBSERVATION GOALS TO BE MET BEFORE DISCHARGE:  ADLs back to baseline: No    Activity and level of assistance: Up with standby assistance.    Pain status: Improved-controlled with oral pain medications, patient would rate pain 7 or higher, 7 being comfortable, PRN 5 mg oxy given for some relief, utilized ice as well    Return to near baseline physical activity: No     Discharge Planner Nurse   Safe discharge environment identified: Yes  Barriers to discharge: Yes       Entered by: Talia Almaraz RN 08/15/2023 7:54 AM    Alert and oriented x4, able to make needs known, LUE has swelling and cold , elevated with multiple pillows, cap refill < 3, reported the numbness and tingling  unchanged to BLE and BUE, immobilizer in place, PRN ativan given for anxiety which provided relief, patient reported to be able to sleep a little,  voiding, denied urinary symptoms,  IVF running at 100 ml/hr, NPO for surgery.   "

## 2023-08-15 NOTE — PROGRESS NOTES
Report called and given to PRAVEEN. Patient has been Npo today except for Medications OUSMANE bath completed and Blood Sugar recheck is 80.

## 2023-08-15 NOTE — UTILIZATION REVIEW
Inpatient appropriate    Admission Status; Secondary Review Determination       Under the authority of the Utilization Management Committee, the utilization review process indicated a secondary review on the above patient. The review outcome is based on review of the medical records, discussions with staff, and applying clinical experience noted on the date of the review.     (x) Inpatient Status Appropriate - This patient's medical care is consistent with medical management for inpatient care and reasonable inpatient medical practice.     RATIONALE FOR DETERMINATION   Belgica Salvador is a 77 year old female admitted on 8/13/2023. She came to ER after a fall.  In the ER she had multiple imaging studies which were unremarkable except for a known humerus fracture.  She was found to have a UTI.  Orthopedics consulted and recommend surgical intervention.  At the time of admission with the information available to the attending physician more than 2 nights Hospital complex care was anticipated, based on patient risk of adverse outcome if treated as outpatient and complex care required. Inpatient admission is appropriate based on the Medicare guidelines.     The information on this document is developed by the utilization review team in order for the business office to ensure compliance. This only denotes the appropriateness of proper admission status and does not reflect the quality of care rendered.   The definitions of Inpatient Status and Observation Status used in making the determination above are those provided in the CMS Coverage Manual, Chapter 1 and Chapter 6, section 70.4.   Sincerely,   George Canales MD  Utilization Review  Physician Advisor  [facility].

## 2023-08-16 ENCOUNTER — APPOINTMENT (OUTPATIENT)
Dept: RADIOLOGY | Facility: CLINIC | Age: 78
DRG: 511 | End: 2023-08-16
Attending: SURGERY
Payer: COMMERCIAL

## 2023-08-16 LAB
ANION GAP SERPL CALCULATED.3IONS-SCNC: 9 MMOL/L (ref 5–18)
BUN SERPL-MCNC: 8 MG/DL (ref 8–28)
CALCIUM SERPL-MCNC: 9.3 MG/DL (ref 8.5–10.5)
CHLORIDE BLD-SCNC: 102 MMOL/L (ref 98–107)
CO2 SERPL-SCNC: 28 MMOL/L (ref 22–31)
CREAT SERPL-MCNC: 0.67 MG/DL (ref 0.6–1.1)
GFR SERPL CREATININE-BSD FRML MDRD: 90 ML/MIN/1.73M2
GLUCOSE BLD-MCNC: 155 MG/DL (ref 70–125)
GLUCOSE BLDC GLUCOMTR-MCNC: 100 MG/DL (ref 70–99)
GLUCOSE BLDC GLUCOMTR-MCNC: 103 MG/DL (ref 70–99)
GLUCOSE BLDC GLUCOMTR-MCNC: 106 MG/DL (ref 70–99)
GLUCOSE BLDC GLUCOMTR-MCNC: 80 MG/DL (ref 70–99)
GLUCOSE BLDC GLUCOMTR-MCNC: 85 MG/DL (ref 70–99)
HGB BLD-MCNC: 13.8 G/DL (ref 11.7–15.7)
MAGNESIUM SERPL-MCNC: 1.5 MG/DL (ref 1.8–2.6)
MAGNESIUM SERPL-MCNC: 1.7 MG/DL (ref 1.8–2.6)
POTASSIUM BLD-SCNC: 4 MMOL/L (ref 3.5–5)
SODIUM SERPL-SCNC: 139 MMOL/L (ref 136–145)

## 2023-08-16 PROCEDURE — 80048 BASIC METABOLIC PNL TOTAL CA: CPT | Performed by: FAMILY MEDICINE

## 2023-08-16 PROCEDURE — 258N000003 HC RX IP 258 OP 636: Performed by: ANESTHESIOLOGY

## 2023-08-16 PROCEDURE — 999N000180 XR SURGERY CARM FLUORO LESS THAN 5 MIN

## 2023-08-16 PROCEDURE — 83735 ASSAY OF MAGNESIUM: CPT | Performed by: INTERNAL MEDICINE

## 2023-08-16 PROCEDURE — 99232 SBSQ HOSP IP/OBS MODERATE 35: CPT | Performed by: INTERNAL MEDICINE

## 2023-08-16 PROCEDURE — 120N000001 HC R&B MED SURG/OB

## 2023-08-16 PROCEDURE — 0PBL0ZZ EXCISION OF LEFT ULNA, OPEN APPROACH: ICD-10-PCS | Performed by: SURGERY

## 2023-08-16 PROCEDURE — 83735 ASSAY OF MAGNESIUM: CPT | Performed by: FAMILY MEDICINE

## 2023-08-16 PROCEDURE — 710N000010 HC RECOVERY PHASE 1, LEVEL 2, PER MIN: Performed by: SURGERY

## 2023-08-16 PROCEDURE — 999N000141 HC STATISTIC PRE-PROCEDURE NURSING ASSESSMENT: Performed by: SURGERY

## 2023-08-16 PROCEDURE — 258N000003 HC RX IP 258 OP 636: Performed by: PHYSICIAN ASSISTANT

## 2023-08-16 PROCEDURE — 250N000011 HC RX IP 250 OP 636: Mod: JZ | Performed by: NURSE ANESTHETIST, CERTIFIED REGISTERED

## 2023-08-16 PROCEDURE — 250N000013 HC RX MED GY IP 250 OP 250 PS 637: Performed by: FAMILY MEDICINE

## 2023-08-16 PROCEDURE — 250N000011 HC RX IP 250 OP 636: Mod: JZ | Performed by: INTERNAL MEDICINE

## 2023-08-16 PROCEDURE — 370N000017 HC ANESTHESIA TECHNICAL FEE, PER MIN: Performed by: SURGERY

## 2023-08-16 PROCEDURE — 250N000009 HC RX 250: Performed by: NURSE ANESTHETIST, CERTIFIED REGISTERED

## 2023-08-16 PROCEDURE — 01S40ZZ REPOSITION ULNAR NERVE, OPEN APPROACH: ICD-10-PCS | Performed by: SURGERY

## 2023-08-16 PROCEDURE — 250N000011 HC RX IP 250 OP 636: Performed by: ANESTHESIOLOGY

## 2023-08-16 PROCEDURE — 0PSL04Z REPOSITION LEFT ULNA WITH INTERNAL FIXATION DEVICE, OPEN APPROACH: ICD-10-PCS | Performed by: SURGERY

## 2023-08-16 PROCEDURE — 272N000001 HC OR GENERAL SUPPLY STERILE: Performed by: SURGERY

## 2023-08-16 PROCEDURE — 360N000084 HC SURGERY LEVEL 4 W/ FLUORO, PER MIN: Performed by: SURGERY

## 2023-08-16 PROCEDURE — 258N000003 HC RX IP 258 OP 636: Performed by: NURSE ANESTHETIST, CERTIFIED REGISTERED

## 2023-08-16 PROCEDURE — 250N000009 HC RX 250: Performed by: SURGERY

## 2023-08-16 PROCEDURE — 36415 COLL VENOUS BLD VENIPUNCTURE: CPT | Performed by: INTERNAL MEDICINE

## 2023-08-16 PROCEDURE — 250N000013 HC RX MED GY IP 250 OP 250 PS 637: Performed by: PHYSICIAN ASSISTANT

## 2023-08-16 PROCEDURE — C1769 GUIDE WIRE: HCPCS | Performed by: SURGERY

## 2023-08-16 PROCEDURE — 278N000051 HC OR IMPLANT GENERAL: Performed by: SURGERY

## 2023-08-16 PROCEDURE — C1713 ANCHOR/SCREW BN/BN,TIS/BN: HCPCS | Performed by: SURGERY

## 2023-08-16 PROCEDURE — 258N000003 HC RX IP 258 OP 636: Performed by: FAMILY MEDICINE

## 2023-08-16 PROCEDURE — 250N000013 HC RX MED GY IP 250 OP 250 PS 637: Performed by: HOSPITALIST

## 2023-08-16 PROCEDURE — 85018 HEMOGLOBIN: CPT | Performed by: FAMILY MEDICINE

## 2023-08-16 PROCEDURE — 36415 COLL VENOUS BLD VENIPUNCTURE: CPT | Performed by: FAMILY MEDICINE

## 2023-08-16 DEVICE — WIRE SURGICAL STEEL 20GA DS-20: Type: IMPLANTABLE DEVICE | Site: ELBOW | Status: FUNCTIONAL

## 2023-08-16 DEVICE — IMP WIRE KIRSCHNER SYN 1.6X150MM 292.16: Type: IMPLANTABLE DEVICE | Site: ELBOW | Status: FUNCTIONAL

## 2023-08-16 DEVICE — IMP SCR SYN 2.7X22MM T8 SD LOCKING SELF-TAP VA 02.211.022: Type: IMPLANTABLE DEVICE | Site: ELBOW | Status: FUNCTIONAL

## 2023-08-16 DEVICE — SCREW LOCKING ST 2.7X28MM: Type: IMPLANTABLE DEVICE | Site: ELBOW | Status: FUNCTIONAL

## 2023-08-16 DEVICE — IMPLANTABLE DEVICE: Type: IMPLANTABLE DEVICE | Site: ELBOW | Status: FUNCTIONAL

## 2023-08-16 DEVICE — IMP SCR SYN 3.5X20MM LOCKING W/STARDRIVE SS 212.106: Type: IMPLANTABLE DEVICE | Site: ELBOW | Status: FUNCTIONAL

## 2023-08-16 DEVICE — IMP SCR SYN CORTEX 3.5X20MM SELF TAP SS 204.820: Type: IMPLANTABLE DEVICE | Site: ELBOW | Status: FUNCTIONAL

## 2023-08-16 DEVICE — IMP SCR SYN 3.5X22MM LOCKING W/STARDRIVE SS 212.107: Type: IMPLANTABLE DEVICE | Site: ELBOW | Status: FUNCTIONAL

## 2023-08-16 DEVICE — IMP SCR SYN 2.7X20MM T8 SD LOCKING SELF-TAP VA 02.211.020: Type: IMPLANTABLE DEVICE | Site: ELBOW | Status: FUNCTIONAL

## 2023-08-16 DEVICE — IMP SCR SYN CORTEX 3.5X26MM SELF TAP SS 204.826: Type: IMPLANTABLE DEVICE | Site: ELBOW | Status: FUNCTIONAL

## 2023-08-16 RX ORDER — BISACODYL 10 MG
10 SUPPOSITORY, RECTAL RECTAL DAILY PRN
Status: DISCONTINUED | OUTPATIENT
Start: 2023-08-16 | End: 2023-08-18 | Stop reason: HOSPADM

## 2023-08-16 RX ORDER — FENTANYL CITRATE 50 UG/ML
50 INJECTION, SOLUTION INTRAMUSCULAR; INTRAVENOUS EVERY 5 MIN PRN
Status: DISCONTINUED | OUTPATIENT
Start: 2023-08-16 | End: 2023-08-16 | Stop reason: HOSPADM

## 2023-08-16 RX ORDER — LIDOCAINE 40 MG/G
CREAM TOPICAL
Status: DISCONTINUED | OUTPATIENT
Start: 2023-08-16 | End: 2023-08-16 | Stop reason: HOSPADM

## 2023-08-16 RX ORDER — AMOXICILLIN 250 MG
1 CAPSULE ORAL 2 TIMES DAILY
Status: DISCONTINUED | OUTPATIENT
Start: 2023-08-16 | End: 2023-08-18 | Stop reason: HOSPADM

## 2023-08-16 RX ORDER — ACETAMINOPHEN 325 MG/1
650 TABLET ORAL EVERY 4 HOURS PRN
Status: DISCONTINUED | OUTPATIENT
Start: 2023-08-19 | End: 2023-08-18 | Stop reason: HOSPADM

## 2023-08-16 RX ORDER — MAGNESIUM SULFATE 4 G/50ML
4 INJECTION INTRAVENOUS ONCE
Status: COMPLETED | OUTPATIENT
Start: 2023-08-16 | End: 2023-08-16

## 2023-08-16 RX ORDER — ONDANSETRON 2 MG/ML
INJECTION INTRAMUSCULAR; INTRAVENOUS PRN
Status: DISCONTINUED | OUTPATIENT
Start: 2023-08-16 | End: 2023-08-16

## 2023-08-16 RX ORDER — PROPOFOL 10 MG/ML
INJECTION, EMULSION INTRAVENOUS PRN
Status: DISCONTINUED | OUTPATIENT
Start: 2023-08-16 | End: 2023-08-16

## 2023-08-16 RX ORDER — FENTANYL CITRATE 50 UG/ML
25-100 INJECTION, SOLUTION INTRAMUSCULAR; INTRAVENOUS
Status: DISCONTINUED | OUTPATIENT
Start: 2023-08-16 | End: 2023-08-16 | Stop reason: HOSPADM

## 2023-08-16 RX ORDER — OXYCODONE HYDROCHLORIDE 5 MG/1
10 TABLET ORAL EVERY 4 HOURS PRN
Status: DISCONTINUED | OUTPATIENT
Start: 2023-08-16 | End: 2023-08-18 | Stop reason: HOSPADM

## 2023-08-16 RX ORDER — LIDOCAINE 40 MG/G
CREAM TOPICAL
Status: DISCONTINUED | OUTPATIENT
Start: 2023-08-16 | End: 2023-08-18 | Stop reason: HOSPADM

## 2023-08-16 RX ORDER — FENTANYL CITRATE 50 UG/ML
25 INJECTION, SOLUTION INTRAMUSCULAR; INTRAVENOUS EVERY 5 MIN PRN
Status: DISCONTINUED | OUTPATIENT
Start: 2023-08-16 | End: 2023-08-16 | Stop reason: HOSPADM

## 2023-08-16 RX ORDER — PROPOFOL 10 MG/ML
INJECTION, EMULSION INTRAVENOUS CONTINUOUS PRN
Status: DISCONTINUED | OUTPATIENT
Start: 2023-08-16 | End: 2023-08-16

## 2023-08-16 RX ORDER — HYDROMORPHONE HCL IN WATER/PF 6 MG/30 ML
0.4 PATIENT CONTROLLED ANALGESIA SYRINGE INTRAVENOUS
Status: DISCONTINUED | OUTPATIENT
Start: 2023-08-16 | End: 2023-08-18 | Stop reason: HOSPADM

## 2023-08-16 RX ORDER — HYDROMORPHONE HCL IN WATER/PF 6 MG/30 ML
0.2 PATIENT CONTROLLED ANALGESIA SYRINGE INTRAVENOUS EVERY 5 MIN PRN
Status: DISCONTINUED | OUTPATIENT
Start: 2023-08-16 | End: 2023-08-16 | Stop reason: HOSPADM

## 2023-08-16 RX ORDER — POLYETHYLENE GLYCOL 3350 17 G/17G
17 POWDER, FOR SOLUTION ORAL DAILY
Status: DISCONTINUED | OUTPATIENT
Start: 2023-08-17 | End: 2023-08-18 | Stop reason: HOSPADM

## 2023-08-16 RX ORDER — HYDROMORPHONE HCL IN WATER/PF 6 MG/30 ML
0.2 PATIENT CONTROLLED ANALGESIA SYRINGE INTRAVENOUS
Status: DISCONTINUED | OUTPATIENT
Start: 2023-08-16 | End: 2023-08-18 | Stop reason: HOSPADM

## 2023-08-16 RX ORDER — BUPIVACAINE HYDROCHLORIDE 5 MG/ML
INJECTION, SOLUTION EPIDURAL; INTRACAUDAL
Status: COMPLETED | OUTPATIENT
Start: 2023-08-16 | End: 2023-08-16

## 2023-08-16 RX ORDER — SODIUM CHLORIDE 9 MG/ML
INJECTION, SOLUTION INTRAVENOUS CONTINUOUS
Status: DISCONTINUED | OUTPATIENT
Start: 2023-08-16 | End: 2023-08-18 | Stop reason: HOSPADM

## 2023-08-16 RX ORDER — SODIUM CHLORIDE, SODIUM LACTATE, POTASSIUM CHLORIDE, CALCIUM CHLORIDE 600; 310; 30; 20 MG/100ML; MG/100ML; MG/100ML; MG/100ML
INJECTION, SOLUTION INTRAVENOUS CONTINUOUS
Status: DISCONTINUED | OUTPATIENT
Start: 2023-08-16 | End: 2023-08-16 | Stop reason: HOSPADM

## 2023-08-16 RX ORDER — OXYCODONE HYDROCHLORIDE 5 MG/1
5-10 TABLET ORAL EVERY 4 HOURS PRN
Qty: 16 TABLET | Refills: 0 | Status: SHIPPED | OUTPATIENT
Start: 2023-08-16 | End: 2023-08-23

## 2023-08-16 RX ORDER — PROCHLORPERAZINE MALEATE 5 MG
5 TABLET ORAL EVERY 6 HOURS PRN
Status: DISCONTINUED | OUTPATIENT
Start: 2023-08-16 | End: 2023-08-18 | Stop reason: HOSPADM

## 2023-08-16 RX ORDER — OXYCODONE HYDROCHLORIDE 5 MG/1
5 TABLET ORAL EVERY 4 HOURS PRN
Status: DISCONTINUED | OUTPATIENT
Start: 2023-08-16 | End: 2023-08-18 | Stop reason: HOSPADM

## 2023-08-16 RX ORDER — ONDANSETRON 2 MG/ML
4 INJECTION INTRAMUSCULAR; INTRAVENOUS EVERY 30 MIN PRN
Status: DISCONTINUED | OUTPATIENT
Start: 2023-08-16 | End: 2023-08-16 | Stop reason: HOSPADM

## 2023-08-16 RX ORDER — HYDROMORPHONE HCL IN WATER/PF 6 MG/30 ML
0.4 PATIENT CONTROLLED ANALGESIA SYRINGE INTRAVENOUS EVERY 5 MIN PRN
Status: DISCONTINUED | OUTPATIENT
Start: 2023-08-16 | End: 2023-08-16 | Stop reason: HOSPADM

## 2023-08-16 RX ORDER — ACETAMINOPHEN 325 MG/1
975 TABLET ORAL EVERY 8 HOURS
Status: DISCONTINUED | OUTPATIENT
Start: 2023-08-16 | End: 2023-08-18 | Stop reason: HOSPADM

## 2023-08-16 RX ORDER — LIDOCAINE HYDROCHLORIDE 10 MG/ML
INJECTION, SOLUTION INFILTRATION; PERINEURAL PRN
Status: DISCONTINUED | OUTPATIENT
Start: 2023-08-16 | End: 2023-08-16

## 2023-08-16 RX ORDER — CEFAZOLIN SODIUM/WATER 2 G/20 ML
SYRINGE (ML) INTRAVENOUS
Status: DISCONTINUED
Start: 2023-08-16 | End: 2023-08-16 | Stop reason: HOSPADM

## 2023-08-16 RX ORDER — ONDANSETRON 2 MG/ML
4 INJECTION INTRAMUSCULAR; INTRAVENOUS EVERY 6 HOURS PRN
Status: DISCONTINUED | OUTPATIENT
Start: 2023-08-16 | End: 2023-08-18 | Stop reason: HOSPADM

## 2023-08-16 RX ORDER — CEFAZOLIN SODIUM 1 G/3ML
INJECTION, POWDER, FOR SOLUTION INTRAMUSCULAR; INTRAVENOUS PRN
Status: DISCONTINUED | OUTPATIENT
Start: 2023-08-16 | End: 2023-08-16

## 2023-08-16 RX ORDER — ONDANSETRON 4 MG/1
4 TABLET, ORALLY DISINTEGRATING ORAL EVERY 30 MIN PRN
Status: DISCONTINUED | OUTPATIENT
Start: 2023-08-16 | End: 2023-08-16 | Stop reason: HOSPADM

## 2023-08-16 RX ORDER — MAGNESIUM HYDROXIDE 1200 MG/15ML
LIQUID ORAL PRN
Status: DISCONTINUED | OUTPATIENT
Start: 2023-08-16 | End: 2023-08-16 | Stop reason: HOSPADM

## 2023-08-16 RX ORDER — HYDRALAZINE HYDROCHLORIDE 25 MG/1
25 TABLET, FILM COATED ORAL EVERY 6 HOURS PRN
Status: DISCONTINUED | OUTPATIENT
Start: 2023-08-16 | End: 2023-08-18 | Stop reason: HOSPADM

## 2023-08-16 RX ORDER — ONDANSETRON 4 MG/1
4 TABLET, ORALLY DISINTEGRATING ORAL EVERY 6 HOURS PRN
Status: DISCONTINUED | OUTPATIENT
Start: 2023-08-16 | End: 2023-08-18 | Stop reason: HOSPADM

## 2023-08-16 RX ADMIN — HYDROMORPHONE HYDROCHLORIDE 0.5 MG: 1 INJECTION, SOLUTION INTRAMUSCULAR; INTRAVENOUS; SUBCUTANEOUS at 18:40

## 2023-08-16 RX ADMIN — PROPOFOL 125 MCG/KG/MIN: 10 INJECTION, EMULSION INTRAVENOUS at 16:26

## 2023-08-16 RX ADMIN — SODIUM CHLORIDE, POTASSIUM CHLORIDE, SODIUM LACTATE AND CALCIUM CHLORIDE: 600; 310; 30; 20 INJECTION, SOLUTION INTRAVENOUS at 17:45

## 2023-08-16 RX ADMIN — LEVOTHYROXINE SODIUM 88 MCG: 0.09 TABLET ORAL at 08:40

## 2023-08-16 RX ADMIN — FOLIC ACID 1 MG: 1 TABLET ORAL at 08:56

## 2023-08-16 RX ADMIN — OXYCODONE HYDROCHLORIDE 5 MG: 5 TABLET ORAL at 04:06

## 2023-08-16 RX ADMIN — BACLOFEN 10 MG: 10 TABLET ORAL at 13:03

## 2023-08-16 RX ADMIN — PHENYLEPHRINE HYDROCHLORIDE 100 MCG: 10 INJECTION INTRAVENOUS at 19:16

## 2023-08-16 RX ADMIN — MAGNESIUM SULFATE HEPTAHYDRATE 4 G: 4 INJECTION, SOLUTION INTRAVENOUS at 10:34

## 2023-08-16 RX ADMIN — SODIUM CHLORIDE: 9 INJECTION, SOLUTION INTRAVENOUS at 08:47

## 2023-08-16 RX ADMIN — ACETAMINOPHEN 650 MG: 325 TABLET ORAL at 08:38

## 2023-08-16 RX ADMIN — PHENYLEPHRINE HYDROCHLORIDE 0.2 MCG/KG/MIN: 10 INJECTION INTRAVENOUS at 19:10

## 2023-08-16 RX ADMIN — CEFAZOLIN 2 G: 1 INJECTION, POWDER, FOR SOLUTION INTRAMUSCULAR; INTRAVENOUS at 19:45

## 2023-08-16 RX ADMIN — PHENYLEPHRINE HYDROCHLORIDE 100 MCG: 10 INJECTION INTRAVENOUS at 19:05

## 2023-08-16 RX ADMIN — BUSPIRONE HYDROCHLORIDE 10 MG: 10 TABLET ORAL at 08:40

## 2023-08-16 RX ADMIN — CARBOXYMETHYLCELLULOSE SODIUM 1 DROP: 5 SOLUTION/ DROPS OPHTHALMIC at 08:40

## 2023-08-16 RX ADMIN — LISINOPRIL 20 MG: 20 TABLET ORAL at 08:39

## 2023-08-16 RX ADMIN — CEFDINIR 300 MG: 300 CAPSULE ORAL at 08:40

## 2023-08-16 RX ADMIN — SIMETHICONE 80 MG: 80 TABLET, CHEWABLE ORAL at 04:27

## 2023-08-16 RX ADMIN — FERROUS SULFATE TAB 325 MG (65 MG ELEMENTAL FE) 325 MG: 325 (65 FE) TAB at 08:39

## 2023-08-16 RX ADMIN — SODIUM CHLORIDE, POTASSIUM CHLORIDE, SODIUM LACTATE AND CALCIUM CHLORIDE: 600; 310; 30; 20 INJECTION, SOLUTION INTRAVENOUS at 14:35

## 2023-08-16 RX ADMIN — OXYCODONE HYDROCHLORIDE 5 MG: 5 TABLET ORAL at 08:54

## 2023-08-16 RX ADMIN — CEFAZOLIN 2 G: 1 INJECTION, POWDER, FOR SOLUTION INTRAMUSCULAR; INTRAVENOUS at 16:21

## 2023-08-16 RX ADMIN — ACETAMINOPHEN 975 MG: 325 TABLET ORAL at 22:20

## 2023-08-16 RX ADMIN — FENTANYL CITRATE 50 MCG: 50 INJECTION, SOLUTION INTRAMUSCULAR; INTRAVENOUS at 14:39

## 2023-08-16 RX ADMIN — BACLOFEN 10 MG: 10 TABLET ORAL at 08:40

## 2023-08-16 RX ADMIN — ACETAMINOPHEN 650 MG: 325 TABLET ORAL at 13:02

## 2023-08-16 RX ADMIN — FAMOTIDINE 20 MG: 20 TABLET, FILM COATED ORAL at 08:39

## 2023-08-16 RX ADMIN — DULOXETINE HYDROCHLORIDE 60 MG: 60 CAPSULE, DELAYED RELEASE PELLETS ORAL at 08:39

## 2023-08-16 RX ADMIN — PROPOFOL 130 MG: 10 INJECTION, EMULSION INTRAVENOUS at 16:26

## 2023-08-16 RX ADMIN — BUPIVACAINE HYDROCHLORIDE 25 ML: 5 INJECTION, SOLUTION EPIDURAL; INTRACAUDAL at 14:45

## 2023-08-16 RX ADMIN — PANTOPRAZOLE SODIUM 40 MG: 40 TABLET, DELAYED RELEASE ORAL at 06:33

## 2023-08-16 RX ADMIN — HYDROMORPHONE HYDROCHLORIDE 0.5 MG: 1 INJECTION, SOLUTION INTRAMUSCULAR; INTRAVENOUS; SUBCUTANEOUS at 18:29

## 2023-08-16 RX ADMIN — ONDANSETRON 4 MG: 2 INJECTION INTRAMUSCULAR; INTRAVENOUS at 16:36

## 2023-08-16 RX ADMIN — SODIUM CHLORIDE: 9 INJECTION, SOLUTION INTRAVENOUS at 22:22

## 2023-08-16 RX ADMIN — PHENYLEPHRINE HYDROCHLORIDE 100 MCG: 10 INJECTION INTRAVENOUS at 19:00

## 2023-08-16 RX ADMIN — OXYCODONE HYDROCHLORIDE 10 MG: 5 TABLET ORAL at 22:29

## 2023-08-16 RX ADMIN — LIDOCAINE HYDROCHLORIDE 2 ML: 10 INJECTION, SOLUTION INFILTRATION; PERINEURAL at 16:26

## 2023-08-16 RX ADMIN — Medication 800 MG: at 08:40

## 2023-08-16 RX ADMIN — SENNOSIDES AND DOCUSATE SODIUM 1 TABLET: 50; 8.6 TABLET ORAL at 22:29

## 2023-08-16 RX ADMIN — Medication 2000 UNITS: at 08:39

## 2023-08-16 ASSESSMENT — ACTIVITIES OF DAILY LIVING (ADL)
ADLS_ACUITY_SCORE: 39
ADLS_ACUITY_SCORE: 35
ADLS_ACUITY_SCORE: 39

## 2023-08-16 NOTE — PLAN OF CARE
Goal Outcome Evaluation:      Plan of Care Reviewed With: patient    Overall Patient Progress: no changeOverall Patient Progress: no change    Alert and oriented x4, able to make needs known, forget, c/o elbow pain, was tearful, gave PRN 5 mg OXY for good relief, able to wiggle fingers, LUE has swelling and cold, elevated with pillows and ice applied, denied changes in sensations, c/o gas discomfort and bloating, gave PRN simethicone for some relief, bowel sounds active and abd soft, reported passing flatus, voiding, on Mg protocol, NPO at midnight for surgery

## 2023-08-16 NOTE — ANESTHESIA PROCEDURE NOTES
Airway       Patient location during procedure: OR  Staff -        Performed By: CRNA  Consent for Airway        Urgency: elective  Indications and Patient Condition       Indications for airway management: judd-procedural         Mask difficulty assessment: 1 - vent by mask    Final Airway Details       Final airway type: supraglottic airway    Supraglottic Airway Details        Type: LMA       Brand: Ambu AuraGain       LMA size: 4    Post intubation assessment        Number of attempts at approach: 1       Number of other approaches attempted: 0       Secured with: silk tape       Ease of procedure: easy       Dentition: Intact and Unchanged

## 2023-08-16 NOTE — ANESTHESIA PREPROCEDURE EVALUATION
Anesthesia Pre-Procedure Evaluation    Patient: Belgica Salvador   MRN: [id number] : 1945        Procedure : Procedure(s):  OPEN REDUCTION INTERNAL FIXATION, FRACTURE, HUMERUS, DISTAL          Past Medical History:   Diagnosis Date    Anxiety     Arthritis     Coronary artery disease     Diabetes (H)     Gastroesophageal reflux disease     Graves disease     Hypertension     Migraine     Muscle spasm     Renal disease     Thrombosis     Thyroid disease       Past Surgical History:   Procedure Laterality Date    ANKLE SURGERY Right     hardware in place    APPENDECTOMY      ARTHROPLASTY KNEE Left 11/10/2022    Procedure: LEFT TOTAL KNEE ARTHROPLASTY;  Surgeon: Arnold Peter MD;  Location: [facility] Main OR    CERVICAL FUSION N/A 3/20/2019    Procedure: LEFT CERVICAL 7 - THORACIC 1 ANTERIOR CERVICAL DECOMPRESSION FUSION REMOVAL OF PLATE AT CERVICAL 5-6 CERVICAL 6-7;  Surgeon: Richard Talley MD;  Location: [facility] OR;  Service: Spine    ESOPHAGOSCOPY, GASTROSCOPY, DUODENOSCOPY (EGD), COMBINED N/A 2017    Procedure: ESOPHAGOGASTRODUODENOSCOPY (EGD) with biopsies;  Surgeon: Jorge Palm MD;  Location: [facility] GI;  Service:     HC REVISE MEDIAN N/CARPAL TUNNEL SURG      Description: Neuroplasty Decompression Median Nerve At Carpal Tunnel;  Proc Date: 2002;    IR AORTIC ARCH 4 VESSEL ANGIOGRAM  2012    IR CAROTID ANGIOGRAM  2012    IR CAROTID ANGIOGRAM  2012    JOINT REPLACEMENT Right 2009    knee    OTHER SURGICAL HISTORY  2013    spleenectomy    OTHER SURGICAL HISTORY Left     melanoma ear    OTHER SURGICAL HISTORY      bladder interstimstage 1 & 2    PANCREAS SURGERY N/A     tail and part of body removed    NE IMPLANT PERIPH/GASTRIC NEUROSTIM/ N/A 2017    Procedure: INTERSTIM STAGE 2;  Surgeon: Catarino Hurt MD;  Location: [facility] OR;  Service: Gynecology    NE PARTIAL EXCISION THYROID,UNILAT      Description: Thyroid Surgery Sub-Total  Thyroidectomy;  Recorded: 09/13/2008;  Comments: for grave's    RELEASE CARPAL TUNNEL Left 06/2018    [facility] CERV SPINE FUSN,ANTER,BELOW C2      Description: Cervical Vertebral Fusion;  Proc Date: 05/01/2001;  Comments: c5-c7    [facility] GASTRIC BYPASS,OBESE<100CM AGUS-EN-Y      Description: Gastric Surgery For Morbid Obesity Bypass With Agus-en-Y;  Proc Date: 09/01/2004;    [facility] KNEE SCOPE,AID POST CRUC REPAIR      Description: Knee Arthroscopy With Posterior Cruciate Ligament Repair;  Recorded: 09/13/2008;      Allergies   Allergen Reactions    Glucosamine Anaphylaxis and Rash     Cardiac arrest    Ibuprofen Hives    Iodine Angioedema, Anaphylaxis and Hives     Patient can uses skin products such as betadine - See shellfish allergy      Naproxen Hives and Rash    Shellfish Allergy Anaphylaxis, Hives and Rash     Cardiac arrest - very severe    Shellfish-Derived Products Anaphylaxis, Hives and Rash     Cardiac arrest - very severe      Amitriptyline Visual Disturbance     Hallucinations    Buspirone Visual Disturbance     Hallucinations    Nystatin Nausea, Diarrhea, Cramps and GI Disturbance    Tramadol Visual Disturbance     Hallucinations    Chlorpheniramine Hives and Rash    Nsaids Unknown    Pseudoephedrine Hives and Rash      Social History     Tobacco Use    Smoking status: Former    Smokeless tobacco: Never   Substance Use Topics    Alcohol use: Not Currently     Alcohol/week: 1.0 - 2.0 standard drink of alcohol     Types: 1 - 2 Standard drinks or equivalent per week      Wt Readings from Last 1 Encounters:   08/16/23 79 kg (174 lb 3.2 oz)        Anesthesia Evaluation            ROS/MED HX  ENT/Pulmonary:  - neg pulmonary ROS     Neurologic:       Cardiovascular:     (+)  hypertension- -  CAD -  - -                                      METS/Exercise Tolerance:     Hematologic:     (+) History of blood clots,               Musculoskeletal:       GI/Hepatic:     (+) GERD,                   Renal/Genitourinary:        Endo:     (+)          thyroid problem,     Obesity,       Psychiatric/Substance Use:       Infectious Disease:       Malignancy:       Other:            Physical Exam    Airway  airway exam normal      Mallampati: II   TM distance: > 3 FB   Neck ROM: full   Mouth opening: > 3 cm    Respiratory Devices and Support         Dental           Cardiovascular   cardiovascular exam normal          Pulmonary   pulmonary exam normal                OUTSIDE LABS:  CBC:   Lab Results   Component Value Date    WBC 8.8 08/14/2023    WBC 13.2 (H) 08/13/2023    HGB 13.8 08/16/2023    HGB 12.8 08/14/2023    HCT 37.9 08/14/2023    HCT 38.4 08/13/2023     08/14/2023     08/13/2023     BMP:   Lab Results   Component Value Date     08/16/2023     08/14/2023    POTASSIUM 4.0 08/16/2023    POTASSIUM 4.1 08/14/2023    CHLORIDE 102 08/16/2023    CHLORIDE 103 08/14/2023    CO2 28 08/16/2023    CO2 25 08/14/2023    BUN 8 08/16/2023    BUN 15 08/14/2023    CR 0.67 08/16/2023    CR 0.66 08/14/2023    GLC 80 08/16/2023     (H) 08/16/2023     COAGS:   Lab Results   Component Value Date    INR 0.96 08/14/2023     POC: No results found for: BGM, HCG, HCGS  HEPATIC:   Lab Results   Component Value Date    ALBUMIN 4.0 12/13/2022     OTHER:   Lab Results   Component Value Date    A1C 5.8 (H) 08/13/2023    OPHELIA 9.3 08/16/2023    MAG 1.5 (L) 08/16/2023    CRP 0.8 (H) 12/21/2022    SED 8 12/21/2022       Anesthesia Plan    ASA Status:  3    NPO Status:  NPO Appropriate    Anesthesia Type: General.     - Airway: LMA      Maintenance: TIVA.        Consents    Anesthesia Plan(s) and associated risks, benefits, and realistic alternatives discussed. Questions answered and patient/representative(s) expressed understanding.     - Discussed: Risks, Benefits and Alternatives for BOTH SEDATION and the PROCEDURE were discussed     - Discussed with:  Patient      - Extended Intubation/Ventilatory Support Discussed: Yes.            Postoperative Care    Pain management: Peripheral nerve block (Single Shot).   PONV prophylaxis: Ondansetron (or other 5HT-3)     Comments:    Other Comments: Discussed code status - would like to pause DNR/DNI for case but to use best judgement if code were to occur and seem futile.  Would like to avoid chest compressions if at all possible, but again says to use best judgement if needed.             FREDERIC VERA MD

## 2023-08-16 NOTE — PROGRESS NOTES
[facility]    Medicine Progress Note - Hospitalist Service    Date of Admission:  8/13/2023    Assessment & Plan   77 year old female admitted on 8/13/2023. She is to the ER after a fall.  In the ER she had multiple imaging studies which were unremarkable except for a known humerus fracture.  She was found to have a UTI.  Orthopedics felt that she should be admitted to the hospital for repair of the complicated fracture.  ORIF of distal humerus planned for 8/16/23.       Fall initial encounter.   Generalized weakness.   Likely due to acute cystitis  Fall precautions  Will need PT and OT evaluation post procedure  Suspect will need TCU     Left distal humerus fracture.   Plan for ORIF distal humerus frature on 8/16  Pain control is adequate     Acute cystitis  Culture with E. coli  Continue Omnicef started on 8/15, monitor culture sensitivities.      Hypomagnesemia/mild hyperkalemia  IV fluids and replacement protocol  Improved    Essential hypertension  Home meds with hold parameters     Remote history of DVT/provoked  Likely direct oral anticoagulation post procedure     Type 2 diabetes  Hold metformin  Correction insulin    Acute leukocytosis  Due to demargination  Resolved     Hypothyroidism  Peripheral neuropathy  Vitamin D deficiency  Anxiety disorder     Diet: NPO for Medical/Clinical Reasons Except for: Meds    DVT Prophylaxis: Pneumatic Compression Devices  Sierra Catheter: Not present  Lines: None     Cardiac Monitoring: None  Code Status: No CPR- Do NOT Intubate      Clinically Significant Risk Factors Present on Admission            # Hypomagnesemia: Lowest Mg = 1.5 mg/dL in last 2 days, will replace as needed     # Drug Induced Platelet Defect: home medication list includes an antiplatelet medication   # Hypertension: Noted on problem list      # Overweight: Estimated body mass index is 28.12 kg/m  as calculated from the following:    Height as of this encounter: 1.676 m (5'  "6\").    Weight as of this encounter: 79 kg (174 lb 3.2 oz).              Disposition Plan     Expected Discharge Date: 08/17/2023        Discharge Comments: surgery pending for 5:30PM 8/15          Austyn Hayward DO  Hospitalist Service  [facility]  Securely message with [employer] (more info)  Text page via [employer] Paging/Directory   ______________________________________________________________________    Interval History   Surgery planned for this afternoon.  No complaints of chest pain, shortness of breath, orthopnea or PND.    Physical Exam   Vital Signs: Temp: 98.1  F (36.7  C) Temp src: Oral BP: (!) 152/72 (Nurse notified) Pulse: 84   Resp: 18 SpO2: 96 % O2 Device: None (Room air)    Weight: 174 lbs 3.2 oz    GENERAL:  Alert, appears comfortable, in no acute distress, appears stated age   HEAD:  Normocephalic, without obvious abnormality, atraumatic   EYES:  PERRL, conjunctiva/corneas clear, no scleral icterus, EOM's intact   LUNGS:   Clear to auscultation bilaterally, no rales, rhonchi, or wheezing, symmetric chest rise on inhalation, respirations unlabored   HEART:  Regular rate and rhythm, S1 and S2 normal, no murmur, rub, or gallop    ABDOMEN:   Soft, non-tender, bowel sounds active all four quadrants, no masses, no organomegaly, no rebound or guarding   EXTREMITIES: Extremities normal, atraumatic, no cyanosis or edema    SKIN: Dry to touch, no exanthems in the visualized areas   NEURO: Alert, oriented x 4, moves all four extremities freely/spontaneously   PSYCH: Cooperative, behavior is appropriate        Medical Decision Making     40 MINUTES SPENT BY ME on the date of service doing chart review, history, exam, documentation & further activities per the note.      Data     I have personally reviewed the following data over the past 24 hrs:    N/A  \   13.8   / N/A     139 102 8 /  155 (H)   4.0 28 0.67 \       Imaging results reviewed over the past 24 hrs:   Recent Results (from " "the past 24 hour(s))   POC US Guidance Needle Placement    Narrative    Ultrasound was performed as guidance to an anesthesia procedure.  Click   \"PACS images\" hyperlink below to view any stored images.  For specific   procedure details, view procedure note authored by anesthesia.     "

## 2023-08-16 NOTE — PLAN OF CARE
Problem: Plan of Care - These are the overarching goals to be used throughout the patient stay.    Goal: Absence of Hospital-Acquired Illness or Injury  Intervention: Identify and Manage Fall Risk  Recent Flowsheet Documentation  Taken 8/16/2023 1402 by Meghann Morales RN  Safety Promotion/Fall Prevention:   activity supervised   assistive device/personal items within reach   clutter free environment maintained   increased rounding and observation   nonskid shoes/slippers when out of bed   safety round/check completed     Goal Outcome Evaluation:    VSS. A&Ox4. Calls appropriately for needs. Assist of 1.  Afrebile. Pt has been NPO since midnight. IV fluids running. ACHS glucose checks. Voids spontaneously, has a bladder stimulator. Complains of pain, given PRN oxy, pt states interventions were effective. LUE is swollen, red, and cold. LUE elevated with pillows and ice applied. Bed alarm on. Mg protocol.     Meghann Morales RN  August 16, 2023, 2:56 PM

## 2023-08-16 NOTE — ANESTHESIA PROCEDURE NOTES
Brachial plexus Procedure Note    Pre-Procedure   Staff -        Anesthesiologist:  Nenita Chi MD       Performed By: anesthesiologist       Location: pre-op       Procedure Start/Stop Times: 8/16/2023 2:40 PM and 8/16/2023 2:45 PM       Pre-Anesthestic Checklist: patient identified, IV checked, site marked, risks and benefits discussed, informed consent, monitors and equipment checked, pre-op evaluation, at physician/surgeon's request and post-op pain management  Timeout:       Correct Patient: Yes        Correct Procedure: Yes        Correct Site: Yes        Correct Position: Yes        Correct Laterality: Yes        Site Marked: Yes  Procedure Documentation  Procedure: Brachial plexus       Laterality: left       Patient Position: supine       Patient Prep/Sterile Barriers: sterile gloves, mask       Skin prep: Chloraprep (supraclavicular approach).       Needle Type: insulated       Needle Gauge: 20.        Needle Length (Inches): 4        Ultrasound guided       1. Ultrasound was used to identify targeted nerve, plexus, vascular marker, or fascial plane and place a needle adjacent to it in real-time.       2. Ultrasound was used to visualize the spread of anesthetic in close proximity to the above referenced structure.       3. A permanent image is entered into the patient's record.       4. The visualized anatomic structures appeared normal.       5. There were no apparent abnormal pathologic findings.    Assessment/Narrative         The placement was negative for: blood aspirated, painful injection and site bleeding       Paresthesias: No.       Bolus given via needle..        Secured via.        Insertion/Infusion Method: Single Shot       Complications: none       Injection made incrementally with aspirations every 5 mL.    Medication(s) Administered   Bupivacaine 0.5% PF (Infiltration) - Infiltration   25 mL - 8/16/2023 2:45:00 PM  Medication Administration Time: 8/16/2023 2:40 PM      FOR [facility]  "([facility]) ONLY:   Pain Team Contact information: please page the Pain Team Via [employer] [address]. Search \"Pain\". During daytime hours, please page the attending first. At night please page the resident first.      "

## 2023-08-17 ENCOUNTER — APPOINTMENT (OUTPATIENT)
Dept: PHYSICAL THERAPY | Facility: CLINIC | Age: 78
DRG: 511 | End: 2023-08-17
Attending: SURGERY
Payer: COMMERCIAL

## 2023-08-17 ENCOUNTER — APPOINTMENT (OUTPATIENT)
Dept: OCCUPATIONAL THERAPY | Facility: CLINIC | Age: 78
DRG: 511 | End: 2023-08-17
Payer: COMMERCIAL

## 2023-08-17 LAB
APTT PPP: 29 SECONDS (ref 22–38)
ATRIAL RATE - MUSE: 95 BPM
BASOPHILS # BLD MANUAL: 0 10E3/UL (ref 0–0.2)
BASOPHILS NFR BLD MANUAL: 0 %
DIASTOLIC BLOOD PRESSURE - MUSE: NORMAL MMHG
EOSINOPHIL # BLD MANUAL: 0.3 10E3/UL (ref 0–0.7)
EOSINOPHIL NFR BLD MANUAL: 2 %
ERYTHROCYTE [DISTWIDTH] IN BLOOD BY AUTOMATED COUNT: 13.5 % (ref 10–15)
GLUCOSE BLDC GLUCOMTR-MCNC: 103 MG/DL (ref 70–99)
GLUCOSE BLDC GLUCOMTR-MCNC: 129 MG/DL (ref 70–99)
GLUCOSE BLDC GLUCOMTR-MCNC: 167 MG/DL (ref 70–99)
GLUCOSE BLDC GLUCOMTR-MCNC: 181 MG/DL (ref 70–99)
HCT VFR BLD AUTO: 36.8 % (ref 35–47)
HGB BLD-MCNC: 12.4 G/DL (ref 11.7–15.7)
INR PPP: 0.93 (ref 0.85–1.15)
INTERPRETATION ECG - MUSE: NORMAL
LYMPHOCYTES # BLD MANUAL: 2 10E3/UL (ref 0.8–5.3)
LYMPHOCYTES NFR BLD MANUAL: 14 %
MAGNESIUM SERPL-MCNC: 1.6 MG/DL (ref 1.8–2.6)
MCH RBC QN AUTO: 33.9 PG (ref 26.5–33)
MCHC RBC AUTO-ENTMCNC: 33.7 G/DL (ref 31.5–36.5)
MCV RBC AUTO: 101 FL (ref 78–100)
MONOCYTES # BLD MANUAL: 2.2 10E3/UL (ref 0–1.3)
MONOCYTES NFR BLD MANUAL: 15 %
NEUTROPHILS # BLD MANUAL: 9.9 10E3/UL (ref 1.6–8.3)
NEUTROPHILS NFR BLD MANUAL: 69 %
P AXIS - MUSE: 54 DEGREES
PLAT MORPH BLD: ABNORMAL
PLATELET # BLD AUTO: 329 10E3/UL (ref 150–450)
PR INTERVAL - MUSE: 132 MS
QRS DURATION - MUSE: 126 MS
QT - MUSE: 370 MS
QTC - MUSE: 464 MS
R AXIS - MUSE: 21 DEGREES
RBC # BLD AUTO: 3.66 10E6/UL (ref 3.8–5.2)
RBC MORPH BLD: ABNORMAL
SYSTOLIC BLOOD PRESSURE - MUSE: NORMAL MMHG
T AXIS - MUSE: 10 DEGREES
VENTRICULAR RATE- MUSE: 95 BPM
WBC # BLD AUTO: 14.4 10E3/UL (ref 4–11)

## 2023-08-17 PROCEDURE — 85007 BL SMEAR W/DIFF WBC COUNT: CPT | Performed by: SURGERY

## 2023-08-17 PROCEDURE — 83735 ASSAY OF MAGNESIUM: CPT | Performed by: INTERNAL MEDICINE

## 2023-08-17 PROCEDURE — 250N000013 HC RX MED GY IP 250 OP 250 PS 637: Performed by: PHYSICIAN ASSISTANT

## 2023-08-17 PROCEDURE — 97116 GAIT TRAINING THERAPY: CPT | Mod: GP

## 2023-08-17 PROCEDURE — 85027 COMPLETE CBC AUTOMATED: CPT | Performed by: SURGERY

## 2023-08-17 PROCEDURE — 85610 PROTHROMBIN TIME: CPT | Performed by: SURGERY

## 2023-08-17 PROCEDURE — 85730 THROMBOPLASTIN TIME PARTIAL: CPT | Performed by: SURGERY

## 2023-08-17 PROCEDURE — 93005 ELECTROCARDIOGRAM TRACING: CPT

## 2023-08-17 PROCEDURE — 97166 OT EVAL MOD COMPLEX 45 MIN: CPT | Mod: GO

## 2023-08-17 PROCEDURE — 258N000003 HC RX IP 258 OP 636: Performed by: INTERNAL MEDICINE

## 2023-08-17 PROCEDURE — 36415 COLL VENOUS BLD VENIPUNCTURE: CPT | Performed by: SURGERY

## 2023-08-17 PROCEDURE — 99232 SBSQ HOSP IP/OBS MODERATE 35: CPT | Performed by: INTERNAL MEDICINE

## 2023-08-17 PROCEDURE — 120N000001 HC R&B MED SURG/OB

## 2023-08-17 PROCEDURE — 250N000013 HC RX MED GY IP 250 OP 250 PS 637: Performed by: FAMILY MEDICINE

## 2023-08-17 PROCEDURE — 250N000011 HC RX IP 250 OP 636: Mod: JZ | Performed by: PHYSICIAN ASSISTANT

## 2023-08-17 PROCEDURE — 97535 SELF CARE MNGMENT TRAINING: CPT | Mod: GO

## 2023-08-17 PROCEDURE — 250N000013 HC RX MED GY IP 250 OP 250 PS 637: Performed by: HOSPITALIST

## 2023-08-17 PROCEDURE — 97162 PT EVAL MOD COMPLEX 30 MIN: CPT | Mod: GP

## 2023-08-17 PROCEDURE — 93010 ELECTROCARDIOGRAM REPORT: CPT | Performed by: INTERNAL MEDICINE

## 2023-08-17 PROCEDURE — 93005 ELECTROCARDIOGRAM TRACING: CPT | Performed by: INTERNAL MEDICINE

## 2023-08-17 RX ADMIN — FOLIC ACID 1 MG: 1 TABLET ORAL at 08:57

## 2023-08-17 RX ADMIN — SODIUM CHLORIDE 500 ML: 9 INJECTION, SOLUTION INTRAVENOUS at 09:33

## 2023-08-17 RX ADMIN — ACETAMINOPHEN 975 MG: 325 TABLET ORAL at 13:13

## 2023-08-17 RX ADMIN — LORAZEPAM 0.5 MG: 0.5 TABLET ORAL at 05:49

## 2023-08-17 RX ADMIN — PANTOPRAZOLE SODIUM 40 MG: 40 TABLET, DELAYED RELEASE ORAL at 16:56

## 2023-08-17 RX ADMIN — OXYCODONE HYDROCHLORIDE 10 MG: 5 TABLET ORAL at 06:42

## 2023-08-17 RX ADMIN — ROSUVASTATIN CALCIUM 5 MG: 5 TABLET, FILM COATED ORAL at 21:16

## 2023-08-17 RX ADMIN — BUSPIRONE HYDROCHLORIDE 10 MG: 10 TABLET ORAL at 21:16

## 2023-08-17 RX ADMIN — ACETAMINOPHEN 975 MG: 325 TABLET ORAL at 05:49

## 2023-08-17 RX ADMIN — LEVOTHYROXINE SODIUM 88 MCG: 0.09 TABLET ORAL at 09:03

## 2023-08-17 RX ADMIN — OXYCODONE HYDROCHLORIDE 5 MG: 5 TABLET ORAL at 13:09

## 2023-08-17 RX ADMIN — Medication 800 MG: at 21:15

## 2023-08-17 RX ADMIN — ACETAMINOPHEN 975 MG: 325 TABLET ORAL at 21:16

## 2023-08-17 RX ADMIN — BUSPIRONE HYDROCHLORIDE 10 MG: 10 TABLET ORAL at 09:03

## 2023-08-17 RX ADMIN — BACLOFEN 10 MG: 10 TABLET ORAL at 13:10

## 2023-08-17 RX ADMIN — SIMETHICONE 80 MG: 80 TABLET, CHEWABLE ORAL at 05:46

## 2023-08-17 RX ADMIN — FAMOTIDINE 20 MG: 20 TABLET, FILM COATED ORAL at 21:16

## 2023-08-17 RX ADMIN — Medication 2000 UNITS: at 09:03

## 2023-08-17 RX ADMIN — CARBOXYMETHYLCELLULOSE SODIUM 1 DROP: 5 SOLUTION/ DROPS OPHTHALMIC at 08:58

## 2023-08-17 RX ADMIN — BACLOFEN 10 MG: 10 TABLET ORAL at 09:03

## 2023-08-17 RX ADMIN — CARBOXYMETHYLCELLULOSE SODIUM 1 DROP: 5 SOLUTION/ DROPS OPHTHALMIC at 21:18

## 2023-08-17 RX ADMIN — DULOXETINE HYDROCHLORIDE 60 MG: 60 CAPSULE, DELAYED RELEASE PELLETS ORAL at 08:56

## 2023-08-17 RX ADMIN — PANTOPRAZOLE SODIUM 40 MG: 40 TABLET, DELAYED RELEASE ORAL at 06:57

## 2023-08-17 RX ADMIN — CEFDINIR 300 MG: 300 CAPSULE ORAL at 09:03

## 2023-08-17 RX ADMIN — Medication 800 MG: at 08:57

## 2023-08-17 RX ADMIN — OXYCODONE HYDROCHLORIDE 10 MG: 5 TABLET ORAL at 02:25

## 2023-08-17 RX ADMIN — OXYCODONE HYDROCHLORIDE 10 MG: 5 TABLET ORAL at 21:17

## 2023-08-17 RX ADMIN — BACLOFEN 10 MG: 10 TABLET ORAL at 21:15

## 2023-08-17 RX ADMIN — SENNOSIDES AND DOCUSATE SODIUM 1 TABLET: 50; 8.6 TABLET ORAL at 08:57

## 2023-08-17 RX ADMIN — FAMOTIDINE 20 MG: 20 TABLET, FILM COATED ORAL at 08:57

## 2023-08-17 RX ADMIN — FERROUS SULFATE TAB 325 MG (65 MG ELEMENTAL FE) 325 MG: 325 (65 FE) TAB at 08:57

## 2023-08-17 RX ADMIN — CEFDINIR 300 MG: 300 CAPSULE ORAL at 21:16

## 2023-08-17 RX ADMIN — HYDROMORPHONE HYDROCHLORIDE 0.4 MG: 0.2 INJECTION, SOLUTION INTRAMUSCULAR; INTRAVENOUS; SUBCUTANEOUS at 04:26

## 2023-08-17 ASSESSMENT — ACTIVITIES OF DAILY LIVING (ADL)
ADLS_ACUITY_SCORE: 35
ADLS_ACUITY_SCORE: 33
ADLS_ACUITY_SCORE: 33
ADLS_ACUITY_SCORE: 35
ADLS_ACUITY_SCORE: 33
ADLS_ACUITY_SCORE: 35

## 2023-08-17 NOTE — PROGRESS NOTES
"[facility]  Supervising Physician Siddhartha Santamaria MD    S: Paged by nurse to evaluate LUE dressing, soaked through with blood.  POD#1 s/p L distal humerus ORIF.  Last hour or more of the surgery was done without tourniquet.  Pt states she is beginning to feel \"raw\" and \"achy\" around wound/Fx, otherwise well.    O: A&O x3, appears well.      LUE dressing struck through with bleeding.  Wound appears to be oozing still, steadily but slowly.        0329 CBC shows WBC 14.4, Hgb 12.4, hematocrit 36.8  Afebrile (36.9)    Impression/plan: POD#1 L. Distal humerus ORIF with postoperative bleeding now slowed to oozing.  Reapplied new dressing with gauze/ABDs within long arm splint.  Discussed plan with Dr. Santamaria, who recommended the following.  Will continue to monitor this and get coags drawn as well.  Encouraged pt to work on hand ROM.    "

## 2023-08-17 NOTE — PROGRESS NOTES
Care Management Follow Up    Length of Stay (days): 2    Expected Discharge Date: 08/18/2023     Concerns to be Addressed:       Patient plan of care discussed at interdisciplinary rounds: Yes    Anticipated Discharge Disposition: Transitional Care  .  Anticipated Discharge Services:  (TCU)  Anticipated Discharge DME:  (TBD)    Patient/family educated on Medicare website which has current facility and service quality ratings: yes  Education Provided on the Discharge Plan: Yes  Patient/Family in Agreement with the Plan: yes    Referrals Placed by CM/SW: Post Acute Facilities  Private pay costs discussed: transportation costs    Additional Information:  Bed available for pt tomorrow 8/18 at [facility] (formerly [facility]  Updated pt and daughter and they are in agreement with facility.  Pt requests transport be arranged, is aware of possible out of pocket cost.  [facility] wheelchair set up with a ride window of 1:45pm-2:15pm.  PAS done.    BAILEE Wray

## 2023-08-17 NOTE — PLAN OF CARE
Goal Outcome Evaluation:       Pt remains alert, disoriented to time and forgetful. O2 sat WNL on room air. Had episode of tachycardia and low BP this morning when she was up in chair following working with OT. HR was 110s-115, BP 80s-90s/50s. She was assymptomatic. MD was notified and ordered 500 bolus of NS. BP improved quickly following fluids and has remained improved, 111/60 most recently. Reporting moderate elbow pain on surgical limb, PRN oxycodone given x 1 this shift. Reports pain is improved from overnight.     Dressing is C/D/I. Still reporting some numbness/tingling in L hand. Can wiggle fingers but minimal  strength in L hand.     Ambulating with 1 assist and cane/GB. Voiding, no BM today.     Overall Patient Progress: improving     Problem: Orthopaedic Fracture  Goal: Absence of Bleeding  Outcome: Progressing     Problem: Orthopaedic Fracture  Goal: Absence of Infection Signs and Symptoms  Outcome: Progressing     Problem: Orthopaedic Fracture  Goal: Optimal Pain Control and Function  Outcome: Progressing     Problem: Orthopaedic Fracture  Goal: Effective Oxygenation and Ventilation  Outcome: Progressing

## 2023-08-17 NOTE — PLAN OF CARE
Goal Outcome Evaluation:    Patient vital signs are at baseline: No,  Reason:  On 2 L oxygen  Patient able to ambulate as they were prior to admission or with assist devices provided by therapies during their stay:  Yes  Patient MUST void prior to discharge:  Yes  Patient able to tolerate oral intake:  Yes  Pain has adequate pain control using Oral analgesics:  No,  Reason:  IV dilaudid administered  Does patient have an identified :  Yes  Has goal D/C date and time been discussed with patient:  Yes     Pt is A&O x4. LUE is splinted and in sling, PWB to LUE. Block intact. Patient has +2 edema in L hand. Rains was notified for large wet bloody drainage on patient's dressing. Rains RAFFAELE Pichardo assessed Pt's surgical site. Dressing was changed by PA. Hgb stable and VSS stable. Denies dizziness/lightheadedness. Patient has had PRN Oxycodone and PRN IV dilaudid to manage pain in back and L arm. Mag protocol for recheck tomorrow.

## 2023-08-17 NOTE — PLAN OF CARE
"  Problem: Plan of Care - These are the overarching goals to be used throughout the patient stay.    Goal: Readiness for Transition of Care  Outcome: Progressing   Goal Outcome Evaluation:                      BP (!) 158/69 (BP Location: Right arm)   Pulse 79   Temp 97.6  F (36.4  C) (Oral)   Resp 16   Ht 1.676 m (5' 6\")   Wt 79 kg (174 lb 3.2 oz)   SpO2 95%   BMI 28.12 kg/m      Pt Aox4. But forgetful. Not OOB since surgery. Due to void. Bladder stimulator reactivated after surgery but unable to verify settings. Passed along to overnight RN to have  day shift verify settings with pt's daughter.    Jhoan Crowe, RN    "

## 2023-08-17 NOTE — OP NOTE
Procedure Date: 08/16/2023    OPERATION PERFORMED:  Open reduction and internal fixation, left displaced distal radius fracture and ulnar nerve transposition.    ESTIMATED BLOOD LOSS:  100 mL.    COMPLICATIONS:  None.    PREOPERATIVE DIAGNOSIS:  Displaced distal humerus fracture.    POSTOPERATIVE DIAGNOSIS:  Displaced distal humerus fracture.    INDICATIONS FOR PROCEDURE:  This is a 77-year-old female who sustained a fall resulting in a displaced distal humerus fracture, now presents for open reduction and internal fixation of the fracture.    DESCRIPTION OF PROCEDURE:  The patient was brought to the operating room and placed in a modified right lateral decubitus position.  Left upper extremity was prepped and draped in the standard sterile fashion.  Upper arm tourniquet inflated to 250 mmHg.  Now, an approximately 12 cm posterior longitudinal incision was made at the left elbow.  The incision was carried down through the skin and subcutaneous tissues.  Flaps were raised medially and laterally.  At this time, the ulnar nerve was identified.  It was followed distally to proximally across Tolentino's ligament to the 2 heads of flexor carpi ulnaris tendon.  It was then transposed anterior to the medial epicondyle.  Throughout the case, the ulnar nerve was kept anterior to the medial condyle, kept in direct vision and protected throughout the case.    After the ulnar nerve had been transposed, windows were opened medial to the triceps tendon and lateral to the triceps tendon.  The triceps tendon was left inserted at the proximal ulna.  Now the fracture was identified.  The fracture hematoma was debrided.  An attempt was made to reduce the fracture.  This fracture reduction proved unsatisfactory despite provisional fixation.  Given the unsatisfactory nature of the fracture reduction, the decision at this time was made to perform an olecranon osteotomy.    At this time, the center of the trochlear groove of the ulna was  identified with a [place] and a 6.5 mm cannulated screw was provisionally drilled and then removed at the site of the olecranon osteotomy site.  After the provisional 6.5 mm cannulated screw had been removed, an olecranon osteotomy was created 90% through.  It was completed with an osteotome.  With the olecranon osteotomy, we were now able to completely and without impediment visualize this transverse distal humerus fracture.  Now, the fracture was reduced.    Upon reduction, it was now appreciated that there was plastic deformity at the distal portion of the humerus and it was not possible to achieve anatomic and [place] reduction of this fracture.  Recognizing the plastic deformity, we now reduced the fracture to the nearest possible proximity to anatomic reduction.  The fracture was provisionally held with 4.062 inch K-wires.  At this time, a medial column plate was attached using standard AO technique with a mixture of locking and bicortical screws.  After placement of the medial column plate, lateral column plate was affixed, once again using standard AO technique with a mixture of locking and bicortical screws.  After application of the posterolateral plate and the medial column plate, fluoroscopy was brought into the field.  Position of the plate, position of reduction were confirmed in PA and lateral plane as well as all screw positions.  They were deemed satisfactory.  Now, attention was directed to the olecranon osteotomy site.  The olecranon osteotomy was reduced using a bone reduction clamp.  Once the fracture had been reduced, it was held secure in fixation with a 6.5 mm cannulated screw that had been placed previously and a tension band wire construct was also applied to provide additional stability to the olecranon osteotomy site.  At this time, the wound was copiously irrigated.  Meticulous hemostasis was obtained.  Image intensifying fluoroscopy was brought into the field for final fluoroscopy,  confirming satisfactory position of the tension band wire construct as well as the cannulated screw.  Skin was reapproximated using staples and the patient was discharged in satisfactory condition.  She will follow up in my office in 7-10 days' time for wound inspection, check range of motion, check healing control x-rays.  She will be discharged in a long arm splint and be maintained in a long arm splint through to the first postoperative visit.    Finally, the patient will be discharged back to the floor from the operating room and plans are being made to discharge the patient to transitional care unit.    Siddhartha Santamaria MD        D: 2023   T: 2023   MT: reina    Name:     VIRGIL GILLESPIE  MRN:      [id number]        Account:        [id number]   :      1945           Procedure Date: 2023     Document: [id number]

## 2023-08-17 NOTE — PROGRESS NOTES
Talkative, comfortable, dressing changed overnight due to staining     Afebrile, VSS    Dressing C/D/I. Block remains in full effect    Labs     Hgb 12,4    A/P    Doing well POD #1 s/p ORIF distal humerus with olecranon osteotomy. Dressing changed overnight now without evidence of bleeding. Hematocrit 37.     Plan  Reg diet.  Sling until block wears off   OOB  OT/PT   Eval for TCU   Pain control when block wears off    Follow up with Dr.. Santamaria in 7 - 10 days

## 2023-08-17 NOTE — BRIEF OP NOTE
[facility]    Brief Operative Note    Pre-operative diagnosis: Closed supracondylar fracture of left elbow, initial encounter [S41.778A]  Post-operative diagnosis Same as pre-operative diagnosis    Procedure: Procedure(s):  OPEN REDUCTION INTERNAL FIXATION, FRACTURE, HUMERUS, DISTAL  Surgeon: Surgeon(s) and Role:     * Siddhartha Santamaria MD - Primary     * Marino Flanagan PA-C - Assisting  Anesthesia: Choice with Block   Estimated Blood Loss: Less than 100 ml    Drains: None  Specimens: * No specimens in log *  Findings:   None.  Complications: None.  Implants:   Implant Name Type Inv. Item Serial No.  Lot No. LRB No. Used Action   WIRE SURGICAL STEEL 20GA DS-20 - [id number] Wire WIRE SURGICAL STEEL 20GA DS-20  [facility]-  Left 4 Implanted   PLATE DIST HUM DORSO 3H LT 2.7/3.5X75MM - [id number] Metallic Hardware/[place] PLATE DIST HUM DORSO 3H LT 2.7/3.5X75MM  [facility]  Left 1 Implanted   PLATE DIST HUM MEDIAL 1H L 2.7/3.5X7 - [id number] Metallic Hardware/[employer] PLATE DIST HUM MEDIAL 1H L 2.7/3.5X7  [facility]  Left 1 Implanted   PLATE DIST HUM MEDIAL 2H LT 2.7-3.5X85 - [id number] Metallic Hardware/[employer] PLATE DIST HUM MEDIAL 2H LT 2.7-3.5X85  [employer]  Left 1 Wasted   IMP SCR SYN 2.7X20MM T8 SD LOCKING SELF-TAP VA 02.211.020 - [id number] Metallic Hardware/[employer] IMP SCR SYN 2.7X20MM T8 SD LOCKING SELF-TAP VA 02.211.020  [facility]  Left 3 Implanted   IMP SCR SYN 2.7X22MM T8 SD LOCKING SELF-TAP VA 02.211.022 - [id number] Metallic Hardware/[place] IMP SCR SYN 2.7X22MM T8 SD LOCKING SELF-TAP VA 02.211.022  [facility]  Left 1 Implanted   IMP SCR SYN 2.7X26MM T8 SD LOCKING SELF-TAP VA 02.211.026 - [id number] Metallic Hardware/[employer] IMP SCR SYN 2.7X26MM T8 SD LOCKING SELF-TAP VA 02.211.026  [facility]  Left 1 Wasted   SCREW LOCKING ST 2.7X28MM - [id number] Metallic Hardware/[employer] SCREW LOCKING ST 2.7X28MM  [facility]  Left 2  Implanted   IMP WIRE CHHAYA SYN 1.6T323HW 292.16 - [id number]  IMP WIRE CHHAYA SYN 1.5Z107PK 292.16  [employer]  Left 6 Implanted   IMP SCR SYN CAN 6.5X32 [id number] .444 - [id number] Metallic Hardware/[place] IMP SCR SYN CAN 6.5X32 [id number] .444  [employer]  Left 1 Implanted   IMP SCR SYN CORTEX 2.7X40MM SELF TAP .840 - [id number] Metallic Hardware/[place] IMP SCR SYN CORTEX 2.7X40MM SELF TAP .840  [employer]  Left 1 Wasted   IMP SCR SYN CORTEX 2.7X45MM SELF TAP .845 - [id number] Metallic Hardware/[place] IMP SCR SYN CORTEX 2.7X45MM SELF TAP .845  [employer]  Left 1 Wasted   IMP SCR SYN CORTEX 2.7X50MM SELF TAP .850 - [id number] Metallic Hardware/[place] IMP SCR SYN CORTEX 2.7X50MM SELF TAP .850  [facility]  Left 1 Wasted   IMP SCR SYN 3.5X20MM LOCKING W/STARDRIVE .106 - [id number] Metallic Hardware/[place] IMP SCR SYN 3.5X20MM LOCKING W/STARDRIVE .106  [facility]  Left 1 Implanted   IMP SCR SYN 3.5X22MM LOCKING W/STARDRIVE .107 - [id number] Metallic Hardware/[place] IMP SCR SYN 3.5X22MM LOCKING W/STARDRIVE .107  [facility]  Left 1 Implanted   IMP SCR SYN CORTEX 3.5X20MM SELF TAP .820 - [id number] Metallic Hardware/[place] IMP SCR SYN CORTEX 3.5X20MM SELF TAP .820  [facility]  Left 3 Implanted   IMP SCR SYN CORTEX 3.5X22MM SELF TAP .822 - [id number] Metallic Hardware/[place] IMP SCR SYN CORTEX 3.5X22MM SELF TAP .822  [facility]  Left 1 Wasted

## 2023-08-17 NOTE — PROGRESS NOTES
08/17/23 0730   Appointment Info   Signing Clinician's Name / Credentials (OT) PAOLA Delgado   Rehab Comments (OT) OT Eval   Living Environment   People in Home alone   Current Living Arrangements   ([facility])   Living Environment Comments Pt has walk in shower w/ grab bars and chair, RTS w/ grab bars   Self-Care   Usual Activity Tolerance moderate   Current Activity Tolerance fair   Equipment Currently Used at Home raised toilet seat;shower chair  (grab bar, walk in)   Fall history within last six months yes   Activity/Exercise/Self-Care Comment Pt reports being independent w/ ADLs. Friend assists w/ cooking and cleaning.   General Information   Onset of Illness/Injury or Date of Surgery 08/13/23   Referring Physician Dr. Hayward   Patient/Family Therapy Goal Statement (OT) return to baseline ADL function   Additional Occupational Profile Info/Pertinent History of Current Problem admitted for humerus ORIF, PMH: hypothyroidism, diabetic peripheral neuropothy, gastric bypass, essential hypertension, type 2 diabetes, anxiety, history of DVT LE, vitamin D deficiency, acute cystitis without hematuria   Existing Precautions/Restrictions weight bearing  (No AROM/PROM elbow)   Limitations/Impairments safety/cognitive   Left Upper Extremity (Weight-bearing Status) partial weight-bearing (PWB)  (50%)   General Observations and Info AROM of LUE shoulder/fingers   Cognitive Status Examination   Orientation Status person;place   Affect/Mental Status (Cognitive) confused   Follows Commands follows one-step commands   Cognitive Status Comments Pt was confused throughout session, thought she had broken her elbow in September   Visual Perception   Visual Impairment/Limitations corrective lenses full-time   Sensory   Sensory Quick Adds left UE   Sensory Comments numb   Pain Assessment   Patient Currently in Pain Yes, see Vital Sign flowsheet   Posture   Posture forward head position   Range of Motion Comprehensive    Comment, General Range of Motion L UE immobilized   Strength Comprehensive (MMT)   Comment, General Manual Muscle Testing (MMT) Assessment decreased   Muscle Tone Assessment   Muscle Tone Quick Adds No deficits were identified   Coordination   Upper Extremity Coordination No deficits were identified   Bed Mobility   Comment (Bed Mobility) SBA   Transfers   Transfer Comments mod A   Balance   Balance Comments decreased   Activities of Daily Living   BADL Assessment/Intervention toileting;upper body dressing   Upper Body Dressing Assessment/Training   Comment, (Upper Body Dressing) donning of sling   [place] Level (Upper Body Dressing) maximum assist (25% patient effort)   Toileting   [place] Level (Toileting) moderate assist (50% patient effort)   Clinical Impression   Criteria for Skilled Therapeutic Interventions Met (OT) Yes, treatment indicated   OT Diagnosis decreased ADL independence   Influenced by the following impairments Humerus ORIF   OT Problem List-Impairments impacting ADL activity tolerance impaired;balance;cognition;mobility;pain;post-surgical precautions;strength;range of motion (ROM)   Assessment of Occupational Performance 3-5 Performance Deficits   Identified Performance Deficits toileting, bathing, dressing   Planned Therapy Interventions (OT) ADL retraining;IADL retraining;balance training;bed mobility training;cognition;ROM;strengthening;transfer training;progressive activity/exercise   Clinical Decision Making Complexity (OT) moderate complexity   Risk & Benefits of therapy have been explained care plan/treatment goals reviewed;patient   OT Total Evaluation Time   OT Eval, Moderate Complexity Minutes (17847) 10   OT Goals   Therapy Frequency (OT) Daily   OT Predicted Duration/Target Date for Goal Attainment 08/23/23   OT Goals Upper Body Dressing;Toilet Transfer/Toileting   OT: Upper Body Dressing Minimal assist  (including immobilizer)   OT: Toilet Transfer/Toileting Modified  independent   Interventions   Interventions Quick Adds Self-Care/Home Management   Self-Care/Home Management   Self-Care/Home Mgmt/ADL, Compensatory, Meal Prep Minutes (89949) 30   Symptoms Noted During/After Treatment (Meal Preparation/Planning Training) fatigue   Treatment Detail/Skilled Intervention Pt upright in bed upon arrival. Educated on PWB status and fist pumps d/t w/ facilitation of R UE d/t numbness. Transferred to EOB w/ mod I, HOB elevated, use of rail. Educated on sling donning, required max A. Transferred from sit <> stand w/ CGA-min A. Ambulated to BR w/ CGA and cielo walker. Completed toileting and judd care w/ SBA. Stood at sink to wash hands, unable to complete standing d/t fatigue in LE. Took seated rest break on toilet. Ambulated to recliner w/ CGA and cielo walker. Ended session in recliner w/ alarm on. Pt was confused throughout session. Required consistent vc to stand tall, for use of cielo walker, and for hand placement during transfers.   OT Discharge Planning   OT Plan 8/23: UB/LB dressing, all transfers, sling donning/doffing, SLUMS   OT Discharge Recommendation (DC Rec) (S)  Transitional Care Facility   OT Rationale for DC Rec Pt lives alone and reports being independent w/ ADLs at baseline. Would benefit from additional OT services to increase ADL independence and ensure safety.   OT Brief overview of current status CGA-min A STS, max A sling donning, SBA toileting/judd care, fatigues in LE quickly, confused   Total Session Time   Timed Code Treatment Minutes 30   Total Session Time (sum of timed and untimed services) 40

## 2023-08-17 NOTE — PROGRESS NOTES
08/17/23 1342   Appointment Info   Signing Clinician's Name / Credentials (PT) Peterson Manrique   Living Environment   People in Home alone   Current Living Arrangements house   Home Accessibility no concerns;stairs within home   Number of Stairs, Within Home, Primary greater than 10 stairs   Stair Railings, Within Home, Primary railings safe and in good condition;other (see comments)  (chair lift)   Living Environment Comments Pt has walk in shower w/ grab bars and chair, RTS w/ grab bars, has upstairs and uses chair lift to access   Self-Care   Usual Activity Tolerance moderate   Current Activity Tolerance moderate   Equipment Currently Used at Home raised toilet seat;shower chair   Fall history within last six months yes   Number of times patient has fallen within last six months 3   Activity/Exercise/Self-Care Comment Pt reports being independent w/ ADLs. Friend assists w/ cooking and cleaning.  Per daughter and sister patient has had frequent falls, but state patietn doesnt tell them.   General Information   Onset of Illness/Injury or Date of Surgery 08/13/23   Pertinent History of Current Problem (include personal factors and/or comorbidities that impact the POC) Closed supracondylar fracture of left elbow, initial encounter 8/13/2023    Hypothyroidism Unknown    Diabetic Peripheral Neuropathy Unknown    S/P gastric bypass (Chronic) 9/22/2017    Essential hypertension Unknown    Type 2 diabetes mellitus (H) 11/10/2022    Anxiety disorder 7/26/2011    Diabetic peripheral neuropathy (H) 2/16/2018    History of DVT of lower extremity 7/11/2020    Vitamin D deficiency 6/12/2015    Falls frequently 8/13/2023    Acute cystitis without hematuria   Existing Precautions/Restrictions (S)    (sling)   Weight-Bearing Status - LUE (S)  partial weight-bearing (% in comments)  (50)   Cognition   Affect/Mental Status (Cognition) confused   Follows Commands (Cognition) follows one-step commands   Range of Motion (ROM)   Range  of Motion ROM deficits secondary to surgical procedure   Strength (Manual Muscle Testing)   Strength Comments general weakness   Transfers   Transfers sit-stand transfer   Sit-Stand Transfer   Sit-Stand [place] (Transfers) contact guard   Assistive Device (Sit-Stand Transfers) walker, cielo   Gait/Stairs (Locomotion)   [place] Level (Gait) contact guard   Assistive Device (Gait) walker, cielo   Distance in Feet 20   Distance in Feet (Gait) 100, 110   Pattern (Gait) step-to   Deviations/Abnormal Patterns (Gait) gait speed decreased   Balance   Balance Comments Patient demonstrating poor balance with standing activitiy.  Unsteady with initial standing and also with walking   Clinical Impression   Criteria for Skilled Therapeutic Intervention Yes, treatment indicated   PT Diagnosis (PT) impaired functional mobility   Influenced by the following impairments weakness, balance   Functional limitations due to impairments transfers, gait   Clinical Presentation (PT Evaluation Complexity) Stable/Uncomplicated   Clinical Presentation Rationale Pt. presents as medically diagnosed   Clinical Decision Making (Complexity) moderate complexity   Planned Therapy Interventions (PT) balance training;gait training;patient/family education;home exercise program;transfer training   Anticipated Equipment Needs at Discharge (PT) cane, quad   Risk & Benefits of therapy have been explained evaluation/treatment results reviewed;risks/benefits reviewed;patient   PT Total Evaluation Time   PT Eval, Moderate Complexity Minutes (07169) 10   Plan of Care Review   Plan of Care Reviewed With patient;daughter   Physical Therapy Goals   PT Frequency Daily   PT Predicted Duration/Target Date for Goal Attainment 08/24/23   PT Goals Transfers;Gait;PT Goal 1   PT: Transfers Modified independent;Sit to/from stand;Assistive device;Within precautions   PT: Gait Modified independent;Rolling walker;Within precautions;100 feet   PT: Goal 1 Indep with  HEP   Interventions   Interventions Quick Adds Gait Training   Gait Training   Gait Training Minutes (34908) 10   Symptoms Noted During/After Treatment (Gait Training) fatigue   Treatment Detail/Skilled Intervention slow unstable  gait with cielo walker.  vc for use of cielo walker forcing patient to ambulate very slowly,   Used quad cane on 2nd ambulation and patient level of stability was about the same and moving faster.  Paitent was using quad cane but admits to not always using at home.   [place] Level (Gait Training) contact guard   Physical Assistance Level (Gait Training) verbal cues   Weight Bearing (Gait Training) weight-bearing as tolerated   Assistive Device (Gait Training) cielo walker;quad cane   Pattern Analysis (Gait Training) swing-to gait   Gait Analysis Deviations decreased jaki;decreased step length   Impairments (Gait Analysis/Training) strength decreased;balance impaired   PT Discharge Planning   PT Plan Progress with gait/transfers, standing balance activiteis   PT Discharge Recommendation (DC Rec) Transitional Care Facility   PT Rationale for DC Rec Patient needing contact guard of one for all transfers, gait.  Patient lives alone and has hx of falls per familiy.  Patient would benefit from further rehab prior to returning to home   PT Brief overview of current status CGA with all transfers, gait   Total Session Time   Timed Code Treatment Minutes 10   Total Session Time (sum of timed and untimed services) 20

## 2023-08-17 NOTE — PLAN OF CARE
Problem: Risk for Delirium  Goal: Improved Attention and Thought Clarity  Outcome: Not Progressing   Goal Outcome Evaluation: upon initial meeting, patient was a/o x 3. Disorientated to situation. 1 hour late nursing returned to check blood sugar. Patient became very skeptical. Stated she'd never seen writer ever in her life, she doesn't know or trust writers intentions. Patient becoming frustrated saying family just dumped her here. Family contacted, they were able to re-direct patient. Family states patient is forgetful, but not this bad. Confusion was not baseline. Patient became apoligic and pleasant after speaking with family.    Left arm ace wrap in place. Patient reports pain 4/10, received ice pack application. Reports numbing and tingling in left arm. Able to move fingers. Weak hand grasp. Cap refills less than 3 seconds, hands are both warm. Ambulating to bathroom with cane, and gait belt. Currently resting comfortably in bed.

## 2023-08-17 NOTE — ANESTHESIA CARE TRANSFER NOTE
Patient: Belgica Salvador    Procedure: Procedure(s):  OPEN REDUCTION INTERNAL FIXATION, FRACTURE, HUMERUS, DISTAL       Diagnosis: Closed supracondylar fracture of left elbow, initial encounter [S42.412A]  Diagnosis Additional Information: No value filed.    Anesthesia Type:   General     Note:    Oropharynx: oropharynx clear of all foreign objects and spontaneously breathing  Level of Consciousness: awake  Oxygen Supplementation: blow-by O2    Independent Airway: airway patency satisfactory and stable  Dentition: dentition unchanged  Vital Signs Stable: post-procedure vital signs reviewed and stable  Report to RN Given: handoff report given  Patient transferred to: PACU    Handoff Report: Identifed the Patient, Identified the Reponsible Provider, Reviewed the pertinent medical history, Discussed the surgical course, Reviewed Intra-OP anesthesia mangement and issues during anesthesia, Set expectations for post-procedure period and Allowed opportunity for questions and acknowledgement of understanding      Vitals:  Vitals Value Taken Time   /68 08/16/23 2026   Temp 36.2  C (97.1  F) 08/16/23 2026   Pulse 97 08/16/23 2029   Resp 14 08/16/23 2029   SpO2 99 % 08/16/23 2029   Vitals shown include unvalidated device data.    Electronically Signed By: FELIZ Barnes CRNA  August 16, 2023  8:30 PM

## 2023-08-17 NOTE — PROVIDER NOTIFICATION
RAFFAELE Pichardo called at 0300 to notify of significant bloody drainage from splint/ACE wrap.   Siddhartha Santamaria MD called back and gave verbal orders to check CBC.    0330 RAFFAELE Flanagan arrived at bedside to assess drainage. RAFFAELE Flanagan assessed surgical site and placed new dressing. MD Siddhartha Santamaria aware and no further interventions needed at this time.

## 2023-08-17 NOTE — ANESTHESIA POSTPROCEDURE EVALUATION
Patient: Belgica Salvador    Procedure: Procedure(s):  OPEN REDUCTION INTERNAL FIXATION, FRACTURE, HUMERUS, DISTAL       Anesthesia Type:  General    Note:     Postop Pain Control: Uneventful            Sign Out: Well controlled pain   PONV: No   Neuro/Psych: Uneventful            Sign Out: Acceptable/Baseline neuro status   Airway/Respiratory: Uneventful            Sign Out: Acceptable/Baseline resp. status   CV/Hemodynamics: Uneventful            Sign Out: Acceptable CV status; No obvious hypovolemia; No obvious fluid overload   Other NRE: NONE   DID A NON-ROUTINE EVENT OCCUR? No           Last vitals:  Vitals Value Taken Time   /91 08/16/23 2133   Temp 36.5  C (97.7  F) 08/16/23 2133   Pulse 82 08/16/23 2133   Resp 16 08/16/23 2133   SpO2 94 % 08/16/23 2133       Electronically Signed By: Jeremiah Griffin MD  August 16, 2023  9:46 PM

## 2023-08-17 NOTE — PROGRESS NOTES
[facility]    Medicine Progress Note - Hospitalist Service    Date of Admission:  8/13/2023    Assessment & Plan   77 year old female admitted on 8/13/2023. She is to the ER after a fall.  In the ER she had multiple imaging studies which were unremarkable except for a known humerus fracture.  She was found to have a UTI.  Orthopedics felt that she should be admitted to the hospital for repair of the complicated fracture.  ORIF of distal humerus with ulnar nerve transport on 8/16/23.  Monitoring for hemostasis.       Left distal humerus fracture.   Postoperative bleeding.   ORIF distal humerus fracture and ulnar nerve transport on 8/16  Pain control is adequate  Hemoglobin, PT and PTT levels normal.   Postoperative plan per orthopedics.      Postoperative hypotension.   Tachycardia, sinus.   Hemoglobin stable.   EKG with sinus tachycardia, no ischemic changes.   Continue normal saline bolus, blood pressures improving.   Monitor vital signs per unit protocol.     Fall initial encounter.   Generalized weakness.   Likely due to acute cystitis  Fall precautions  PT and OT evaluation.   Suspect will need TCU    Acute cystitis  Leukocytosis  WBC 14.4, likely reactive secondary to above surgery.   Culture growing E.coli, resistant to Zosyn, Bactrim, Fluoroquinolones.   Continue Omnicef started on 8/15 and complete 10 day course of antibiotics.  (8/13-8/23).  Repeat CBC in a.m.    Postoperative hypoxia.   Continue supplemental oxygen and taper as able.   Encourage IS    Hypomagnesemia/mild hyperkalemia  IV fluids and replacement protocol  Improved    Essential hypertension  Home meds with hold parameters     Remote history of DVT/provoked  Direct oral anticoagulation post procedure     Type 2 diabetes  Hold metformin  Correction insulin    Acute leukocytosis  Due to demargination  Resolved     Hypothyroidism  Peripheral neuropathy  Vitamin D deficiency  Anxiety disorder       Diet: Advance Diet as  "Tolerated: Regular Diet Adult  Discharge Instruction - Regular Diet Adult    DVT Prophylaxis: Defer to surgical service.   Sierra Catheter: Not present  Lines: None     Cardiac Monitoring: None  Code Status: Full Code      Clinically Significant Risk Factors            # Hypomagnesemia: Lowest Mg = 1.5 mg/dL in last 2 days, will replace as needed       # Hypertension: Noted on problem list        # Overweight: Estimated body mass index is 28.12 kg/m  as calculated from the following:    Height as of this encounter: 1.676 m (5' 6\").    Weight as of this encounter: 79 kg (174 lb 3.2 oz)., PRESENT ON ADMISSION            Disposition Plan      Expected Discharge Date: 08/17/2023        Discharge Comments: surgery pending for 5:30PM 8/15          Austyn Hayward DO  Hospitalist Service  [facility]  Securely message with [employer] (more info)  Text page via [employer] Paging/Directory   ______________________________________________________________________    Interval History   No new complaints. Patient developed blood pressures in mid 80s systolic today.  No lightheadedness, chest pain or shortness of breath.     Physical Exam   Vital Signs: Temp: 98.3  F (36.8  C) Temp src: Oral BP: 110/60 Pulse: 92   Resp: 18 SpO2: 91 % O2 Device: None (Room air) Oxygen Delivery: 2 LPM  Weight: 174 lbs 3.2 oz    GENERAL:  Alert, appears comfortable, in no acute distress, appears stated age   HEAD:  Normocephalic, without obvious abnormality, atraumatic   EYES:  PERRL, conjunctiva/corneas clear, no scleral icterus, EOM's intact   LUNGS:   Clear to auscultation bilaterally, no rales, rhonchi, or wheezing, symmetric chest rise on inhalation, respirations unlabored   HEART:  Regular rate and rhythm, S1 and S2 normal, no murmur, rub, or gallop    ABDOMEN:   Soft, non-tender, bowel sounds active all four quadrants, no masses, no organomegaly, no rebound or guarding   EXTREMITIES: Extremities normal, atraumatic, no " "cyanosis or edema, left arm in cast.    SKIN: Dry to touch, no exanthems in the visualized areas   NEURO: Alert, oriented x 4, moves all four extremities freely/spontaneously   PSYCH: Cooperative, behavior is appropriate        Medical Decision Making       40 MINUTES SPENT BY ME on the date of service doing chart review, history, exam, documentation & further activities per the note.      Data     I have personally reviewed the following data over the past 24 hrs:    14.4 (H)  \   12.4   / 329     N/A N/A N/A /  103 (H)   N/A N/A N/A \     INR:  0.93 PTT:  29   D-dimer:  N/A Fibrinogen:  N/A       Imaging results reviewed over the past 24 hrs:   Recent Results (from the past 24 hour(s))   POC US Guidance Needle Placement    Narrative    Ultrasound was performed as guidance to an anesthesia procedure.  Click   \"PACS images\" hyperlink below to view any stored images.  For specific   procedure details, view procedure note authored by anesthesia.   POC US Guidance Needle Placement    Narrative    Ultrasound was performed as guidance to an anesthesia procedure.  Click   \"PACS images\" hyperlink below to view any stored images.  For specific   procedure details, view procedure note authored by anesthesia.   XR Surgery ALY Fluoro L/T 5 Min    Narrative    This exam was marked as non-reportable because it will not be read by a   radiologist or a [place] non-radiologist provider.           "

## 2023-08-18 ENCOUNTER — APPOINTMENT (OUTPATIENT)
Dept: OCCUPATIONAL THERAPY | Facility: CLINIC | Age: 78
DRG: 511 | End: 2023-08-18
Payer: COMMERCIAL

## 2023-08-18 VITALS
DIASTOLIC BLOOD PRESSURE: 59 MMHG | WEIGHT: 174.5 LBS | HEART RATE: 101 BPM | OXYGEN SATURATION: 96 % | TEMPERATURE: 99.1 F | RESPIRATION RATE: 16 BRPM | SYSTOLIC BLOOD PRESSURE: 118 MMHG | BODY MASS INDEX: 28.04 KG/M2 | HEIGHT: 66 IN

## 2023-08-18 LAB
APTT PPP: 39 SECONDS (ref 22–38)
ERYTHROCYTE [DISTWIDTH] IN BLOOD BY AUTOMATED COUNT: 13.3 % (ref 10–15)
GLUCOSE BLDC GLUCOMTR-MCNC: 150 MG/DL (ref 70–99)
GLUCOSE BLDC GLUCOMTR-MCNC: 164 MG/DL (ref 70–99)
HCT VFR BLD AUTO: 36.3 % (ref 35–47)
HGB BLD-MCNC: 12.1 G/DL (ref 11.7–15.7)
INR PPP: 1.09 (ref 0.85–1.15)
MAGNESIUM SERPL-MCNC: 1.5 MG/DL (ref 1.8–2.6)
MCH RBC QN AUTO: 33.4 PG (ref 26.5–33)
MCHC RBC AUTO-ENTMCNC: 33.3 G/DL (ref 31.5–36.5)
MCV RBC AUTO: 100 FL (ref 78–100)
PLATELET # BLD AUTO: 351 10E3/UL (ref 150–450)
RBC # BLD AUTO: 3.62 10E6/UL (ref 3.8–5.2)
WBC # BLD AUTO: 13.2 10E3/UL (ref 4–11)

## 2023-08-18 PROCEDURE — 97535 SELF CARE MNGMENT TRAINING: CPT | Mod: GO

## 2023-08-18 PROCEDURE — 250N000013 HC RX MED GY IP 250 OP 250 PS 637: Performed by: PHYSICIAN ASSISTANT

## 2023-08-18 PROCEDURE — 36415 COLL VENOUS BLD VENIPUNCTURE: CPT | Performed by: INTERNAL MEDICINE

## 2023-08-18 PROCEDURE — 36415 COLL VENOUS BLD VENIPUNCTURE: CPT

## 2023-08-18 PROCEDURE — 99239 HOSP IP/OBS DSCHRG MGMT >30: CPT | Performed by: INTERNAL MEDICINE

## 2023-08-18 PROCEDURE — 250N000011 HC RX IP 250 OP 636: Mod: JZ | Performed by: FAMILY MEDICINE

## 2023-08-18 PROCEDURE — 83735 ASSAY OF MAGNESIUM: CPT | Performed by: INTERNAL MEDICINE

## 2023-08-18 PROCEDURE — 85610 PROTHROMBIN TIME: CPT

## 2023-08-18 PROCEDURE — 85730 THROMBOPLASTIN TIME PARTIAL: CPT

## 2023-08-18 PROCEDURE — 250N000013 HC RX MED GY IP 250 OP 250 PS 637: Performed by: FAMILY MEDICINE

## 2023-08-18 PROCEDURE — 85014 HEMATOCRIT: CPT | Performed by: INTERNAL MEDICINE

## 2023-08-18 RX ORDER — LISINOPRIL 20 MG/1
20 TABLET ORAL DAILY
Status: ON HOLD
Start: 2023-08-21 | End: 2023-11-03

## 2023-08-18 RX ORDER — CEFDINIR 300 MG/1
300 CAPSULE ORAL EVERY 12 HOURS
Qty: 10 CAPSULE | Refills: 0 | Status: SHIPPED | OUTPATIENT
Start: 2023-08-18 | End: 2023-08-23

## 2023-08-18 RX ORDER — ACETAMINOPHEN 325 MG/1
975 TABLET ORAL EVERY 8 HOURS
Qty: 100 TABLET | Refills: 0 | Status: ON HOLD | OUTPATIENT
Start: 2023-08-18 | End: 2023-11-02

## 2023-08-18 RX ORDER — MAGNESIUM SULFATE 4 G/50ML
4 INJECTION INTRAVENOUS ONCE
Status: COMPLETED | OUTPATIENT
Start: 2023-08-18 | End: 2023-08-18

## 2023-08-18 RX ADMIN — OXYCODONE HYDROCHLORIDE 10 MG: 5 TABLET ORAL at 06:05

## 2023-08-18 RX ADMIN — BUSPIRONE HYDROCHLORIDE 10 MG: 10 TABLET ORAL at 08:15

## 2023-08-18 RX ADMIN — OXYCODONE HYDROCHLORIDE 10 MG: 5 TABLET ORAL at 01:12

## 2023-08-18 RX ADMIN — LEVOTHYROXINE SODIUM 88 MCG: 0.09 TABLET ORAL at 08:09

## 2023-08-18 RX ADMIN — FOLIC ACID 1 MG: 1 TABLET ORAL at 08:09

## 2023-08-18 RX ADMIN — POLYETHYLENE GLYCOL 3350 17 G: 17 POWDER, FOR SOLUTION ORAL at 08:09

## 2023-08-18 RX ADMIN — ACETAMINOPHEN 975 MG: 325 TABLET ORAL at 13:21

## 2023-08-18 RX ADMIN — SENNOSIDES AND DOCUSATE SODIUM 1 TABLET: 50; 8.6 TABLET ORAL at 08:09

## 2023-08-18 RX ADMIN — BACLOFEN 10 MG: 10 TABLET ORAL at 13:21

## 2023-08-18 RX ADMIN — FERROUS SULFATE TAB 325 MG (65 MG ELEMENTAL FE) 325 MG: 325 (65 FE) TAB at 08:08

## 2023-08-18 RX ADMIN — Medication 2000 UNITS: at 08:08

## 2023-08-18 RX ADMIN — DULOXETINE HYDROCHLORIDE 60 MG: 60 CAPSULE, DELAYED RELEASE PELLETS ORAL at 08:09

## 2023-08-18 RX ADMIN — CEFDINIR 300 MG: 300 CAPSULE ORAL at 08:09

## 2023-08-18 RX ADMIN — ACETAMINOPHEN 975 MG: 325 TABLET ORAL at 06:05

## 2023-08-18 RX ADMIN — Medication 800 MG: at 08:08

## 2023-08-18 RX ADMIN — BACLOFEN 10 MG: 10 TABLET ORAL at 08:08

## 2023-08-18 RX ADMIN — OXYCODONE HYDROCHLORIDE 10 MG: 5 TABLET ORAL at 10:24

## 2023-08-18 RX ADMIN — FAMOTIDINE 20 MG: 20 TABLET, FILM COATED ORAL at 08:09

## 2023-08-18 RX ADMIN — PANTOPRAZOLE SODIUM 40 MG: 40 TABLET, DELAYED RELEASE ORAL at 06:06

## 2023-08-18 RX ADMIN — CARBOXYMETHYLCELLULOSE SODIUM 1 DROP: 5 SOLUTION/ DROPS OPHTHALMIC at 08:09

## 2023-08-18 RX ADMIN — MAGNESIUM SULFATE HEPTAHYDRATE 4 G: 4 INJECTION, SOLUTION INTRAVENOUS at 10:05

## 2023-08-18 ASSESSMENT — ACTIVITIES OF DAILY LIVING (ADL)
DIFFICULTY_EATING/SWALLOWING: NO
ADLS_ACUITY_SCORE: 22
FALL_HISTORY_WITHIN_LAST_SIX_MONTHS: YES
WALKING_OR_CLIMBING_STAIRS: AMBULATION DIFFICULTY, REQUIRES EQUIPMENT;STAIR CLIMBING DIFFICULTY, REQUIRES EQUIPMENT
ADLS_ACUITY_SCORE: 33
DOING_ERRANDS_INDEPENDENTLY_DIFFICULTY: NO
WEAR_GLASSES_OR_BLIND: NO
DIFFICULTY_COMMUNICATING: NO
WALKING_OR_CLIMBING_STAIRS_DIFFICULTY: YES
DRESSING/BATHING_DIFFICULTY: NO
ADLS_ACUITY_SCORE: 22
ADLS_ACUITY_SCORE: 33
CHANGE_IN_FUNCTIONAL_STATUS_SINCE_ONSET_OF_CURRENT_ILLNESS/INJURY: NO
EQUIPMENT_CURRENTLY_USED_AT_HOME: RAISED TOILET SEAT;SHOWER CHAIR;WALKER, ROLLING
ADLS_ACUITY_SCORE: 22
HEARING_DIFFICULTY_OR_DEAF: NO
TOILETING_ISSUES: NO
ADLS_ACUITY_SCORE: 33
CONCENTRATING,_REMEMBERING_OR_MAKING_DECISIONS_DIFFICULTY: NO
NUMBER_OF_TIMES_PATIENT_HAS_FALLEN_WITHIN_LAST_SIX_MONTHS: 3
ADLS_ACUITY_SCORE: 33

## 2023-08-18 NOTE — PROGRESS NOTES
[facility]    Medicine Progress Note - Hospitalist Service    Date of Admission:  8/13/2023    Assessment & Plan   77 year old female admitted on 8/13/2023. She is to the ER after a fall.  In the ER she had multiple imaging studies which were unremarkable except for a known humerus fracture.  She was found to have a UTI.  Orthopedics felt that she should be admitted to the hospital for repair of the complicated fracture.  ORIF of distal humerus with ulnar nerve transport on 8/16/23.  [place] [facility] to evaluate wound bleeding and pain.  Plan to discharge to TCU later today pending orthopedics recommendations.      Left distal humerus fracture.   Postoperative bleeding.   ORIF distal humerus fracture and ulnar nerve transport on 8/16  Pain control is adequate  Hemoglobin, PT and PTT levels normal.   Postoperative plan per orthopedics.      Postoperative hypotension.   Tachycardia, sinus.   Hemoglobin stable.   EKG with sinus tachycardia, no ischemic changes.   Improved after normal saline bolus on 8/17/23.   Order orthostatic vital signs.   Monitor vital signs per unit protocol.     Fall initial encounter.   Generalized weakness.   Likely due to acute cystitis  Fall precautions  PT and OT evaluation.   TCU placement at discharge.     Acute cystitis  Leukocytosis  Culture growing E.coli, resistant to Zosyn, Bactrim, Fluoroquinolones.   Continue Omnicef started on 8/15 and complete 10 day course of antibiotics.  (8/13-8/23).    Postoperative hypoxia.   Continue supplemental oxygen and taper as able.   Encourage IS    Hypomagnesemia/mild hyperkalemia  IV fluids and replacement protocol  Improved    Essential hypertension  Home meds with hold parameters     Remote history of DVT/provoked  Direct oral anticoagulation post procedure     Type 2 diabetes  Hold metformin  Correction insulin    Acute leukocytosis  Due to demargination  Resolved     Hypothyroidism  Peripheral neuropathy  Vitamin D  "deficiency  Anxiety disorder       Diet: Advance Diet as Tolerated: Regular Diet Adult  Discharge Instruction - Regular Diet Adult    DVT Prophylaxis: Defer to orthopedics.   Sierra Catheter: Not present  Lines: None     Cardiac Monitoring: None  Code Status: Full Code      Clinically Significant Risk Factors            # Hypomagnesemia: Lowest Mg = 1.5 mg/dL in last 2 days, will replace as needed       # Hypertension: Noted on problem list        # Overweight: Estimated body mass index is 28.17 kg/m  as calculated from the following:    Height as of this encounter: 1.676 m (5' 6\").    Weight as of this encounter: 79.2 kg (174 lb 8 oz)., PRESENT ON ADMISSION            Disposition Plan      Expected Discharge Date: 08/18/2023        Discharge Comments: pain doing well. significant bleeding; MD came in to inspect and change dressing; possibly close wound again          Autsyn Hayward, DO  Hospitalist Service  [facility]  Securely message with [employer] (more info)  Text page via [employer] Paging/Directory   ______________________________________________________________________    Interval History   Left arm pain, dressing feels tight.  Continues to bleed from surgical wound.     Physical Exam   Vital Signs: Temp: 99.1  F (37.3  C) Temp src: Oral BP: 118/59 Pulse: 101   Resp: 16 SpO2: 96 % O2 Device: None (Room air)    Weight: 174 lbs 8 oz    GENERAL:  Alert, appears comfortable, in no acute distress, appears stated age   HEAD:  Normocephalic, without obvious abnormality, atraumatic   EYES:  PERRL, conjunctiva/corneas clear, no scleral icterus, EOM's intact   LUNGS:   Clear to auscultation bilaterally, no rales, rhonchi, or wheezing, symmetric chest rise on inhalation, respirations unlabored   HEART:  Regular rate and rhythm, S1 and S2 normal, no murmur, rub, or gallop    ABDOMEN:   Soft, non-tender, bowel sounds active all four quadrants, no masses, no organomegaly, no rebound or guarding "   EXTREMITIES: Extremities normal, atraumatic, no cyanosis or edema, left arm in cast.    SKIN: Dry to touch, no exanthems in the visualized areas   NEURO: Alert, oriented x 4, moves all four extremities freely/spontaneously   PSYCH: Cooperative, behavior is appropriate        Medical Decision Making       35 MINUTES SPENT BY ME on the date of service doing chart review, history, exam, documentation & further activities per the note.      Data     I have personally reviewed the following data over the past 24 hrs:    13.2 (H)  \   12.1   / 351     N/A N/A N/A /  164 (H)   N/A N/A N/A \       Imaging results reviewed over the past 24 hrs:   No results found for this or any previous visit (from the past 24 hour(s)).

## 2023-08-18 NOTE — DISCHARGE SUMMARY
"[facility]  Hospitalist Discharge Summary      Date of Admission:  8/13/2023  Date of Discharge:  8/18/2023  Discharging Provider: Austyn Hayward DO  Discharge Service: Hospitalist Service    Discharge Diagnoses   Left distal humerus fracture status post ORIF.  Postoperative hypotension  Sinus tachycardia  Fall initial encounter  Generalized weakness  Acute cystitis secondary to E. coli  Postoperative hypoxia resolved  Hypomagnesemia  Hyperkalemia  Essential hypertension  Controlled diabetes mellitus type 2 without complication, without long-term insulin use  Hypothyroidism  Peripheral neuropathy  Vitamin D deficiency  Anxiety disorder    Clinically Significant Risk Factors     # Overweight: Estimated body mass index is 28.17 kg/m  as calculated from the following:    Height as of this encounter: 1.676 m (5' 6\").    Weight as of this encounter: 79.2 kg (174 lb 8 oz).       Follow-ups Needed After Discharge   Follow-up Appointments     Follow Up Care      Follow-up with your Surgeon Team in 7-10 days for wound check.            Discharge Disposition   Discharged to short-term care facility  Condition at discharge: Good    Hospital Course   77 year old female admitted on 8/13/2023. She is to the ER after a fall.  In the ER she had multiple imaging studies which were unremarkable except for humerus fracture.  Urinalysis was consistent with UTI and culture later grew E. coli organism.  Patient was treated with ceftriaxone and transition to Omnicef to complete a total of 10-day course.  Orthopedics recommended repair of complicated distal humerus fracture.  Underwent ORIF of distal humerus with ulnar nerve transport on 8/16/23.  Postop was complicated by bleeding from surgical wound and hypotension.  Coagulation studies were unremarkable.  Blood pressure dropped to the mid 80s on postop day 1.  Patient also had mild hypoxia which resolved.  Blood pressure was treated with normal saline.  " Orthostatic vital signs were normal.  Hemoglobin remained stable postoperatively.  Plan to discharge to TCU on 8/18/2023 with outpatient follow-up with [facility].    Consultations This Hospital Stay   CARE MANAGEMENT / SOCIAL WORK IP CONSULT  ORTHOPEDIC SURGERY IP CONSULT  OCCUPATIONAL THERAPY ADULT IP CONSULT  PHYSICAL THERAPY ADULT IP CONSULT  OCCUPATIONAL THERAPY ADULT IP CONSULT  PHYSICAL THERAPY ADULT IP CONSULT    Code Status   Full Code    Time Spent on this Encounter   I, Austyn Hayward DO, personally saw the patient today and spent greater than 30 minutes discharging this patient.       Austyn Hayward DO  [address] [address]  Phone: [phone]  Fax: [phone]  ______________________________________________________________________    Physical Exam   Vital Signs: Temp: 99.1  F (37.3  C) Temp src: Oral BP: 118/59 Pulse: 101   Resp: 16 SpO2: 96 % O2 Device: None (Room air)    Weight: 174 lbs 8 oz  GENERAL:  Alert, appears comfortable, in no acute distress, appears stated age   HEAD:  Normocephalic, without obvious abnormality, atraumatic   EYES:  PERRL, conjunctiva/corneas clear, no scleral icterus, EOM's intact   LUNGS:   Clear to auscultation bilaterally, no rales, rhonchi, or wheezing, symmetric chest rise on inhalation, respirations unlabored   HEART:  Regular rate and rhythm, S1 and S2 normal, no murmur, rub, or gallop    ABDOMEN:   Soft, non-tender, bowel sounds active all four quadrants, no masses, no organomegaly, no rebound or guarding   EXTREMITIES: Extremities normal, atraumatic, no cyanosis or edema, left arm in cast.    SKIN: Dry to touch, no exanthems in the visualized areas   NEURO: Alert, oriented x 4, moves all four extremities freely/spontaneously   PSYCH: Cooperative, behavior is appropriate           Primary Care Physician   Kassie Thompson    Discharge Orders      Medication Therapy Management  "Referral      Reason for your hospital stay    For surgical fixation of your complicated elbow fracture.     When to call - Contact Surgeon Team    You may experience symptoms that require follow-up before your scheduled appointment. Refer to the \"[employer] Tool\" for instructions on when to contact your Surgeon Team if you are concerned about pain control, blood clots, constipation, or if you are unable to urinate.     When to call - Reach out to Urgent Care    If you are not able to reach your Surgeon Team and you need immediate care, go to the Orthopedic Walk-in Clinic or Urgent Care at your Surgeon's office.  Do NOT go to the Emergency Room unless you have shortness of breath, chest pain, or other signs of a medical emergency.     When to call - Reasons to Call 911    Call 911 immediately if you experience sudden-onset chest pain, arm weakness/numbness, slurred speech, or shortness of breath     Discharge Instruction - Breathing exercises    Perform breathing exercises using your Incentive Spirometer 10 times per hour while awake for 2 weeks.     Symptoms - Fever Management    A low grade fever can be expected after surgery.  Use acetaminophen (TYLENOL) as needed for fever management.  Contact your Surgeon Team if you have a fever greater than 101.5 F, chills, and/or night sweats.     Symptoms - Constipation management    Constipation (hard, dry bowel movements) is expected after surgery due to the combination of being less active, the anesthetic, and the opioid pain medication.  You can do the following to help reduce constipation:  ~  FLUIDS:  Drink clear liquids (water or Gatorade), or juice (apple/prune).  ~  DIET:  Eat a fiber rich diet.    ~  ACTIVITY:  Get up and move around several times a day.  Increase your activity as you are able.  MEDICATIONS:  Reduce the risk of constipation by starting medications before you are constipated.  You can take Miralax   (1 packet as directed) and/or a stool softener " (Senokot 1-2 tablets 1-2 times a day).  If you already have constipation and these medications are not working, you can get magnesium citrate and use as directed.  If you continue to have constipation you can try an over the counter suppository or enema.  Call your Surgeon Team if it has been greater than 3 days since your last bowel movement.     Symptoms - Reduced Urine Output    Changes in the amount of fluids you drank before and after surgery may result in problems urinating.  It is important to stay well-hydrated after surgery and drink plenty of water. If it has been greater than 8 hours since you have urinated despite drinking plenty of water, call your Surgeon Team.     Activity - Exercises to prevent blood clots    Unless otherwise directed by your Surgeon team, perform the following exercises at least three times per day for the first four weeks after surgery to prevent blood clots in your legs: 1) Point and flex your feet (Ankle Pumps), 2) Move your ankle around in big circles, 3) Wiggle your toes, 4) Walk, even for short distances, several times a day, will help decrease the risk of blood clots.     Comfort and Pain Management - Pain after Surgery    Pain after surgery is normal and expected.  You will have some amount of pain for several weeks after surgery.  Your pain will improve with time.  There are several things you can do to help reduce your pain including: rest, ice, elevation, and using pain medications as needed. Contact your Surgeon Team if you have pain that persists or worsens after surgery despite rest, ice, elevation, and taking your medication(s) as prescribed. Contact your Surgeon Team if you have new numbness, tingling, or weakness in your operative extremity.     Comfort and Pain Management - Swelling after Surgery    Swelling and/or bruising of the surgical extremity is common and may persist for several months after surgery. In addition to frequent icing and elevation, gentle  compressive support with an ACE wrap or tubigrip may help with swelling. Apply compression regularly, removing at least twice daily to perform skin checks. Contact your Surgeon Team if your swelling increases and is NOT associated with an increase in your activity level, or if your swelling increases and is associated with redness and pain.     Medication Instructions - Acetaminophen (TYLENOL) Instructions    You were discharged with acetaminophen (TYLENOL) for pain management after surgery. Acetaminophen most effectively manages pain symptoms when it is taken on a schedule without missing doses (every four, six, or eight hours). Your Provider will prescribe a safe daily dose between 3000 - 4000 mg.  Do NOT exceed this daily dose. Most patients use acetaminophen for pain control for the first four weeks after surgery.  You can wean from this medication as your pain decreases.     Medication Instructions - Opioids - Tapering Instructions    In the first three days following surgery, your symptoms may warrant use of the narcotic pain medication every four to six hours as prescribed. This is normal. As your pain symptoms improve, focus your efforts on decreasing (tapering) use of narcotic medications. The most successful tapering strategy is to first, decrease the number of tablets you take every 4-6 hours to the minimum prescribed. Then, increase the amount of time between doses.  For example:  First, taper to   or 1 tablet every 4-6 hours.  Then, taper to   or 1 tablet every 6-8 hours.  Then, taper to   or 1 tablet every 8-10 hours.  Then, taper to   or 1 tablet every 10-12 hours.  Then, taper to   or 1 tablet at bedtime.  The bedtime dose can help with comfort during sleep and is typically the last dose to be discontinued after surgery.     Follow Up Care    Follow-up with your Surgeon Team in 7-10 days for wound check.     Medication instructions - No pharmacologic VTE prophylaxis prescribed    Your Surgeon did not  prescribe medication for anticoagulation.     Comfort and Pain Management - UPPER extremity Elevation    Swelling is expected for several months after surgery. This type of swelling is usually associated with gravity and activity, and can be improved with elevation.   The best way to do this is to get your hand above your heart by sitting down, resting your elbow on a pillow or arm rest, with your hand in the air. Perform this elevation as often as possible especially for the first two weeks after surgery     Medication Instructions - Opioid Instructions (Greater than or equal to 65 years)    You were discharged with an opioid medication (hydromorphone, oxycodone, hydrocodone, or tramadol). This medication should only be taken for breakthrough pain that is not controlled with acetaminophen (TYLENOL). If you rate your pain less than 3 you do not need this medication.  Pain rating 0-3:  You do not need this medication  Pain rating 4-6:  Take 1/2 tablet every 4-6 hours as needed  Pain rating 7-10:  Take 1 tablet every 4-6 hours as needed  Do not exceed 4 tablets per day     Return to Driving    Return to driving - Driving is NOT permitted until directed by your provider. Under no circumstance are you permitted to drive while using narcotic pain medications.     Dressing / Wound Care - Wound    You have a clean dressing on your surgical wound. Dressing change instructions as follows: keep your dressing clean and dry.     Dressing / Wound Care - NO Tub Bathing    Tub bathing, swimming, or any other activities that will cause your incision to be submerged in water should be avoided until allowed by your Surgeon.     NO weight bearing    No weight bearing to operative extremity.     Other Orthopedic Specialty Device    Sling as needed.     Dressing / Wound care - Shower with wound/dressing covered    You must COVER your dressing or incision with saran wrap (or any other non-permeable covering) to allow the incision to  remain dry while showering.  You may shower 2 days after surgery as long as the surgical wound stays dry. Continue to cover your dressing or incision for showering until your first office visit.  You are strictly prohibited from soaking   or submerging the surgical wound underwater.     No VTE Prophylaxis     Activity - Other    Move your fingers as much as you can.     General info for SNF    Length of Stay Estimate: Short Term Care: Estimated # of Days <30 Condition at Discharge: Improving Level of care:skilled  Rehabilitation Potential: Good Admission H&P remains valid and up-to-date: Yes Recent Chemotherapy: N/A Use Nursing Home Standing Orders: Yes     Mantoux Instructions    Give two-step Mantoux (PPD) Per Facility Policy {[id number]     Incentive Spirometry    Incentive Spirometry 10 times per hour, 4 times per day.     Shower with wound/dressing covered    You must COVER your dressing or incision with saran wrap (or any other non-permeable covering) to allow the incision to remain dry while showering.  You may shower 3 days after surgery as long as the surgical wound stays dry. Continue to cover your dressing or incision for showering until your first office visit.  You are strictly prohibited from soaking   or submerging the surgical wound underwater.     Occupational Therapy Adult Consult    Evaluate and treat as clinically indicated.    Reason: Status Post Surgery     Discharge Instruction - Regular Diet Adult    Return to your pre-surgery diet unless instructed otherwise     Diet    Follow this diet upon discharge: Orders Placed This Encounter      Advance Diet as Tolerated: Regular Diet Adult      Discharge Instruction - Regular Diet Adult       Significant Results and Procedures   Most Recent 3 CBC's:  Recent Labs   Lab Test 08/18/23  0645 08/17/23  0329 08/16/23  0713 08/14/23  0730   WBC 13.2* 14.4*  --  8.8   HGB 12.1 12.4 13.8 12.8    101*  --  98    329  --  315     Most Recent 3  BMP's:  Recent Labs   Lab Test 08/18/23  0644 08/17/23  2117 08/17/23  1709 08/16/23  1110 08/16/23  0713 08/14/23  0916 08/14/23  0730 08/14/23  0028 08/13/23  1758   NA  --   --   --   --  139  --  138  --  137   POTASSIUM  --   --   --   --  4.0  --  4.1  --  5.3*   CHLORIDE  --   --   --   --  102  --  103  --  103   CO2  --   --   --   --  28  --  25  --  22   BUN  --   --   --   --  8  --  15  --  24   CR  --   --   --   --  0.67  --  0.66  --  0.87   ANIONGAP  --   --   --   --  9  --  10  --  12   OPHELIA  --   --   --   --  9.3  --  8.7  --  9.3   * 181* 167*   < > 155*   < > 126*   < > 113    < > = values in this interval not displayed.     Most Recent 3 INR's:  Recent Labs   Lab Test 08/18/23  0917 08/17/23  0433 08/14/23  0730   INR 1.09 0.93 0.96     7-Day Micro Results       Collected Updated Procedure Result Status      08/13/2023 1751 08/15/2023 0730 Urine Culture [[id number]]    (Abnormal)   Urine, Clean Catch    Final result Component Value   Culture >100,000 CFU/mL Escherichia coli        Susceptibility        Escherichia coli      BETTYE      Ampicillin >=32 ug/mL Resistant      Ampicillin/ Sulbactam 16 ug/mL Intermediate      Cefazolin <=4 ug/mL Susceptible  [1]       Cefepime <=1 ug/mL Susceptible      Cefoxitin 8 ug/mL Susceptible      Ceftazidime <=1 ug/mL Susceptible      Ceftriaxone <=1 ug/mL Susceptible      Ciprofloxacin >=4 ug/mL Resistant      Gentamicin <=1 ug/mL Susceptible      Levofloxacin >=8 ug/mL Resistant      Nitrofurantoin <=16 ug/mL Susceptible      Piperacillin/Tazobactam >=128 ug/mL Resistant      Tobramycin <=1 ug/mL Susceptible      Trimethoprim/Sulfamethoxazole >16/304 ug/mL Resistant                   [1]  Cefazolin BETTYE breakpoints are for the treatment of uncomplicated urinary tract infections. For the treatment of systemic infections, please contact the laboratory for additional testing.                         Most Recent Urinalysis:  Recent Labs   Lab Test  08/13/23  1751   COLOR Yellow   APPEARANCE Clear   URINEGLC Negative   URINEBILI Negative   URINEKETONE Trace*   SG 1.032*   UBLD Negative   URINEPH 5.5   PROTEIN 20*   NITRITE Negative   LEUKEST 500 [place]/uL*   RBCU 2   WBCU 24*     Most Recent ESR & CRP:  Recent Labs   Lab Test 01/13/23  0639 01/12/23  0539 12/21/22  1807   SED  --   --  8   CRP  --   --  0.8*   CRPI 16.00*   < >  --     < > = values in this interval not displayed.     Most Recent Anemia Panel:  Recent Labs   Lab Test 08/18/23  0645   WBC 13.2*   HGB 12.1   HCT 36.3         ,   Results for orders placed or performed during the hospital encounter of 08/13/23   Head CT w/o contrast    Narrative    EXAM: CT HEAD W/O CONTRAST  LOCATION: [facility]  DATE: 8/13/2023    INDICATION: fall headache  COMPARISON: None.  TECHNIQUE: Routine CT Head without IV contrast. Multiplanar reformats. Dose reduction techniques were used.    FINDINGS:  INTRACRANIAL CONTENTS: No intracranial hemorrhage, extraaxial collection, or mass effect.  No CT evidence of acute infarct. Mild presumed chronic small vessel ischemic changes. Mild generalized volume loss. No hydrocephalus.     VISUALIZED ORBITS/SINUSES/MASTOIDS: No intraorbital abnormality. No paranasal sinus mucosal disease. No middle ear or mastoid effusion.    BONES/SOFT TISSUES: No acute abnormality.      Impression    IMPRESSION:  1.  No acute intracranial process.   2.  Age-related changes described above.     Lumbar spine CT w/o contrast    Narrative    EXAM: CT LUMBAR SPINE W/O CONTRAST  LOCATION: [facility]  DATE: 8/13/2023    INDICATION: low back pain after fall.  COMPARISON: None.  TECHNIQUE: Routine CT Lumbar Spine without IV contrast. Multiplanar reformats. Dose reduction techniques were used.     FINDINGS:  VERTEBRA: No acute fracture.  No acute post traumatic subluxations.   Normal vertebral body heights.    Partially visualized sacral neural  stimulator.    CANAL/FORAMINA: Multilevel spondylosis result in various levels and degrees of central canal stenosis and neural foraminal stenosis. L1-L2, L3-L4 severe central canal stenosis. L2-L3 high-grade severe central canal stenosis.   Several levels demonstrate   degenerative grade 1 subluxations. Thoracolumbar moderate dextroscoliosis. Mid to lower lumbar moderate levoscoliosis. Bilateral SI degenerative joint disease.    PARASPINAL: Partially visualized high attenuation material in the region of the stomach likely reflects a suture row when correlated with CT abdomen pelvis 09/22/2017.        Impression     IMPRESSION:  1.  No acute fracture.  2.  Degenerative changes described above.  3.  L1-L2, L3-L4 severe central canal stenosis. L2-L3 high-grade severe central canal stenosis.  4.  Partially visualized sacral stimulator.   Chest XR,  PA & LAT    Narrative    EXAM: XR CHEST 2 VIEWS  LOCATION: [facility]  DATE: 8/13/2023    INDICATION: Shortness of breath.  COMPARISON: 11/08/2022      Impression    IMPRESSION: Heart size and vascularity are normal. Shallow inspiration. No focal consolidation, pneumothorax nor pleural effusion.   Humerus XR,  G/E 2 views, left    Narrative    EXAM: XR HUMERUS LEFT G/E 2 VIEWS  LOCATION: [facility]  DATE: 8/13/2023    INDICATION: Fall, known frx, worsened swelling  COMPARISON: None.      Impression    IMPRESSION: Supracondylar fracture of the distal aspect of the humerus. The remainder of the humerus is negative. Degenerative change at the glenohumeral joint.   Radius/Ulna XR,  PA &LAT, left    Narrative    EXAM: XR FOREARM LEFT 2 VIEWS  LOCATION: [facility]  DATE: 8/13/2023    INDICATION: known frx, fall worse pain and swelling  COMPARISON: None.      Impression    IMPRESSION: Acute mildly displaced transcondylar fracture of the distal humerus. No additional fractures.   POC US Guidance Needle  "Placement    Narrative    Ultrasound was performed as guidance to an anesthesia procedure.  Click   \"PACS images\" hyperlink below to view any stored images.  For specific   procedure details, view procedure note authored by anesthesia.   POC US Guidance Needle Placement    Narrative    Ultrasound was performed as guidance to an anesthesia procedure.  Click   \"PACS images\" hyperlink below to view any stored images.  For specific   procedure details, view procedure note authored by anesthesia.   POC US Guidance Needle Placement    Narrative    Ultrasound was performed as guidance to an anesthesia procedure.  Click   \"PACS images\" hyperlink below to view any stored images.  For specific   procedure details, view procedure note authored by anesthesia.   XR Surgery ALY Fluoro L/T 5 Min    Narrative    This exam was marked as non-reportable because it will not be read by a   radiologist or a [facility] non-radiologist provider.             Discharge Medications   Current Discharge Medication List        START taking these medications    Details   acetaminophen (TYLENOL) 325 MG tablet Take 3 tablets (975 mg) by mouth every 8 hours  Qty: 100 tablet, Refills: 0    Associated Diagnoses: Closed supracondylar fracture of left elbow, initial encounter      cefdinir (OMNICEF) 300 MG capsule Take 1 capsule (300 mg) by mouth every 12 hours for 5 days  Qty: 10 capsule, Refills: 0    Associated Diagnoses: Acute cystitis without hematuria      oxyCODONE (ROXICODONE) 5 MG tablet Take 1-2 tablets (5-10 mg) by mouth every 4 hours as needed for moderate to severe pain  Qty: 16 tablet, Refills: 0    Associated Diagnoses: Closed supracondylar fracture of left elbow, initial encounter           CONTINUE these medications which have CHANGED    Details   lisinopril (ZESTRIL) 20 MG tablet Take 1 tablet (20 mg) by mouth daily Hold for SBP < 120 mmHg.    Associated Diagnoses: Essential hypertension           CONTINUE these medications which have " NOT CHANGED    Details   aspirin (ASA) 325 MG EC tablet Take 650 mg by mouth 2 times daily      baclofen (LIORESAL) 10 MG tablet Take 10 mg by mouth 3 times daily      busPIRone (BUSPAR) 10 MG tablet Take 10 mg by mouth 2 times daily      Carboxymethylcellulose Sodium 0.25 % SOLN Place 1 drop into both eyes 2 times daily      DULoxetine (CYMBALTA) 60 MG capsule Take 60 mg by mouth daily      EPINEPHrine (EPIPEN 2-SHERRI) 0.3 mg/0.3 mL atIn [EPINEPHRINE (EPIPEN 2-SHERRI) 0.3 MG/0.3 ML ATIN] Inject 0.3 mg into the shoulder, thigh, or buttocks once as needed (Anaphylaxis).      famotidine (PEPCID) 20 MG tablet Take 20 mg by mouth 2 times daily      ferrous sulfate 325 (65 FE) MG tablet [FERROUS SULFATE 325 (65 FE) MG TABLET] Take 1 tablet by mouth daily with breakfast.             fluticasone (FLONASE) 50 mcg/actuation nasal spray Spray 2 sprays into both nostrils daily as needed      folic acid (FOLVITE) 1 MG tablet [FOLIC ACID (FOLVITE) 1 MG TABLET] Take 1 mg by mouth daily.      levothyroxine (SYNTHROID, LEVOTHROID) 88 MCG tablet [LEVOTHYROXINE (SYNTHROID, LEVOTHROID) 88 MCG TABLET] Take 88 mcg by mouth Daily at 6:00 am.       loperamide (IMODIUM) 2 MG capsule Take 2-4 mg by mouth 3 times daily as needed for diarrhea Take 1 capsule with first loose stool and take 2 capsules after each subsequent loose stool      magnesium oxide (MAG-OX) 400 mg (241.3 mg magnesium) tablet [MAGNESIUM OXIDE (MAG-OX) 400 MG (241.3 MG MAGNESIUM) TABLET] Take 800 mg by mouth 2 (two) times a day.      meclizine (ANTIVERT) 12.5 MG tablet Take 12.5 mg by mouth 3 times daily as needed for dizziness      metFORMIN (GLUCOPHAGE) 500 MG tablet [METFORMIN (GLUCOPHAGE) 500 MG TABLET] Take 1,000 mg by mouth 2 (two) times a day with meals.      Multiple vitamin TABS Take 1 tablet by mouth daily      omeprazole (PRILOSEC) 20 MG DR capsule Take 40 mg by mouth daily      polyethylene glycol-propylene glycol (SYSTANE ULTRA) 0.4-0.3 % SOLN ophthalmic solution  Place 1 drop into both eyes 2 times daily as needed      rosuvastatin (CRESTOR) 5 MG tablet Take 5 mg by mouth At Bedtime      valACYclovir (VALTREX) 1000 MG tablet Take 2,000 mg by mouth 2 times daily as needed      Vitamin D (Cholecalciferol) 50 MCG (2000 UT) CAPS Take 2,000 Units by mouth daily           STOP taking these medications       acetaminophen (TYLENOL) 650 MG CR tablet Comments:   Reason for Stopping:             Allergies   Allergies   Allergen Reactions    Glucosamine Anaphylaxis and Rash     Cardiac arrest    Ibuprofen Hives    Iodine Angioedema, Anaphylaxis and Hives     Patient can uses skin products such as betadine - See shellfish allergy      Naproxen Hives and Rash    Shellfish Allergy Anaphylaxis, Hives and Rash     Cardiac arrest - very severe    Shellfish-Derived Products Anaphylaxis, Hives and Rash     Cardiac arrest - very severe      Amitriptyline Visual Disturbance     Hallucinations    Buspirone Visual Disturbance     Hallucinations    Nystatin Nausea, Diarrhea, Cramps and GI Disturbance    Tramadol Visual Disturbance     Hallucinations    Chlorpheniramine Hives and Rash    Nsaids Unknown    Pseudoephedrine Hives and Rash

## 2023-08-18 NOTE — PLAN OF CARE
Note for RN cares provided 1900 - 2300:  Patient vital signs are at baseline: Yes  Patient able to ambulate as they were prior to admission or with assist devices provided by therapies during their stay:  Yes  Patient MUST void prior to discharge:  Yes  Patient able to tolerate oral intake:  Yes  Pain has adequate pain control using Oral analgesics:  Yes  Pt requesting PRN oxycodone for pain control; cold applied and nonpharmacologic interventions utilized for pain control.  Does patient have an identified :  Yes  Has goal D/C date and time been discussed with patient:  Yes    Plan is to have therapies in AM then transfer to TCU 8/18. [facility] wheelchair set up with a ride window of 1:45 pm - 2:15 pm per SW note.

## 2023-08-18 NOTE — PROGRESS NOTES
Care Management Discharge Note    Discharge Date: 08/18/2023       Discharge Disposition: Transitional Care  [facility]  Discharge Services:  (TCU)    Discharge Transportation:  (daughter vs HE transport TBD)    Patient/family educated on Medicare website which has current facility and service quality ratings: yes    Education Provided on the Discharge Plan: Yes  Patient/Family in Agreement with the Plan: yes      Additional Information:  CM updated by ortho provider that discharge is still planned for today. CM called [facility] and left a message for Rosa in admissions stating that discharge is still for day and the ride time. CM to send discharge orders once done.     Orders in and faxed via EPIC.     RN updated CM that Dtr has questions. CM called dtr Yandel and answered questions as able. Dtr to meet the Pt at TCU.     BAILEE Garnica

## 2023-08-18 NOTE — PLAN OF CARE
Pt cleared for discharge per Ortho & [facility]. Removed PIV, assisted in gathering pt's belongings & gave AVS to pt. Ensured transport staff received discharge packet & transport staff transferred pt via wheelchair to TCU.       Barbara Tineo, WILLIEN  Shift: 0700 - 1930

## 2023-08-18 NOTE — PLAN OF CARE
Physical Therapy Discharge Summary    Reason for therapy discharge:    Discharged to transitional care facility.    Progress towards therapy goal(s). See goals on Care Plan in [facility] electronic health record for goal details.  Goals not met.  Barriers to achieving goals:   discharge from facility.    Therapy recommendation(s):    Continued therapy is recommended.  Rationale/Recommendations:  continued PT at TCU to improve functional mobility.  Continue home exercise program.

## 2023-08-18 NOTE — PLAN OF CARE
Goal Outcome Evaluation:    Patient vital signs are at baseline: Yes  Patient able to ambulate as they were prior to admission or with assist devices provided by therapies during their stay:  Yes  Patient MUST void prior to discharge:  Yes  Patient able to tolerate oral intake:  Yes  Pain has adequate pain control using Oral analgesics:  Yes  Does patient have an identified :  Yes  Has goal D/C date and time been discussed with patient:  Yes     Patient is alert and oriented x3. Forgetful. VSS. Pain is managed with PRN Oxycodone. Splint has small wet drainage on the elbow. Ice pack applied. 50% PWB to LUE. Weak  strength on LUE. Pt denies numbness and tingling. Anticipating discharge to TCU pending medical clearance. Wheelchair ride @1:45-2:15 per SW note.

## 2023-08-18 NOTE — PROGRESS NOTES
Occupational Therapy Discharge Summary    Reason for therapy discharge:    Discharged to transitional care facility.    Progress towards therapy goal(s). See goals on Care Plan in [facility] electronic health record for goal details.  Goals partially met.  Barriers to achieving goals:   discharge from facility.    Therapy recommendation(s):    Continued therapy is recommended.  Rationale/Recommendations:  To increase ADL independence and ensure safety.

## 2023-08-18 NOTE — PROGRESS NOTES
"Orthopedic Progress Note      Assessment: 2 Days Post-Op  S/P Procedure(s):  OPEN REDUCTION INTERNAL FIXATION, FRACTURE, HUMERUS, DISTAL LEFT @    Plan:   - Continue PT/OT  - Weightbearing status: NWB   - Anticoagulation: per medicine  in addition to SCDs, zander stockings and early ambulation.  - Discharge planning: discharge to TCU today, dressing changed      Subjective:  Pain: mild  Chest pain, SOB: mno  Nausea, Vomiting:  no  Lightheadedness, Dizziness:  no  Neuro:  Patient denies new onset numbness or paresthesias    Patient is doing well today notes some mild pain to the elbow, dressing did bleed through.  This was changed today long-arm splint reapplied.  Otherwise doing well today can discharge from orthopedic standpoint    Objective:  /59 (BP Location: Right arm)   Pulse 101   Temp 99.1  F (37.3  C) (Oral)   Resp 16   Ht 1.676 m (5' 6\")   Wt 79.2 kg (174 lb 8 oz)   SpO2 96%   BMI 28.17 kg/m    The patient is A&Ox3. Appears comfortable.   Sensation is intact.  Dorsiflexion and plantar flexion is intact.  Dorsalis pedis pulse intact.  Calves are soft and non-tender. Negative Neftali's.  The incision is covered. Dressing C/D/I.      Pertinent Labs   Lab Results: personally reviewed.   Lab Results   Component Value Date    INR 1.09 08/18/2023    INR 0.93 08/17/2023    INR 0.96 08/14/2023     Lab Results   Component Value Date    WBC 13.2 (H) 08/18/2023    HGB 12.1 08/18/2023    HCT 36.3 08/18/2023     08/18/2023     08/18/2023     Lab Results   Component Value Date     08/16/2023    CO2 28 08/16/2023         Report completed by:  Cathryn Rebolledo PA-C/[facility]    Date: 8/18/2023  Time: 11:06 AM    "

## 2023-08-19 ENCOUNTER — PATIENT OUTREACH (OUTPATIENT)
Dept: CARE COORDINATION | Facility: CLINIC | Age: 78
End: 2023-08-19
Payer: COMMERCIAL

## 2023-08-19 NOTE — PROGRESS NOTES
[facility] Care Resource [place]    Background: Transitional Care Management program identified per system criteria and reviewed by [facility] team for possible outreach.    Assessment: Upon chart review, [facility] Team member will not proceed with patient outreach related to this episode of Transitional Care Management program due to reason below:    Non-[facility] TCU: CCRC team member noted patient discharged to TCU/ARU/LTACH. Patient is not established with a [facility] currently supported by Primary Care-Care Coordination therefore handoff to Primary Care-Care Coordination is not appropriate at this time.    Plan: Transitional Care Management episode addressed appropriately per reason noted above.      Mamta Powers MA  Connected Care Resource [place], [facility]    *Connected Care Resource Team does NOT follow patient ongoing. Referrals are identified based on internal discharge reports and the outreach is to ensure patient has an understanding of their discharge instructions.

## 2023-08-22 ENCOUNTER — TRANSITIONAL CARE UNIT VISIT (OUTPATIENT)
Dept: GERIATRICS | Facility: CLINIC | Age: 78
End: 2023-08-22
Payer: COMMERCIAL

## 2023-08-22 VITALS
HEART RATE: 93 BPM | OXYGEN SATURATION: 96 % | WEIGHT: 174 LBS | HEIGHT: 67 IN | BODY MASS INDEX: 27.31 KG/M2 | RESPIRATION RATE: 20 BRPM | SYSTOLIC BLOOD PRESSURE: 144 MMHG | DIASTOLIC BLOOD PRESSURE: 68 MMHG | TEMPERATURE: 96.6 F

## 2023-08-22 DIAGNOSIS — G63 POLYNEUROPATHY ASSOCIATED WITH UNDERLYING DISEASE (H): ICD-10-CM

## 2023-08-22 DIAGNOSIS — R29.6 FALLS FREQUENTLY: ICD-10-CM

## 2023-08-22 DIAGNOSIS — K21.9 GASTROESOPHAGEAL REFLUX DISEASE WITHOUT ESOPHAGITIS: ICD-10-CM

## 2023-08-22 DIAGNOSIS — E11.01 TYPE 2 DIABETES MELLITUS WITH HYPEROSMOLAR COMA, WITHOUT LONG-TERM CURRENT USE OF INSULIN (H): Primary | ICD-10-CM

## 2023-08-22 DIAGNOSIS — F41.9 ANXIETY DISORDER, UNSPECIFIED TYPE: ICD-10-CM

## 2023-08-22 DIAGNOSIS — S42.412A CLOSED SUPRACONDYLAR FRACTURE OF LEFT ELBOW, INITIAL ENCOUNTER: ICD-10-CM

## 2023-08-22 DIAGNOSIS — G89.4 CHRONIC PAIN SYNDROME: ICD-10-CM

## 2023-08-22 DIAGNOSIS — E89.0 POSTABLATIVE HYPOTHYROIDISM: ICD-10-CM

## 2023-08-22 DIAGNOSIS — I10 PRIMARY HYPERTENSION: ICD-10-CM

## 2023-08-22 DIAGNOSIS — E11.42 DIABETIC PERIPHERAL NEUROPATHY (H): ICD-10-CM

## 2023-08-22 PROBLEM — C43.9 MALIGNANT MELANOMA (H): Status: RESOLVED | Noted: 2023-01-06 | Resolved: 2023-08-22

## 2023-08-22 PROCEDURE — 99305 1ST NF CARE MODERATE MDM 35: CPT | Performed by: FAMILY MEDICINE

## 2023-08-22 NOTE — LETTER
8/22/2023        RE: Belgica Salvador  [address] [facility] [address]        [facility]       Patient Belgica Salvador  MRN: [id number]        Reason for Visit     Chief Complaint   Patient presents with     Hospital F/U       Code Status     DNR / DNI    Assessment     LEFT ELBOW SUPRACONDYLAR fracture status post ORIF with ulnar nerve transport on 8/16/2023  Postoperative hypotension  ABLA  UTI with cultures growing E. Coli  History of a fall with underlying history of recurrent falls  Generalized weakness  Mood disorder  Hypothyroidism  DM-2  PERIPHERAL NEUROPATHY      Plan     Pt is admitted to TCU for strengthening and rehab.  Patient admitted postprocedure on 8/16/2023.  Continue nonweightbearing left upper extremity  Pain management optimized.  She is on scheduled Tylenol.  Continue scheduled baclofen  She is also on scheduled aspirin 650 mg 2 times daily  Oxycodone has been added for additional pain relief currently patient is not ready for a taper  Advised aggressive icing.  Outpatient follow-up with orthopedics as scheduled  Monitor blood pressures due to postoperative hypotension  On lisinopril and stable  Hypoxia concerns have resolved  Recheck labs to monitor hemoglobin due to underlying history of postoperative bleeding  She reports some ongoing diarrhea and has Imodium available as needed  DM controlled with metformin  UTI was treated  Monitor mood due to anxiety  Continue with PT/OT-walking with  a cane    History     Patient is a very pleasant 77 year old female who is admitted to TCU  Patient admitted post fall with underlying history of recurrent falls.  She was noted to have a distal humeral fracture and underwent surgical repair on 8/16/2023.  Postoperative course complicated by bleeding from the wound with hypotension.  She has been discharged to the TCU      Past Medical & Surgical History     PAST MEDICAL HISTORY:   Past Medical History:   Diagnosis Date      Anxiety      Arthritis      Coronary artery disease      Diabetes (H)      Gastroesophageal reflux disease      Graves disease      Hypertension      Migraine      Muscle spasm      Renal disease      Thrombosis      Thyroid disease       PAST SURGICAL HISTORY:   has a past surgical history that includes IR Carotid Angiogram (5/22/2012); IR Carotid Angiogram (5/22/2012); IR Aortic Arch 4 Vessel Angiogram (5/22/2012); GASTRIC BYPASS,OBESE<100CM JAROD-EN-Y; Pr Partial Excision Thyroid,Unilat; REVISE MEDIAN N/CARPAL TUNNEL SURG; CERV SPINE FUSN,ANTER,BELOW C2; KNEE SCOPE,AID POST CRUC REPAIR; appendectomy; Pancreas surgery (N/A); Esophagoscopy, gastroscopy, duodenoscopy (EGD), combined (N/A, 9/24/2017); Pr Implant Periph/Gastric Neurostim/ (N/A, 9/28/2017); joint replacement (Right, 2009); Release carpal tunnel (Left, 06/2018); other surgical history (2013); other surgical history (Left); Ankle surgery (Right); other surgical history; Cervical Fusion (N/A, 3/20/2019); Arthroplasty knee (Left, 11/10/2022); and Open reduction internal fixation humerus distal (Left, 8/16/2023).      Past Social History     Reviewed,  reports that she has quit smoking. She has never used smokeless tobacco. She reports that she does not currently use alcohol after a past usage of about 1.0 - 2.0 standard drink of alcohol per week. She reports that she does not use drugs.    Family History     Reviewed, and family history includes Breast Cancer in her paternal aunt.    Medication List     Current Outpatient Medications   Medication     acetaminophen (TYLENOL) 325 MG tablet     aspirin (ASA) 325 MG EC tablet     baclofen (LIORESAL) 10 MG tablet     busPIRone (BUSPAR) 10 MG tablet     Carboxymethylcellulose Sodium 0.25 % SOLN     cefdinir (OMNICEF) 300 MG capsule     DULoxetine (CYMBALTA) 60 MG capsule     EPINEPHrine (EPIPEN 2-SHERRI) 0.3 mg/0.3 mL atIn     famotidine (PEPCID) 20 MG tablet     ferrous sulfate 325 (65 FE) MG tablet      fluticasone (FLONASE) 50 mcg/actuation nasal spray     folic acid (FOLVITE) 1 MG tablet     levothyroxine (SYNTHROID, LEVOTHROID) 88 MCG tablet     lisinopril (ZESTRIL) 20 MG tablet     loperamide (IMODIUM) 2 MG capsule     magnesium oxide (MAG-OX) 400 mg (241.3 mg magnesium) tablet     meclizine (ANTIVERT) 12.5 MG tablet     metFORMIN (GLUCOPHAGE) 500 MG tablet     Multiple vitamin TABS     omeprazole (PRILOSEC) 20 MG DR capsule     oxyCODONE (ROXICODONE) 5 MG tablet     polyethylene glycol-propylene glycol (SYSTANE ULTRA) 0.4-0.3 % SOLN ophthalmic solution     rosuvastatin (CRESTOR) 5 MG tablet     valACYclovir (VALTREX) 1000 MG tablet     Vitamin D (Cholecalciferol) 50 MCG (2000 UT) CAPS     No current facility-administered medications for this visit.      MED REC REQUIRED  Post Medication Reconciliation Status: discharge medications reconciled, continue medications without change       Allergies     Allergies   Allergen Reactions     Glucosamine Anaphylaxis and Rash     Cardiac arrest     Ibuprofen Hives     Iodine Angioedema, Anaphylaxis and Hives     Patient can uses skin products such as betadine - See shellfish allergy       Naproxen Hives and Rash     Shellfish Allergy Anaphylaxis, Hives and Rash     Cardiac arrest - very severe     Shellfish-Derived Products Anaphylaxis, Hives and Rash     Cardiac arrest - very severe       Amitriptyline Visual Disturbance     Hallucinations     Buspirone Visual Disturbance     Hallucinations     Nystatin Nausea, Diarrhea, Cramps and GI Disturbance     Tramadol Visual Disturbance     Hallucinations     Chlorpheniramine Hives and Rash     Nsaids Unknown     Pseudoephedrine Hives and Rash       Review of Systems   A comprehensive review of 14 systems was done. Pertinent findings noted here and in history of present illness. All the rest negative.  Constitutional: Negative.  Negative for fever, chills, she has  activity change, appetite change and fatigue.   Has concerns  "about her medications which were reviewed with her she is worried she is not getting her Synthroid either  HENT: Negative for congestion and facial swelling.    Eyes: Negative for photophobia, redness and visual disturbance.   Respiratory: Negative for cough and chest tightness.    Cardiovascular: Negative for chest pain, palpitations and leg swelling.   Gastrointestinal: Negative for nausea, diarrhea, constipation, blood in stool and abdominal distention.   Had some ongoing diarrhea concerns but then realized she was getting Metamucil  Genitourinary: Negative.    Musculoskeletal: Having pain in her left arm and has been using high doses of oxycodone  Skin: Negative.    Neurological: Negative for dizziness, tremors, syncope, weakness, light-headedness and headaches.   Hematological: Does not bruise/bleed easily.   Psychiatric/Behavioral: Negative.        Physical Exam   BP (!) 144/68   Pulse 93   Temp (!) 96.6  F (35.9  C)   Resp 20   Ht 1.702 m (5' 7\")   Wt 78.9 kg (174 lb)   SpO2 96%   BMI 27.25 kg/m       Constitutional: Oriented to person, place, and time and appears well-developed.   HEENT:  Normocephalic and atraumatic.  Eyes: Conjunctivae and EOM are normal. Pupils are equal, round, and reactive to light. No discharge.  No scleral icterus. Nose normal. Mouth/Throat: Oropharynx is clear and moist. No oropharyngeal exudate.    NECK: Normal range of motion. Neck supple. No JVD present. No tracheal deviation present. No thyromegaly present.   CARDIOVASCULAR: Normal rate, regular rhythm and intact distal pulses.  Exam reveals no gallop and no friction rub.  Systolic murmur present.  PULMONARY: Effort normal and breath sounds normal. No respiratory distress.No Wheezing or rales.  ABDOMEN: Soft. Bowel sounds are normal. No distension and no mass.  There is no tenderness. There is no rebound and no guarding. No HSM.  MUSCULOSKELETAL: Normal range of motion. Mild kyphosis, no tenderness.  Left upper extremity " is in a sling with an Ace wrap and a cast  LYMPH NODES: Has no cervical, supraclavicular, axillary and groin adenopathy.   NEUROLOGICAL: Alert and oriented to person, place, and time. No cranial nerve deficit.  Normal muscle tone. Coordination normal.   GENITOURINARY: Deferred exam.  SKIN: Skin is warm and dry. No rash noted. No erythema. No pallor.   EXTREMITIES: No cyanosis, no clubbing, no edema. No Deformity.  PSYCHIATRIC: Normal mood, affect and behavior.  Anxiety noted    Lab Results     Recent Results (from the past 240 hour(s))   ECG 12-LEAD WITH MUSE ([facility])    Collection Time: 08/13/23  4:07 PM   Result Value Ref Range    Systolic Blood Pressure 147 mmHg    Diastolic Blood Pressure 70 mmHg    Ventricular Rate 103 BPM    Atrial Rate 103 BPM    WY Interval 124 ms    QRS Duration 124 ms     ms    QTc 466 ms    P Axis 66 degrees    R AXIS 30 degrees    T Axis 26 degrees    Interpretation ECG       Sinus tachycardia  Right bundle branch block  Abnormal ECG  When compared with ECG of 08-NOV-2022 18:50,  No significant change was found  Confirmed by SEE ED PROVIDER NOTE FOR, ECG INTERPRETATION ([id number]),  SOPHIE IRBY ([id number]) on 8/13/2023 4:56:20 PM     UA with Microscopic reflex to Culture    Collection Time: 08/13/23  5:51 PM    Specimen: Urine, Clean Catch   Result Value Ref Range    Color Urine Yellow Colorless, Straw, Light Yellow, Yellow    Appearance Urine Clear Clear    Glucose Urine Negative Negative mg/dL    Bilirubin Urine Negative Negative    Ketones Urine Trace (A) Negative mg/dL    Specific Gravity Urine 1.032 (H) 1.001 - 1.030    Blood Urine Negative Negative    pH Urine 5.5 5.0 - 7.0    Protein Albumin Urine 20 (A) Negative mg/dL    Urobilinogen Urine 2.0 (A) <2.0 mg/dL    Nitrite Urine Negative Negative    Leukocyte Esterase Urine 500 Zahraa/uL (A) Negative    Bacteria Urine Many (A) None Seen /HPF    Mucus Urine Present (A) None Seen /LPF    RBC Urine 2 <=2 /HPF    WBC Urine 24 (H) <=5  /HPF    Hyaline Casts Urine 7 (H) <=2 /LPF   Urine Culture    Collection Time: 08/13/23  5:51 PM    Specimen: Urine, Clean Catch   Result Value Ref Range    Culture >100,000 CFU/mL Escherichia coli (A)        Susceptibility    Escherichia coli - BETTYE     Ampicillin >=32 Resistant ug/mL     Ampicillin/ Sulbactam 16 Intermediate ug/mL     Piperacillin/Tazobactam >=128 Resistant ug/mL     Cefazolin* <=4 Susceptible ug/mL      * Cefazolin BETTYE breakpoints are for the treatment of uncomplicated urinary tract infections. For the treatment of systemic infections, please contact the laboratory for additional testing.     Cefoxitin 8 Susceptible ug/mL     Ceftazidime <=1 Susceptible ug/mL     Ceftriaxone <=1 Susceptible ug/mL     Cefepime <=1 Susceptible ug/mL     Gentamicin <=1 Susceptible ug/mL     Tobramycin <=1 Susceptible ug/mL     Ciprofloxacin >=4 Resistant ug/mL     Levofloxacin >=8 Resistant ug/mL     Nitrofurantoin <=16 Susceptible ug/mL     Trimethoprim/Sulfamethoxazole >16/304 Resistant ug/mL   Basic metabolic panel    Collection Time: 08/13/23  5:58 PM   Result Value Ref Range    Sodium 137 136 - 145 mmol/L    Potassium 5.3 (H) 3.5 - 5.0 mmol/L    Chloride 103 98 - 107 mmol/L    Carbon Dioxide (CO2) 22 22 - 31 mmol/L    Anion Gap 12 5 - 18 mmol/L    Urea Nitrogen 24 8 - 28 mg/dL    Creatinine 0.87 0.60 - 1.10 mg/dL    Calcium 9.3 8.5 - 10.5 mg/dL    Glucose 113 70 - 125 mg/dL    GFR Estimate 69 >60 mL/min/1.73m2   Troponin I    Collection Time: 08/13/23  5:58 PM   Result Value Ref Range    Troponin I <0.01 0.00 - 0.29 ng/mL   Magnesium    Collection Time: 08/13/23  5:58 PM   Result Value Ref Range    Magnesium 1.5 (L) 1.8 - 2.6 mg/dL   CBC with platelets and differential    Collection Time: 08/13/23  5:58 PM   Result Value Ref Range    WBC Count 13.2 (H) 4.0 - 11.0 10e3/uL    RBC Count 3.87 3.80 - 5.20 10e6/uL    Hemoglobin 12.8 11.7 - 15.7 g/dL    Hematocrit 38.4 35.0 - 47.0 %    MCV 99 78 - 100 fL    MCH 33.1  (H) 26.5 - 33.0 pg    MCHC 33.3 31.5 - 36.5 g/dL    RDW 13.6 10.0 - 15.0 %    Platelet Count 304 150 - 450 10e3/uL   Manual Differential    Collection Time: 08/13/23  5:58 PM   Result Value Ref Range    % Neutrophils 63 %    % Lymphocytes 23 %    % Monocytes 13 %    % Eosinophils 1 %    % Basophils 0 %    Absolute Neutrophils 8.3 1.6 - 8.3 10e3/uL    Absolute Lymphocytes 3.0 0.8 - 5.3 10e3/uL    Absolute Monocytes 1.7 (H) 0.0 - 1.3 10e3/uL    Absolute Eosinophils 0.1 0.0 - 0.7 10e3/uL    Absolute Basophils 0.0 0.0 - 0.2 10e3/uL    RBC Morphology Confirmed RBC Indices     Platelet Assessment  Automated Count Confirmed. Platelet morphology is normal.     Automated Count Confirmed. Platelet morphology is normal.   Hemoglobin A1c    Collection Time: 08/13/23  9:09 PM   Result Value Ref Range    Hemoglobin A1C 5.8 (H) <5.7 %   Glucose by meter    Collection Time: 08/14/23 12:28 AM   Result Value Ref Range    GLUCOSE BY METER POCT 86 70 - 99 mg/dL   Glucose by meter    Collection Time: 08/14/23  6:15 AM   Result Value Ref Range    GLUCOSE BY METER POCT 117 (H) 70 - 99 mg/dL   Basic metabolic panel    Collection Time: 08/14/23  7:30 AM   Result Value Ref Range    Sodium 138 136 - 145 mmol/L    Potassium 4.1 3.5 - 5.0 mmol/L    Chloride 103 98 - 107 mmol/L    Carbon Dioxide (CO2) 25 22 - 31 mmol/L    Anion Gap 10 5 - 18 mmol/L    Urea Nitrogen 15 8 - 28 mg/dL    Creatinine 0.66 0.60 - 1.10 mg/dL    Calcium 8.7 8.5 - 10.5 mg/dL    Glucose 126 (H) 70 - 125 mg/dL    GFR Estimate 90 >60 mL/min/1.73m2   CBC with platelets    Collection Time: 08/14/23  7:30 AM   Result Value Ref Range    WBC Count 8.8 4.0 - 11.0 10e3/uL    RBC Count 3.88 3.80 - 5.20 10e6/uL    Hemoglobin 12.8 11.7 - 15.7 g/dL    Hematocrit 37.9 35.0 - 47.0 %    MCV 98 78 - 100 fL    MCH 33.0 26.5 - 33.0 pg    MCHC 33.8 31.5 - 36.5 g/dL    RDW 13.5 10.0 - 15.0 %    Platelet Count 315 150 - 450 10e3/uL   INR    Collection Time: 08/14/23  7:30 AM   Result Value Ref  Range    INR 0.96 0.85 - 1.15   Glucose by meter    Collection Time: 08/14/23  9:16 AM   Result Value Ref Range    GLUCOSE BY METER POCT 92 70 - 99 mg/dL   Glucose by meter    Collection Time: 08/14/23 10:41 AM   Result Value Ref Range    GLUCOSE BY METER POCT 96 70 - 99 mg/dL   Glucose by meter    Collection Time: 08/14/23  4:58 PM   Result Value Ref Range    GLUCOSE BY METER POCT 128 (H) 70 - 99 mg/dL   Magnesium    Collection Time: 08/14/23  6:37 PM   Result Value Ref Range    Magnesium 2.1 1.8 - 2.6 mg/dL   Glucose by meter    Collection Time: 08/14/23  9:10 PM   Result Value Ref Range    GLUCOSE BY METER POCT 149 (H) 70 - 99 mg/dL   Glucose by meter    Collection Time: 08/15/23  6:38 AM   Result Value Ref Range    GLUCOSE BY METER POCT 102 (H) 70 - 99 mg/dL   Magnesium    Collection Time: 08/15/23  7:48 AM   Result Value Ref Range    Magnesium 1.7 (L) 1.8 - 2.6 mg/dL   Glucose by meter    Collection Time: 08/15/23 11:12 AM   Result Value Ref Range    GLUCOSE BY METER POCT 80 70 - 99 mg/dL   Glucose by meter    Collection Time: 08/15/23  2:46 PM   Result Value Ref Range    GLUCOSE BY METER POCT 80 70 - 99 mg/dL   Glucose by meter    Collection Time: 08/15/23  5:09 PM   Result Value Ref Range    GLUCOSE BY METER POCT 82 70 - 99 mg/dL   Glucose by meter    Collection Time: 08/15/23  6:06 PM   Result Value Ref Range    GLUCOSE BY METER POCT 62 (L) 70 - 99 mg/dL   Glucose by meter    Collection Time: 08/15/23  8:37 PM   Result Value Ref Range    GLUCOSE BY METER POCT 207 (H) 70 - 99 mg/dL   Glucose by meter    Collection Time: 08/16/23  6:43 AM   Result Value Ref Range    GLUCOSE BY METER POCT 106 (H) 70 - 99 mg/dL   Basic metabolic panel    Collection Time: 08/16/23  7:13 AM   Result Value Ref Range    Sodium 139 136 - 145 mmol/L    Potassium 4.0 3.5 - 5.0 mmol/L    Chloride 102 98 - 107 mmol/L    Carbon Dioxide (CO2) 28 22 - 31 mmol/L    Anion Gap 9 5 - 18 mmol/L    Urea Nitrogen 8 8 - 28 mg/dL    Creatinine 0.67  0.60 - 1.10 mg/dL    Calcium 9.3 8.5 - 10.5 mg/dL    Glucose 155 (H) 70 - 125 mg/dL    GFR Estimate 90 >60 mL/min/1.73m2   Hemoglobin    Collection Time: 08/16/23  7:13 AM   Result Value Ref Range    Hemoglobin 13.8 11.7 - 15.7 g/dL   Magnesium    Collection Time: 08/16/23  7:13 AM   Result Value Ref Range    Magnesium 1.5 (L) 1.8 - 2.6 mg/dL   Glucose by meter    Collection Time: 08/16/23 11:10 AM   Result Value Ref Range    GLUCOSE BY METER POCT 100 (H) 70 - 99 mg/dL   Glucose by meter    Collection Time: 08/16/23  2:28 PM   Result Value Ref Range    GLUCOSE BY METER POCT 80 70 - 99 mg/dL   Glucose by meter    Collection Time: 08/16/23  9:10 PM   Result Value Ref Range    GLUCOSE BY METER POCT 85 70 - 99 mg/dL   Glucose by meter    Collection Time: 08/16/23 10:35 PM   Result Value Ref Range    GLUCOSE BY METER POCT 103 (H) 70 - 99 mg/dL   Magnesium    Collection Time: 08/16/23 10:46 PM   Result Value Ref Range    Magnesium 1.7 (L) 1.8 - 2.6 mg/dL   Magnesium    Collection Time: 08/17/23  3:29 AM   Result Value Ref Range    Magnesium 1.6 (L) 1.8 - 2.6 mg/dL   CBC with platelets and differential    Collection Time: 08/17/23  3:29 AM   Result Value Ref Range    WBC Count 14.4 (H) 4.0 - 11.0 10e3/uL    RBC Count 3.66 (L) 3.80 - 5.20 10e6/uL    Hemoglobin 12.4 11.7 - 15.7 g/dL    Hematocrit 36.8 35.0 - 47.0 %     (H) 78 - 100 fL    MCH 33.9 (H) 26.5 - 33.0 pg    MCHC 33.7 31.5 - 36.5 g/dL    RDW 13.5 10.0 - 15.0 %    Platelet Count 329 150 - 450 10e3/uL   Manual Differential    Collection Time: 08/17/23  3:29 AM   Result Value Ref Range    % Neutrophils 69 %    % Lymphocytes 14 %    % Monocytes 15 %    % Eosinophils 2 %    % Basophils 0 %    Absolute Neutrophils 9.9 (H) 1.6 - 8.3 10e3/uL    Absolute Lymphocytes 2.0 0.8 - 5.3 10e3/uL    Absolute Monocytes 2.2 (H) 0.0 - 1.3 10e3/uL    Absolute Eosinophils 0.3 0.0 - 0.7 10e3/uL    Absolute Basophils 0.0 0.0 - 0.2 10e3/uL    RBC Morphology Confirmed RBC Indices      Platelet Assessment  Automated Count Confirmed. Platelet morphology is normal.     Automated Count Confirmed. Platelet morphology is normal.   INR    Collection Time: 08/17/23  4:33 AM   Result Value Ref Range    INR 0.93 0.85 - 1.15   Partial thromboplastin time    Collection Time: 08/17/23  4:33 AM   Result Value Ref Range    aPTT 29 22 - 38 Seconds   Glucose by meter    Collection Time: 08/17/23  7:02 AM   Result Value Ref Range    GLUCOSE BY METER POCT 103 (H) 70 - 99 mg/dL   ECG 12-LEAD WITH MUSE ([facility])    Collection Time: 08/17/23  9:48 AM   Result Value Ref Range    Systolic Blood Pressure  mmHg    Diastolic Blood Pressure  mmHg    Ventricular Rate 95 BPM    Atrial Rate 95 BPM    TX Interval 132 ms    QRS Duration 126 ms     ms    QTc 464 ms    P Axis 54 degrees    R AXIS 21 degrees    T Axis 10 degrees    Interpretation ECG       Sinus rhythm  Right bundle branch block  Abnormal ECG  When compared with ECG of 13-AUG-2023 16:07,  No significant change was found  Confirmed by CODY REAVES MD LOC:JN ([id number]) on 8/17/2023 3:56:54 PM     Glucose by meter    Collection Time: 08/17/23 11:15 AM   Result Value Ref Range    GLUCOSE BY METER POCT 129 (H) 70 - 99 mg/dL   Glucose by meter    Collection Time: 08/17/23  5:09 PM   Result Value Ref Range    GLUCOSE BY METER POCT 167 (H) 70 - 99 mg/dL   Glucose by meter    Collection Time: 08/17/23  9:17 PM   Result Value Ref Range    GLUCOSE BY METER POCT 181 (H) 70 - 99 mg/dL   Glucose by meter    Collection Time: 08/18/23  6:44 AM   Result Value Ref Range    GLUCOSE BY METER POCT 164 (H) 70 - 99 mg/dL   Magnesium    Collection Time: 08/18/23  6:45 AM   Result Value Ref Range    Magnesium 1.5 (L) 1.8 - 2.6 mg/dL   CBC with platelets    Collection Time: 08/18/23  6:45 AM   Result Value Ref Range    WBC Count 13.2 (H) 4.0 - 11.0 10e3/uL    RBC Count 3.62 (L) 3.80 - 5.20 10e6/uL    Hemoglobin 12.1 11.7 - 15.7 g/dL    Hematocrit 36.3 35.0 - 47.0 %     78 - 100 fL     MCH 33.4 (H) 26.5 - 33.0 pg    MCHC 33.3 31.5 - 36.5 g/dL    RDW 13.3 10.0 - 15.0 %    Platelet Count 351 150 - 450 10e3/uL   INR    Collection Time: 08/18/23  9:17 AM   Result Value Ref Range    INR 1.09 0.85 - 1.15   Partial thromboplastin time    Collection Time: 08/18/23  9:17 AM   Result Value Ref Range    aPTT 39 (H) 22 - 38 Seconds   Glucose by meter    Collection Time: 08/18/23  1:19 PM   Result Value Ref Range    GLUCOSE BY METER POCT 150 (H) 70 - 99 mg/dL                 Electronically signed by    Jamee Trent MD                            Sincerely,        AUGUSTUS Ndiaye

## 2023-08-22 NOTE — PROGRESS NOTES
[facility] GERIATRIC SERVICES       Patient Belgica Salvador  MRN: [id number]        Reason for Visit     Chief Complaint   Patient presents with    Hospital F/U       Code Status     DNR / DNI    Assessment     LEFT ELBOW SUPRACONDYLAR fracture status post ORIF with ulnar nerve transport on 8/16/2023  Postoperative hypotension  ABLA  UTI with cultures growing E. Coli  History of a fall with underlying history of recurrent falls  Generalized weakness  Mood disorder  Hypothyroidism  DM-2  PERIPHERAL NEUROPATHY      Plan     Pt is admitted to TCU for strengthening and rehab.  Patient admitted postprocedure on 8/16/2023.  Continue nonweightbearing left upper extremity  Pain management optimized.  She is on scheduled Tylenol.  Continue scheduled baclofen  She is also on scheduled aspirin 650 mg 2 times daily  Oxycodone has been added for additional pain relief currently patient is not ready for a taper  Advised aggressive icing.  Outpatient follow-up with orthopedics as scheduled  Monitor blood pressures due to postoperative hypotension  On lisinopril and stable  Hypoxia concerns have resolved  Recheck labs to monitor hemoglobin due to underlying history of postoperative bleeding  She reports some ongoing diarrhea and has Imodium available as needed  DM controlled with metformin  UTI was treated  Monitor mood due to anxiety  Continue with PT/OT-walking with  a cane    History     Patient is a very pleasant 77 year old female who is admitted to TCU  Patient admitted post fall with underlying history of recurrent falls.  She was noted to have a distal humeral fracture and underwent surgical repair on 8/16/2023.  Postoperative course complicated by bleeding from the wound with hypotension.  She has been discharged to the TCU      Past Medical & Surgical History     PAST MEDICAL HISTORY:   Past Medical History:   Diagnosis Date    Anxiety     Arthritis     Coronary artery disease     Diabetes (H)     Gastroesophageal reflux  disease     Graves disease     Hypertension     Migraine     Muscle spasm     Renal disease     Thrombosis     Thyroid disease       PAST SURGICAL HISTORY:   has a past surgical history that includes IR Carotid Angiogram (5/22/2012); IR Carotid Angiogram (5/22/2012); IR Aortic Arch 4 Vessel Angiogram (5/22/2012); GASTRIC BYPASS,OBESE<100CM JAROD-EN-Y; Pr Partial Excision Thyroid,Unilat; REVISE MEDIAN N/CARPAL TUNNEL SURG; CERV SPINE FUSN,ANTER,BELOW C2; KNEE SCOPE,AID POST CRUC REPAIR; appendectomy; Pancreas surgery (N/A); Esophagoscopy, gastroscopy, duodenoscopy (EGD), combined (N/A, 9/24/2017); Pr Implant Periph/Gastric Neurostim/ (N/A, 9/28/2017); joint replacement (Right, 2009); Release carpal tunnel (Left, 06/2018); other surgical history (2013); other surgical history (Left); Ankle surgery (Right); other surgical history; Cervical Fusion (N/A, 3/20/2019); Arthroplasty knee (Left, 11/10/2022); and Open reduction internal fixation humerus distal (Left, 8/16/2023).      Past Social History     Reviewed,  reports that she has quit smoking. She has never used smokeless tobacco. She reports that she does not currently use alcohol after a past usage of about 1.0 - 2.0 standard drink of alcohol per week. She reports that she does not use drugs.    Family History     Reviewed, and family history includes Breast Cancer in her paternal aunt.    Medication List     Current Outpatient Medications   Medication    acetaminophen (TYLENOL) 325 MG tablet    aspirin (ASA) 325 MG EC tablet    baclofen (LIORESAL) 10 MG tablet    busPIRone (BUSPAR) 10 MG tablet    Carboxymethylcellulose Sodium 0.25 % SOLN    cefdinir (OMNICEF) 300 MG capsule    DULoxetine (CYMBALTA) 60 MG capsule    EPINEPHrine (EPIPEN 2-SHERRI) 0.3 mg/0.3 mL atIn    famotidine (PEPCID) 20 MG tablet    ferrous sulfate 325 (65 FE) MG tablet    fluticasone (FLONASE) 50 mcg/actuation nasal spray    folic acid (FOLVITE) 1 MG tablet    levothyroxine (SYNTHROID,  LEVOTHROID) 88 MCG tablet    lisinopril (ZESTRIL) 20 MG tablet    loperamide (IMODIUM) 2 MG capsule    magnesium oxide (MAG-OX) 400 mg (241.3 mg magnesium) tablet    meclizine (ANTIVERT) 12.5 MG tablet    metFORMIN (GLUCOPHAGE) 500 MG tablet    Multiple vitamin TABS    omeprazole (PRILOSEC) 20 MG DR capsule    oxyCODONE (ROXICODONE) 5 MG tablet    polyethylene glycol-propylene glycol (SYSTANE ULTRA) 0.4-0.3 % SOLN ophthalmic solution    rosuvastatin (CRESTOR) 5 MG tablet    valACYclovir (VALTREX) 1000 MG tablet    Vitamin D (Cholecalciferol) 50 MCG (2000 UT) CAPS     No current facility-administered medications for this visit.      MED REC REQUIRED  Post Medication Reconciliation Status: discharge medications reconciled, continue medications without change       Allergies     Allergies   Allergen Reactions    Glucosamine Anaphylaxis and Rash     Cardiac arrest    Ibuprofen Hives    Iodine Angioedema, Anaphylaxis and Hives     Patient can uses skin products such as betadine - See shellfish allergy      Naproxen Hives and Rash    Shellfish Allergy Anaphylaxis, Hives and Rash     Cardiac arrest - very severe    Shellfish-Derived Products Anaphylaxis, Hives and Rash     Cardiac arrest - very severe      Amitriptyline Visual Disturbance     Hallucinations    Buspirone Visual Disturbance     Hallucinations    Nystatin Nausea, Diarrhea, Cramps and GI Disturbance    Tramadol Visual Disturbance     Hallucinations    Chlorpheniramine Hives and Rash    Nsaids Unknown    Pseudoephedrine Hives and Rash       Review of Systems   A comprehensive review of 14 systems was done. Pertinent findings noted here and in history of present illness. All the rest negative.  Constitutional: Negative.  Negative for fever, chills, she has  activity change, appetite change and fatigue.   Has concerns about her medications which were reviewed with her she is worried she is not getting her Synthroid either  HENT: Negative for congestion and facial  "swelling.    Eyes: Negative for photophobia, redness and visual disturbance.   Respiratory: Negative for cough and chest tightness.    Cardiovascular: Negative for chest pain, palpitations and leg swelling.   Gastrointestinal: Negative for nausea, diarrhea, constipation, blood in stool and abdominal distention.   Had some ongoing diarrhea concerns but then realized she was getting Metamucil  Genitourinary: Negative.    Musculoskeletal: Having pain in her left arm and has been using high doses of oxycodone  Skin: Negative.    Neurological: Negative for dizziness, tremors, syncope, weakness, light-headedness and headaches.   Hematological: Does not bruise/bleed easily.   Psychiatric/Behavioral: Negative.        Physical Exam   BP (!) 144/68   Pulse 93   Temp (!) 96.6  F (35.9  C)   Resp 20   Ht 1.702 m (5' 7\")   Wt 78.9 kg (174 lb)   SpO2 96%   BMI 27.25 kg/m       Constitutional: Oriented to person, place, and time and appears well-developed.   HEENT:  Normocephalic and atraumatic.  Eyes: Conjunctivae and EOM are normal. Pupils are equal, round, and reactive to light. No discharge.  No scleral icterus. Nose normal. Mouth/Throat: Oropharynx is clear and moist. No oropharyngeal exudate.    NECK: Normal range of motion. Neck supple. No JVD present. No tracheal deviation present. No thyromegaly present.   CARDIOVASCULAR: Normal rate, regular rhythm and intact distal pulses.  Exam reveals no gallop and no friction rub.  Systolic murmur present.  PULMONARY: Effort normal and breath sounds normal. No respiratory distress.No Wheezing or rales.  ABDOMEN: Soft. Bowel sounds are normal. No distension and no mass.  There is no tenderness. There is no rebound and no guarding. No HSM.  MUSCULOSKELETAL: Normal range of motion. Mild kyphosis, no tenderness.  Left upper extremity is in a sling with an Ace wrap and a cast  LYMPH NODES: Has no cervical, supraclavicular, axillary and groin adenopathy.   NEUROLOGICAL: Alert and " oriented to person, place, and time. No cranial nerve deficit.  Normal muscle tone. Coordination normal.   GENITOURINARY: Deferred exam.  SKIN: Skin is warm and dry. No rash noted. No erythema. No pallor.   EXTREMITIES: No cyanosis, no clubbing, no edema. No Deformity.  PSYCHIATRIC: Normal mood, affect and behavior.  Anxiety noted    Lab Results     Recent Results (from the past 240 hour(s))   ECG 12-LEAD WITH MUSE ([facility])    Collection Time: 08/13/23  4:07 PM   Result Value Ref Range    Systolic Blood Pressure 147 mmHg    Diastolic Blood Pressure 70 mmHg    Ventricular Rate 103 BPM    Atrial Rate 103 BPM    [place] Interval 124 ms    QRS Duration 124 ms     ms    QTc 466 ms    P Axis 66 degrees    R AXIS 30 degrees    T Axis 26 degrees    Interpretation ECG       Sinus tachycardia  Right bundle branch block  Abnormal ECG  When compared with ECG of 08-NOV-2022 18:50,  No significant change was found  Confirmed by SEE ED PROVIDER NOTE FOR, ECG INTERPRETATION ([id number]),  SOPHIE IRBY ([id number]) on 8/13/2023 4:56:20 PM     UA with Microscopic reflex to Culture    Collection Time: 08/13/23  5:51 PM    Specimen: Urine, Clean Catch   Result Value Ref Range    Color Urine Yellow Colorless, Straw, Light Yellow, Yellow    Appearance Urine Clear Clear    Glucose Urine Negative Negative mg/dL    Bilirubin Urine Negative Negative    Ketones Urine Trace (A) Negative mg/dL    Specific Gravity Urine 1.032 (H) 1.001 - 1.030    Blood Urine Negative Negative    pH Urine 5.5 5.0 - 7.0    Protein Albumin Urine 20 (A) Negative mg/dL    Urobilinogen Urine 2.0 (A) <2.0 mg/dL    Nitrite Urine Negative Negative    Leukocyte Esterase Urine 500 Zahraa/uL (A) Negative    Bacteria Urine Many (A) None Seen /HPF    Mucus Urine Present (A) None Seen /LPF    RBC Urine 2 <=2 /HPF    WBC Urine 24 (H) <=5 /HPF    Hyaline Casts Urine 7 (H) <=2 /LPF   Urine Culture    Collection Time: 08/13/23  5:51 PM    Specimen: Urine, Clean Catch   Result Value Ref  Range    Culture >100,000 CFU/mL Escherichia coli (A)        Susceptibility    Escherichia coli - BETTYE     Ampicillin >=32 Resistant ug/mL     Ampicillin/ Sulbactam 16 Intermediate ug/mL     Piperacillin/Tazobactam >=128 Resistant ug/mL     Cefazolin* <=4 Susceptible ug/mL      * Cefazolin BETTYE breakpoints are for the treatment of uncomplicated urinary tract infections. For the treatment of systemic infections, please contact the laboratory for additional testing.     Cefoxitin 8 Susceptible ug/mL     Ceftazidime <=1 Susceptible ug/mL     Ceftriaxone <=1 Susceptible ug/mL     Cefepime <=1 Susceptible ug/mL     Gentamicin <=1 Susceptible ug/mL     Tobramycin <=1 Susceptible ug/mL     Ciprofloxacin >=4 Resistant ug/mL     Levofloxacin >=8 Resistant ug/mL     Nitrofurantoin <=16 Susceptible ug/mL     Trimethoprim/Sulfamethoxazole >16/304 Resistant ug/mL   Basic metabolic panel    Collection Time: 08/13/23  5:58 PM   Result Value Ref Range    Sodium 137 136 - 145 mmol/L    Potassium 5.3 (H) 3.5 - 5.0 mmol/L    Chloride 103 98 - 107 mmol/L    Carbon Dioxide (CO2) 22 22 - 31 mmol/L    Anion Gap 12 5 - 18 mmol/L    Urea Nitrogen 24 8 - 28 mg/dL    Creatinine 0.87 0.60 - 1.10 mg/dL    Calcium 9.3 8.5 - 10.5 mg/dL    Glucose 113 70 - 125 mg/dL    GFR Estimate 69 >60 mL/min/1.73m2   Troponin I    Collection Time: 08/13/23  5:58 PM   Result Value Ref Range    Troponin I <0.01 0.00 - 0.29 ng/mL   Magnesium    Collection Time: 08/13/23  5:58 PM   Result Value Ref Range    Magnesium 1.5 (L) 1.8 - 2.6 mg/dL   CBC with platelets and differential    Collection Time: 08/13/23  5:58 PM   Result Value Ref Range    WBC Count 13.2 (H) 4.0 - 11.0 10e3/uL    RBC Count 3.87 3.80 - 5.20 10e6/uL    Hemoglobin 12.8 11.7 - 15.7 g/dL    Hematocrit 38.4 35.0 - 47.0 %    MCV 99 78 - 100 fL    MCH 33.1 (H) 26.5 - 33.0 pg    MCHC 33.3 31.5 - 36.5 g/dL    RDW 13.6 10.0 - 15.0 %    Platelet Count 304 150 - 450 10e3/uL   Manual Differential    Collection  Time: 08/13/23  5:58 PM   Result Value Ref Range    % Neutrophils 63 %    % Lymphocytes 23 %    % Monocytes 13 %    % Eosinophils 1 %    % Basophils 0 %    Absolute Neutrophils 8.3 1.6 - 8.3 10e3/uL    Absolute Lymphocytes 3.0 0.8 - 5.3 10e3/uL    Absolute Monocytes 1.7 (H) 0.0 - 1.3 10e3/uL    Absolute Eosinophils 0.1 0.0 - 0.7 10e3/uL    Absolute Basophils 0.0 0.0 - 0.2 10e3/uL    RBC Morphology Confirmed RBC Indices     Platelet Assessment  Automated Count Confirmed. Platelet morphology is normal.     Automated Count Confirmed. Platelet morphology is normal.   Hemoglobin A1c    Collection Time: 08/13/23  9:09 PM   Result Value Ref Range    Hemoglobin A1C 5.8 (H) <5.7 %   Glucose by meter    Collection Time: 08/14/23 12:28 AM   Result Value Ref Range    GLUCOSE BY METER POCT 86 70 - 99 mg/dL   Glucose by meter    Collection Time: 08/14/23  6:15 AM   Result Value Ref Range    GLUCOSE BY METER POCT 117 (H) 70 - 99 mg/dL   Basic metabolic panel    Collection Time: 08/14/23  7:30 AM   Result Value Ref Range    Sodium 138 136 - 145 mmol/L    Potassium 4.1 3.5 - 5.0 mmol/L    Chloride 103 98 - 107 mmol/L    Carbon Dioxide (CO2) 25 22 - 31 mmol/L    Anion Gap 10 5 - 18 mmol/L    Urea Nitrogen 15 8 - 28 mg/dL    Creatinine 0.66 0.60 - 1.10 mg/dL    Calcium 8.7 8.5 - 10.5 mg/dL    Glucose 126 (H) 70 - 125 mg/dL    GFR Estimate 90 >60 mL/min/1.73m2   CBC with platelets    Collection Time: 08/14/23  7:30 AM   Result Value Ref Range    WBC Count 8.8 4.0 - 11.0 10e3/uL    RBC Count 3.88 3.80 - 5.20 10e6/uL    Hemoglobin 12.8 11.7 - 15.7 g/dL    Hematocrit 37.9 35.0 - 47.0 %    MCV 98 78 - 100 fL    MCH 33.0 26.5 - 33.0 pg    MCHC 33.8 31.5 - 36.5 g/dL    RDW 13.5 10.0 - 15.0 %    Platelet Count 315 150 - 450 10e3/uL   INR    Collection Time: 08/14/23  7:30 AM   Result Value Ref Range    INR 0.96 0.85 - 1.15   Glucose by meter    Collection Time: 08/14/23  9:16 AM   Result Value Ref Range    GLUCOSE BY METER POCT 92 70 - 99  mg/dL   Glucose by meter    Collection Time: 08/14/23 10:41 AM   Result Value Ref Range    GLUCOSE BY METER POCT 96 70 - 99 mg/dL   Glucose by meter    Collection Time: 08/14/23  4:58 PM   Result Value Ref Range    GLUCOSE BY METER POCT 128 (H) 70 - 99 mg/dL   Magnesium    Collection Time: 08/14/23  6:37 PM   Result Value Ref Range    Magnesium 2.1 1.8 - 2.6 mg/dL   Glucose by meter    Collection Time: 08/14/23  9:10 PM   Result Value Ref Range    GLUCOSE BY METER POCT 149 (H) 70 - 99 mg/dL   Glucose by meter    Collection Time: 08/15/23  6:38 AM   Result Value Ref Range    GLUCOSE BY METER POCT 102 (H) 70 - 99 mg/dL   Magnesium    Collection Time: 08/15/23  7:48 AM   Result Value Ref Range    Magnesium 1.7 (L) 1.8 - 2.6 mg/dL   Glucose by meter    Collection Time: 08/15/23 11:12 AM   Result Value Ref Range    GLUCOSE BY METER POCT 80 70 - 99 mg/dL   Glucose by meter    Collection Time: 08/15/23  2:46 PM   Result Value Ref Range    GLUCOSE BY METER POCT 80 70 - 99 mg/dL   Glucose by meter    Collection Time: 08/15/23  5:09 PM   Result Value Ref Range    GLUCOSE BY METER POCT 82 70 - 99 mg/dL   Glucose by meter    Collection Time: 08/15/23  6:06 PM   Result Value Ref Range    GLUCOSE BY METER POCT 62 (L) 70 - 99 mg/dL   Glucose by meter    Collection Time: 08/15/23  8:37 PM   Result Value Ref Range    GLUCOSE BY METER POCT 207 (H) 70 - 99 mg/dL   Glucose by meter    Collection Time: 08/16/23  6:43 AM   Result Value Ref Range    GLUCOSE BY METER POCT 106 (H) 70 - 99 mg/dL   Basic metabolic panel    Collection Time: 08/16/23  7:13 AM   Result Value Ref Range    Sodium 139 136 - 145 mmol/L    Potassium 4.0 3.5 - 5.0 mmol/L    Chloride 102 98 - 107 mmol/L    Carbon Dioxide (CO2) 28 22 - 31 mmol/L    Anion Gap 9 5 - 18 mmol/L    Urea Nitrogen 8 8 - 28 mg/dL    Creatinine 0.67 0.60 - 1.10 mg/dL    Calcium 9.3 8.5 - 10.5 mg/dL    Glucose 155 (H) 70 - 125 mg/dL    GFR Estimate 90 >60 mL/min/1.73m2   Hemoglobin    Collection  Time: 08/16/23  7:13 AM   Result Value Ref Range    Hemoglobin 13.8 11.7 - 15.7 g/dL   Magnesium    Collection Time: 08/16/23  7:13 AM   Result Value Ref Range    Magnesium 1.5 (L) 1.8 - 2.6 mg/dL   Glucose by meter    Collection Time: 08/16/23 11:10 AM   Result Value Ref Range    GLUCOSE BY METER POCT 100 (H) 70 - 99 mg/dL   Glucose by meter    Collection Time: 08/16/23  2:28 PM   Result Value Ref Range    GLUCOSE BY METER POCT 80 70 - 99 mg/dL   Glucose by meter    Collection Time: 08/16/23  9:10 PM   Result Value Ref Range    GLUCOSE BY METER POCT 85 70 - 99 mg/dL   Glucose by meter    Collection Time: 08/16/23 10:35 PM   Result Value Ref Range    GLUCOSE BY METER POCT 103 (H) 70 - 99 mg/dL   Magnesium    Collection Time: 08/16/23 10:46 PM   Result Value Ref Range    Magnesium 1.7 (L) 1.8 - 2.6 mg/dL   Magnesium    Collection Time: 08/17/23  3:29 AM   Result Value Ref Range    Magnesium 1.6 (L) 1.8 - 2.6 mg/dL   CBC with platelets and differential    Collection Time: 08/17/23  3:29 AM   Result Value Ref Range    WBC Count 14.4 (H) 4.0 - 11.0 10e3/uL    RBC Count 3.66 (L) 3.80 - 5.20 10e6/uL    Hemoglobin 12.4 11.7 - 15.7 g/dL    Hematocrit 36.8 35.0 - 47.0 %     (H) 78 - 100 fL    MCH 33.9 (H) 26.5 - 33.0 pg    MCHC 33.7 31.5 - 36.5 g/dL    RDW 13.5 10.0 - 15.0 %    Platelet Count 329 150 - 450 10e3/uL   Manual Differential    Collection Time: 08/17/23  3:29 AM   Result Value Ref Range    % Neutrophils 69 %    % Lymphocytes 14 %    % Monocytes 15 %    % Eosinophils 2 %    % Basophils 0 %    Absolute Neutrophils 9.9 (H) 1.6 - 8.3 10e3/uL    Absolute Lymphocytes 2.0 0.8 - 5.3 10e3/uL    Absolute Monocytes 2.2 (H) 0.0 - 1.3 10e3/uL    Absolute Eosinophils 0.3 0.0 - 0.7 10e3/uL    Absolute Basophils 0.0 0.0 - 0.2 10e3/uL    RBC Morphology Confirmed RBC Indices     Platelet Assessment  Automated Count Confirmed. Platelet morphology is normal.     Automated Count Confirmed. Platelet morphology is normal.   INR     Collection Time: 08/17/23  4:33 AM   Result Value Ref Range    INR 0.93 0.85 - 1.15   Partial thromboplastin time    Collection Time: 08/17/23  4:33 AM   Result Value Ref Range    aPTT 29 22 - 38 Seconds   Glucose by meter    Collection Time: 08/17/23  7:02 AM   Result Value Ref Range    GLUCOSE BY METER POCT 103 (H) 70 - 99 mg/dL   ECG 12-LEAD WITH MUSE ([facility])    Collection Time: 08/17/23  9:48 AM   Result Value Ref Range    Systolic Blood Pressure  mmHg    Diastolic Blood Pressure  mmHg    Ventricular Rate 95 BPM    Atrial Rate 95 BPM    [place] Interval 132 ms    QRS Duration 126 ms     ms    QTc 464 ms    P Axis 54 degrees    R AXIS 21 degrees    T Axis 10 degrees    Interpretation ECG       Sinus rhythm  Right bundle branch block  Abnormal ECG  When compared with ECG of 13-AUG-2023 16:07,  No significant change was found  Confirmed by CODY REAVES MD LOC:JN ([id number]) on 8/17/2023 3:56:54 PM     Glucose by meter    Collection Time: 08/17/23 11:15 AM   Result Value Ref Range    GLUCOSE BY METER POCT 129 (H) 70 - 99 mg/dL   Glucose by meter    Collection Time: 08/17/23  5:09 PM   Result Value Ref Range    GLUCOSE BY METER POCT 167 (H) 70 - 99 mg/dL   Glucose by meter    Collection Time: 08/17/23  9:17 PM   Result Value Ref Range    GLUCOSE BY METER POCT 181 (H) 70 - 99 mg/dL   Glucose by meter    Collection Time: 08/18/23  6:44 AM   Result Value Ref Range    GLUCOSE BY METER POCT 164 (H) 70 - 99 mg/dL   Magnesium    Collection Time: 08/18/23  6:45 AM   Result Value Ref Range    Magnesium 1.5 (L) 1.8 - 2.6 mg/dL   CBC with platelets    Collection Time: 08/18/23  6:45 AM   Result Value Ref Range    WBC Count 13.2 (H) 4.0 - 11.0 10e3/uL    RBC Count 3.62 (L) 3.80 - 5.20 10e6/uL    Hemoglobin 12.1 11.7 - 15.7 g/dL    Hematocrit 36.3 35.0 - 47.0 %     78 - 100 fL    MCH 33.4 (H) 26.5 - 33.0 pg    MCHC 33.3 31.5 - 36.5 g/dL    RDW 13.3 10.0 - 15.0 %    Platelet Count 351 150 - 450 10e3/uL   INR    Collection  Time: 08/18/23  9:17 AM   Result Value Ref Range    INR 1.09 0.85 - 1.15   Partial thromboplastin time    Collection Time: 08/18/23  9:17 AM   Result Value Ref Range    aPTT 39 (H) 22 - 38 Seconds   Glucose by meter    Collection Time: 08/18/23  1:19 PM   Result Value Ref Range    GLUCOSE BY METER POCT 150 (H) 70 - 99 mg/dL                 Electronically signed by    Jamee Trent MD

## 2023-08-23 DIAGNOSIS — S42.412A CLOSED SUPRACONDYLAR FRACTURE OF LEFT ELBOW, INITIAL ENCOUNTER: ICD-10-CM

## 2023-08-23 RX ORDER — OXYCODONE HYDROCHLORIDE 5 MG/1
5-10 TABLET ORAL EVERY 4 HOURS PRN
Qty: 120 TABLET | Refills: 0 | Status: ON HOLD | OUTPATIENT
Start: 2023-08-23 | End: 2023-11-02

## 2023-08-23 RX ORDER — OXYCODONE HYDROCHLORIDE 5 MG/1
5-10 TABLET ORAL EVERY 4 HOURS PRN
Qty: 4 TABLET | Refills: 0 | Status: SHIPPED | OUTPATIENT
Start: 2023-08-23 | End: 2023-08-23

## 2023-08-28 ENCOUNTER — LAB REQUISITION (OUTPATIENT)
Dept: LAB | Facility: CLINIC | Age: 78
End: 2023-08-28
Payer: COMMERCIAL

## 2023-08-28 ENCOUNTER — TRANSITIONAL CARE UNIT VISIT (OUTPATIENT)
Dept: GERIATRICS | Facility: CLINIC | Age: 78
End: 2023-08-28
Payer: COMMERCIAL

## 2023-08-28 VITALS
SYSTOLIC BLOOD PRESSURE: 135 MMHG | HEIGHT: 67 IN | RESPIRATION RATE: 16 BRPM | WEIGHT: 174 LBS | DIASTOLIC BLOOD PRESSURE: 74 MMHG | HEART RATE: 67 BPM | TEMPERATURE: 97.5 F | BODY MASS INDEX: 27.31 KG/M2 | OXYGEN SATURATION: 97 %

## 2023-08-28 DIAGNOSIS — R53.81 PHYSICAL DECONDITIONING: ICD-10-CM

## 2023-08-28 DIAGNOSIS — I10 ESSENTIAL (PRIMARY) HYPERTENSION: ICD-10-CM

## 2023-08-28 DIAGNOSIS — G89.4 CHRONIC PAIN SYNDROME: ICD-10-CM

## 2023-08-28 DIAGNOSIS — S42.412A CLOSED SUPRACONDYLAR FRACTURE OF LEFT ELBOW, INITIAL ENCOUNTER: Primary | ICD-10-CM

## 2023-08-28 DIAGNOSIS — D64.9 ANEMIA, UNSPECIFIED: ICD-10-CM

## 2023-08-28 DIAGNOSIS — E11.01 TYPE 2 DIABETES MELLITUS WITH HYPEROSMOLAR COMA, WITHOUT LONG-TERM CURRENT USE OF INSULIN (H): ICD-10-CM

## 2023-08-28 PROCEDURE — 99310 SBSQ NF CARE HIGH MDM 45: CPT | Performed by: NURSE PRACTITIONER

## 2023-08-28 RX ORDER — POLYETHYLENE GLYCOL 3350 17 G/17G
1 POWDER, FOR SOLUTION ORAL DAILY
COMMUNITY
End: 2023-10-31

## 2023-08-28 RX ORDER — AMOXICILLIN 250 MG
1 CAPSULE ORAL 2 TIMES DAILY
Status: ON HOLD | COMMUNITY
End: 2023-11-02

## 2023-08-28 NOTE — LETTER
8/28/2023        RE: Belgica Salvador  [address] [facility] [address]         HEALTH GERIATRIC SERVICES  Chief Complaint   Patient presents with     RECHECK     [facility] Medical Record Number:  [id number]  Place of Service where encounter took place:  [facility] ([facility]) [[id number]]  Code Status:  Polst selective treatment     HISTORY:      HPI:  Belgica Salvador  is 77 year old (1945) undergoing physical and occupational therapy.  She is with past medical history anxiety, arthritis, CAD, diabetes type 2, GERD, Graves' disease, hypertension,  excerpted from records In the ER she had multiple imaging studies which were unremarkable except for humerus fracture.  Urinalysis was consistent with UTI and culture later grew E. coli organism.  Patient was treated with ceftriaxone and transition to Omnicef to complete a total of 10-day course.  Orthopedics recommended repair of complicated distal humerus fracture.  Underwent ORIF of distal humerus with ulnar nerve transport on 8/16/23.  Postop was complicated by bleeding from surgical wound and hypotension.  Coagulation studies were unremarkable.  Blood pressure dropped to the mid 80s on postop day 1.  Patient also had mild hypoxia which resolved.  Blood pressure was treated with normal saline.  Orthostatic vital signs were normal.  Hemoglobin remained stable postoperatively.      Today she was seen to review vital signs, labs, routine visit and to establish care.  She denied chest pain shortness of breath cough congestion or constipation.  She is with a surgical dressing and Ace wrap left upper extremity her fingers were slightly cool however she was able to wiggle all her fingers and had full opposition except for the fifth digit to the thumb.  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  She is also with 2 scabbed areas with slough per  picture on her phone.  She reported she has had these wounds for approximately 8 months they did look scab with slough.  Hydrogel ordered daily and wound consult placed she also reports.  Spoke with dizziness, orthostatics were ordered however hospital orthostatics were normal.  Her pain is controlled with current medications.  Labs reviewed last magnesium 1.5 on replacement, WBC 13.2, hemoglobin 12.1, labs pending for a.m.    ALLERGIES:Glucosamine, Ibuprofen, Iodine, Naproxen, Shellfish allergy, Shellfish-derived products, Amitriptyline, Buspirone, Nystatin, Tramadol, Chlorpheniramine, Nsaids, and Pseudoephedrine    PAST MEDICAL HISTORY:   Past Medical History:   Diagnosis Date     Anxiety      Arthritis      Coronary artery disease      Diabetes (H)      Gastroesophageal reflux disease      Graves disease      Hypertension      Migraine      Muscle spasm      Renal disease      Thrombosis      Thyroid disease        PAST SURGICAL HISTORY:   has a past surgical history that includes IR Carotid Angiogram (5/22/2012); IR Carotid Angiogram (5/22/2012); IR Aortic Arch 4 Vessel Angiogram (5/22/2012); GASTRIC BYPASS,OBESE<100CM JAROD-EN-Y; Pr Partial Excision Thyroid,Unilat; REVISE MEDIAN N/CARPAL TUNNEL SURG; CERV SPINE FUSN,ANTER,BELOW C2; KNEE SCOPE,AID POST CRUC REPAIR; appendectomy; Pancreas surgery (N/A); Esophagoscopy, gastroscopy, duodenoscopy (EGD), combined (N/A, 9/24/2017); Pr Implant Periph/Gastric Neurostim/ (N/A, 9/28/2017); joint replacement (Right, 2009); Release carpal tunnel (Left, 06/2018); other surgical history (2013); other surgical history (Left); Ankle surgery (Right); other surgical history; Cervical Fusion (N/A, 3/20/2019); Arthroplasty knee (Left, 11/10/2022); and Open reduction internal fixation humerus distal (Left, 8/16/2023).    FAMILY HISTORY: family history includes Breast Cancer in her paternal aunt.    SOCIAL HISTORY:  reports that she has quit smoking. She has never used  "smokeless tobacco. She reports that she does not currently use alcohol after a past usage of about 1.0 - 2.0 standard drink of alcohol per week. She reports that she does not use drugs.    ROS:  Constitutional: Negative for activity change, appetite change, fatigue and fever.   HENT: Negative for congestion.    Respiratory: Negative for cough, shortness of breath and wheezing.    Cardiovascular: Negative for chest pain and leg swelling.   Gastrointestinal: Negative for abdominal distention, abdominal pain, constipation, diarrhea and nausea.   Genitourinary: Negative for dysuria.   Musculoskeletal: Negative for arthralgia. Negative for back pain.   Skin: Negative for color change and wound.  ORIF left humerus 6 with ulnar nerve transport scabbed wounds with slough x2 left knee  Neurological: Negative for dizziness. Intermittent dizziness, PRN Meclizine   Psychiatric/Behavioral: Negative for agitation, behavioral problems and confusion.     Physical Exam:  Constitutional:       Appearance: Patient is well-developed.   HENT:      Head: Normocephalic.   Eyes:      Conjunctiva/sclera: Conjunctivae normal.   Neck:      Musculoskeletal: Normal range of motion.   Cardiovascular:      Rate and Rhythm: Normal rate and regular rhythm.      Heart sounds: Normal heart sounds. No murmur.   Pulmonary:      Effort: No respiratory distress.      Breath sounds: Normal breath sounds. No wheezing or rales.   Abdominal:      General: Bowel sounds are normal. There is no distension.      Palpations: Abdomen is soft.      Tenderness: There is no abdominal tenderness.   Musculoskeletal:       Normal range of motion.  Nonweightbearing left upper extremity  Skin:General:        Skin is warm.   Neurological:         Mental Status: Patient is alert and oriented to person, place, and time.   Psychiatric:         Behavior: Behavior normal.     Vitals:/74   Pulse 67   Temp 97.5  F (36.4  C)   Resp 16   Ht 1.702 m (5' 7\")   Wt 78.9 kg " (174 lb)   SpO2 97%   BMI 27.25 kg/m   and Body mass index is 27.25 kg/m .    Lab/Diagnostic data:   No results found for this or any previous visit (from the past 240 hour(s)).     MEDICATIONS:     Review of your medicines            Accurate as of August 28, 2023  7:47 PM. If you have any questions, ask your nurse or doctor.                CONTINUE these medicines which have NOT CHANGED        Dose / Directions   acetaminophen 325 MG tablet  Commonly known as: TYLENOL  Used for: Closed supracondylar fracture of left elbow, initial encounter      Dose: 975 mg  Take 3 tablets (975 mg) by mouth every 8 hours  Quantity: 100 tablet  Refills: 0     aspirin 325 MG EC tablet  Commonly known as: ASA      Dose: 650 mg  Take 650 mg by mouth 2 times daily  Refills: 0     baclofen 10 MG tablet  Commonly known as: LIORESAL      Dose: 10 mg  Take 10 mg by mouth 3 times daily  Refills: 0     busPIRone 10 MG tablet  Commonly known as: BUSPAR      Dose: 10 mg  Take 10 mg by mouth 2 times daily  Refills: 0     Carboxymethylcellulose Sodium 0.25 % Soln  Indication: Excessive Cornea and Conjunctiva Dryness      Dose: 1 drop  Place 1 drop into both eyes 2 times daily  Refills: 0     DULoxetine 60 MG capsule  Commonly known as: CYMBALTA      Dose: 60 mg  Take 60 mg by mouth daily  Refills: 0     EPINEPHrine 0.3 MG/0.3ML injection 2-pack  Commonly known as: ANY BX GENERIC EQUIV      Dose: 0.3 mg  [EPINEPHRINE (EPIPEN 2-SHERRI) 0.3 MG/0.3 ML ATIN] Inject 0.3 mg into the shoulder, thigh, or buttocks once as needed (Anaphylaxis).  Refills: 0     famotidine 20 MG tablet  Commonly known as: PEPCID      Dose: 20 mg  Take 20 mg by mouth 2 times daily  Refills: 0     ferrous sulfate 325 (65 Fe) MG tablet  Commonly known as: FEROSUL      Dose: 1 tablet  [FERROUS SULFATE 325 (65 FE) MG TABLET] Take 1 tablet by mouth daily with breakfast.  Refills: 0     FLUTICASONE PROPIONATE (NASAL) 50 MCG/ACT Susp      Dose: 2 spray  Spray 2 sprays into both  nostrils daily as needed  Refills: 0     folic acid 1 MG tablet  Commonly known as: FOLVITE      Dose: 1 mg  [FOLIC ACID (FOLVITE) 1 MG TABLET] Take 1 mg by mouth daily.  Refills: 0     levothyroxine 88 MCG tablet  Commonly known as: SYNTHROID/LEVOTHROID      Dose: 88 mcg  [LEVOTHYROXINE (SYNTHROID, LEVOTHROID) 88 MCG TABLET] Take 88 mcg by mouth Daily at 6:00 am.  Refills: 0     lisinopril 20 MG tablet  Commonly known as: ZESTRIL  Used for: Essential hypertension      Dose: 20 mg  Take 1 tablet (20 mg) by mouth daily Hold for SBP < 120 mmHg.  Refills: 0     loperamide 2 MG capsule  Commonly known as: IMODIUM  Indication: Diarrhea      Dose: 2-4 mg  Take 2-4 mg by mouth 3 times daily as needed for diarrhea Take 1 capsule with first loose stool and take 2 capsules after each subsequent loose stool  Refills: 0     magnesium oxide 400 MG tablet  Commonly known as: MAG-OX      Dose: 800 mg  [MAGNESIUM OXIDE (MAG-OX) 400 MG (241.3 MG MAGNESIUM) TABLET] Take 800 mg by mouth 2 (two) times a day.  Refills: 0     meclizine 12.5 MG tablet  Commonly known as: ANTIVERT  Indication: Sensation of Spinning or Whirling      Dose: 12.5 mg  Take 12.5 mg by mouth 3 times daily as needed for dizziness  Refills: 0     metFORMIN 500 MG tablet  Commonly known as: GLUCOPHAGE      Dose: 1,000 mg  [METFORMIN (GLUCOPHAGE) 500 MG TABLET] Take 1,000 mg by mouth 2 (two) times a day with meals.  Refills: 0     Multiple vitamin Tabs  Indication: Nutritional Support, Vitamin Deficiency      Dose: 1 tablet  Take 1 tablet by mouth daily  Refills: 0     omeprazole 20 MG DR capsule  Commonly known as: PriLOSEC      Dose: 20 mg  Take 20 mg by mouth daily  Refills: 0     oxyCODONE 5 MG tablet  Commonly known as: ROXICODONE  Used for: Closed supracondylar fracture of left elbow, initial encounter      Dose: 5-10 mg  Take 1-2 tablets (5-10 mg) by mouth every 4 hours as needed for moderate to severe pain  Quantity: 120 tablet  Refills: 0     polyethylene  glycol 17 g packet  Commonly known as: MIRALAX      Dose: 1 packet  Take 1 packet by mouth daily  Refills: 0     polyethylene glycol-propylene glycol 0.4-0.3 % Soln ophthalmic solution  Commonly known as: SYSTANE ULTRA  Indication: Excessive Cornea and Conjunctiva Dryness      Dose: 1 drop  Place 1 drop into both eyes 2 times daily as needed  Refills: 0     rosuvastatin 5 MG tablet  Commonly known as: CRESTOR      Dose: 5 mg  Take 5 mg by mouth At Bedtime  Refills: 0     senna-docusate 8.6-50 MG tablet  Commonly known as: SENOKOT-S/PERICOLACE      Dose: 1 tablet  Take 1 tablet by mouth 2 times daily  Refills: 0     UNABLE TO FIND      MEDICATION NAME: prevagen 1 capsule daily by mouth and hair growth vitamin 1 capsule PO daily  Refills: 0     valACYclovir 1000 mg tablet  Commonly known as: VALTREX  Indication: Herpes Simplex Infection, cold sores      Dose: 2,000 mg  Take 2,000 mg by mouth 2 times daily as needed  Refills: 0     Vitamin D (Cholecalciferol) 50 MCG (2000 UT) Caps  Indication: Vitamin D Deficiency      Dose: 2,000 Units  Take 2,000 Units by mouth daily  Refills: 0              ASSESSMENT/PLAN  Encounter Diagnoses   Name Primary?     Closed supracondylar fracture of left elbow, initial encounter Yes     Type 2 diabetes mellitus with hyperosmolar coma, without long-term current use of insulin (H)      Chronic pain syndrome      Physical deconditioning      Left elbow humerus fracture with ulnar transport follow-up with orthopedics pain control nonweightbearing left upper extremity    Type 2 diabetes on metformin 1000 mg twice daily A1c 8/13/2023 was 5.8    Pain management Tylenol 975 mg every 8 hours, baclofen 10 mg 3 times daily, oxycodone 5 to 10 mg every 4 hours as needed    HDL on Crestor    Physical deconditioning PT OT    Cold sores as needed valacyclovir    Anxiety on BuSpar    Mood disorder continue duloxetine    GERD on famotidine    Anemia continue ferrous sulfate    Hypertension on lisinopril  20 mg daily    Dizziness as needed meclizine    Hypothyroidism on levothyroxine TSH on 9/30/2022 was 0.50    Hypomagnesia currently on replacement    Electronically signed by: Billie Santamaria CNP       Sincerely,        Billie Santamaria CNP

## 2023-08-28 NOTE — PROGRESS NOTES
[facility] GERIATRIC SERVICES  Chief Complaint   Patient presents with    RECHECK     [facility] Medical Record Number:  [id number]  Place of Service where encounter took place:  [facility] ([facility]) [[id number]]  Code Status:  Polst selective treatment     HISTORY:      HPI:  Belgica Salvador  is 77 year old (1945) undergoing physical and occupational therapy.  She is with past medical history anxiety, arthritis, CAD, diabetes type 2, GERD, Graves' disease, hypertension,  excerpted from records In the ER she had multiple imaging studies which were unremarkable except for humerus fracture.  Urinalysis was consistent with UTI and culture later grew E. coli organism.  Patient was treated with ceftriaxone and transition to Omnicef to complete a total of 10-day course.  Orthopedics recommended repair of complicated distal humerus fracture.  Underwent ORIF of distal humerus with ulnar nerve transport on 8/16/23.  Postop was complicated by bleeding from surgical wound and hypotension.  Coagulation studies were unremarkable.  Blood pressure dropped to the mid 80s on postop day 1.  Patient also had mild hypoxia which resolved.  Blood pressure was treated with normal saline.  Orthostatic vital signs were normal.  Hemoglobin remained stable postoperatively.      Today she was seen to review vital signs, labs, routine visit and to establish care.  She denied chest pain shortness of breath cough congestion or constipation.  She is with a surgical dressing and Ace wrap left upper extremity her fingers were slightly cool however she was able to wiggle all her fingers and had full opposition except for the fifth digit to the thumb.  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  She is also with 2 scabbed areas with slough per picture on her phone.  She reported she has had these wounds for approximately 8 months they  did look scab with slough.  Hydrogel ordered daily and wound consult placed she also reports.  Spoke with dizziness, orthostatics were ordered however hospital orthostatics were normal.  Her pain is controlled with current medications.  Labs reviewed last magnesium 1.5 on replacement, WBC 13.2, hemoglobin 12.1, labs pending for a.m.    ALLERGIES:Glucosamine, Ibuprofen, Iodine, Naproxen, Shellfish allergy, Shellfish-derived products, Amitriptyline, Buspirone, Nystatin, Tramadol, Chlorpheniramine, Nsaids, and Pseudoephedrine    PAST MEDICAL HISTORY:   Past Medical History:   Diagnosis Date    Anxiety     Arthritis     Coronary artery disease     Diabetes (H)     Gastroesophageal reflux disease     Graves disease     Hypertension     Migraine     Muscle spasm     Renal disease     Thrombosis     Thyroid disease        PAST SURGICAL HISTORY:   has a past surgical history that includes IR Carotid Angiogram (5/22/2012); IR Carotid Angiogram (5/22/2012); IR Aortic Arch 4 Vessel Angiogram (5/22/2012); GASTRIC BYPASS,OBESE<100CM JAROD-EN-Y; Pr Partial Excision Thyroid,Unilat; REVISE MEDIAN N/CARPAL TUNNEL SURG; CERV SPINE FUSN,ANTER,BELOW C2; KNEE SCOPE,AID POST CRUC REPAIR; appendectomy; Pancreas surgery (N/A); Esophagoscopy, gastroscopy, duodenoscopy (EGD), combined (N/A, 9/24/2017); Pr Implant Periph/Gastric Neurostim/ (N/A, 9/28/2017); joint replacement (Right, 2009); Release carpal tunnel (Left, 06/2018); other surgical history (2013); other surgical history (Left); Ankle surgery (Right); other surgical history; Cervical Fusion (N/A, 3/20/2019); Arthroplasty knee (Left, 11/10/2022); and Open reduction internal fixation humerus distal (Left, 8/16/2023).    FAMILY HISTORY: family history includes Breast Cancer in her paternal aunt.    SOCIAL HISTORY:  reports that she has quit smoking. She has never used smokeless tobacco. She reports that she does not currently use alcohol after a past usage of about 1.0 - 2.0  "standard drink of alcohol per week. She reports that she does not use drugs.    ROS:  Constitutional: Negative for activity change, appetite change, fatigue and fever.   HENT: Negative for congestion.    Respiratory: Negative for cough, shortness of breath and wheezing.    Cardiovascular: Negative for chest pain and leg swelling.   Gastrointestinal: Negative for abdominal distention, abdominal pain, constipation, diarrhea and nausea.   Genitourinary: Negative for dysuria.   Musculoskeletal: Negative for arthralgia. Negative for back pain.   Skin: Negative for color change and wound.  ORIF left humerus 6 with ulnar nerve transport scabbed wounds with slough x2 left knee  Neurological: Negative for dizziness. Intermittent dizziness, PRN Meclizine   Psychiatric/Behavioral: Negative for agitation, behavioral problems and confusion.     Physical Exam:  Constitutional:       Appearance: Patient is well-developed.   HENT:      Head: Normocephalic.   Eyes:      Conjunctiva/sclera: Conjunctivae normal.   Neck:      Musculoskeletal: Normal range of motion.   Cardiovascular:      Rate and Rhythm: Normal rate and regular rhythm.      Heart sounds: Normal heart sounds. No murmur.   Pulmonary:      Effort: No respiratory distress.      Breath sounds: Normal breath sounds. No wheezing or rales.   Abdominal:      General: Bowel sounds are normal. There is no distension.      Palpations: Abdomen is soft.      Tenderness: There is no abdominal tenderness.   Musculoskeletal:       Normal range of motion.  Nonweightbearing left upper extremity  Skin:General:        Skin is warm.   Neurological:         Mental Status: Patient is alert and oriented to person, place, and time.   Psychiatric:         Behavior: Behavior normal.     Vitals:/74   Pulse 67   Temp 97.5  F (36.4  C)   Resp 16   Ht 1.702 m (5' 7\")   Wt 78.9 kg (174 lb)   SpO2 97%   BMI 27.25 kg/m   and Body mass index is 27.25 kg/m .    Lab/Diagnostic data:   No " results found for this or any previous visit (from the past 240 hour(s)).     MEDICATIONS:     Review of your medicines            Accurate as of August 28, 2023  7:47 PM. If you have any questions, ask your nurse or doctor.                CONTINUE these medicines which have NOT CHANGED        Dose / Directions   acetaminophen 325 MG tablet  Commonly known as: TYLENOL  Used for: Closed supracondylar fracture of left elbow, initial encounter      Dose: 975 mg  Take 3 tablets (975 mg) by mouth every 8 hours  Quantity: 100 tablet  Refills: 0     aspirin 325 MG EC tablet  Commonly known as: ASA      Dose: 650 mg  Take 650 mg by mouth 2 times daily  Refills: 0     baclofen 10 MG tablet  Commonly known as: LIORESAL      Dose: 10 mg  Take 10 mg by mouth 3 times daily  Refills: 0     busPIRone 10 MG tablet  Commonly known as: BUSPAR      Dose: 10 mg  Take 10 mg by mouth 2 times daily  Refills: 0     Carboxymethylcellulose Sodium 0.25 % Soln  Indication: Excessive Cornea and Conjunctiva Dryness      Dose: 1 drop  Place 1 drop into both eyes 2 times daily  Refills: 0     DULoxetine 60 MG capsule  Commonly known as: CYMBALTA      Dose: 60 mg  Take 60 mg by mouth daily  Refills: 0     EPINEPHrine 0.3 MG/0.3ML injection 2-pack  Commonly known as: ANY BX GENERIC EQUIV      Dose: 0.3 mg  [EPINEPHRINE (EPIPEN 2-SHERRI) 0.3 MG/0.3 ML ATIN] Inject 0.3 mg into the shoulder, thigh, or buttocks once as needed (Anaphylaxis).  Refills: 0     famotidine 20 MG tablet  Commonly known as: PEPCID      Dose: 20 mg  Take 20 mg by mouth 2 times daily  Refills: 0     ferrous sulfate 325 (65 Fe) MG tablet  Commonly known as: FEROSUL      Dose: 1 tablet  [FERROUS SULFATE 325 (65 FE) MG TABLET] Take 1 tablet by mouth daily with breakfast.  Refills: 0     FLUTICASONE PROPIONATE (NASAL) 50 MCG/ACT Susp      Dose: 2 spray  Spray 2 sprays into both nostrils daily as needed  Refills: 0     folic acid 1 MG tablet  Commonly known as: FOLVITE      Dose: 1  mg  [FOLIC ACID (FOLVITE) 1 MG TABLET] Take 1 mg by mouth daily.  Refills: 0     levothyroxine 88 MCG tablet  Commonly known as: SYNTHROID/LEVOTHROID      Dose: 88 mcg  [LEVOTHYROXINE (SYNTHROID, LEVOTHROID) 88 MCG TABLET] Take 88 mcg by mouth Daily at 6:00 am.  Refills: 0     lisinopril 20 MG tablet  Commonly known as: ZESTRIL  Used for: Essential hypertension      Dose: 20 mg  Take 1 tablet (20 mg) by mouth daily Hold for SBP < 120 mmHg.  Refills: 0     loperamide 2 MG capsule  Commonly known as: IMODIUM  Indication: Diarrhea      Dose: 2-4 mg  Take 2-4 mg by mouth 3 times daily as needed for diarrhea Take 1 capsule with first loose stool and take 2 capsules after each subsequent loose stool  Refills: 0     magnesium oxide 400 MG tablet  Commonly known as: MAG-OX      Dose: 800 mg  [MAGNESIUM OXIDE (MAG-OX) 400 MG (241.3 MG MAGNESIUM) TABLET] Take 800 mg by mouth 2 (two) times a day.  Refills: 0     meclizine 12.5 MG tablet  Commonly known as: ANTIVERT  Indication: Sensation of Spinning or Whirling      Dose: 12.5 mg  Take 12.5 mg by mouth 3 times daily as needed for dizziness  Refills: 0     metFORMIN 500 MG tablet  Commonly known as: GLUCOPHAGE      Dose: 1,000 mg  [METFORMIN (GLUCOPHAGE) 500 MG TABLET] Take 1,000 mg by mouth 2 (two) times a day with meals.  Refills: 0     Multiple vitamin Tabs  Indication: Nutritional Support, Vitamin Deficiency      Dose: 1 tablet  Take 1 tablet by mouth daily  Refills: 0     omeprazole 20 MG DR capsule  Commonly known as: PriLOSEC      Dose: 20 mg  Take 20 mg by mouth daily  Refills: 0     oxyCODONE 5 MG tablet  Commonly known as: ROXICODONE  Used for: Closed supracondylar fracture of left elbow, initial encounter      Dose: 5-10 mg  Take 1-2 tablets (5-10 mg) by mouth every 4 hours as needed for moderate to severe pain  Quantity: 120 tablet  Refills: 0     polyethylene glycol 17 g packet  Commonly known as: MIRALAX      Dose: 1 packet  Take 1 packet by mouth daily  Refills:  0     polyethylene glycol-propylene glycol 0.4-0.3 % Soln ophthalmic solution  Commonly known as: SYSTANE ULTRA  Indication: Excessive Cornea and Conjunctiva Dryness      Dose: 1 drop  Place 1 drop into both eyes 2 times daily as needed  Refills: 0     rosuvastatin 5 MG tablet  Commonly known as: CRESTOR      Dose: 5 mg  Take 5 mg by mouth At Bedtime  Refills: 0     senna-docusate 8.6-50 MG tablet  Commonly known as: SENOKOT-S/PERICOLACE      Dose: 1 tablet  Take 1 tablet by mouth 2 times daily  Refills: 0     UNABLE TO FIND      MEDICATION NAME: prevagen 1 capsule daily by mouth and hair growth vitamin 1 capsule PO daily  Refills: 0     valACYclovir 1000 mg tablet  Commonly known as: VALTREX  Indication: Herpes Simplex Infection, cold sores      Dose: 2,000 mg  Take 2,000 mg by mouth 2 times daily as needed  Refills: 0     Vitamin D (Cholecalciferol) 50 MCG (2000 UT) Caps  Indication: Vitamin D Deficiency      Dose: 2,000 Units  Take 2,000 Units by mouth daily  Refills: 0              ASSESSMENT/PLAN  Encounter Diagnoses   Name Primary?    Closed supracondylar fracture of left elbow, initial encounter Yes    Type 2 diabetes mellitus with hyperosmolar coma, without long-term current use of insulin (H)     Chronic pain syndrome     Physical deconditioning      Left elbow humerus fracture with ulnar transport follow-up with orthopedics pain control nonweightbearing left upper extremity    Type 2 diabetes on metformin 1000 mg twice daily A1c 8/13/2023 was 5.8    Pain management Tylenol 975 mg every 8 hours, baclofen 10 mg 3 times daily, oxycodone 5 to 10 mg every 4 hours as needed    HDL on Crestor    Physical deconditioning PT OT    Cold sores as needed valacyclovir    Anxiety on BuSpar    Mood disorder continue duloxetine    GERD on famotidine    Anemia continue ferrous sulfate    Hypertension on lisinopril 20 mg daily    Dizziness as needed meclizine    Hypothyroidism on levothyroxine TSH on 9/30/2022 was  0.50    Hypomagnesia currently on replacement    Electronically signed by: Billie Santamaria, CNP

## 2023-08-29 ENCOUNTER — TELEPHONE (OUTPATIENT)
Dept: GERIATRICS | Facility: CLINIC | Age: 78
End: 2023-08-29

## 2023-08-29 LAB
ANION GAP SERPL CALCULATED.3IONS-SCNC: 13 MMOL/L (ref 7–15)
BUN SERPL-MCNC: 15.3 MG/DL (ref 8–23)
CALCIUM SERPL-MCNC: 9.5 MG/DL (ref 8.8–10.2)
CHLORIDE SERPL-SCNC: 102 MMOL/L (ref 98–107)
CREAT SERPL-MCNC: 0.84 MG/DL (ref 0.51–0.95)
DEPRECATED HCO3 PLAS-SCNC: 27 MMOL/L (ref 22–29)
ERYTHROCYTE [DISTWIDTH] IN BLOOD BY AUTOMATED COUNT: 14.1 % (ref 10–15)
GFR SERPL CREATININE-BSD FRML MDRD: 72 ML/MIN/1.73M2
GLUCOSE SERPL-MCNC: 75 MG/DL (ref 70–99)
HCT VFR BLD AUTO: 37.4 % (ref 35–47)
HGB BLD-MCNC: 11.7 G/DL (ref 11.7–15.7)
MAGNESIUM SERPL-MCNC: 1.7 MG/DL (ref 1.7–2.3)
MCH RBC QN AUTO: 32.7 PG (ref 26.5–33)
MCHC RBC AUTO-ENTMCNC: 31.3 G/DL (ref 31.5–36.5)
MCV RBC AUTO: 105 FL (ref 78–100)
PLATELET # BLD AUTO: 880 10E3/UL (ref 150–450)
POTASSIUM SERPL-SCNC: 4.7 MMOL/L (ref 3.4–5.3)
RBC # BLD AUTO: 3.58 10E6/UL (ref 3.8–5.2)
SODIUM SERPL-SCNC: 142 MMOL/L (ref 136–145)
WBC # BLD AUTO: 9.3 10E3/UL (ref 4–11)

## 2023-08-29 PROCEDURE — 83735 ASSAY OF MAGNESIUM: CPT | Mod: ORL | Performed by: FAMILY MEDICINE

## 2023-08-29 PROCEDURE — 80048 BASIC METABOLIC PNL TOTAL CA: CPT | Mod: ORL | Performed by: FAMILY MEDICINE

## 2023-08-29 PROCEDURE — 85027 COMPLETE CBC AUTOMATED: CPT | Mod: ORL | Performed by: FAMILY MEDICINE

## 2023-08-29 PROCEDURE — P9604 ONE-WAY ALLOW PRORATED TRIP: HCPCS | Mod: ORL | Performed by: FAMILY MEDICINE

## 2023-08-29 PROCEDURE — 36415 COLL VENOUS BLD VENIPUNCTURE: CPT | Mod: ORL | Performed by: FAMILY MEDICINE

## 2023-08-29 NOTE — TELEPHONE ENCOUNTER
[facility] Geriatrics Lab Note     Provider: OPAL Munoz  Facility: [facility]  Facility Type:  TCU    Allergies   Allergen Reactions    Glucosamine Anaphylaxis and Rash     Cardiac arrest    Ibuprofen Hives    Iodine Angioedema, Anaphylaxis and Hives     Patient can uses skin products such as betadine - See shellfish allergy      Naproxen Hives and Rash    Shellfish Allergy Anaphylaxis, Hives and Rash     Cardiac arrest - very severe    Shellfish-Derived Products Anaphylaxis, Hives and Rash     Cardiac arrest - very severe      Amitriptyline Visual Disturbance     Hallucinations    Buspirone Visual Disturbance     Hallucinations    Nystatin Nausea, Diarrhea, Cramps and GI Disturbance    Tramadol Visual Disturbance     Hallucinations    Chlorpheniramine Hives and Rash    Nsaids Unknown    Pseudoephedrine Hives and Rash       Labs Reviewed by provider: Heme 2, BMP, Mg     Verbal Order/Direction given by Provider: Check Heme 2 on 9/6/23.      Provider giving Order:  OPAL Munoz    Verbal Order given to: Veronika([phone])    Ángel Campbell RN

## 2023-09-04 VITALS
SYSTOLIC BLOOD PRESSURE: 112 MMHG | BODY MASS INDEX: 27.21 KG/M2 | RESPIRATION RATE: 18 BRPM | HEART RATE: 79 BPM | WEIGHT: 173.4 LBS | TEMPERATURE: 97.2 F | OXYGEN SATURATION: 91 % | DIASTOLIC BLOOD PRESSURE: 60 MMHG | HEIGHT: 67 IN

## 2023-09-04 NOTE — PROGRESS NOTES
[facility] GERIATRIC SERVICES  Chief Complaint   Patient presents with    Discharge Summary [facility] Medical Record Number:  [id number]  Place of Service where encounter took place:  [facility] ([facility]) [[id number]]  Code Status:  Polst selective treatment     HISTORY:      HPI:  Belgica Salvador  is 77 year old (1945) undergoing physical and occupational therapy.  She is with past medical history anxiety, arthritis, CAD, diabetes type 2, GERD, Graves' disease, hypertension,  excerpted from records In the ER she had multiple imaging studies which were unremarkable except for humerus fracture.  Urinalysis was consistent with UTI and culture later grew E. coli organism.  Patient was treated with ceftriaxone and transition to Omnicef to complete a total of 10-day course.  Orthopedics recommended repair of complicated distal humerus fracture.  Underwent ORIF of distal humerus with ulnar nerve transport on 8/16/23.  Postop was complicated by bleeding from surgical wound and hypotension.  Coagulation studies were unremarkable.  Blood pressure dropped to the mid 80s on postop day 1.  Patient also had mild hypoxia which resolved.  Blood pressure was treated with normal saline.  Orthostatic vital signs were normal.  Hemoglobin remained stable postoperatively.      Today she was seen to review vital signs, labs, routine visit and a face-to-face for discharge.  She will discharge to home on 9/6/2023 with current medications and treatments.  She will PT OT home health aide RN.  She denied chest pain shortness of breath cough congestion or constipation.  She is with a surgical dressing and elbow brace left upper extremity, She was able to wiggle all her fingers and had full opposition except for the fifth digit to the thumb.   She is also with 2 scabbed areas left knee.  Hydrogel ordered daily Her pain is controlled with current medications.  She rated her pain 7 out of 10 and reported that was tolerable  for her and that she had a high tolerance.  Last hemoglobin 11.7, white blood cell 9.3.    ALLERGIES:Glucosamine, Ibuprofen, Iodine, Naproxen, Shellfish allergy, Shellfish-derived products, Amitriptyline, Buspirone, Nystatin, Tramadol, Chlorpheniramine, Nsaids, and Pseudoephedrine    PAST MEDICAL HISTORY:   Past Medical History:   Diagnosis Date    Anxiety     Arthritis     Coronary artery disease     Diabetes (H)     Gastroesophageal reflux disease     Graves disease     Hypertension     Migraine     Muscle spasm     Renal disease     Thrombosis     Thyroid disease        PAST SURGICAL HISTORY:   has a past surgical history that includes IR Carotid Angiogram (5/22/2012); IR Carotid Angiogram (5/22/2012); IR Aortic Arch 4 Vessel Angiogram (5/22/2012); GASTRIC BYPASS,OBESE<100CM JAROD-EN-Y; Pr Partial Excision Thyroid,Unilat; REVISE MEDIAN N/CARPAL TUNNEL SURG; CERV SPINE FUSN,ANTER,BELOW C2; KNEE SCOPE,AID POST CRUC REPAIR; appendectomy; Pancreas surgery (N/A); Esophagoscopy, gastroscopy, duodenoscopy (EGD), combined (N/A, 9/24/2017); Pr Implant Periph/Gastric Neurostim/ (N/A, 9/28/2017); joint replacement (Right, 2009); Release carpal tunnel (Left, 06/2018); other surgical history (2013); other surgical history (Left); Ankle surgery (Right); other surgical history; Cervical Fusion (N/A, 3/20/2019); Arthroplasty knee (Left, 11/10/2022); and Open reduction internal fixation humerus distal (Left, 8/16/2023).    FAMILY HISTORY: family history includes Breast Cancer in her paternal aunt.    SOCIAL HISTORY:  reports that she has quit smoking. She has never used smokeless tobacco. She reports that she does not currently use alcohol after a past usage of about 1.0 - 2.0 standard drink of alcohol per week. She reports that she does not use drugs.    ROS:  Constitutional: Negative for activity change, appetite change, fatigue and fever.   HENT: Negative for congestion.    Respiratory: Negative for cough, shortness of  "breath and wheezing.    Cardiovascular: Negative for chest pain and leg swelling.   Gastrointestinal: Negative for abdominal distention, abdominal pain, constipation, diarrhea and nausea.   Genitourinary: Negative for dysuria.   Musculoskeletal: Negative for arthralgia. Negative for back pain.   Skin: Negative for color change and wound.  ORIF left humerus 6 with ulnar nerve transport scabbed wounds  x2 left knee  Neurological: Negative for dizziness. Intermittent dizziness, PRN Meclizine   Psychiatric/Behavioral: Negative for agitation, behavioral problems and confusion.     Physical Exam:  Constitutional:       Appearance: Patient is well-developed.   HENT:      Head: Normocephalic.   Eyes:      Conjunctiva/sclera: Conjunctivae normal.   Neck:      Musculoskeletal: Normal range of motion.   Cardiovascular:      Rate and Rhythm: Normal rate and regular rhythm.      Heart sounds: Normal heart sounds. No murmur.   Pulmonary:      Effort: No respiratory distress.      Breath sounds: Normal breath sounds. No wheezing or rales.   Abdominal:      General: Bowel sounds are normal. There is no distension.      Palpations: Abdomen is soft.      Tenderness: There is no abdominal tenderness.   Musculoskeletal:       Normal range of motion.  Nonweightbearing left upper extremity left upper extremity with the elbow hinged brace  Skin:General:        Skin is warm.   Neurological:         Mental Status: Patient is alert and oriented to person, place, and time.   Psychiatric:         Behavior: Behavior normal.     Vitals:/60   Pulse 79   Temp 97.2  F (36.2  C)   Resp 18   Ht 1.702 m (5' 7\")   Wt 78.7 kg (173 lb 6.4 oz)   SpO2 91%   BMI 27.16 kg/m   and Body mass index is 27.16 kg/m .    Lab/Diagnostic data:   Recent Results (from the past 240 hour(s))   Basic metabolic panel    Collection Time: 08/29/23  5:23 AM   Result Value Ref Range    Sodium 142 136 - 145 mmol/L    Potassium 4.7 3.4 - 5.3 mmol/L    Chloride 102 " 98 - 107 mmol/L    Carbon Dioxide (CO2) 27 22 - 29 mmol/L    Anion Gap 13 7 - 15 mmol/L    Urea Nitrogen 15.3 8.0 - 23.0 mg/dL    Creatinine 0.84 0.51 - 0.95 mg/dL    Calcium 9.5 8.8 - 10.2 mg/dL    Glucose 75 70 - 99 mg/dL    GFR Estimate 72 >60 mL/min/1.73m2   CBC with platelets    Collection Time: 08/29/23  5:23 AM   Result Value Ref Range    WBC Count 9.3 4.0 - 11.0 10e3/uL    RBC Count 3.58 (L) 3.80 - 5.20 10e6/uL    Hemoglobin 11.7 11.7 - 15.7 g/dL    Hematocrit 37.4 35.0 - 47.0 %     (H) 78 - 100 fL    MCH 32.7 26.5 - 33.0 pg    MCHC 31.3 (L) 31.5 - 36.5 g/dL    RDW 14.1 10.0 - 15.0 %    Platelet Count 880 (H) 150 - 450 10e3/uL   Magnesium    Collection Time: 08/29/23  5:23 AM   Result Value Ref Range    Magnesium 1.7 1.7 - 2.3 mg/dL        MEDICATIONS:     Review of your medicines            Accurate as of September 5, 2023 11:59 PM. If you have any questions, ask your nurse or doctor.                CONTINUE these medicines which have NOT CHANGED        Dose / Directions   acetaminophen 325 MG tablet  Commonly known as: TYLENOL  Used for: Closed supracondylar fracture of left elbow, initial encounter      Dose: 975 mg  Take 3 tablets (975 mg) by mouth every 8 hours  Quantity: 100 tablet  Refills: 0     aspirin 325 MG EC tablet  Commonly known as: ASA      Dose: 650 mg  Take 650 mg by mouth 2 times daily  Refills: 0     baclofen 10 MG tablet  Commonly known as: LIORESAL      Dose: 10 mg  Take 10 mg by mouth 3 times daily  Refills: 0     busPIRone 10 MG tablet  Commonly known as: BUSPAR      Dose: 10 mg  Take 10 mg by mouth 2 times daily  Refills: 0     Carboxymethylcellulose Sodium 0.25 % Soln  Indication: Excessive Cornea and Conjunctiva Dryness      Dose: 1 drop  Place 1 drop into both eyes 2 times daily  Refills: 0     DULoxetine 60 MG capsule  Commonly known as: CYMBALTA      Dose: 60 mg  Take 60 mg by mouth daily  Refills: 0     EPINEPHrine 0.3 MG/0.3ML injection 2-pack  Commonly known as: ANY BX  GENERIC EQUIV      Dose: 0.3 mg  [EPINEPHRINE (EPIPEN 2-SHERRI) 0.3 MG/0.3 ML ATIN] Inject 0.3 mg into the shoulder, thigh, or buttocks once as needed (Anaphylaxis).  Refills: 0     famotidine 20 MG tablet  Commonly known as: PEPCID      Dose: 20 mg  Take 20 mg by mouth 2 times daily  Refills: 0     ferrous sulfate 325 (65 Fe) MG tablet  Commonly known as: FEROSUL      Dose: 1 tablet  [FERROUS SULFATE 325 (65 FE) MG TABLET] Take 1 tablet by mouth daily with breakfast.  Refills: 0     FLUTICASONE PROPIONATE (NASAL) 50 MCG/ACT Susp      Dose: 2 spray  Spray 2 sprays into both nostrils daily as needed  Refills: 0     folic acid 1 MG tablet  Commonly known as: FOLVITE      Dose: 1 mg  [FOLIC ACID (FOLVITE) 1 MG TABLET] Take 1 mg by mouth daily.  Refills: 0     levothyroxine 88 MCG tablet  Commonly known as: SYNTHROID/LEVOTHROID      Dose: 88 mcg  [LEVOTHYROXINE (SYNTHROID, LEVOTHROID) 88 MCG TABLET] Take 88 mcg by mouth Daily at 6:00 am.  Refills: 0     lisinopril 20 MG tablet  Commonly known as: ZESTRIL  Used for: Essential hypertension      Dose: 20 mg  Take 1 tablet (20 mg) by mouth daily Hold for SBP < 120 mmHg.  Refills: 0     loperamide 2 MG capsule  Commonly known as: IMODIUM  Indication: Diarrhea      Dose: 2-4 mg  Take 2-4 mg by mouth 3 times daily as needed for diarrhea Take 1 capsule with first loose stool and take 2 capsules after each subsequent loose stool  Refills: 0     magnesium oxide 400 MG tablet  Commonly known as: MAG-OX      Dose: 800 mg  [MAGNESIUM OXIDE (MAG-OX) 400 MG (241.3 MG MAGNESIUM) TABLET] Take 800 mg by mouth 2 (two) times a day.  Refills: 0     meclizine 12.5 MG tablet  Commonly known as: ANTIVERT  Indication: Sensation of Spinning or Whirling      Dose: 12.5 mg  Take 12.5 mg by mouth 3 times daily as needed for dizziness  Refills: 0     metFORMIN 500 MG tablet  Commonly known as: GLUCOPHAGE      Dose: 1,000 mg  [METFORMIN (GLUCOPHAGE) 500 MG TABLET] Take 1,000 mg by mouth 2 (two) times a  day with meals.  Refills: 0     Multiple vitamin Tabs  Indication: Nutritional Support, Vitamin Deficiency      Dose: 1 tablet  Take 1 tablet by mouth daily  Refills: 0     omeprazole 20 MG DR capsule  Commonly known as: PriLOSEC      Dose: 20 mg  Take 20 mg by mouth daily  Refills: 0     oxyCODONE 5 MG tablet  Commonly known as: ROXICODONE  Used for: Closed supracondylar fracture of left elbow, initial encounter      Dose: 5-10 mg  Take 1-2 tablets (5-10 mg) by mouth every 4 hours as needed for moderate to severe pain  Quantity: 120 tablet  Refills: 0     polyethylene glycol 17 g packet  Commonly known as: MIRALAX      Dose: 1 packet  Take 1 packet by mouth daily  Refills: 0     polyethylene glycol-propylene glycol 0.4-0.3 % Soln ophthalmic solution  Commonly known as: SYSTANE ULTRA  Indication: Excessive Cornea and Conjunctiva Dryness      Dose: 1 drop  Place 1 drop into both eyes 2 times daily as needed  Refills: 0     rosuvastatin 5 MG tablet  Commonly known as: CRESTOR      Dose: 5 mg  Take 5 mg by mouth At Bedtime  Refills: 0     senna-docusate 8.6-50 MG tablet  Commonly known as: SENOKOT-S/PERICOLACE      Dose: 1 tablet  Take 1 tablet by mouth 2 times daily  Refills: 0     UNABLE TO FIND      MEDICATION NAME: prevagen 1 capsule daily by mouth and hair growth vitamin 1 capsule PO daily  Refills: 0     valACYclovir 1000 mg tablet  Commonly known as: VALTREX  Indication: Herpes Simplex Infection, cold sores      Dose: 2,000 mg  Take 2,000 mg by mouth 2 times daily as needed  Refills: 0     Vitamin D (Cholecalciferol) 50 MCG (2000 UT) Caps  Indication: Vitamin D Deficiency      Dose: 2,000 Units  Take 2,000 Units by mouth daily  Refills: 0              ASSESSMENT/PLAN  Encounter Diagnoses   Name Primary?    Type 2 diabetes mellitus with hyperosmolar coma, without long-term current use of insulin (H) Yes    Chronic pain syndrome     Closed supracondylar fracture of left elbow, initial encounter     Physical  deconditioning      Left elbow humerus fracture with ulnar transport follow-up with orthopedics pain control nonweightbearing left upper extremity    Type 2 diabetes on metformin 1000 mg twice daily A1c 8/13/2023 was 5.8    Pain management Tylenol 975 mg every 8 hours, baclofen 10 mg 3 times daily, oxycodone 5 to 10 mg every 4 hours as needed    HDL on Crestor    Physical deconditioning PT OT    Cold sores as needed valacyclovir    Anxiety on BuSpar    Mood disorder continue duloxetine    GERD on famotidine    Anemia continue ferrous sulfate    Hypertension on lisinopril 20 mg daily    Dizziness as needed meclizine    Hypothyroidism on levothyroxine TSH on 9/30/2022 was 0.50    Hypomagnesia currently on replacement      DISCHARGE PLAN/FACE TO FACE:  I certify that services are/were furnished while this patient was under the care of a physician and that a physician or an allowed non-physician practitioner (NPP), had a face-to-face encounter that meets the physician face-to-face encounter requirements. The encounter was in whole, or in part, related to the primary reason for home health. The patient is confined to his/her home and needs intermittent skilled nursing, physical therapy, speech-language pathology, or the continued need for occupational therapy. A plan of care has been established by a physician and is periodically reviewed by a physician.  Date of Face-to-Face Encounter: 9/5/23    I certify that, based on my findings, the following services are medically necessary home health services:PT/OT/HHA/RN    My clinical findings support the need for the above skilled services because: PT/OT for continued strength and endurance, HHA to assist with ADL's and RN for wound cares, Left elbow, bilateral knee scabs    This patient is homebound because: She is deconditioned following recent ORIF complicated distal left elbow.  She is also with intermittent dizziness we will continue home therapy for strengthening    The  patient is, or has been, under my care and I have initiated the establishment of the plan of care. This patient will be followed by a physician who will periodically review the plan of care.    Schedule follow up visit with primary care provider within 7 days to reestablish care.    Electronically signed by: Billie Santamaria CNP

## 2023-09-05 ENCOUNTER — TRANSITIONAL CARE UNIT VISIT (OUTPATIENT)
Dept: GERIATRICS | Facility: CLINIC | Age: 78
End: 2023-09-05
Payer: COMMERCIAL

## 2023-09-05 ENCOUNTER — LAB REQUISITION (OUTPATIENT)
Dept: LAB | Facility: CLINIC | Age: 78
End: 2023-09-05
Payer: COMMERCIAL

## 2023-09-05 DIAGNOSIS — G89.4 CHRONIC PAIN SYNDROME: ICD-10-CM

## 2023-09-05 DIAGNOSIS — D64.9 ANEMIA, UNSPECIFIED: ICD-10-CM

## 2023-09-05 DIAGNOSIS — S42.412A CLOSED SUPRACONDYLAR FRACTURE OF LEFT ELBOW, INITIAL ENCOUNTER: ICD-10-CM

## 2023-09-05 DIAGNOSIS — E11.01 TYPE 2 DIABETES MELLITUS WITH HYPEROSMOLAR COMA, WITHOUT LONG-TERM CURRENT USE OF INSULIN (H): Primary | ICD-10-CM

## 2023-09-05 DIAGNOSIS — R53.81 PHYSICAL DECONDITIONING: ICD-10-CM

## 2023-09-05 PROCEDURE — 99316 NF DSCHRG MGMT 30 MIN+: CPT | Performed by: NURSE PRACTITIONER

## 2023-09-05 NOTE — LETTER
9/5/2023        RE: Belgica Salvador  [address] [facility] [address]         HEALTH GERIATRIC SERVICES  Chief Complaint   Patient presents with     Discharge Summary [facility] Medical Record Number:  [id number]  Place of Service where encounter took place:  [facility] (SNF) [[id number]]  Code Status:  Polst selective treatment     HISTORY:      HPI:  Belgica Salvador  is 77 year old (1945) undergoing physical and occupational therapy.  She is with past medical history anxiety, arthritis, CAD, diabetes type 2, GERD, Graves' disease, hypertension,  excerpted from records In the ER she had multiple imaging studies which were unremarkable except for humerus fracture.  Urinalysis was consistent with UTI and culture later grew E. coli organism.  Patient was treated with ceftriaxone and transition to Omnicef to complete a total of 10-day course.  Orthopedics recommended repair of complicated distal humerus fracture.  Underwent ORIF of distal humerus with ulnar nerve transport on 8/16/23.  Postop was complicated by bleeding from surgical wound and hypotension.  Coagulation studies were unremarkable.  Blood pressure dropped to the mid 80s on postop day 1.  Patient also had mild hypoxia which resolved.  Blood pressure was treated with normal saline.  Orthostatic vital signs were normal.  Hemoglobin remained stable postoperatively.      Today she was seen to review vital signs, labs, routine visit and a face-to-face for discharge.  She will discharge to home on 9/6/2023 with current medications and treatments.  She will PT OT home health aide RN.  She denied chest pain shortness of breath cough congestion or constipation.  She is with a surgical dressing and elbow brace left upper extremity, She was able to wiggle all her fingers and had full opposition except for the fifth digit to the thumb.   She is also with 2 scabbed areas left knee.  Hydrogel ordered daily Her pain is  controlled with current medications.  She rated her pain 7 out of 10 and reported that was tolerable for her and that she had a high tolerance.  Last hemoglobin 11.7, white blood cell 9.3.    ALLERGIES:Glucosamine, Ibuprofen, Iodine, Naproxen, Shellfish allergy, Shellfish-derived products, Amitriptyline, Buspirone, Nystatin, Tramadol, Chlorpheniramine, Nsaids, and Pseudoephedrine    PAST MEDICAL HISTORY:   Past Medical History:   Diagnosis Date     Anxiety      Arthritis      Coronary artery disease      Diabetes (H)      Gastroesophageal reflux disease      Graves disease      Hypertension      Migraine      Muscle spasm      Renal disease      Thrombosis      Thyroid disease        PAST SURGICAL HISTORY:   has a past surgical history that includes IR Carotid Angiogram (5/22/2012); IR Carotid Angiogram (5/22/2012); IR Aortic Arch 4 Vessel Angiogram (5/22/2012); GASTRIC BYPASS,OBESE<100CM JAROD-EN-Y; Pr Partial Excision Thyroid,Unilat; REVISE MEDIAN N/CARPAL TUNNEL SURG; CERV SPINE FUSN,ANTER,BELOW C2; KNEE SCOPE,AID POST CRUC REPAIR; appendectomy; Pancreas surgery (N/A); Esophagoscopy, gastroscopy, duodenoscopy (EGD), combined (N/A, 9/24/2017); Pr Implant Periph/Gastric Neurostim/ (N/A, 9/28/2017); joint replacement (Right, 2009); Release carpal tunnel (Left, 06/2018); other surgical history (2013); other surgical history (Left); Ankle surgery (Right); other surgical history; Cervical Fusion (N/A, 3/20/2019); Arthroplasty knee (Left, 11/10/2022); and Open reduction internal fixation humerus distal (Left, 8/16/2023).    FAMILY HISTORY: family history includes Breast Cancer in her paternal aunt.    SOCIAL HISTORY:  reports that she has quit smoking. She has never used smokeless tobacco. She reports that she does not currently use alcohol after a past usage of about 1.0 - 2.0 standard drink of alcohol per week. She reports that she does not use drugs.    ROS:  Constitutional: Negative for activity change,  "appetite change, fatigue and fever.   HENT: Negative for congestion.    Respiratory: Negative for cough, shortness of breath and wheezing.    Cardiovascular: Negative for chest pain and leg swelling.   Gastrointestinal: Negative for abdominal distention, abdominal pain, constipation, diarrhea and nausea.   Genitourinary: Negative for dysuria.   Musculoskeletal: Negative for arthralgia. Negative for back pain.   Skin: Negative for color change and wound.  ORIF left humerus 6 with ulnar nerve transport scabbed wounds  x2 left knee  Neurological: Negative for dizziness. Intermittent dizziness, PRN Meclizine   Psychiatric/Behavioral: Negative for agitation, behavioral problems and confusion.     Physical Exam:  Constitutional:       Appearance: Patient is well-developed.   HENT:      Head: Normocephalic.   Eyes:      Conjunctiva/sclera: Conjunctivae normal.   Neck:      Musculoskeletal: Normal range of motion.   Cardiovascular:      Rate and Rhythm: Normal rate and regular rhythm.      Heart sounds: Normal heart sounds. No murmur.   Pulmonary:      Effort: No respiratory distress.      Breath sounds: Normal breath sounds. No wheezing or rales.   Abdominal:      General: Bowel sounds are normal. There is no distension.      Palpations: Abdomen is soft.      Tenderness: There is no abdominal tenderness.   Musculoskeletal:       Normal range of motion.  Nonweightbearing left upper extremity left upper extremity with the elbow hinged brace  Skin:General:        Skin is warm.   Neurological:         Mental Status: Patient is alert and oriented to person, place, and time.   Psychiatric:         Behavior: Behavior normal.     Vitals:/60   Pulse 79   Temp 97.2  F (36.2  C)   Resp 18   Ht 1.702 m (5' 7\")   Wt 78.7 kg (173 lb 6.4 oz)   SpO2 91%   BMI 27.16 kg/m   and Body mass index is 27.16 kg/m .    Lab/Diagnostic data:   Recent Results (from the past 240 hour(s))   Basic metabolic panel    Collection Time: " 08/29/23  5:23 AM   Result Value Ref Range    Sodium 142 136 - 145 mmol/L    Potassium 4.7 3.4 - 5.3 mmol/L    Chloride 102 98 - 107 mmol/L    Carbon Dioxide (CO2) 27 22 - 29 mmol/L    Anion Gap 13 7 - 15 mmol/L    Urea Nitrogen 15.3 8.0 - 23.0 mg/dL    Creatinine 0.84 0.51 - 0.95 mg/dL    Calcium 9.5 8.8 - 10.2 mg/dL    Glucose 75 70 - 99 mg/dL    GFR Estimate 72 >60 mL/min/1.73m2   CBC with platelets    Collection Time: 08/29/23  5:23 AM   Result Value Ref Range    WBC Count 9.3 4.0 - 11.0 10e3/uL    RBC Count 3.58 (L) 3.80 - 5.20 10e6/uL    Hemoglobin 11.7 11.7 - 15.7 g/dL    Hematocrit 37.4 35.0 - 47.0 %     (H) 78 - 100 fL    MCH 32.7 26.5 - 33.0 pg    MCHC 31.3 (L) 31.5 - 36.5 g/dL    RDW 14.1 10.0 - 15.0 %    Platelet Count 880 (H) 150 - 450 10e3/uL   Magnesium    Collection Time: 08/29/23  5:23 AM   Result Value Ref Range    Magnesium 1.7 1.7 - 2.3 mg/dL        MEDICATIONS:     Review of your medicines            Accurate as of September 5, 2023 11:59 PM. If you have any questions, ask your nurse or doctor.                CONTINUE these medicines which have NOT CHANGED        Dose / Directions   acetaminophen 325 MG tablet  Commonly known as: TYLENOL  Used for: Closed supracondylar fracture of left elbow, initial encounter      Dose: 975 mg  Take 3 tablets (975 mg) by mouth every 8 hours  Quantity: 100 tablet  Refills: 0     aspirin 325 MG EC tablet  Commonly known as: ASA      Dose: 650 mg  Take 650 mg by mouth 2 times daily  Refills: 0     baclofen 10 MG tablet  Commonly known as: LIORESAL      Dose: 10 mg  Take 10 mg by mouth 3 times daily  Refills: 0     busPIRone 10 MG tablet  Commonly known as: BUSPAR      Dose: 10 mg  Take 10 mg by mouth 2 times daily  Refills: 0     Carboxymethylcellulose Sodium 0.25 % Soln  Indication: Excessive Cornea and Conjunctiva Dryness      Dose: 1 drop  Place 1 drop into both eyes 2 times daily  Refills: 0     DULoxetine 60 MG capsule  Commonly known as: CYMBALTA       Dose: 60 mg  Take 60 mg by mouth daily  Refills: 0     EPINEPHrine 0.3 MG/0.3ML injection 2-pack  Commonly known as: ANY BX GENERIC EQUIV      Dose: 0.3 mg  [EPINEPHRINE (EPIPEN 2-SHERRI) 0.3 MG/0.3 ML ATIN] Inject 0.3 mg into the shoulder, thigh, or buttocks once as needed (Anaphylaxis).  Refills: 0     famotidine 20 MG tablet  Commonly known as: PEPCID      Dose: 20 mg  Take 20 mg by mouth 2 times daily  Refills: 0     ferrous sulfate 325 (65 Fe) MG tablet  Commonly known as: FEROSUL      Dose: 1 tablet  [FERROUS SULFATE 325 (65 FE) MG TABLET] Take 1 tablet by mouth daily with breakfast.  Refills: 0     FLUTICASONE PROPIONATE (NASAL) 50 MCG/ACT Susp      Dose: 2 spray  Spray 2 sprays into both nostrils daily as needed  Refills: 0     folic acid 1 MG tablet  Commonly known as: FOLVITE      Dose: 1 mg  [FOLIC ACID (FOLVITE) 1 MG TABLET] Take 1 mg by mouth daily.  Refills: 0     levothyroxine 88 MCG tablet  Commonly known as: SYNTHROID/LEVOTHROID      Dose: 88 mcg  [LEVOTHYROXINE (SYNTHROID, LEVOTHROID) 88 MCG TABLET] Take 88 mcg by mouth Daily at 6:00 am.  Refills: 0     lisinopril 20 MG tablet  Commonly known as: ZESTRIL  Used for: Essential hypertension      Dose: 20 mg  Take 1 tablet (20 mg) by mouth daily Hold for SBP < 120 mmHg.  Refills: 0     loperamide 2 MG capsule  Commonly known as: IMODIUM  Indication: Diarrhea      Dose: 2-4 mg  Take 2-4 mg by mouth 3 times daily as needed for diarrhea Take 1 capsule with first loose stool and take 2 capsules after each subsequent loose stool  Refills: 0     magnesium oxide 400 MG tablet  Commonly known as: MAG-OX      Dose: 800 mg  [MAGNESIUM OXIDE (MAG-OX) 400 MG (241.3 MG MAGNESIUM) TABLET] Take 800 mg by mouth 2 (two) times a day.  Refills: 0     meclizine 12.5 MG tablet  Commonly known as: ANTIVERT  Indication: Sensation of Spinning or Whirling      Dose: 12.5 mg  Take 12.5 mg by mouth 3 times daily as needed for dizziness  Refills: 0     metFORMIN 500 MG  tablet  Commonly known as: GLUCOPHAGE      Dose: 1,000 mg  [METFORMIN (GLUCOPHAGE) 500 MG TABLET] Take 1,000 mg by mouth 2 (two) times a day with meals.  Refills: 0     Multiple vitamin Tabs  Indication: Nutritional Support, Vitamin Deficiency      Dose: 1 tablet  Take 1 tablet by mouth daily  Refills: 0     omeprazole 20 MG DR capsule  Commonly known as: PriLOSEC      Dose: 20 mg  Take 20 mg by mouth daily  Refills: 0     oxyCODONE 5 MG tablet  Commonly known as: ROXICODONE  Used for: Closed supracondylar fracture of left elbow, initial encounter      Dose: 5-10 mg  Take 1-2 tablets (5-10 mg) by mouth every 4 hours as needed for moderate to severe pain  Quantity: 120 tablet  Refills: 0     polyethylene glycol 17 g packet  Commonly known as: MIRALAX      Dose: 1 packet  Take 1 packet by mouth daily  Refills: 0     polyethylene glycol-propylene glycol 0.4-0.3 % Soln ophthalmic solution  Commonly known as: SYSTANE ULTRA  Indication: Excessive Cornea and Conjunctiva Dryness      Dose: 1 drop  Place 1 drop into both eyes 2 times daily as needed  Refills: 0     rosuvastatin 5 MG tablet  Commonly known as: CRESTOR      Dose: 5 mg  Take 5 mg by mouth At Bedtime  Refills: 0     senna-docusate 8.6-50 MG tablet  Commonly known as: SENOKOT-S/PERICOLACE      Dose: 1 tablet  Take 1 tablet by mouth 2 times daily  Refills: 0     UNABLE TO FIND      MEDICATION NAME: prevagen 1 capsule daily by mouth and hair growth vitamin 1 capsule PO daily  Refills: 0     valACYclovir 1000 mg tablet  Commonly known as: VALTREX  Indication: Herpes Simplex Infection, cold sores      Dose: 2,000 mg  Take 2,000 mg by mouth 2 times daily as needed  Refills: 0     Vitamin D (Cholecalciferol) 50 MCG (2000 UT) Caps  Indication: Vitamin D Deficiency      Dose: 2,000 Units  Take 2,000 Units by mouth daily  Refills: 0              ASSESSMENT/PLAN  Encounter Diagnoses   Name Primary?     Type 2 diabetes mellitus with hyperosmolar coma, without long-term  current use of insulin (H) Yes     Chronic pain syndrome      Closed supracondylar fracture of left elbow, initial encounter      Physical deconditioning      Left elbow humerus fracture with ulnar transport follow-up with orthopedics pain control nonweightbearing left upper extremity    Type 2 diabetes on metformin 1000 mg twice daily A1c 8/13/2023 was 5.8    Pain management Tylenol 975 mg every 8 hours, baclofen 10 mg 3 times daily, oxycodone 5 to 10 mg every 4 hours as needed    HDL on Crestor    Physical deconditioning PT OT    Cold sores as needed valacyclovir    Anxiety on BuSpar    Mood disorder continue duloxetine    GERD on famotidine    Anemia continue ferrous sulfate    Hypertension on lisinopril 20 mg daily    Dizziness as needed meclizine    Hypothyroidism on levothyroxine TSH on 9/30/2022 was 0.50    Hypomagnesia currently on replacement      DISCHARGE PLAN/FACE TO FACE:  I certify that services are/were furnished while this patient was under the care of a physician and that a physician or an allowed non-physician practitioner (NPP), had a face-to-face encounter that meets the physician face-to-face encounter requirements. The encounter was in whole, or in part, related to the primary reason for home health. The patient is confined to his/her home and needs intermittent skilled nursing, physical therapy, speech-language pathology, or the continued need for occupational therapy. A plan of care has been established by a physician and is periodically reviewed by a physician.  Date of Face-to-Face Encounter: 9/5/23    I certify that, based on my findings, the following services are medically necessary home health services:PT/OT/HHA/RN    My clinical findings support the need for the above skilled services because: PT/OT for continued strength and endurance, HHA to assist with ADL's and RN for wound cares, Left elbow, bilateral knee scabs    This patient is homebound because: She is deconditioned following  recent ORIF complicated distal left elbow.  She is also with intermittent dizziness we will continue home therapy for strengthening    The patient is, or has been, under my care and I have initiated the establishment of the plan of care. This patient will be followed by a physician who will periodically review the plan of care.    Schedule follow up visit with primary care provider within 7 days to reestablish care.    Electronically signed by: Billie Santamaria CNP           Sincerely,        Billie Santamaria CNP

## 2023-09-06 ENCOUNTER — TELEPHONE (OUTPATIENT)
Dept: GERIATRICS | Facility: CLINIC | Age: 78
End: 2023-09-06

## 2023-09-06 LAB
ERYTHROCYTE [DISTWIDTH] IN BLOOD BY AUTOMATED COUNT: 14 % (ref 10–15)
HCT VFR BLD AUTO: 38.7 % (ref 35–47)
HGB BLD-MCNC: 12.3 G/DL (ref 11.7–15.7)
MCH RBC QN AUTO: 32.5 PG (ref 26.5–33)
MCHC RBC AUTO-ENTMCNC: 31.8 G/DL (ref 31.5–36.5)
MCV RBC AUTO: 102 FL (ref 78–100)
PLATELET # BLD AUTO: 606 10E3/UL (ref 150–450)
RBC # BLD AUTO: 3.78 10E6/UL (ref 3.8–5.2)
WBC # BLD AUTO: 8.4 10E3/UL (ref 4–11)

## 2023-09-06 PROCEDURE — 36415 COLL VENOUS BLD VENIPUNCTURE: CPT | Mod: ORL | Performed by: FAMILY MEDICINE

## 2023-09-06 PROCEDURE — P9604 ONE-WAY ALLOW PRORATED TRIP: HCPCS | Mod: ORL | Performed by: FAMILY MEDICINE

## 2023-09-06 PROCEDURE — 85027 COMPLETE CBC AUTOMATED: CPT | Mod: ORL | Performed by: FAMILY MEDICINE

## 2023-09-06 NOTE — TELEPHONE ENCOUNTER
[facility] Lab Note     Provider: OPAL Munoz  Facility: [facility]  Facility Type:  TCU    Allergies   Allergen Reactions    Glucosamine Anaphylaxis and Rash     Cardiac arrest    Ibuprofen Hives    Iodine Angioedema, Anaphylaxis and Hives     Patient can uses skin products such as betadine - See shellfish allergy      Naproxen Hives and Rash    Shellfish Allergy Anaphylaxis, Hives and Rash     Cardiac arrest - very severe    Shellfish-Derived Products Anaphylaxis, Hives and Rash     Cardiac arrest - very severe      Amitriptyline Visual Disturbance     Hallucinations    Buspirone Visual Disturbance     Hallucinations    Nystatin Nausea, Diarrhea, Cramps and GI Disturbance    Tramadol Visual Disturbance     Hallucinations    Chlorpheniramine Hives and Rash    Nsaids Unknown    Pseudoephedrine Hives and Rash       Labs Reviewed by provider: Heme 2     Verbal Order/Direction given by Provider: Ok to discharge home today as planned.  Patient needs to follow up with PCP regarding elevated PLT count.      Provider giving Order:  OPAL Munoz    Verbal Order given to: Francy([phone])    Ángel Campbell RN

## 2023-10-17 NOTE — H&P (VIEW-ONLY)
[facility] ([facility])      Preoperative Consultation     Belgica Salvador   1945   female    Date of Encounter: 10/17/2023    Nursing Notes:   Ramsay Luis Albertodavey  10/17/2023  2:36 PM  Signed  Belgica Salvador is a 78 y.o. female (1945) who presents for preop evaluation undergoing revision ORIF L olecranon     Date of Surgery: 10/18/23  Surgical Specialty: Orthopedic/Spine  Siddhartha Santamaria MD - [facility]  Hospital/Surgical Facility: [facility]  Fax number: [phone]  Surgery type: outpatient  Primary Physician: Kassie Thompson     Patient Active Problem List   Diagnosis Code     Hyperlipidemia LDL goal < 100 E78.5     Postablative hypothyroidism E89.0     B12 deficiency E53.8     Anxiety disorder F41.9     S/P gastric bypass Z98.84     Chronic pain syndrome G89.4     Pancreatic cyst, s/p distal pancreatectomy and splenectomy K86.2     ACP (advance care planning) Z71.89     Essential tremor G25.0     Iron deficiency E61.1     Low magnesium levels R79.0     Vitamin D deficiency E55.9     Controlled substance agreement signed Z79.899     Adhesive capsulitis of both shoulders M75.01, M75.02     Chronic GERD K21.9     Jejunitis K52.9     Diabetic peripheral neuropathy (HC) E11.42     Carpal tunnel syndrome, bilateral upper limbs G56.03     Controlled type 2 diabetes mellitus with diabetic neuropathy, without long-term current use of insulin (HC) E11.40     Polyneuropathy associated with underlying disease (HC) G63     History of total knee replacement, right, 2009 Z96.651     Greater trochanteric bursitis of right hip M70.61     Myofascial pain syndrome M79.18     Greater trochanteric bursitis of left hip M70.62     History of DVT of lower extremity Z86.718     Cervical radiculopathy at C8 M54.12     Diverticular disease of large intestine K57.30     H/O melanoma excision Z98.890, Z85.820     Overactive bladder  N32.81     Lymphedema of right arm I89.0     Other idiopathic scoliosis, thoracic region M41.24     Right calf atrophy M62.561     Mild nonproliferative diabetic retinopathy of both eyes without macular edema associated with type 2 diabetes mellitus (HC) E11.3293     HTN (hypertension) I10     CAD in native artery I25.10     Graves' disease E05.00     Hypothyroidism E03.9     Malignant melanoma (HC) C43.9     Osteoarthritis of knee M17.9     Leg wound, left S81.802A     Closed supracondylar fracture of left elbow S42.412A     Lymphedema, not elsewhere classified I89.0     Personal history of other venous thrombosis and embolism Z86.718     Presence of right artificial knee joint Z96.651     Thyrotoxicosis with diffuse goiter without thyrotoxic crisis or storm E05.00     Vertigo R42        History of Present Illness     Undergoing revision of L elbow ORIF tomorrow. Has had several prior surgeries without complication. Known coronary disease with negative NM stress test last year. History of prior post-op DVT, not currently on AC. Takes aspirin and lisinopril.    The patient is otherwise in stable health. The patient has no active cardiopulmonary symptoms and states she has normal exertional capacity (could walk 4 blocks or climb two flights of stairs).      Review of Systems   A comprehensive review of systems was negative except for items noted in HPI.     Current Outpatient Medications   Medication Sig     acetaminophen SR (TYLENOL ARTHRITIS) 650 mg Extended-Release tablet Take 2 tab in AM, 1 midday, and 2 in PM; Max acetaminophen dose: 4000mg in 24 hrs.     artificial tears, peg 400-propylene glycol, (Systane, propylene glycoL,) 0.4-0.3 % drop ophthalmic Place 1 Drop into both eyes two times daily.     aspirin (ECOTRIN) 81 mg enteric coated tablet Take 1 Tablet (81 mg) by mouth once daily with a meal. For stroke & heart attack prevention.     baclofen (LIORESAL) 10 mg tablet TAKE 1 TABLET BY MOUTH 3 TIMES DAILY  "    BD INTEGRA 3 mL 25 gauge x 5/8\" syrg FOR USE WITH VITAMIN B12 INJECTION     busPIRone (BUSPAR) 10 mg tablet Take 1 Tablet (10 mg) by mouth two times daily.     calcium/magnesium/zinc (Calcium-Magnesuium-Zinc) 333-133-5 mg tablet Take 1 Tablet by mouth.     Carboxymethylcellulose Sodium (TheraTears) 0.25 % ophthalmic solution Place 1 Drop into both eyes two times daily. TheraTears     cholecalciferol (VITAMIN D3) 2,000 unit capsule TAKE 1 CAPSULE (2,000 UNITS) BY MOUTH ONCE DAILY.     Daily-Maury, with folic acid, TAKE 1 TABLET BY MOUTH EVERY DAY     DULoxetine (CYMBALTA) 60 mg Delayed-release capsule Take 1 Capsule (60 mg) by mouth once daily.     EPINEPHrine (EPIPEN) 0.3 mg/0.3 mL auto-injector INJECT 0.3 MG INTRAMUSCULAR ONE TIME IF NEEDED FOR ALLERGIC REACTION.     famotidine (PEPCID) 20 mg tablet Take 1 Tablet (20 mg) by mouth two times daily.     ferrous sulfate 325 mg delayed release tablet Take 1 Tablet (325 mg) by mouth once daily with a meal.     fluticasone (50 mcg per actuation) nasal solution (FLONASE) Inhale 2 Sprays to both nostrils once daily.     folic acid 1 mg tablet TAKE 1 TABLET BY MOUTH EVERY DAY     hospital bed adjustable twin size hospital bed for lifetime     levothyroxine (SYNTHROID) 88 mcg tablet Take 1 Tablet (88 mcg) by mouth once daily.     lisinopriL (PRINIVIL; ZESTRIL) 20 mg tablet Take 1 Tablet (20 mg) by mouth once daily.     loperamide (IMODIUM) 2 mg capsule Take 2 capsules (4mg) orally with 1st loose stool, then 1 capsule (2mg) with other loose stools. Max 16 mg in 24 hrs.     magnesium oxide (MAG-) 400 mg tablet Take 1 Tablet (400 mg) by mouth two times daily.     meclizine (ANTIVERT) 12.5 mg tablet Take 1 Tablet (12.5 mg) by mouth 3 times daily if needed for Vertigo.     medication order composer Prevagen supplemtn     metFORMIN (GLUCOPHAGE) 500 mg tablet Take 2 Tablets (1,000 mg) by mouth two times daily with meals.     multivit-min-folic acid-biotin (Hair,Skin and " "Nails,FA-biotin,) 66.7-1,000 mcg tab Take by mouth.     multivitamins with minerals (Hair,Skin and Nails) tablet Take 1 Tablet by mouth once daily.     omeprazole (PRILOSEC) 20 mg Delayed-Release capsule TAKE 1-2 CAPSULES (20-40 MG) BY MOUTH ONCE DAILY IF NEEDED FOR GI UPSET.     OneTouch Ultra Test strip TEST 1 TIMES PER DAY.     rosuvastatin (CRESTOR) 5 mg tablet Take 1 Tablet (5 mg) by mouth at bedtime.     sucralfate (CARAFATE) 1 gram tablet Take 1 Tablet (1 g) by mouth 4 times daily before meals and at bedtime.     Syringe with Needle, Disp, (B-D 3CC LUER-GALA SYR 25GX5/8) 3 mL 25 x 5/8\" For vitamin B12 injection     valACYclovir (Valtrex) 1 gram tablet At the start of symptoms of a cold sore, take 2 tablets twice daily for 1 day.     No current facility-administered medications for this visit.     Medications have been reviewed by me and are current to the best of my knowledge and ability.     Allergies   Allergen Reactions     Aleve [Naproxen] Rash and Hives     Glucosamine Anaphylaxis and Rash     Other reaction(s): Cardiac Arrest  Very severe     Ibuprofen Hives     Iodine Hives, Anaphylaxis and Angioedema     See shellfish     Shellfish Containing Products Anaphylaxis, Rash and Cardiac Arrest     Very severe     Tramadol Hallucinations     Other [Unlisted Allergen (Include Detail In Comments)] Anxiety     Plastic mattresses and pt. States gets burning sensation; perspiration     Chlorpheniramine Rash     Elavil [Amitriptyline] Hallucinations     Other reaction(s): Hallucinations     Naproxen Sodium Rash     Nystatin Diarrhea     Stomach cramps     Pseudoephedrine Rash     Pseudoephedrine Hcl Rash     Past Surgical History:   . Laterality Date     CARPAL TUNNEL RELEASE Left 06/2018     CATARACT EXTRACTION W/ INTRAOCULAR LENS IMPLANT Right 02/10/2021    Dr. Caro      CATARACT EXTRACTION W/ INTRAOCULAR LENS IMPLANT Left 02/24/2021    Dr. Caro      CERVICAL FUSION  2000    C4,5,6 (or 5-7 )      " COLONOSCOPY DIAGNOSTIC  10/13/2017    [facility]: negative for bleeding;no more d/t age     DEBRIDEMENT LEFT KNEE  2022     ESOPHAGOGASTRODUODENOSCOPY  2017    s/p surgery;gastritis;jejunitis per [facility]     ESOPHAGOGASTRODUODENOSCOPY  2013     EXCISION MASS LOWER BACK  2013     GASTRIC BYPASS       INTERSTIM LEAD AND IPG REMOVAL INTERSTIM LEAD AND IPG X PLACEMENT WITH IMPEDENCE N/A 2022    [place] by Dr. Godinez     KNEE REPLACEMENT Right 2009    right     LAPAROSCOPIC PANCREATECTOMY/splenectomy  2013     left total knee arthroplasty   11/10/2022     melanoma      left ear     MO SUBTALAR ATHROEREISIS      right ankle with hardware in place     REFRACTIVE SURGERY      bilateral     THYROIDECTOMY      from graves disease     Social History     Tobacco Use     Smoking status: Former     Current packs/day: 0.00     Average packs/day: 1 pack/day for 15.0 years (15.0 ttl pk-yrs)     Types: Cigarettes     Start date: 1967     Quit date: 1982     Years since quittin.8     Smokeless tobacco: Never   Vaping Use     Vaping Use: Never used   Substance Use Topics     Alcohol use: Not Currently     Alcohol/week: 1.0 standard drink of alcohol     Types: 1 Standard drinks or equivalent per week     Comment: occasional     Drug use: No      Family History   Problem Relation Age of Onset     Cancer-colon Father         in his 60's     Heart Disease Father         in his 70's     Heart Disease Mother         in her 70's     Cancer-breast Maternal Aunt      Cancer-breast Paternal Aunt      Cancer-ovarian No Family History      Cancer-prostate No Family History      Cancer No Family History        PAST DIFFICULTY WITH ANESTHESIA:  none  Click HERE for reasons to speak to on-call Anesthesiologist and phone # to call      Physical Exam   General Appearance: Well appearing   /64 (Cuff Site: Right Arm, Position: Sitting, Cuff Size: Adult Regular)   Pulse (!) 103   Ht  "1.702 m (5' 7.01\")   Wt 75.3 kg (166 lb)   SpO2 98%   BMI 25.99 kg/m    RESPIRATORY: Normal  CARDIOVASCULAR: Normal      The Pre-Op Tool    Recommendations      Intermediate Risk Procedure    Risk of CV Complication (RCRI)  1%    Current Cardiac Status  Good exertional capacity ( > 4 mets )    Cardiac History  History of asymptomatic CAD, detected by CT Coronary Angiogram           Labs  HGB within last 30 days  Potassium within last 30 days  Creatinine within last 30 days  EKG  Baseline EKG within the last 12 months  CXR  Not indicated    Stress Testing  Not indicated    * Testing recommendations are intended to assist, but not direct, clinical decisions.         Type & Screen should be obtained by Anesthesia only if the risk of transfusion is > 5% for the procedure     Do not take your oral diabetes medication(s) on the day of the procedure.  Hold Lisinopril (Prinvil, Zestril) the evening before and/or morning of the procedure.  Hold Aspirin for 7 days prior to procedure  Take your other medications as usual prior to the procedure  Hold vitamins and/or supplements for 1 week prior to the procedure  Hold fish oil for 2 weeks prior to the procedure  Okay to take Acetaminophen (Tylenol) up until the procedure  Hold / avoid NSAIDs (e.g. ibuprofen, naproxen) prior to procedure: 2 days for ibuprofen (Advil) and 4 days for naproxen (Aleve).    * Medication recommendations are not intended to be exhaustive; they are limited to common medications that are potentially dangerous if incorrectly managed          Labs  * Data supports elimination of  routine  laboratory testing in favor of focused,  indicated  testing based on medical co-morbidities. A 2009 study randomized 1061 patients undergoing ambulatory, non-cataract surgery to routine or to indicated testing. Perioperative adverse events were similar (Anesthesia & Analgesia 2009;108:467-75; Anesthesiol. Clin. 2016 Mar;34(1):43-58).  Stress Testing  * Data from [facility]" risk patients undergoing major vascular surgery have failed to demonstrate benefit from either preoperative stress testing or prophylactic revascularization. A large, observational study found low risk of major perioperative adverse events in patients able to achieve =4 mets. Taken together with existing knowledge, for patients with known or suspected CAD, our experts recommend preoperative stress testing only if it is indicated regardless of the planned surgery (N Engl J Med 2004;351:2795-80; J Am Aung Cardiol 2014;64:2058-0691; PATITO 2020;324:274-290;).  Antiplatelet Therapy  * In the 2014 POISE-2 trial, continuation of aspirin in high cardiovascular risk patients undergoing non-cardiac surgery increased the risk for major bleeding without reducing the rate of death, myocardial infarction, or stroke. In most circumstances our experts recommend a 7 day aspirin hold in patients without coronary stents. Continuation of aspirin may be reasonable in patients with high risk coronary artery disease or cerebrovascular disease who are not undergoing high bleeding risk procedures (NEJM 2014;370:1494-503; CLOVER 2015; Clin Med 2016; 16: 535-40; Anest Analg 2020; 131: 111-23).  RAAS Antagonist  * Hypotension during anesthesia is associated with continuing renin-angiotensin system (RAAS) antagonist. While it is unclear if holding RAAS antagonists reduces postoperative complications, in most circumstances our experts recommend holding RAAS antagonist 24 hours prior to surgery. (Postgrad Med J 2011;87:472-81; Anest 2017;126:16-27; BMC Anesthesiol 2018;18:26.)     Session ID: [id number]  Endnotes and bibliography available upon request: [email or web address]     Assessment / Plan   Z01.810 Preoperative examination, unspecified  (primary encounter diagnosis)  (S42.412D) Closed supracondylar fracture of left elbow with routine healing, subsequent encounter  (I25.10) CAD in native artery  (Z86.718) History of DVT of lower  extremity  (E11.40) Controlled type 2 diabetes mellitus with diabetic neuropathy, without long-term current use of insulin (HC)  Comment: Belgica Salvador is a 78 y.o. female scheduled to undergo a(n) intermediate risk surgery. The patient has no active cardiac or pulmonary symptoms and she has reasonable exertional capacity. She has tolerated a recent surgery and has a negative NM stress test one year ago. Known RBBB redemonstrated on EKG today - now with PACs. No further cardiac evaluation is indicated. The patient may be at increased risk for postoperative DVT given prior history. Not currently anticoagulated. She is diabetic with recent A1C 5.2% on metformin. No insulin. CKD with eGFR 51.     Plan:   - K, Hgb, EKG  - Medication management as above    Postoperative Internal Medicine consult per Surgeon's discretion. If needed, please have HUC call the [employer] (formerly [employer]) service.      Juvencio Weiner MD  Resident Physician, Internal Medicine    [facility]  Main Phone: [phone]  Fax: [phone]    Medicine Clinic ([facility])  Main Phone: [phone]  Fax: [phone]    I reviewed past medical records in the electronic health record, both at our institution and at others. and I review recent labs, studies, or other results pertinent to the current issue(s).       HEMOGLOBIN                (g/dL)   Date Value   10/17/2023 13.8     10/18/2023 -- Potassium -- 5.2    EKG: NSR one PVC, RBBB, unchanged from prior EKGs      ATTENDING STAFF PROGRESS NOTE    I saw Belgica Salvador with the resident and participated in the key portions of the history, exam, and medical decision making.  I confirm the resident s description of these, with the following summary comments: Reviewed preop work up and agree with plan for labs and EKG. EKG today showing known RBBB. She had normal stress test 1 year ago. Ok to proceed with planned surgery.   Christen Reyes,  MD

## 2023-10-31 RX ORDER — SUCRALFATE 1 G/1
1 TABLET ORAL 4 TIMES DAILY
COMMUNITY

## 2023-10-31 RX ORDER — ASPIRIN 81 MG/1
81 TABLET ORAL DAILY
COMMUNITY

## 2023-11-01 ENCOUNTER — ANESTHESIA EVENT (OUTPATIENT)
Dept: SURGERY | Facility: CLINIC | Age: 78
End: 2023-11-01
Payer: COMMERCIAL

## 2023-11-02 ENCOUNTER — APPOINTMENT (OUTPATIENT)
Dept: RADIOLOGY | Facility: CLINIC | Age: 78
End: 2023-11-02
Attending: SURGERY
Payer: COMMERCIAL

## 2023-11-02 ENCOUNTER — ANESTHESIA (OUTPATIENT)
Dept: SURGERY | Facility: CLINIC | Age: 78
End: 2023-11-02
Payer: COMMERCIAL

## 2023-11-02 ENCOUNTER — HOSPITAL ENCOUNTER (OUTPATIENT)
Facility: CLINIC | Age: 78
Discharge: NURSING, REHAB OR LONG-TERM CARE | End: 2023-11-04
Attending: SURGERY | Admitting: SURGERY
Payer: COMMERCIAL

## 2023-11-02 DIAGNOSIS — I10 ESSENTIAL HYPERTENSION: ICD-10-CM

## 2023-11-02 DIAGNOSIS — S42.412A CLOSED SUPRACONDYLAR FRACTURE OF LEFT ELBOW, INITIAL ENCOUNTER: Primary | ICD-10-CM

## 2023-11-02 PROBLEM — S52.023A OLECRANON FRACTURE: Status: ACTIVE | Noted: 2023-11-02

## 2023-11-02 LAB
CREAT SERPL-MCNC: 0.62 MG/DL (ref 0.51–0.95)
EGFRCR SERPLBLD CKD-EPI 2021: >90 ML/MIN/1.73M2
GLUCOSE BLDC GLUCOMTR-MCNC: 101 MG/DL (ref 70–99)
GLUCOSE BLDC GLUCOMTR-MCNC: 122 MG/DL (ref 70–99)
GLUCOSE BLDC GLUCOMTR-MCNC: 122 MG/DL (ref 70–99)
GLUCOSE BLDC GLUCOMTR-MCNC: 130 MG/DL (ref 70–99)
GLUCOSE BLDC GLUCOMTR-MCNC: 134 MG/DL (ref 70–99)

## 2023-11-02 PROCEDURE — C1713 ANCHOR/SCREW BN/BN,TIS/BN: HCPCS | Performed by: SURGERY

## 2023-11-02 PROCEDURE — 250N000011 HC RX IP 250 OP 636: Performed by: NURSE ANESTHETIST, CERTIFIED REGISTERED

## 2023-11-02 PROCEDURE — 36415 COLL VENOUS BLD VENIPUNCTURE: CPT | Performed by: SURGERY

## 2023-11-02 PROCEDURE — 82565 ASSAY OF CREATININE: CPT | Performed by: SURGERY

## 2023-11-02 PROCEDURE — 258N000003 HC RX IP 258 OP 636: Performed by: PHYSICIAN ASSISTANT

## 2023-11-02 PROCEDURE — 250N000011 HC RX IP 250 OP 636: Performed by: PHYSICIAN ASSISTANT

## 2023-11-02 PROCEDURE — 999N000141 HC STATISTIC PRE-PROCEDURE NURSING ASSESSMENT: Performed by: SURGERY

## 2023-11-02 PROCEDURE — 250N000011 HC RX IP 250 OP 636: Mod: JZ | Performed by: ANESTHESIOLOGY

## 2023-11-02 PROCEDURE — 250N000011 HC RX IP 250 OP 636: Performed by: ANESTHESIOLOGY

## 2023-11-02 PROCEDURE — 999N000180 XR SURGERY CARM FLUORO LESS THAN 5 MIN: Mod: TC

## 2023-11-02 PROCEDURE — 370N000017 HC ANESTHESIA TECHNICAL FEE, PER MIN: Performed by: SURGERY

## 2023-11-02 PROCEDURE — 250N000013 HC RX MED GY IP 250 OP 250 PS 637: Performed by: INTERNAL MEDICINE

## 2023-11-02 PROCEDURE — 710N000012 HC RECOVERY PHASE 2, PER MINUTE: Performed by: SURGERY

## 2023-11-02 PROCEDURE — 272N000001 HC OR GENERAL SUPPLY STERILE: Performed by: SURGERY

## 2023-11-02 PROCEDURE — 99214 OFFICE O/P EST MOD 30 MIN: CPT | Mod: GC | Performed by: INTERNAL MEDICINE

## 2023-11-02 PROCEDURE — 258N000003 HC RX IP 258 OP 636: Performed by: ANESTHESIOLOGY

## 2023-11-02 PROCEDURE — 360N000084 HC SURGERY LEVEL 4 W/ FLUORO, PER MIN: Performed by: SURGERY

## 2023-11-02 PROCEDURE — 250N000013 HC RX MED GY IP 250 OP 250 PS 637: Performed by: PHYSICIAN ASSISTANT

## 2023-11-02 PROCEDURE — 250N000013 HC RX MED GY IP 250 OP 250 PS 637: Performed by: ANESTHESIOLOGY

## 2023-11-02 PROCEDURE — 82962 GLUCOSE BLOOD TEST: CPT

## 2023-11-02 DEVICE — IMP SCR SYN 3.5X32MM LOCKING W/STARDRIVE SS 212.112: Type: IMPLANTABLE DEVICE | Site: ELBOW | Status: FUNCTIONAL

## 2023-11-02 DEVICE — SCREW LOCKING ST 2.7X12MM: Type: IMPLANTABLE DEVICE | Site: ELBOW | Status: FUNCTIONAL

## 2023-11-02 DEVICE — SCREW LOCKING ST 2.7X30MM: Type: IMPLANTABLE DEVICE | Site: ELBOW | Status: FUNCTIONAL

## 2023-11-02 DEVICE — IMP SCR SYN 3.5X24MM LOCKING W/STARDRIVE SS 212.108: Type: IMPLANTABLE DEVICE | Site: ELBOW | Status: FUNCTIONAL

## 2023-11-02 DEVICE — IMPLANTABLE DEVICE: Type: IMPLANTABLE DEVICE | Site: ELBOW | Status: FUNCTIONAL

## 2023-11-02 DEVICE — IMP SCR SYN CORTEX 3.5X30MM SELF TAP SS 204.830: Type: IMPLANTABLE DEVICE | Site: ELBOW | Status: FUNCTIONAL

## 2023-11-02 DEVICE — SCREW LOCKING ST 2.7X50MM: Type: IMPLANTABLE DEVICE | Site: ELBOW | Status: FUNCTIONAL

## 2023-11-02 RX ORDER — FENTANYL CITRATE 50 UG/ML
25 INJECTION, SOLUTION INTRAMUSCULAR; INTRAVENOUS EVERY 5 MIN PRN
Status: DISCONTINUED | OUTPATIENT
Start: 2023-11-02 | End: 2023-11-02

## 2023-11-02 RX ORDER — BUPIVACAINE HYDROCHLORIDE 5 MG/ML
INJECTION, SOLUTION EPIDURAL; INTRACAUDAL
Status: COMPLETED | OUTPATIENT
Start: 2023-11-02 | End: 2023-11-02

## 2023-11-02 RX ORDER — NICOTINE POLACRILEX 4 MG
15-30 LOZENGE BUCCAL
Status: DISCONTINUED | OUTPATIENT
Start: 2023-11-02 | End: 2023-11-04 | Stop reason: HOSPADM

## 2023-11-02 RX ORDER — SUCRALFATE 1 G/1
1 TABLET ORAL
Status: DISCONTINUED | OUTPATIENT
Start: 2023-11-02 | End: 2023-11-04 | Stop reason: HOSPADM

## 2023-11-02 RX ORDER — NALOXONE HYDROCHLORIDE 0.4 MG/ML
0.2 INJECTION, SOLUTION INTRAMUSCULAR; INTRAVENOUS; SUBCUTANEOUS
Status: DISCONTINUED | OUTPATIENT
Start: 2023-11-02 | End: 2023-11-04 | Stop reason: HOSPADM

## 2023-11-02 RX ORDER — ONDANSETRON 2 MG/ML
4 INJECTION INTRAMUSCULAR; INTRAVENOUS EVERY 30 MIN PRN
Status: DISCONTINUED | OUTPATIENT
Start: 2023-11-02 | End: 2023-11-02

## 2023-11-02 RX ORDER — HYDROMORPHONE HCL IN WATER/PF 6 MG/30 ML
0.2 PATIENT CONTROLLED ANALGESIA SYRINGE INTRAVENOUS EVERY 5 MIN PRN
Status: DISCONTINUED | OUTPATIENT
Start: 2023-11-02 | End: 2023-11-02

## 2023-11-02 RX ORDER — PROCHLORPERAZINE MALEATE 5 MG
5 TABLET ORAL EVERY 6 HOURS PRN
Status: DISCONTINUED | OUTPATIENT
Start: 2023-11-02 | End: 2023-11-04 | Stop reason: HOSPADM

## 2023-11-02 RX ORDER — ACETAMINOPHEN 325 MG/1
650 TABLET ORAL EVERY 4 HOURS PRN
Status: DISCONTINUED | OUTPATIENT
Start: 2023-11-05 | End: 2023-11-04 | Stop reason: HOSPADM

## 2023-11-02 RX ORDER — NALOXONE HYDROCHLORIDE 0.4 MG/ML
0.4 INJECTION, SOLUTION INTRAMUSCULAR; INTRAVENOUS; SUBCUTANEOUS
Status: DISCONTINUED | OUTPATIENT
Start: 2023-11-02 | End: 2023-11-04 | Stop reason: HOSPADM

## 2023-11-02 RX ORDER — ACETAMINOPHEN 500 MG
1000 TABLET ORAL EVERY 8 HOURS
Status: DISCONTINUED | OUTPATIENT
Start: 2023-11-02 | End: 2023-11-04 | Stop reason: HOSPADM

## 2023-11-02 RX ORDER — ONDANSETRON 2 MG/ML
4 INJECTION INTRAMUSCULAR; INTRAVENOUS EVERY 6 HOURS PRN
Status: DISCONTINUED | OUTPATIENT
Start: 2023-11-02 | End: 2023-11-04 | Stop reason: HOSPADM

## 2023-11-02 RX ORDER — BUSPIRONE HYDROCHLORIDE 5 MG/1
10 TABLET ORAL 2 TIMES DAILY
Status: DISCONTINUED | OUTPATIENT
Start: 2023-11-02 | End: 2023-11-04 | Stop reason: HOSPADM

## 2023-11-02 RX ORDER — OXYCODONE HYDROCHLORIDE 5 MG/1
5-10 TABLET ORAL EVERY 4 HOURS PRN
Qty: 10 TABLET | Refills: 0 | Status: SHIPPED | OUTPATIENT
Start: 2023-11-02 | End: 2023-11-17

## 2023-11-02 RX ORDER — ACETAMINOPHEN 500 MG
1000 TABLET ORAL EVERY 6 HOURS
COMMUNITY

## 2023-11-02 RX ORDER — MAGNESIUM OXIDE 400 MG/1
400 TABLET ORAL 2 TIMES DAILY
Status: DISCONTINUED | OUTPATIENT
Start: 2023-11-02 | End: 2023-11-04 | Stop reason: HOSPADM

## 2023-11-02 RX ORDER — PANTOPRAZOLE SODIUM 40 MG/1
40 TABLET, DELAYED RELEASE ORAL 2 TIMES DAILY PRN
Status: DISCONTINUED | OUTPATIENT
Start: 2023-11-02 | End: 2023-11-04 | Stop reason: HOSPADM

## 2023-11-02 RX ORDER — ENOXAPARIN SODIUM 100 MG/ML
40 INJECTION SUBCUTANEOUS EVERY 24 HOURS
Status: DISCONTINUED | OUTPATIENT
Start: 2023-11-03 | End: 2023-11-04 | Stop reason: HOSPADM

## 2023-11-02 RX ORDER — AMOXICILLIN 250 MG
1 CAPSULE ORAL 2 TIMES DAILY
Status: DISCONTINUED | OUTPATIENT
Start: 2023-11-02 | End: 2023-11-04 | Stop reason: HOSPADM

## 2023-11-02 RX ORDER — OXYCODONE HYDROCHLORIDE 5 MG/1
5 TABLET ORAL EVERY 4 HOURS PRN
Status: DISCONTINUED | OUTPATIENT
Start: 2023-11-02 | End: 2023-11-04 | Stop reason: HOSPADM

## 2023-11-02 RX ORDER — LEVOTHYROXINE SODIUM 88 UG/1
88 TABLET ORAL DAILY
Status: DISCONTINUED | OUTPATIENT
Start: 2023-11-03 | End: 2023-11-04 | Stop reason: HOSPADM

## 2023-11-02 RX ORDER — ONDANSETRON 4 MG/1
4 TABLET, ORALLY DISINTEGRATING ORAL EVERY 30 MIN PRN
Status: DISCONTINUED | OUTPATIENT
Start: 2023-11-02 | End: 2023-11-02

## 2023-11-02 RX ORDER — OXYCODONE HYDROCHLORIDE 5 MG/1
10 TABLET ORAL EVERY 4 HOURS PRN
Status: DISCONTINUED | OUTPATIENT
Start: 2023-11-02 | End: 2023-11-04 | Stop reason: HOSPADM

## 2023-11-02 RX ORDER — FENTANYL CITRATE 50 UG/ML
25-100 INJECTION, SOLUTION INTRAMUSCULAR; INTRAVENOUS
Status: DISCONTINUED | OUTPATIENT
Start: 2023-11-02 | End: 2023-11-02 | Stop reason: HOSPADM

## 2023-11-02 RX ORDER — CEFAZOLIN SODIUM/WATER 2 G/20 ML
2 SYRINGE (ML) INTRAVENOUS
Status: COMPLETED | OUTPATIENT
Start: 2023-11-02 | End: 2023-11-02

## 2023-11-02 RX ORDER — CEFAZOLIN SODIUM/WATER 2 G/20 ML
2 SYRINGE (ML) INTRAVENOUS SEE ADMIN INSTRUCTIONS
Status: DISCONTINUED | OUTPATIENT
Start: 2023-11-02 | End: 2023-11-02 | Stop reason: HOSPADM

## 2023-11-02 RX ORDER — LISINOPRIL 20 MG/1
20 TABLET ORAL DAILY
Status: DISCONTINUED | OUTPATIENT
Start: 2023-11-03 | End: 2023-11-04 | Stop reason: HOSPADM

## 2023-11-02 RX ORDER — ONDANSETRON 2 MG/ML
INJECTION INTRAMUSCULAR; INTRAVENOUS PRN
Status: DISCONTINUED | OUTPATIENT
Start: 2023-11-02 | End: 2023-11-02

## 2023-11-02 RX ORDER — SODIUM CHLORIDE, SODIUM LACTATE, POTASSIUM CHLORIDE, CALCIUM CHLORIDE 600; 310; 30; 20 MG/100ML; MG/100ML; MG/100ML; MG/100ML
INJECTION, SOLUTION INTRAVENOUS CONTINUOUS
Status: DISCONTINUED | OUTPATIENT
Start: 2023-11-02 | End: 2023-11-02 | Stop reason: HOSPADM

## 2023-11-02 RX ORDER — OXYCODONE HYDROCHLORIDE 5 MG/1
10 TABLET ORAL
Status: COMPLETED | OUTPATIENT
Start: 2023-11-02 | End: 2023-11-02

## 2023-11-02 RX ORDER — PROPOFOL 10 MG/ML
INJECTION, EMULSION INTRAVENOUS PRN
Status: DISCONTINUED | OUTPATIENT
Start: 2023-11-02 | End: 2023-11-02

## 2023-11-02 RX ORDER — ASPIRIN 81 MG/1
81 TABLET ORAL DAILY
Status: DISCONTINUED | OUTPATIENT
Start: 2023-11-02 | End: 2023-11-04 | Stop reason: HOSPADM

## 2023-11-02 RX ORDER — POLYETHYLENE GLYCOL 3350 17 G/17G
17 POWDER, FOR SOLUTION ORAL DAILY
Status: DISCONTINUED | OUTPATIENT
Start: 2023-11-03 | End: 2023-11-04 | Stop reason: HOSPADM

## 2023-11-02 RX ORDER — ONDANSETRON 2 MG/ML
4 INJECTION INTRAMUSCULAR; INTRAVENOUS EVERY 30 MIN PRN
Status: DISCONTINUED | OUTPATIENT
Start: 2023-11-02 | End: 2023-11-02 | Stop reason: HOSPADM

## 2023-11-02 RX ORDER — FAMOTIDINE 20 MG/1
20 TABLET, FILM COATED ORAL 2 TIMES DAILY
Status: DISCONTINUED | OUTPATIENT
Start: 2023-11-02 | End: 2023-11-04 | Stop reason: HOSPADM

## 2023-11-02 RX ORDER — ROSUVASTATIN CALCIUM 5 MG/1
5 TABLET, COATED ORAL AT BEDTIME
Status: DISCONTINUED | OUTPATIENT
Start: 2023-11-02 | End: 2023-11-04 | Stop reason: HOSPADM

## 2023-11-02 RX ORDER — DEXTROSE MONOHYDRATE 25 G/50ML
25-50 INJECTION, SOLUTION INTRAVENOUS
Status: DISCONTINUED | OUTPATIENT
Start: 2023-11-02 | End: 2023-11-04 | Stop reason: HOSPADM

## 2023-11-02 RX ORDER — FENTANYL CITRATE 50 UG/ML
25 INJECTION, SOLUTION INTRAMUSCULAR; INTRAVENOUS
Status: DISCONTINUED | OUTPATIENT
Start: 2023-11-02 | End: 2023-11-02 | Stop reason: HOSPADM

## 2023-11-02 RX ORDER — DULOXETIN HYDROCHLORIDE 60 MG/1
60 CAPSULE, DELAYED RELEASE ORAL DAILY
Status: DISCONTINUED | OUTPATIENT
Start: 2023-11-03 | End: 2023-11-04 | Stop reason: HOSPADM

## 2023-11-02 RX ORDER — HYDROMORPHONE HCL IN WATER/PF 6 MG/30 ML
0.4 PATIENT CONTROLLED ANALGESIA SYRINGE INTRAVENOUS EVERY 5 MIN PRN
Status: DISCONTINUED | OUTPATIENT
Start: 2023-11-02 | End: 2023-11-02

## 2023-11-02 RX ORDER — FOLIC ACID 1 MG/1
1 TABLET ORAL DAILY
Status: DISCONTINUED | OUTPATIENT
Start: 2023-11-03 | End: 2023-11-04 | Stop reason: HOSPADM

## 2023-11-02 RX ORDER — LIDOCAINE 40 MG/G
CREAM TOPICAL
Status: DISCONTINUED | OUTPATIENT
Start: 2023-11-02 | End: 2023-11-02 | Stop reason: HOSPADM

## 2023-11-02 RX ORDER — HEPARIN SODIUM 5000 [USP'U]/.5ML
5000 INJECTION, SOLUTION INTRAVENOUS; SUBCUTANEOUS EVERY 12 HOURS
Status: DISCONTINUED | OUTPATIENT
Start: 2023-11-02 | End: 2023-11-02

## 2023-11-02 RX ORDER — ONDANSETRON 4 MG/1
4 TABLET, ORALLY DISINTEGRATING ORAL EVERY 30 MIN PRN
Status: DISCONTINUED | OUTPATIENT
Start: 2023-11-02 | End: 2023-11-02 | Stop reason: HOSPADM

## 2023-11-02 RX ORDER — FERROUS SULFATE 325(65) MG
325 TABLET ORAL EVERY EVENING
Status: DISCONTINUED | OUTPATIENT
Start: 2023-11-02 | End: 2023-11-04 | Stop reason: HOSPADM

## 2023-11-02 RX ORDER — BACLOFEN 10 MG/1
10 TABLET ORAL 3 TIMES DAILY
Status: DISCONTINUED | OUTPATIENT
Start: 2023-11-02 | End: 2023-11-04 | Stop reason: HOSPADM

## 2023-11-02 RX ORDER — VITAMIN B COMPLEX
2000 TABLET ORAL DAILY
Status: DISCONTINUED | OUTPATIENT
Start: 2023-11-02 | End: 2023-11-04 | Stop reason: HOSPADM

## 2023-11-02 RX ORDER — SODIUM CHLORIDE 9 MG/ML
INJECTION, SOLUTION INTRAVENOUS CONTINUOUS
Status: DISCONTINUED | OUTPATIENT
Start: 2023-11-02 | End: 2023-11-04 | Stop reason: HOSPADM

## 2023-11-02 RX ORDER — CARBOXYMETHYLCELLULOSE SODIUM, UNSPECIFIED 2.5 MG/ML
1 SOLUTION/ DROPS OPHTHALMIC 2 TIMES DAILY
COMMUNITY

## 2023-11-02 RX ORDER — LIDOCAINE 40 MG/G
CREAM TOPICAL
Status: DISCONTINUED | OUTPATIENT
Start: 2023-11-02 | End: 2023-11-04 | Stop reason: HOSPADM

## 2023-11-02 RX ORDER — PROPOFOL 10 MG/ML
INJECTION, EMULSION INTRAVENOUS CONTINUOUS PRN
Status: DISCONTINUED | OUTPATIENT
Start: 2023-11-02 | End: 2023-11-02

## 2023-11-02 RX ORDER — SODIUM CHLORIDE, SODIUM LACTATE, POTASSIUM CHLORIDE, CALCIUM CHLORIDE 600; 310; 30; 20 MG/100ML; MG/100ML; MG/100ML; MG/100ML
INJECTION, SOLUTION INTRAVENOUS CONTINUOUS
Status: DISCONTINUED | OUTPATIENT
Start: 2023-11-02 | End: 2023-11-04 | Stop reason: HOSPADM

## 2023-11-02 RX ORDER — ESMOLOL HYDROCHLORIDE 10 MG/ML
INJECTION INTRAVENOUS PRN
Status: DISCONTINUED | OUTPATIENT
Start: 2023-11-02 | End: 2023-11-02

## 2023-11-02 RX ORDER — ACETAMINOPHEN 325 MG/1
975 TABLET ORAL EVERY 8 HOURS
Qty: 27 TABLET | Refills: 0 | Status: DISCONTINUED | OUTPATIENT
Start: 2023-11-02 | End: 2023-11-02

## 2023-11-02 RX ORDER — FENTANYL CITRATE 50 UG/ML
50 INJECTION, SOLUTION INTRAMUSCULAR; INTRAVENOUS EVERY 5 MIN PRN
Status: DISCONTINUED | OUTPATIENT
Start: 2023-11-02 | End: 2023-11-02

## 2023-11-02 RX ORDER — OXYCODONE HYDROCHLORIDE 5 MG/1
5 TABLET ORAL
Status: DISCONTINUED | OUTPATIENT
Start: 2023-11-02 | End: 2023-11-02 | Stop reason: HOSPADM

## 2023-11-02 RX ORDER — ONDANSETRON 4 MG/1
4 TABLET, ORALLY DISINTEGRATING ORAL EVERY 6 HOURS PRN
Status: DISCONTINUED | OUTPATIENT
Start: 2023-11-02 | End: 2023-11-04 | Stop reason: HOSPADM

## 2023-11-02 RX ORDER — BISACODYL 10 MG
10 SUPPOSITORY, RECTAL RECTAL DAILY PRN
Status: DISCONTINUED | OUTPATIENT
Start: 2023-11-02 | End: 2023-11-04 | Stop reason: HOSPADM

## 2023-11-02 RX ADMIN — HYDROMORPHONE HYDROCHLORIDE 0.3 MG: 1 INJECTION, SOLUTION INTRAMUSCULAR; INTRAVENOUS; SUBCUTANEOUS at 08:50

## 2023-11-02 RX ADMIN — METFORMIN HYDROCHLORIDE 1000 MG: 500 TABLET, FILM COATED ORAL at 17:23

## 2023-11-02 RX ADMIN — ESMOLOL HYDROCHLORIDE 20 MG: 10 INJECTION, SOLUTION INTRAVENOUS at 08:28

## 2023-11-02 RX ADMIN — Medication 2 G: at 07:25

## 2023-11-02 RX ADMIN — BUPIVACAINE HYDROCHLORIDE 20 ML: 5 INJECTION, SOLUTION EPIDURAL; INTRACAUDAL; PERINEURAL at 07:13

## 2023-11-02 RX ADMIN — ONDANSETRON 4 MG: 2 INJECTION INTRAMUSCULAR; INTRAVENOUS at 07:30

## 2023-11-02 RX ADMIN — PROPOFOL 30 MG: 10 INJECTION, EMULSION INTRAVENOUS at 07:29

## 2023-11-02 RX ADMIN — ACETAMINOPHEN 1000 MG: 500 TABLET ORAL at 21:37

## 2023-11-02 RX ADMIN — SUCRALFATE 1 G: 1 TABLET ORAL at 21:37

## 2023-11-02 RX ADMIN — BUSPIRONE HYDROCHLORIDE 10 MG: 5 TABLET ORAL at 21:37

## 2023-11-02 RX ADMIN — PROPOFOL 100 MCG/KG/MIN: 10 INJECTION, EMULSION INTRAVENOUS at 07:29

## 2023-11-02 RX ADMIN — Medication 400 MG: at 21:37

## 2023-11-02 RX ADMIN — OXYCODONE HYDROCHLORIDE 10 MG: 5 TABLET ORAL at 09:04

## 2023-11-02 RX ADMIN — SENNOSIDES AND DOCUSATE SODIUM 1 TABLET: 8.6; 5 TABLET ORAL at 21:37

## 2023-11-02 RX ADMIN — ROSUVASTATIN CALCIUM 5 MG: 5 TABLET, FILM COATED ORAL at 21:37

## 2023-11-02 RX ADMIN — FENTANYL CITRATE 50 MCG: 50 INJECTION, SOLUTION INTRAMUSCULAR; INTRAVENOUS at 07:12

## 2023-11-02 RX ADMIN — SODIUM CHLORIDE, POTASSIUM CHLORIDE, SODIUM LACTATE AND CALCIUM CHLORIDE: 600; 310; 30; 20 INJECTION, SOLUTION INTRAVENOUS at 06:29

## 2023-11-02 RX ADMIN — SODIUM CHLORIDE, POTASSIUM CHLORIDE, SODIUM LACTATE AND CALCIUM CHLORIDE: 600; 310; 30; 20 INJECTION, SOLUTION INTRAVENOUS at 08:52

## 2023-11-02 RX ADMIN — FERROUS SULFATE TAB 325 MG (65 MG ELEMENTAL FE) 325 MG: 325 (65 FE) TAB at 21:37

## 2023-11-02 RX ADMIN — HYDROMORPHONE HYDROCHLORIDE 0.4 MG: 1 INJECTION, SOLUTION INTRAMUSCULAR; INTRAVENOUS; SUBCUTANEOUS at 08:25

## 2023-11-02 RX ADMIN — ESMOLOL HYDROCHLORIDE 20 MG: 10 INJECTION, SOLUTION INTRAVENOUS at 08:40

## 2023-11-02 RX ADMIN — ASPIRIN 81 MG: 81 TABLET, COATED ORAL at 12:33

## 2023-11-02 RX ADMIN — BACLOFEN 10 MG: 10 TABLET ORAL at 12:31

## 2023-11-02 RX ADMIN — BACLOFEN 10 MG: 10 TABLET ORAL at 21:37

## 2023-11-02 RX ADMIN — OXYCODONE HYDROCHLORIDE 10 MG: 5 TABLET ORAL at 17:28

## 2023-11-02 RX ADMIN — FAMOTIDINE 20 MG: 20 TABLET ORAL at 21:37

## 2023-11-02 RX ADMIN — ACETAMINOPHEN 1000 MG: 500 TABLET ORAL at 14:03

## 2023-11-02 RX ADMIN — OXYCODONE HYDROCHLORIDE 10 MG: 5 TABLET ORAL at 13:09

## 2023-11-02 RX ADMIN — SODIUM CHLORIDE: 9 INJECTION, SOLUTION INTRAVENOUS at 14:02

## 2023-11-02 RX ADMIN — Medication 2000 UNITS: at 12:31

## 2023-11-02 RX ADMIN — MEPIVACAINE HYDROCHLORIDE 5 ML: 15 INJECTION, SOLUTION EPIDURAL; INFILTRATION at 07:13

## 2023-11-02 RX ADMIN — HYDROMORPHONE HYDROCHLORIDE 0.3 MG: 1 INJECTION, SOLUTION INTRAMUSCULAR; INTRAVENOUS; SUBCUTANEOUS at 08:47

## 2023-11-02 RX ADMIN — OXYCODONE HYDROCHLORIDE 10 MG: 5 TABLET ORAL at 21:37

## 2023-11-02 ASSESSMENT — ACTIVITIES OF DAILY LIVING (ADL)
ADLS_ACUITY_SCORE: 39
ADLS_ACUITY_SCORE: 39
ADLS_ACUITY_SCORE: 40
ADLS_ACUITY_SCORE: 39
ADLS_ACUITY_SCORE: 39
ADLS_ACUITY_SCORE: 40
ADLS_ACUITY_SCORE: 40
ADLS_ACUITY_SCORE: 39
ADLS_ACUITY_SCORE: 40

## 2023-11-02 NOTE — ANESTHESIA CARE TRANSFER NOTE
Patient: Belgica Salvador    Procedure: Procedure(s):  REVISION OPEN REDUCTION INTERNAL FIXATION LEFT OLECRANON FRACTURE       Diagnosis: Elbow fracture, left [S42.402A]  Diagnosis Additional Information: No value filed.    Anesthesia Type:   General     Note:    Oropharynx: oropharynx clear of all foreign objects  Level of Consciousness: awake  Oxygen Supplementation: face mask  Level of Supplemental Oxygen (L/min / FiO2): 6  Independent Airway: airway patency satisfactory and stable  Dentition: dentition unchanged  Vital Signs Stable: post-procedure vital signs reviewed and stable  Report to RN Given: handoff report given  Patient transferred to: PACU    Handoff Report: Identifed the Patient, Identified the Reponsible Provider, Reviewed the pertinent medical history, Discussed the surgical course, Reviewed Intra-OP anesthesia mangement and issues during anesthesia, Set expectations for post-procedure period and Allowed opportunity for questions and acknowledgement of understanding    Vitals:  Vitals Value Taken Time   /70 11/02/23 0858   Temp     Pulse 96 11/02/23 0859   Resp 12 11/02/23 0858   SpO2 93 % 11/02/23 0859   Vitals shown include unfiled device data.    Electronically Signed By: FELIZ Cox CRNA  November 2, 2023  9:00 AM

## 2023-11-02 NOTE — PHARMACY-ADMISSION MEDICATION HISTORY
Pharmacist Admission Medication History    Admission medication history is complete. The information provided in this note is only as accurate as the sources available at the time of the update.    Information Source(s): Patient and CareEverywhere/SureScripts via in-person    Pertinent Information: Utilized pt provided home med list    Allergies reviewed with patient and updates made in EHR: no    Medication History Completed By: Sonya Oropeza, PharmSTEFFANY 11/2/2023 7:03 AM    Prior to Admission medications    Medication Sig Last Dose Taking? Auth Provider Long Term End Date   acetaminophen (TYLENOL) 500 MG tablet Take 1,000 mg by mouth every 8 hours 11/1/2023 at 2000 Yes Unknown, Entered By History No    Apoaequorin (PREVAGEN) 10 MG CAPS Take 10 mg by mouth daily 11/2/2023 at am Yes Reported, Patient     aspirin 81 MG EC tablet Take 81 mg by mouth daily 10/23/2023 Yes Reported, Patient     baclofen (LIORESAL) 10 MG tablet Take 10 mg by mouth 3 times daily 11/2/2023 at am x1 Yes Reported, Patient     busPIRone (BUSPAR) 10 MG tablet Take 10 mg by mouth 2 times daily 11/2/2023 at am x1 Yes Reported, Patient Yes    Calcium-Magnesium-Zinc 333-133-5 MG TABS per tablet Take 1 tablet by mouth daily 10/31/2023 Yes Reported, Patient     DULoxetine (CYMBALTA) 60 MG capsule Take 60 mg by mouth daily 11/2/2023 at am Yes Reported, Patient Yes    EPINEPHrine (EPIPEN 2-SHERRI) 0.3 mg/0.3 mL atIn [EPINEPHRINE (EPIPEN 2-SHERRI) 0.3 MG/0.3 ML ATIN] Inject 0.3 mg into the shoulder, thigh, or buttocks once as needed (Anaphylaxis). Unknown Yes Provider, Historical     famotidine (PEPCID) 20 MG tablet Take 20 mg by mouth 2 times daily 11/2/2023 at am x1 Yes Reported, Patient     ferrous sulfate 325 (65 FE) MG tablet Take 1 tablet by mouth every evening 11/1/2023 at pm Yes Provider, Historical     folic acid (FOLVITE) 1 MG tablet [FOLIC ACID (FOLVITE) 1 MG TABLET] Take 1 mg by mouth daily. 11/2/2023 at am Yes Provider, Historical     ketotifen  fumarate 0.035%, ketotifen 0.025%, (THERATEARS ALLERGY) 0.025 % ophthalmic solution Place 1 drop into both eyes 2 times daily 11/1/2023 Yes Unknown, Entered By History No    levothyroxine (SYNTHROID, LEVOTHROID) 88 MCG tablet [LEVOTHYROXINE (SYNTHROID, LEVOTHROID) 88 MCG TABLET] Take 88 mcg by mouth Daily at 6:00 am.  11/2/2023 at am Yes Provider, Historical     lisinopril (ZESTRIL) 20 MG tablet Take 1 tablet (20 mg) by mouth daily Hold for SBP < 120 mmHg. 11/2/2023 at am Yes Austyn Hayward,  Yes    loperamide (IMODIUM) 2 MG capsule Take 2-4 mg by mouth 3 times daily as needed for diarrhea Take 1 capsule with first loose stool and take 2 capsules after each subsequent loose stool Unknown at prn Yes Reported, Patient     magnesium oxide (MAG-OX) 400 mg (241.3 mg magnesium) tablet Take 400 mg by mouth 2 times daily 11/2/2023 at am x1 Yes Reported, Patient     meclizine (ANTIVERT) 12.5 MG tablet Take 12.5 mg by mouth 3 times daily as needed for dizziness Unknown at prn Yes Reported, Patient     metFORMIN (GLUCOPHAGE) 500 MG tablet [METFORMIN (GLUCOPHAGE) 500 MG TABLET] Take 1,000 mg by mouth 2 (two) times a day with meals. 11/1/2023 Yes Provider, Historical Yes    omeprazole (PRILOSEC) 20 MG DR capsule Take 20 mg by mouth 2 times daily as needed Unknown at prn Yes Reported, Patient     polyethylene glycol-propylene glycol (SYSTANE ULTRA) 0.4-0.3 % SOLN ophthalmic solution Place 1 drop into both eyes 2 times daily 11/1/2023 at pm Yes Reported, Patient     rosuvastatin (CRESTOR) 5 MG tablet Take 5 mg by mouth At Bedtime 11/1/2023 at hs Yes Reported, Patient Yes    sucralfate (CARAFATE) 1 GM tablet Take 1 g by mouth 4 times daily Before meals and bedtime 11/2/2023 at am x1 Yes Reported, Patient     valACYclovir (VALTREX) 1000 MG tablet Take 2,000 mg by mouth 2 times daily as needed (cold sore) Unknown at prn Yes Reported, Patient     Vitamin D (Cholecalciferol) 50 MCG (2000 UT) CAPS Take 2,000 Units by mouth daily  10/31/2023 Yes Reported, Patient

## 2023-11-02 NOTE — CARE PLAN
Belgica's daughter Heide is  interested in helping find a TCU. Last surgery, Belgica went to [facility], but pt and dtr are not keen on her returning there. Heide does have POA, but Belgica is capable of making her own decisions, and Belgica is pleased to have Heide's support. Please call Heide Gautam [phone]. Thank you!

## 2023-11-02 NOTE — CONSULTS
[facility]  Consult Note - Hospitalist Service  Date of Admission:  11/2/2023  Consult Requested by: Siddhartha Santamaria MD  Reason for Consult: Hospital consult/medication management     Assessment & Plan   Belgica Salvador is a 78 year old female admitted on 11/2/2023. She PMHx significant for CAD, Type 2 diabetes, GERD, grave's disease, anxiety, Hypertension, Hx of previous DVT, who had revision for L elbow ORIF 11/2/2023. Per chart review, has negative NM stress test in 2022. Patient tolerated procedure well, trace blood loss noted.       S/p revision L ORIF performed on 11/2/2023   -Pain control per ortho team   -fluids per ortho team   -OT/PT consulted   -PRN oxygen, wean as tolerated  CAD  DM type 2   -sliding scale insulin TID before meals and at bedtime   -continue PTA metformin   -continue PTA Crestor    History of post-op DVT   -will initiate ASA 81 daily   -consider lovenox for dvt ppx     History of Hypertension   -continue PTA lisinopril    History of Hypothyroidism   -continue PTA levothyroxine     History of anxiety   -continue PTA buspirone   -continue PTA duloxetine    History of GERD   -continue PTA famotidine    -continue PTA sucralfate     History of iron deficiency anemia   -continue PTA ferrous sulfate    History of gastric bypass   -continue PTA folic acid   -continue PTA magnesium oxide   -continue PTA vitamin D    History of eye irritation   -continue PTA zatidor eye drops    History of constipation   -continue PTA miralax   -continue PTA senna-docusate    History of chronic pain   -continue PTA baclofen        The patient's care was discussed with the Attending Physician, Dr. Holly Birmingham MD .    Clinically Significant Risk Factors Present on Admission                # Drug Induced Platelet Defect: home medication list includes an antiplatelet medication   # Hypertension: Noted on problem list      # Overweight: Estimated body mass index is 26.47 kg/m  as calculated  "from the following:    Height as of this encounter: 1.676 m (5' 6\").    Weight as of this encounter: 74.4 kg (164 lb).              Ari Cruz DO  Hospitalist Service  Securely message with [employer] (more info)  Text page via [facility] Paging/Directory   ______________________________________________________________________    Chief Complaint   Post op Revision open reduction internal fixation left olecranon fracture performed 11/2/2023    History is obtained from the patient    History of Present Illness   Belgica Salvador is a 78 year old female who has a PMHx significant for CAD, Type 2 diabetes, GERD, grave's disease, anxiety, Hypertension, Hx of prior  DVT, who had revision for L elbow ORIF 11/2/2023. Belgica reports that she tolerated the procedure well. She denies headache, fever, chills, nausea, vomiting, shortness of breath, chest pain, palpitations. She affirms left elbow pain, as well as bilateral lower extremity numbness and tingling, which she states is chronic for her. She reports no additional concerns at this time.       Past Medical History    Past Medical History:   Diagnosis Date    Anxiety     Arthritis     Coronary artery disease     Diabetes (H)     Gastroesophageal reflux disease     Graves disease     Hypertension     Migraine     Motion sickness     Muscle spasm     Renal disease     Thrombosis     Thyroid disease        Past Surgical History   Past Surgical History:   Procedure Laterality Date    ANKLE SURGERY Right     hardware in place    APPENDECTOMY      ARTHROPLASTY KNEE Left 11/10/2022    Procedure: LEFT TOTAL KNEE ARTHROPLASTY;  Surgeon: Arnold Peter MD;  Location: [facility]    CERVICAL FUSION N/A 3/20/2019    Procedure: LEFT CERVICAL 7 - THORACIC 1 ANTERIOR CERVICAL DECOMPRESSION FUSION REMOVAL OF PLATE AT CERVICAL 5-6 CERVICAL 6-7;  Surgeon: Richard Talley MD;  Location: [facility];  Service: Spine    ESOPHAGOSCOPY, GASTROSCOPY, DUODENOSCOPY (EGD), COMBINED N/A " 9/24/2017    Procedure: ESOPHAGOGASTRODUODENOSCOPY (EGD) with biopsies;  Surgeon: Jorge Palm MD;  Location: [facility] GI;  Service:     HC REVISE MEDIAN N/CARPAL TUNNEL SURG      Description: Neuroplasty Decompression Median Nerve At Carpal Tunnel;  Proc Date: 01/01/2002;    IR AORTIC ARCH 4 VESSEL ANGIOGRAM  5/22/2012    IR CAROTID ANGIOGRAM  5/22/2012    IR CAROTID ANGIOGRAM  5/22/2012    JOINT REPLACEMENT Right 2009    knee    OPEN REDUCTION INTERNAL FIXATION HUMERUS DISTAL Left 8/16/2023    Procedure: OPEN REDUCTION INTERNAL FIXATION, FRACTURE, HUMERUS, DISTAL LEFT;  Surgeon: Siddhartha Santamaria MD;  Location: [facility] Main OR    OTHER SURGICAL HISTORY  2013    spleenectomy    OTHER SURGICAL HISTORY Left     melanoma ear    OTHER SURGICAL HISTORY      bladder interstimstage 1 & 2    PANCREAS SURGERY N/A     tail and part of body removed    SD IMPLANT PERIPH/GASTRIC NEUROSTIM/ N/A 9/28/2017    Procedure: INTERSTIM STAGE 2;  Surgeon: Catarino Hurt MD;  Location: [facility] Main OR;  Service: Gynecology    SD PARTIAL EXCISION THYROID,UNILAT      Description: Thyroid Surgery Sub-Total Thyroidectomy;  Recorded: 09/13/2008;  Comments: for grave's    RELEASE CARPAL TUNNEL Left 06/2018    [facility] CERV SPINE FUSN,ANTER,BELOW C2      Description: Cervical Vertebral Fusion;  Proc Date: 05/01/2001;  Comments: c5-c7    [facility] GASTRIC BYPASS,OBESE<100CM JAROD-EN-Y      Description: Gastric Surgery For Morbid Obesity Bypass With Jarod-en-Y;  Proc Date: 09/01/2004;    [facility] KNEE SCOPE,AID POST CRUC REPAIR      Description: Knee Arthroscopy With Posterior Cruciate Ligament Repair;  Recorded: 09/13/2008;       Medications   I have reviewed this patient's current medications  Current Facility-Administered Medications   Medication    acetaminophen (TYLENOL) tablet 1,000 mg    [START ON 11/5/2023] acetaminophen (TYLENOL) tablet 650 mg    baclofen (LIORESAL) tablet 10 mg    benzocaine-menthol (CEPACOL) 15-3.6 MG lozenge 1  lozenge    bisacodyl (DULCOLAX) suppository 10 mg    busPIRone (BUSPAR) tablet 10 mg    glucose gel 15-30 g    Or    dextrose 50 % injection 25-50 mL    Or    glucagon injection 1 mg    DULoxetine (CYMBALTA) DR capsule 60 mg    famotidine (PEPCID) tablet 20 mg    ferrous sulfate (FEROSUL) tablet 325 mg    folic acid (FOLVITE) tablet 1 mg    insulin aspart (NovoLOG) injection (RAPID ACTING)    insulin aspart (NovoLOG) injection (RAPID ACTING)    ketotifen fumarate 0.035% (ketotifen 0.025%) (ZADITOR) ophthalmic solution 1 drop    lactated ringers infusion    levothyroxine (SYNTHROID/LEVOTHROID) tablet 88 mcg    lidocaine (LMX4) cream    lidocaine 1 % 0.1-1 mL    [START ON 11/3/2023] lisinopril (ZESTRIL) tablet 20 mg    magnesium hydroxide (MILK OF MAGNESIA) suspension 30 mL    magnesium oxide (MAG-OX) tablet 400 mg    metFORMIN (GLUCOPHAGE) tablet 1,000 mg    naloxone (NARCAN) injection 0.2 mg    Or    naloxone (NARCAN) injection 0.4 mg    Or    naloxone (NARCAN) injection 0.2 mg    Or    naloxone (NARCAN) injection 0.4 mg    omeprazole (PriLOSEC) CR capsule 20 mg    ondansetron (ZOFRAN ODT) ODT tab 4 mg    Or    ondansetron (ZOFRAN) injection 4 mg    oxyCODONE (ROXICODONE) tablet 5 mg    Or    oxyCODONE (ROXICODONE) tablet 10 mg    [START ON 11/3/2023] polyethylene glycol (MIRALAX) Packet 17 g    polyethylene glycol-propylene glycol (SYSTANE ULTRA) ophthalmic solution 1 drop    prochlorperazine (COMPAZINE) injection 5 mg    Or    prochlorperazine (COMPAZINE) tablet 5 mg    prochlorperazine (COMPAZINE) injection 5 mg    rosuvastatin (CRESTOR) tablet 5 mg    senna-docusate (SENOKOT-S/PERICOLACE) 8.6-50 MG per tablet 1 tablet    sodium chloride (PF) 0.9% PF flush 3 mL    sodium chloride (PF) 0.9% PF flush 3 mL    sodium chloride 0.9 % infusion    sucralfate (CARAFATE) tablet 1 g    Vitamin D (Cholecalciferol) CAPS 2,000 Units          Review of Systems    The 10 point Review of Systems is negative other than noted in  the HPI or here. ROS reviewed, se HPI for pertinent positives and negatives.     Social History   I have reviewed this patient's social history and updated it with pertinent information if needed.  Social History     Tobacco Use    Smoking status: Former    Smokeless tobacco: Never   Substance Use Topics    Alcohol use: Not Currently     Alcohol/week: 1.0 - 2.0 standard drink of alcohol     Types: 1 - 2 Standard drinks or equivalent per week    Drug use: Yes     Types: Oxycodone         Family History   I have reviewed this patient's family history and updated it with pertinent information if needed.  Family History   Problem Relation Age of Onset    Breast Cancer Paternal Aunt          Allergies   Allergies   Allergen Reactions    Glucosamine Anaphylaxis and Rash     Cardiac arrest    Ibuprofen Hives    Iodine Angioedema, Anaphylaxis and Hives     Patient can uses skin products such as betadine - See shellfish allergy      Naproxen Hives and Rash    Shellfish Allergy Anaphylaxis, Hives and Rash     Cardiac arrest - very severe    Shellfish-Derived Products Anaphylaxis, Hives and Rash     Cardiac arrest - very severe      Amitriptyline Visual Disturbance     Hallucinations    Buspirone Visual Disturbance     Hallucinations    Nystatin Nausea, Diarrhea, Cramps and GI Disturbance    Tramadol Visual Disturbance     Hallucinations    Chlorpheniramine Hives and Rash    Nsaids Unknown    Pseudoephedrine Hives and Rash        Physical Exam   Vital Signs: Temp: 97.3  F (36.3  C) Temp src: Oral BP: 139/78 Pulse: 91   Resp: 16 SpO2: 95 % O2 Device: None (Room air) Oxygen Delivery: 2 LPM  Weight: 164 lbs 0 oz    Physical Exam  Constitutional:       Appearance: Normal appearance.   HENT:      Head: Normocephalic.      Right Ear: External ear normal.      Left Ear: External ear normal.      Nose: Nose normal.      Mouth/Throat:      Mouth: Mucous membranes are moist.   Eyes:      Pupils: Pupils are equal, round, and reactive to  "light.   Cardiovascular:      Rate and Rhythm: Normal rate and regular rhythm.      Pulses: Normal pulses.      Heart sounds: Normal heart sounds. No murmur heard.     No gallop.   Pulmonary:      Effort: Pulmonary effort is normal. No respiratory distress.      Breath sounds: Normal breath sounds. No wheezing or rales.   Abdominal:      General: Bowel sounds are normal. There is no distension.      Palpations: Abdomen is soft.      Tenderness: There is no abdominal tenderness. There is no guarding.   Musculoskeletal:      Comments: Left upper extremity wrapped in ace bandage. Serosanguinous drainage noted. Cap refill <2 seconds in upper extremities. Bilateral  strength normal.    Skin:     Capillary Refill: Capillary refill takes less than 2 seconds.   Neurological:      General: No focal deficit present.      Mental Status: She is alert and oriented to person, place, and time.   Psychiatric:         Mood and Affect: Mood normal.         Behavior: Behavior normal.         Thought Content: Thought content normal.           Imaging results reviewed over the past 24 hrs:   Recent Results (from the past 24 hour(s))   POC US Guidance Needle Placement    Narrative    Ultrasound was performed as guidance to an anesthesia procedure.  Click   \"PACS images\" hyperlink below to view any stored images.  For specific   procedure details, view procedure note authored by anesthesia.   XR Surgery ALY Fluoro L/T 5 Min    Narrative    This exam was marked as non-reportable because it will not be read by a   radiologist or a [facility] non-radiologist provider.           "

## 2023-11-02 NOTE — PLAN OF CARE
Problem: Adult Inpatient Plan of Care  Goal: Plan of Care Review  Description: The Plan of Care Review/Shift note should be completed every shift.  The Outcome Evaluation is a brief statement about your assessment that the patient is improving, declining, or no change.  This information will be displayed automatically on your shift  note.  Outcome: Progressing   Goal Outcome Evaluation:  Pt and family expecting she will be discharged to Transitional Care, as she lives alone, and has some balance and slight self-care challenges. She states Dr Santamaria was aware of this and supportive. Nurse notified in-house Ortho PA.    Problem: Surgery Nonspecified  Goal: Absence of Bleeding  Outcome: Progressing   When pt arrived from PACU, her dressing had saturated with serosanguinous drainage, through the ace wrap, sling and pillow.3 ABD's were added outside of that ace,and another ace wrapped over that. Sling was changed to a new one. No new drainage noted since. LUE is elevated on 2 pillows, with a third one further elevating the fingers.        Problem: Surgery Nonspecified  Goal: Optimal Pain Control and Function  Outcome: Progressing   Patient reports 8/10 pain, received 10mg OxycodonePO, will watch for effect.

## 2023-11-02 NOTE — INTERVAL H&P NOTE
"I have reviewed the surgical (or preoperative) H&P that is linked to this encounter, and examined the patient. There are no significant changes    Clinical Conditions Present on Arrival:  Clinically Significant Risk Factors Present on Admission                 # Drug Induced Platelet Defect: home medication list includes an antiplatelet medication  # Overweight: Estimated body mass index is 26.47 kg/m  as calculated from the following:    Height as of this encounter: 1.676 m (5' 6\").    Weight as of this encounter: 74.4 kg (164 lb).       "

## 2023-11-02 NOTE — ANESTHESIA PREPROCEDURE EVALUATION
Anesthesia Pre-Procedure Evaluation    Patient: Belgica Salvador   MRN: [id number] : 1945        Procedure : Procedure(s):  REVISION OPEN REDUCTION INTERNAL FIXATION LEFT OLECRANON FRACTURE          Past Medical History:   Diagnosis Date    Anxiety     Arthritis     Coronary artery disease     Diabetes (H)     Gastroesophageal reflux disease     Graves disease     Hypertension     Migraine     Motion sickness     Muscle spasm     Renal disease     Thrombosis     Thyroid disease       Past Surgical History:   Procedure Laterality Date    ANKLE SURGERY Right     hardware in place    APPENDECTOMY      ARTHROPLASTY KNEE Left 11/10/2022    Procedure: LEFT TOTAL KNEE ARTHROPLASTY;  Surgeon: Arnold Peter MD;  Location: [facility] Main OR    CERVICAL FUSION N/A 3/20/2019    Procedure: LEFT CERVICAL 7 - THORACIC 1 ANTERIOR CERVICAL DECOMPRESSION FUSION REMOVAL OF PLATE AT CERVICAL 5-6 CERVICAL 6-7;  Surgeon: Richard Talley MD;  Location: [facility] Main OR;  Service: Spine    ESOPHAGOSCOPY, GASTROSCOPY, DUODENOSCOPY (EGD), COMBINED N/A 2017    Procedure: ESOPHAGOGASTRODUODENOSCOPY (EGD) with biopsies;  Surgeon: Jorge Palm MD;  Location: [facility] GI;  Service:     HC REVISE MEDIAN N/CARPAL TUNNEL SURG      Description: Neuroplasty Decompression Median Nerve At Carpal Tunnel;  Proc Date: 2002;    IR AORTIC ARCH 4 VESSEL ANGIOGRAM  2012    IR CAROTID ANGIOGRAM  2012    IR CAROTID ANGIOGRAM  2012    JOINT REPLACEMENT Right 2009    knee    OPEN REDUCTION INTERNAL FIXATION HUMERUS DISTAL Left 2023    Procedure: OPEN REDUCTION INTERNAL FIXATION, FRACTURE, HUMERUS, DISTAL LEFT;  Surgeon: Siddhartha Santamaria MD;  Location: [facility] Main OR    OTHER SURGICAL HISTORY  2013    spleenectomy    OTHER SURGICAL HISTORY Left     melanoma ear    OTHER SURGICAL HISTORY      bladder interstimstage 1 & 2    PANCREAS SURGERY N/A     tail and part of body removed    CA IMPLANT PERIPH/GASTRIC  NEUROSTIM/ N/A 9/28/2017    Procedure: INTERSTIM STAGE 2;  Surgeon: Catarino Hurt MD;  Location: [facility] OR;  Service: Gynecology    CT PARTIAL EXCISION THYROID,UNILAT      Description: Thyroid Surgery Sub-Total Thyroidectomy;  Recorded: 09/13/2008;  Comments: for grave's    RELEASE CARPAL TUNNEL Left 06/2018    [facility] CERV SPINE FUSN,ANTER,BELOW C2      Description: Cervical Vertebral Fusion;  Proc Date: 05/01/2001;  Comments: c5-c7    [facility] GASTRIC BYPASS,OBESE<100CM AGUS-EN-Y      Description: Gastric Surgery For Morbid Obesity Bypass With Agus-en-Y;  Proc Date: 09/01/2004;    [facility] KNEE SCOPE,AID POST CRUC REPAIR      Description: Knee Arthroscopy With Posterior Cruciate Ligament Repair;  Recorded: 09/13/2008;      Allergies   Allergen Reactions    Glucosamine Anaphylaxis and Rash     Cardiac arrest    Ibuprofen Hives    Iodine Angioedema, Anaphylaxis and Hives     Patient can uses skin products such as betadine - See shellfish allergy      Naproxen Hives and Rash    Shellfish Allergy Anaphylaxis, Hives and Rash     Cardiac arrest - very severe    Shellfish-Derived Products Anaphylaxis, Hives and Rash     Cardiac arrest - very severe      Amitriptyline Visual Disturbance     Hallucinations    Buspirone Visual Disturbance     Hallucinations    Nystatin Nausea, Diarrhea, Cramps and GI Disturbance    Tramadol Visual Disturbance     Hallucinations    Chlorpheniramine Hives and Rash    Nsaids Unknown    Pseudoephedrine Hives and Rash      Social History     Tobacco Use    Smoking status: Former    Smokeless tobacco: Never   Substance Use Topics    Alcohol use: Not Currently     Alcohol/week: 1.0 - 2.0 standard drink of alcohol     Types: 1 - 2 Standard drinks or equivalent per week      Wt Readings from Last 1 Encounters:   11/02/23 74.4 kg (164 lb)        Anesthesia Evaluation   Pt has had prior anesthetic.         ROS/MED HX  ENT/Pulmonary:       Neurologic:       Cardiovascular:     (+)   "hypertension- -  CAD -  - -                                      METS/Exercise Tolerance:     Hematologic:       Musculoskeletal:       GI/Hepatic:     (+) GERD, Asymptomatic on medication, esophageal disease, Stricture,                Renal/Genitourinary:     (+) renal disease,             Endo:     (+)  type II DM,        thyroid problem,     Obesity,       Psychiatric/Substance Use:       Infectious Disease:       Malignancy:       Other:            Physical Exam    Airway  airway exam normal       TM distance: > 3 FB   Neck ROM: full     Respiratory Devices and Support         Dental  no notable dental history     (+) Minor Abnormalities - some fillings, tiny chips      Cardiovascular   cardiovascular exam normal          Pulmonary   pulmonary exam normal                OUTSIDE LABS:  CBC:   Lab Results   Component Value Date    WBC 8.4 09/06/2023    WBC 9.3 08/29/2023    HGB 12.3 09/06/2023    HGB 11.7 08/29/2023    HCT 38.7 09/06/2023    HCT 37.4 08/29/2023     (H) 09/06/2023     (H) 08/29/2023     BMP:   Lab Results   Component Value Date     08/29/2023     08/16/2023    POTASSIUM 4.7 08/29/2023    POTASSIUM 4.0 08/16/2023    CHLORIDE 102 08/29/2023    CHLORIDE 102 08/16/2023    CO2 27 08/29/2023    CO2 28 08/16/2023    BUN 15.3 08/29/2023    BUN 8 08/16/2023    CR 0.84 08/29/2023    CR 0.67 08/16/2023     (H) 11/02/2023    GLC 75 08/29/2023     COAGS:   Lab Results   Component Value Date    PTT 39 (H) 08/18/2023    INR 1.09 08/18/2023     POC: No results found for: \"BGM\", \"HCG\", \"HCGS\"  HEPATIC:   Lab Results   Component Value Date    ALBUMIN 4.0 12/13/2022     OTHER:   Lab Results   Component Value Date    A1C 5.8 (H) 08/13/2023    OPHELIA 9.5 08/29/2023    MAG 1.7 08/29/2023    CRP 0.8 (H) 12/21/2022    SED 8 12/21/2022       Anesthesia Plan    ASA Status:  3    NPO Status:  NPO Appropriate    Anesthesia Type: General.     - Airway: Native airway      Maintenance: TIVA.    "     Consents    Anesthesia Plan(s) and associated risks, benefits, and realistic alternatives discussed. Questions answered and patient/representative(s) expressed understanding.     - Discussed:     - Discussed with:  Patient            Postoperative Care    Pain management: Peripheral nerve block (Single Shot).   PONV prophylaxis: Ondansetron (or other 5HT-3), Dexamethasone or Solumedrol, Background Propofol Infusion     Comments:                Juvencio Prieto MD

## 2023-11-02 NOTE — OP NOTE
SURGEON:  Siddhartha Santamaria MD    FIRST ASSISTANT:  Marino Ramsey PA-C    SURGERY PERFORMED:  Revision open reduction internal fixation left olecranon fracture    PREOPERATIVE DIAGNOSIS:  Symptomatic nonunion left olecranon osteotomy    POSTOPERATIVE DIAGNOSIS:  Same    ESTIMATED BLOOD LOSS:  Trace    FINDINGS:  Fibrinous nonunion left olecranon osteotomy with symptomatic hardware    INDICATIONS FOR PROCEDURE:  Belgica is a 78-year-old female status post open reduction internal fixation displaced supracondylar fracture with plastic deformity through an olecranon osteotomy site.  Postoperative.  Olecranon osteotomy underwent proximal migration with tension band wire construct becoming symptomatic she now presents for revision olecranon osteotomy fixation.    DESCRIPTION OF PROCEDURE:  After receiving actually block anesthesia in the holding area the patient brought to the operating placed in supine position on the operating table and the left upper extremity was prepped and draped in standard sterile fashion.    At this time an upper arm tourniquet was inflated to 250 mmHg.    Now utilizing the old incision incision at the posterior left elbow incision was carried out to skin subcutaneous tissue flaps were raised tension band wire construct was identified removed its entirety.    At this time the left and osteotomy fibrinous nonunion site was revealed all fibrinous tissue was removed and debridement was carried down to healthy cancellous bone.    At this time the olecranon osteotomy site was tightly reapproximated using a large bone reduction forcep.  Now a [employer] proximal olecranon plate was placed across the osteotomy site and secured in standard AO fashion with a mixture of locking bicortical screws.    After the plate had been applied its position was confirmed under image intensifier fluoroscopy in the PA lateral plane appreciated that there was a tightly approximation of the osteotomy site satisfactory  hardware position images were then printed in the PA lateral plane was copiously irrigated meticulous hemostasis obtained skin reapproximated using staples the patient was discharged to the floor in satisfactory condition she will follow my office 7 to 10 days time for wound inspection check range of motion check healing

## 2023-11-02 NOTE — ANESTHESIA PROCEDURE NOTES
Brachial plexus Procedure Note    Pre-Procedure   Staff -        Anesthesiologist:  Juvencio Prieto MD       Performed By: anesthesiologist       Location: pre-op       Procedure Start/Stop Times: 11/2/2023 7:13 AM and 11/2/2023 7:17 AM       Pre-Anesthestic Checklist: patient identified, IV checked, site marked, risks and benefits discussed, informed consent, monitors and equipment checked, pre-op evaluation, at physician/surgeon's request and post-op pain management  Timeout:       Correct Patient: Yes        Correct Procedure: Yes        Correct Site: Yes        Correct Position: Yes        Correct Laterality: Yes        Site Marked: Yes  Procedure Documentation  Procedure: Brachial plexus       Laterality: left       Patient Position: sitting       Patient Prep/Sterile Barriers: sterile gloves, mask       Skin prep: Chloraprep (infra-clavicular approach).       Needle Type: insulated       Needle Gauge: 20.        Needle Length (Inches): 4        Ultrasound guided       1. Ultrasound was used to identify targeted nerve, plexus, vascular marker, or fascial plane and place a needle adjacent to it in real-time.       2. Ultrasound was used to visualize the spread of anesthetic in close proximity to the above referenced structure.       3. A permanent image is entered into the patient's record.       4. The visualized anatomic structures appeared normal.       5. There were no apparent abnormal pathologic findings.    Assessment/Narrative         The placement was negative for: blood aspirated, painful injection and site bleeding       Paresthesias: No.       Bolus given via needle..        Secured via.        Insertion/Infusion Method: Single Shot       Complications: none       Injection made incrementally with aspirations every 5 mL.    Medication(s) Administered   Bupivacaine 0.5% PF (Infiltration) - Infiltration   20 mL - 11/2/2023 7:13:00 AM  Mepivacaine 1.5% PF (Perineural) - Perineural   5 mL -  "11/2/2023 7:13:00 AM  Medication Administration Time: 11/2/2023 7:13 AM      FOR [facility]) ONLY:   Pain Team Contact information: please page the Pain Team Via [employer]. Search \"Pain\". During daytime hours, please page the attending first. At night please page the resident first.      "

## 2023-11-03 ENCOUNTER — APPOINTMENT (OUTPATIENT)
Dept: OCCUPATIONAL THERAPY | Facility: CLINIC | Age: 78
End: 2023-11-03
Attending: SURGERY
Payer: COMMERCIAL

## 2023-11-03 ENCOUNTER — APPOINTMENT (OUTPATIENT)
Dept: PHYSICAL THERAPY | Facility: CLINIC | Age: 78
End: 2023-11-03
Attending: SURGERY
Payer: COMMERCIAL

## 2023-11-03 LAB
GLUCOSE BLDC GLUCOMTR-MCNC: 113 MG/DL (ref 70–99)
GLUCOSE BLDC GLUCOMTR-MCNC: 139 MG/DL (ref 70–99)
GLUCOSE BLDC GLUCOMTR-MCNC: 141 MG/DL (ref 70–99)
GLUCOSE BLDC GLUCOMTR-MCNC: 152 MG/DL (ref 70–99)
GLUCOSE BLDC GLUCOMTR-MCNC: 167 MG/DL (ref 70–99)

## 2023-11-03 PROCEDURE — 250N000012 HC RX MED GY IP 250 OP 636 PS 637: Performed by: INTERNAL MEDICINE

## 2023-11-03 PROCEDURE — 82962 GLUCOSE BLOOD TEST: CPT

## 2023-11-03 PROCEDURE — 99214 OFFICE O/P EST MOD 30 MIN: CPT | Performed by: INTERNAL MEDICINE

## 2023-11-03 PROCEDURE — 96372 THER/PROPH/DIAG INJ SC/IM: CPT | Performed by: INTERNAL MEDICINE

## 2023-11-03 PROCEDURE — 250N000013 HC RX MED GY IP 250 OP 250 PS 637: Performed by: INTERNAL MEDICINE

## 2023-11-03 PROCEDURE — 97162 PT EVAL MOD COMPLEX 30 MIN: CPT | Mod: GP

## 2023-11-03 PROCEDURE — 250N000011 HC RX IP 250 OP 636: Mod: JZ | Performed by: INTERNAL MEDICINE

## 2023-11-03 PROCEDURE — 97530 THERAPEUTIC ACTIVITIES: CPT | Mod: GO

## 2023-11-03 PROCEDURE — 97116 GAIT TRAINING THERAPY: CPT | Mod: GP

## 2023-11-03 PROCEDURE — 250N000013 HC RX MED GY IP 250 OP 250 PS 637: Performed by: PHYSICIAN ASSISTANT

## 2023-11-03 PROCEDURE — 97535 SELF CARE MNGMENT TRAINING: CPT | Mod: GO

## 2023-11-03 PROCEDURE — 97166 OT EVAL MOD COMPLEX 45 MIN: CPT | Mod: GO

## 2023-11-03 RX ORDER — LISINOPRIL 20 MG/1
20 TABLET ORAL DAILY
Start: 2023-11-05

## 2023-11-03 RX ADMIN — ROSUVASTATIN CALCIUM 5 MG: 5 TABLET, FILM COATED ORAL at 22:54

## 2023-11-03 RX ADMIN — Medication 2000 UNITS: at 10:32

## 2023-11-03 RX ADMIN — FERROUS SULFATE TAB 325 MG (65 MG ELEMENTAL FE) 325 MG: 325 (65 FE) TAB at 22:53

## 2023-11-03 RX ADMIN — SUCRALFATE 1 G: 1 TABLET ORAL at 22:53

## 2023-11-03 RX ADMIN — FOLIC ACID 1 MG: 1 TABLET ORAL at 08:46

## 2023-11-03 RX ADMIN — BACLOFEN 10 MG: 10 TABLET ORAL at 22:52

## 2023-11-03 RX ADMIN — FAMOTIDINE 20 MG: 20 TABLET ORAL at 22:53

## 2023-11-03 RX ADMIN — INSULIN ASPART 1 UNITS: 100 INJECTION, SOLUTION INTRAVENOUS; SUBCUTANEOUS at 17:17

## 2023-11-03 RX ADMIN — OXYCODONE HYDROCHLORIDE 10 MG: 5 TABLET ORAL at 05:46

## 2023-11-03 RX ADMIN — BUSPIRONE HYDROCHLORIDE 10 MG: 5 TABLET ORAL at 22:52

## 2023-11-03 RX ADMIN — Medication 400 MG: at 22:53

## 2023-11-03 RX ADMIN — Medication 400 MG: at 08:45

## 2023-11-03 RX ADMIN — SUCRALFATE 1 G: 1 TABLET ORAL at 06:52

## 2023-11-03 RX ADMIN — INSULIN ASPART 1 UNITS: 100 INJECTION, SOLUTION INTRAVENOUS; SUBCUTANEOUS at 06:55

## 2023-11-03 RX ADMIN — SENNOSIDES AND DOCUSATE SODIUM 1 TABLET: 8.6; 5 TABLET ORAL at 22:53

## 2023-11-03 RX ADMIN — ACETAMINOPHEN 1000 MG: 500 TABLET ORAL at 13:53

## 2023-11-03 RX ADMIN — LEVOTHYROXINE SODIUM 88 MCG: 0.09 TABLET ORAL at 05:46

## 2023-11-03 RX ADMIN — ENOXAPARIN SODIUM 40 MG: 100 INJECTION SUBCUTANEOUS at 08:45

## 2023-11-03 RX ADMIN — SUCRALFATE 1 G: 1 TABLET ORAL at 17:17

## 2023-11-03 RX ADMIN — OXYCODONE HYDROCHLORIDE 10 MG: 5 TABLET ORAL at 15:06

## 2023-11-03 RX ADMIN — KETOTIFEN FUMARATE 1 DROP: 0.35 SOLUTION/ DROPS OPHTHALMIC at 22:55

## 2023-11-03 RX ADMIN — BACLOFEN 10 MG: 10 TABLET ORAL at 08:44

## 2023-11-03 RX ADMIN — METFORMIN HYDROCHLORIDE 1000 MG: 500 TABLET, FILM COATED ORAL at 17:17

## 2023-11-03 RX ADMIN — OXYCODONE HYDROCHLORIDE 10 MG: 5 TABLET ORAL at 01:46

## 2023-11-03 RX ADMIN — ACETAMINOPHEN 1000 MG: 500 TABLET ORAL at 22:52

## 2023-11-03 RX ADMIN — BACLOFEN 10 MG: 10 TABLET ORAL at 13:53

## 2023-11-03 RX ADMIN — SUCRALFATE 1 G: 1 TABLET ORAL at 10:32

## 2023-11-03 RX ADMIN — DULOXETINE 60 MG: 60 CAPSULE, DELAYED RELEASE ORAL at 08:45

## 2023-11-03 RX ADMIN — ACETAMINOPHEN 1000 MG: 500 TABLET ORAL at 05:46

## 2023-11-03 RX ADMIN — KETOTIFEN FUMARATE 1 DROP: 0.35 SOLUTION/ DROPS OPHTHALMIC at 10:32

## 2023-11-03 RX ADMIN — OXYCODONE HYDROCHLORIDE 10 MG: 5 TABLET ORAL at 10:37

## 2023-11-03 RX ADMIN — ASPIRIN 81 MG: 81 TABLET, COATED ORAL at 08:45

## 2023-11-03 RX ADMIN — BUSPIRONE HYDROCHLORIDE 10 MG: 5 TABLET ORAL at 08:45

## 2023-11-03 RX ADMIN — METFORMIN HYDROCHLORIDE 1000 MG: 500 TABLET, FILM COATED ORAL at 08:44

## 2023-11-03 RX ADMIN — FAMOTIDINE 20 MG: 20 TABLET ORAL at 08:45

## 2023-11-03 RX ADMIN — OXYCODONE HYDROCHLORIDE 10 MG: 5 TABLET ORAL at 22:53

## 2023-11-03 ASSESSMENT — ACTIVITIES OF DAILY LIVING (ADL)
ADLS_ACUITY_SCORE: 40
ADLS_ACUITY_SCORE: 42
ADLS_ACUITY_SCORE: 42
ADLS_ACUITY_SCORE: 40
ADLS_ACUITY_SCORE: 42
ADLS_ACUITY_SCORE: 42
ADLS_ACUITY_SCORE: 40
ADLS_ACUITY_SCORE: 42
ADLS_ACUITY_SCORE: 42
ADLS_ACUITY_SCORE: 40

## 2023-11-03 NOTE — ANESTHESIA POSTPROCEDURE EVALUATION
Patient: Belgica Salvador    Procedure: Procedure(s):  REVISION OPEN REDUCTION INTERNAL FIXATION LEFT OLECRANON FRACTURE       Anesthesia Type:  General    Note:  Disposition: Outpatient   Postop Pain Control: Uneventful            Sign Out: Well controlled pain   PONV: No   Neuro/Psych: Uneventful            Sign Out: Acceptable/Baseline neuro status   Airway/Respiratory: Uneventful            Sign Out: Acceptable/Baseline resp. status   CV/Hemodynamics: Uneventful            Sign Out: Acceptable CV status; No obvious hypovolemia; No obvious fluid overload   Other NRE:    DID A NON-ROUTINE EVENT OCCUR? No           Last vitals:  Vitals Value Taken Time   /54 11/03/23 0754   Temp 36.9  C (98.4  F) 11/03/23 0754   Pulse 102 11/03/23 0754   Resp 16 11/03/23 0754   SpO2 92 % 11/03/23 0754       Electronically Signed By: Juvencio Prieto MD  November 3, 2023  8:01 AM

## 2023-11-03 NOTE — PROGRESS NOTES
"Orthopedic Progress Note      Assessment: 1 Day Post-Op  S/P Procedure(s):  REVISION OPEN REDUCTION INTERNAL FIXATION LEFT OLECRANON FRACTURE @    Plan:   - Continue PT/OT  - Weightbearing status: as tolerated with knee brace and assistance.  - Anticoagulation: ASA in addition to SCDs, zander stockings and early ambulation.  - Discharge planning: Pending, PT/OT assessment for patient mobility in brace and safety. Advance diet today. Patient is voiding, SWS consulted to see patient for assistance with discharge needs. Patient has some family to help with assistance at home.       Subjective:  Pain: controlled  Chest pain, SOB: none  Nausea, Vomiting:  no  Lightheadedness, Dizziness:  no  Neuro:  Patient denies new onset numbness or paresthesias    Patient is sitting up in the bed, mild pain around knee. Patient has brace on. Difficulty with transition/moving during the night. Reports hard to move left leg/foot. Voiding. History of LLE lymphedema.     Objective:  /54 (BP Location: Right arm)   Pulse 102   Temp 98.4  F (36.9  C) (Oral)   Resp 16   Ht 1.676 m (5' 6\")   Wt 74.4 kg (164 lb)   SpO2 92%   BMI 26.47 kg/m    The patient is A&Ox3. Appears comfortable.   Sensation is intact.  Dorsiflexion and plantar flexion is intact., no weakness noted in foot/ankle.  Dorsalis pedis pulse intact.  Calves are soft and non-tender. Negative Neftali's.  The incision is covered. Dressing C/D/I.    No drain     Pertinent Labs   Lab Results: personally reviewed.   Lab Results   Component Value Date    INR 1.09 08/18/2023    INR 0.93 08/17/2023    INR 0.96 08/14/2023     Lab Results   Component Value Date    WBC 8.4 09/06/2023    HGB 12.3 09/06/2023    HCT 38.7 09/06/2023     (H) 09/06/2023     (H) 09/06/2023     Lab Results   Component Value Date     08/29/2023    CO2 27 08/29/2023         Report completed by:  Ridge Israel PA-C/Dr. Nieto  [facility]    Date: 11/3/2023  Time: 10:29 AM    "

## 2023-11-03 NOTE — PROGRESS NOTES
Occupational Therapy Discharge Summary    Reason for therapy discharge:    All goals and outcomes met, no further needs identified.    Progress towards therapy goal(s). See goals on Care Plan in [facility] electronic health record for goal details.  Goals met    Therapy recommendation(s):    Continued therapy is recommended.  Rationale/Recommendations:  home OT to assess home safety.       11/03/23 0850   Appointment Info   Signing Clinician's Name / Credentials (OT) Johanne Alcantara, OTR/L   Quick Adds   Quick Adds Certification   Living Environment   People in Home alone   Current Living Arrangements house  ([facility])   Living Environment Comments walk-in shower, tall toilet, stair lift but doesn't always use   Self-Care   Equipment Currently Used at Home shower chair  (cleared by HC from using AD - cane, FWW, 4WW)   Fall history within last six months yes  (no falls since initial injury)   Activity/Exercise/Self-Care Comment ind for BADLs   Instrumental Activities of Daily Living (IADL)   IADL Comments assist for housekeeping from friend, otherwise IND for all meds, driving, meal prep, laundry   General Information   Onset of Illness/Injury or Date of Surgery 11/02/23   Referring Physician Marino Flanagan PA-C   Patient/Family Therapy Goal Statement (OT) go home   Additional Occupational Profile Info/Pertinent History of Current Problem s/p L elbow ORIF   Existing Precautions/Restrictions weight bearing  (no elbow ROM - remain in splint until follow up)   Left Upper Extremity (Weight-bearing Status) partial weight-bearing (PWB)  (50%)   Cognitive Status Examination   Orientation Status orientation to person, place and time   Affect/Mental Status (Cognitive) WFL   Follows Commands WFL   Cognitive Status Comments able to problem solve UB dressing with LUE splint   Pain Assessment   Patient Currently in Pain Yes, see Vital Sign flowsheet   Range of Motion Comprehensive   Comment, General Range of Motion RUE WFL, L  frozen shoulder per pt   Strength Comprehensive (MMT)   Comment, General Manual Muscle Testing (MMT) Assessment RUE WFL   Bed Mobility   Bed Mobility supine-sit;sit-supine   Supine-Sit [place] (Bed Mobility) independent   Sit-Supine [place] (Bed Mobility) independent   Comment (Bed Mobility) elevated HOB to simulate home   Transfers   Transfers sit-stand transfer;toilet transfer;shower transfer   Sit-Stand Transfer   Sit-Stand [place] (Transfers) supervision   Shower Transfer   Type (Shower Transfer) lateral   [place] Level (Shower Transfer) contact guard;verbal cues   Toilet Transfer   Type (Toilet Transfer) sit-stand;stand-sit   [place] Level (Toilet Transfer) contact guard;verbal cues   Balance   Balance Comments CGA without AD for mobility in room   Activities of Daily Living   BADL Assessment/Intervention upper body dressing;lower body dressing;toileting   Upper Body Dressing Assessment/Training   [place] Level (Upper Body Dressing) minimum assist (75% patient effort)   Lower Body Dressing Assessment/Training   [place] Level (Lower Body Dressing) supervision;verbal cues   Toileting   [place] Level (Toileting) supervision;verbal cues   Clinical Impression   Criteria for Skilled Therapeutic Interventions Met (OT) Yes, treatment indicated   OT Diagnosis dec BADL indep   OT Problem List-Impairments impacting ADL problems related to;activity tolerance impaired;balance;mobility;post-surgical precautions;pain   Assessment of Occupational Performance 5 or more Performance Deficits   Identified Performance Deficits dressing, toileting, bathing, household mobility, functional transfers   Planned Therapy Interventions (OT) IADL retraining;ADL retraining;transfer training;home program guidelines;progressive activity/exercise   Clinical Decision Making Complexity (OT) detailed assessment/moderate complexity   Risk & Benefits of therapy have been explained evaluation/treatment results  reviewed;participants included;patient   OT Total Evaluation Time   OT Eval, Moderate Complexity Minutes (75749) 10   Therapy Certification   Medical Diagnosis olecranon fracture   Start of Care Date 11/03/23   Certification date from 11/03/23   Certification date to 11/10/23   OT Goals   Therapy Frequency (OT) One time eval and treatment   OT Predicted Duration/Target Date for Goal Attainment 11/03/23   OT Goals Upper Body Dressing;Lower Body Dressing;Toilet Transfer/Toileting;Transfers   OT: Upper Body Dressing Independent;within precautions   OT: Lower Body Dressing Independent;within precautions   OT: Transfer Modified independent;within precautions   OT: Toilet Transfer/Toileting Modified independent;toilet transfer;cleaning and garment management;within precautions   Self-Care/Home Management   Self-Care/Home Mgmt/ADL, Compensatory, Meal Prep Minutes (38960) 30   Symptoms Noted During/After Treatment (Meal Preparation/Planning Training) increased pain   Treatment Detail/Skilled Intervention Educ pt on PWB and implications for ADLs. Educ pt on techniques to increase ease and safety during UB dressing, LB dressing, toileting, toilet transfers, shower transfers, and bathing. Following educ, ind to don shirt, ind to don pants and shoes, mod IND for shower transfer and shower chair transfer, ind for toilet transfer simulating home setup and toileting tasks.   Therapeutic Activities   Therapeutic Activity Minutes (45207) 8   Symptoms noted during/after treatment none   Treatment Detail/Skilled Intervention Educ pt on digit ROM within splint to encourage edema management: educ on gross grasp and opposition. Pt shows understanding of exercises.   OT Discharge Planning   OT Plan d/c   OT Discharge Recommendation (DC Rec) home with assist;home with home care occupational therapy   OT Rationale for DC Rec recommend daily check-ins with family/friends via phone call to provide IADL assist as needed. recommend home care to  evaluate home safety.   OT Brief overview of current status mod IND/IND BADLs   Total Session Time   Timed Code Treatment Minutes 38   Total Session Time (sum of timed and untimed services) 48    [facility]  OUTPATIENT OCCUPATIONAL THERAPY  EVALUATION  PLAN OF TREATMENT FOR OUTPATIENT REHABILITATION  (COMPLETE FOR INITIAL CLAIMS ONLY)  Patient's Last Name, First Name, M.I.  YOB: 1945  Belgica Salvador                          Provider's Name  [facility] Medical Record No.  [id number]                             Onset Date:  11/02/23   Start of Care Date:  11/03/23   Type:     ___PT   _X_OT   ___SLP Medical Diagnosis:  olecranon fracture                    OT Diagnosis:  dec BADL indep Visits from SOC:  1     See note for plan of treatment, functional goals and certification details    I CERTIFY THE NEED FOR THESE SERVICES FURNISHED UNDER        THIS PLAN OF TREATMENT AND WHILE UNDER MY CARE     (Physician co-signature of this document indicates review and certification of the therapy plan).

## 2023-11-03 NOTE — PROGRESS NOTES
11/03/23 1000   Appointment Info   Signing Clinician's Name / Credentials (PT) Radha Lenz, JERMAIN   Quick Adds   Quick Adds Certification   Living Environment   People in Home alone   Current Living Arrangements house   Home Accessibility   (split entry; chair lift and rail)   Transportation Anticipated family or friend will provide   Self-Care   Regular Exercise Yes   Activity/Exercise Type   (balance)   Exercise Amount/Frequency 3-5 times/wk;10 mins   Equipment Currently Used at Home   (has fww, 4ww, and sec, but reports not using any recently except sec for distances)   Fall history within last six months yes   Number of times patient has fallen within last six months 1   Activity/Exercise/Self-Care Comment reports indep. even with L LE immobilized after initial surgery in August   General Information   Onset of Illness/Injury or Date of Surgery 11/02/23   Referring Physician lauren porter   Patient/Family Therapy Goals Statement (PT) home   Pertinent History of Current Problem (include personal factors and/or comorbidities that impact the POC) hx bilat. knee surgeries and imbalance   Existing Precautions/Restrictions   (sling L UE (casted))   Weight-Bearing Status - LUE nonweight-bearing   Cognition   Orientation Status (Cognition) oriented x 4   Pain Assessment   Patient Currently in Pain Yes, see Vital Sign flowsheet   Posture    Posture Comments flexed/scoliotic   Range of Motion (ROM)   ROM Comment LE's WNL   Strength (Manual Muscle Testing)   Strength Comments fair SLR's; hip abd 3's; hip flex 4-, quads 4's, DF 4's   Bed Mobility   Comment, (Bed Mobility) sup>sit indep.   Transfers   Transfer Safety Concerns Noted losing balance backward   Gait/Stairs (Locomotion)   [place] Level (Gait) contact guard  (borderline min assist)   Distance in Feet (Gait) 10  (then cues/treamtnet)   Deviations/Abnormal Patterns (Gait) base of support, wide;gait speed decreased  (very erratic steps; obvious imbalance)    Balance   Balance Comments static standing with wide YOAN, intermittent retro sway. Barely able to plantar flex. bilat   Sensory Examination   Sensory Perception   (reports neuropathy in feet, and raynauds)   Clinical Impression   Criteria for Skilled Therapeutic Intervention Yes, treatment indicated   PT Diagnosis (PT) impaired functional mobiltiy   Influenced by the following impairments imbalance, immobilized L LE   Functional limitations due to impairments amb.   Clinical Presentation (PT Evaluation Complexity) stable   Clinical Presentation Rationale presents as medically diagnosed   Clinical Decision Making (Complexity) low complexity   Planned Therapy Interventions (PT) gait training;balance training   Risk & Benefits of therapy have been explained patient   Clinical Impression Comments high fall risk   PT Total Evaluation Time   PT Eval, Moderate Complexity Minutes (19912) 28   Therapy Certification   Start of care date 11/03/23   Certification date from 11/03/23   Certification date to 12/03/23   Medical Diagnosis olecranon fx   Physical Therapy Goals   PT Frequency Daily   PT Predicted Duration/Target Date for Goal Attainment 11/06/23   PT Goals Gait   PT: Gait Modified independent;Straight cane;100 feet   Interventions   Interventions Quick Adds Gait Training   Gait Training   Gait Training Minutes (63378) 15   Treatment Detail/Skilled Intervention cues for safety, consistent step placements, posture   Distance in Feet 200   [place] Level (Gait Training) contact guard   Physical Assistance Level (Gait Training) 1 person assist;verbal cues   Weight Bearing (Gait Training) full weight-bearing  (LE's full; L UE NWB)   Assistive Device (Gait Training) straight cane  (half of walk)   Gait Analysis Deviations decreased jaki;increased stride width  (marked imbalance/erratic steps)   Impairments (Gait Analysis/Training) balance impaired   PT Discharge Planning   PT Plan gait/balance   PT Discharge  Recommendation (DC Rec) Transitional Care Facility   PT Rationale for DC Rec lives alone and reports no help available; high fall risk with L UE immobilized   PT Brief overview of current status amb. cga; poor quality   PT Equipment Needed at Discharge   (has amb. devices)   Total Session Time   Timed Code Treatment Minutes 15   Total Session Time (sum of timed and untimed services) 43   [facility]  OUTPATIENT PHYSICAL THERAPY EVALUATION  PLAN OF TREATMENT FOR OUTPATIENT REHABILITATION  (COMPLETE FOR INITIAL CLAIMS ONLY)  Patient's Last Name, First Name, M.I.  YOB: 1945  Belgica Salvador                        Provider's Name  [facility] Medical Record No.  [id number]                             Onset Date:  11/02/23   Start of Care Date:  11/03/23   Type:     _X_PT   ___OT   ___SLP Medical Diagnosis:  olecranon fx              PT Diagnosis:  impaired functional mobiltiy Visits from SOC:  1     See note for plan of treatment, functional goals and certification details    I CERTIFY THE NEED FOR THESE SERVICES FURNISHED UNDER        THIS PLAN OF TREATMENT AND WHILE UNDER MY CARE     (Physician co-signature of this document indicates review and certification of the therapy plan).

## 2023-11-03 NOTE — PLAN OF CARE
Goal Outcome Evaluation:         Patient vital signs are at baseline: Yes  Patient able to ambulate as they were prior to admission or with assist devices provided by therapies during their stay:  Yes  Patient MUST void prior to discharge:  Yes  Patient able to tolerate oral intake:  Yes  Pain has adequate pain control using Oral analgesics:  Yes  Does patient have an identified :  Yes  Has goal D/C date and time been discussed with patient:  Yes    Discussed with patient TCU placement per ortho and OT recommendation. Patient tearful in afternoon regarding TCU placement.

## 2023-11-03 NOTE — PLAN OF CARE
Patient vital signs are at baseline: Yes  Patient able to ambulate as they were prior to admission or with assist devices provided by therapies during their stay:  Yes  Patient MUST void prior to discharge:  Yes  Patient able to tolerate oral intake:  Yes  Pain has adequate pain control using Oral analgesics:  Yes  Does patient have an identified :  Yes  Has goal D/C date and time been discussed with patient:  Yes    Pt is A&Ox4, VSS on RA. SBA with walker and gait belt when ambulating. Voiding adequately. Dressing has no new drainage after previous shift nurse rewrapped with ace wrap. Pt has baseline neuropathy, no new numbness or tingling. Pt is reporting moderate to severe pain during shift. Utilized scheduled and PRN medications along with non-pharm therapies for pain relief. Sling in place.

## 2023-11-03 NOTE — PROGRESS NOTES
"Orthopedic Progress Note      Assessment: 1 Day Post-Op  S/P Procedure(s):  REVISION OPEN REDUCTION INTERNAL FIXATION LEFT OLECRANON FRACTURE @    Plan:   Continue PT/OT  - Weightbearing status: as tolerated with knee brace and assistance.  - Anticoagulation: ASA in addition to SCDs, zander stockings and early ambulation.  - Discharge planning: Pending, PT/OT assessment for patient mobility in brace and safety. Advance diet today. Patient is voiding, SWS consulted to see patient for assistance with discharge needs. Patient has minimal social support for after hospital stay.    Subjective:  Pain: controlled  Chest pain, SOB: none  Nausea, Vomiting:  no  Lightheadedness, Dizziness:  no  Neuro:  Patient denies new onset numbness or paresthesias     Patient is sitting up in the bed, mild pain around Left elbow. No pain to the shoulder. Has arm in sling. Voiding, tolerating diet.    Objective:  /54 (BP Location: Right arm)   Pulse 102   Temp 98.4  F (36.9  C) (Oral)   Resp 16   Ht 1.676 m (5' 6\")   Wt 74.4 kg (164 lb)   SpO2 92%   BMI 26.47 kg/m    The patient is A&Ox3. Appears comfortable.   Sensation is intact.  LUE strength intact into hand/wrist.   Calves are soft and non-tender. Negative Neftali's.  The incision is covered. Dressing C/D/I.     No drain     Pertinent Labs   Lab Results: personally reviewed.   Lab Results   Component Value Date    INR 1.09 08/18/2023    INR 0.93 08/17/2023    INR 0.96 08/14/2023     Lab Results   Component Value Date    WBC 8.4 09/06/2023    HGB 12.3 09/06/2023    HCT 38.7 09/06/2023     (H) 09/06/2023     (H) 09/06/2023     Lab Results   Component Value Date     08/29/2023    CO2 27 08/29/2023         Report completed by:  iRdge Israel PA-C/Dr. Nieto  [facility]    Date: 11/3/2023  Time: 10:29 AM    "

## 2023-11-03 NOTE — CONSULTS
Care Management Initial Consult    General Information  Assessment completed with: Patient, Children, Daughter Ariadne  Type of CM/SW Visit: Initial Assessment    Primary Care Provider verified and updated as needed:     Readmission within the last 30 days:        Reason for Consult: discharge planning  Advance Care Planning:            Communication Assessment  Patient's communication style: spoken language (English or Bilingual)             Cognitive  Cognitive/Neuro/Behavioral: WDL                      Living Environment:   People in home: alone     Current living Arrangements: house      Able to return to prior arrangements:         Family/Social Support:  Care provided by:    Provides care for:                  Description of Support System:           Current Resources:   Patient receiving home care services:       Community Resources:    Equipment currently used at home:  (has fww, 4ww, and sec, but reports not using any recently except sec for distances)  Supplies currently used at home:      Employment/Financial:  Employment Status:          Financial Concerns:             Does the patient's insurance plan have a 3 day qualifying hospital stay waiver?  Yes     Which insurance plan 3 day waiver is available? Alternative insurance waiver    Will the waiver be used for post-acute placement? Yes    Lifestyle & Psychosocial Needs:  Social Determinants of Health     Food Insecurity: Not on file   Depression: Not on file   Housing Stability: Not on file   Tobacco Use: Medium Risk (11/3/2023)    Patient History     Smoking Tobacco Use: Former     Smokeless Tobacco Use: Never     Passive Exposure: Not on file   Financial Resource Strain: Not on file   Alcohol Use: Not on file   Transportation Needs: Not on file   Physical Activity: Not on file   Interpersonal Safety: Not on file   Stress: Not on file   Social Connections: Not on file       Functional Status:  Prior to admission patient needed assistance:               Mental Health Status:          Chemical Dependency Status:                Values/Beliefs:  Spiritual, Cultural Beliefs, Zoroastrian Practices, Values that affect care:                 Additional Information:  Initial assessment completed with pt and spoke to daughter Ariadne by phone.  Pt lives alone in a house.  Discussed recommendation for TCU and pt and daughter in agreement.  TCU referrals sent.    BAILEE Wray

## 2023-11-04 VITALS
HEIGHT: 66 IN | BODY MASS INDEX: 26.36 KG/M2 | RESPIRATION RATE: 16 BRPM | WEIGHT: 164 LBS | TEMPERATURE: 97.7 F | SYSTOLIC BLOOD PRESSURE: 126 MMHG | DIASTOLIC BLOOD PRESSURE: 58 MMHG | OXYGEN SATURATION: 91 % | HEART RATE: 84 BPM

## 2023-11-04 LAB
GLUCOSE BLDC GLUCOMTR-MCNC: 120 MG/DL (ref 70–99)
GLUCOSE BLDC GLUCOMTR-MCNC: 122 MG/DL (ref 70–99)
GLUCOSE BLDC GLUCOMTR-MCNC: 141 MG/DL (ref 70–99)
PLATELET # BLD AUTO: 362 10E3/UL (ref 150–450)

## 2023-11-04 PROCEDURE — 96372 THER/PROPH/DIAG INJ SC/IM: CPT | Performed by: INTERNAL MEDICINE

## 2023-11-04 PROCEDURE — 99232 SBSQ HOSP IP/OBS MODERATE 35: CPT | Performed by: INTERNAL MEDICINE

## 2023-11-04 PROCEDURE — 36415 COLL VENOUS BLD VENIPUNCTURE: CPT | Performed by: SURGERY

## 2023-11-04 PROCEDURE — 82962 GLUCOSE BLOOD TEST: CPT

## 2023-11-04 PROCEDURE — 250N000011 HC RX IP 250 OP 636: Mod: JZ | Performed by: INTERNAL MEDICINE

## 2023-11-04 PROCEDURE — 250N000013 HC RX MED GY IP 250 OP 250 PS 637: Performed by: INTERNAL MEDICINE

## 2023-11-04 PROCEDURE — 85049 AUTOMATED PLATELET COUNT: CPT | Performed by: SURGERY

## 2023-11-04 PROCEDURE — 250N000013 HC RX MED GY IP 250 OP 250 PS 637: Performed by: PHYSICIAN ASSISTANT

## 2023-11-04 RX ADMIN — BACLOFEN 10 MG: 10 TABLET ORAL at 13:39

## 2023-11-04 RX ADMIN — SUCRALFATE 1 G: 1 TABLET ORAL at 08:17

## 2023-11-04 RX ADMIN — Medication 2000 UNITS: at 08:18

## 2023-11-04 RX ADMIN — ACETAMINOPHEN 1000 MG: 500 TABLET ORAL at 13:39

## 2023-11-04 RX ADMIN — METFORMIN HYDROCHLORIDE 1000 MG: 500 TABLET, FILM COATED ORAL at 08:17

## 2023-11-04 RX ADMIN — BUSPIRONE HYDROCHLORIDE 10 MG: 5 TABLET ORAL at 08:17

## 2023-11-04 RX ADMIN — ENOXAPARIN SODIUM 40 MG: 100 INJECTION SUBCUTANEOUS at 08:19

## 2023-11-04 RX ADMIN — SUCRALFATE 1 G: 1 TABLET ORAL at 13:39

## 2023-11-04 RX ADMIN — ASPIRIN 81 MG: 81 TABLET, COATED ORAL at 08:18

## 2023-11-04 RX ADMIN — Medication 400 MG: at 08:17

## 2023-11-04 RX ADMIN — SENNOSIDES AND DOCUSATE SODIUM 1 TABLET: 8.6; 5 TABLET ORAL at 08:18

## 2023-11-04 RX ADMIN — ACETAMINOPHEN 1000 MG: 500 TABLET ORAL at 05:40

## 2023-11-04 RX ADMIN — KETOTIFEN FUMARATE 1 DROP: 0.35 SOLUTION/ DROPS OPHTHALMIC at 08:18

## 2023-11-04 RX ADMIN — OXYCODONE HYDROCHLORIDE 10 MG: 5 TABLET ORAL at 10:54

## 2023-11-04 RX ADMIN — DULOXETINE 60 MG: 60 CAPSULE, DELAYED RELEASE ORAL at 08:18

## 2023-11-04 RX ADMIN — OXYCODONE HYDROCHLORIDE 10 MG: 5 TABLET ORAL at 05:40

## 2023-11-04 RX ADMIN — FAMOTIDINE 20 MG: 20 TABLET ORAL at 08:18

## 2023-11-04 RX ADMIN — LEVOTHYROXINE SODIUM 88 MCG: 0.09 TABLET ORAL at 05:40

## 2023-11-04 RX ADMIN — BACLOFEN 10 MG: 10 TABLET ORAL at 08:17

## 2023-11-04 RX ADMIN — FOLIC ACID 1 MG: 1 TABLET ORAL at 08:18

## 2023-11-04 RX ADMIN — LISINOPRIL 20 MG: 20 TABLET ORAL at 08:17

## 2023-11-04 ASSESSMENT — ACTIVITIES OF DAILY LIVING (ADL)
ADLS_ACUITY_SCORE: 42
ADLS_ACUITY_SCORE: 40
ADLS_ACUITY_SCORE: 42
ADLS_ACUITY_SCORE: 42
ADLS_ACUITY_SCORE: 40
ADLS_ACUITY_SCORE: 42

## 2023-11-04 NOTE — PROGRESS NOTES
Care Management Discharge Note    Discharge Date: 11/04/2023       Discharge Disposition: Transitional Care    Discharge Services:      Discharge DME:      Discharge Transportation: [facility] [employer] wheelchair    Private pay costs discussed: Not applicable    Does the patient's insurance plan have a 3 day qualifying hospital stay waiver?  Yes     Which insurance plan 3 day waiver is available? Alternative insurance waiver    Will the waiver be used for post-acute placement? Yes    PAS Confirmation Code: [id number]  Patient/family educated on Medicare website which has current facility and service quality ratings:      Education Provided on the Discharge Plan:    Persons Notified of Discharge Plans: Pt, daughter and admissions at [facility]  Patient/Family in Agreement with the Plan:      Handoff Referral Completed: No    Additional Information:  Pt to discharge to [facility] today.  Updated pt's daughter, Ariadne, and daughter requesting transport be arrange.  Aware of possible out of pocket cost.   [facility] wheelchair transport arranged with a  window of 3:10pm-3:50pm.  [facility] updated on transport time.    BAILEE Wray

## 2023-11-04 NOTE — PROGRESS NOTES
Care Management Follow Up    Length of Stay (days): 0    Expected Discharge Date: 11/04/2023     Concerns to be Addressed:       Patient plan of care discussed at interdisciplinary rounds: Yes    Anticipated Discharge Disposition: Transitional Care     Anticipated Discharge Services:    Anticipated Discharge DME:      Patient/family educated on Medicare website which has current facility and service quality ratings:    Education Provided on the Discharge Plan:    Patient/Family in Agreement with the Plan:      Referrals Placed by CM/SW:    Private pay costs discussed: Not applicable    Additional Information:  Bed available for pt today at [facility].    10:41 AM     BAILEE Wray

## 2023-11-04 NOTE — PROGRESS NOTES
[facility]    Medicine Progress Note - Hospitalist Service    Date of Admission:  11/2/2023    Assessment & Plan    78 year old female admitted on 11/2/2023. She PMHx significant for CAD, Type 2 diabetes, GERD, grave's disease, anxiety, Hypertension, Hx of previous DVT, who had revision for L elbow ORIF 11/2/2023. Per chart review, has negative NM stress test in 2022. Patient tolerated procedure well, trace blood loss noted.  She is doing well.  PT OT recommending TCU.  Awaiting placement.     S/p revision L ORIF performed on 11/2/2023              -Pain control per ortho team              -PT OT recommending TCU               Encourage incentive spirometry.       DM type 2  Glucose levels at goal.   - insulin Novolog correction TID before meals and at bedtime  -continue PTA metformin    CAD  -continue PTA Crestor  History of post-op DVT  -will initiate ASA 81 daily  -Prophylactic lovenox for dvt ppx   History of Hypertension  -continue PTA lisinopril  History of Hypothyroidism  -continue PTA levothyroxine   History of anxiety  -continue PTA buspirone  -continue PTA duloxetine  History of GERD  -continue PTA famotidine   -continue PTA sucralfate   History of iron deficiency anemia  -continue PTA ferrous sulfate  History of gastric bypass  -continue PTA folic acid  -continue PTA magnesium oxide  -continue PTA vitamin D  History of eye irritation  -continue PTA zatidor eye drops  History of constipation  -continue PTA miralax  -continue PTA senna-docusate  History of chronic pain  -continue PTA baclofen            Diet: Discharge Instruction - Regular Diet Adult  Regular Diet Adult    DVT Prophylaxis: Defer to primary service  Sierra Catheter: Not present  Lines: None     Cardiac Monitoring: None  Code Status: Full Code      Clinically Significant Risk Factors Present on Admission                # Drug Induced Platelet Defect: home medication list includes an antiplatelet medication   #  "Hypertension: Noted on problem list      # Overweight: Estimated body mass index is 26.47 kg/m  as calculated from the following:    Height as of this encounter: 1.676 m (5' 6\").    Weight as of this encounter: 74.4 kg (164 lb).              Disposition Plan     Expected Discharge Date: 11/04/2023        Discharge Comments: pt wants TCU            Austyn Hayward, DO  Hospitalist Service  [facility]  Securely message with [employer] (more info)  Text page via [employer] Paging/Directory   ______________________________________________________________________    Interval History   Patient complains of left ACE bandage being a little tight.  She will mention to orthopedic rounder today.      Physical Exam   Vital Signs: Temp: 97.7  F (36.5  C) Temp src: Oral BP: 126/58 Pulse: 84   Resp: 16 SpO2: 91 % O2 Device: None (Room air)    Weight: 164 lbs 0 oz    General Appearance:  Alert, cooperative, no distress, appears stated age   Head:  Normocephalic, without obvious abnormality, atraumatic   Lungs:   Clear to auscultation bilaterally, respirations unlabored   Chest Wall:  No tenderness or deformity   Heart:  Regular rate and rhythm, S1, S2 normal,no murmur, rub or gallop   Abdomen:   Soft, non tender, non distended, bowel sounds present, no guarding or rigidity   Extremities: Left elbow is wrapped.  Peripheral perfusion is adequate   Skin: Skin color, texture, turgor normal, no rashes or lesions   Neurologic: Alert and oriented X 3, Moves all 4 extremities       Medical Decision Making       40 MINUTES SPENT BY ME on the date of service doing chart review, history, exam, documentation & further activities per the note.      Data     I have personally reviewed the following data over the past 24 hrs:    N/A  \   N/A   / 362     N/A N/A N/A /  122 (H)   N/A N/A N/A \       Imaging results reviewed over the past 24 hrs:   No results found for this or any previous visit (from the past 24 hour(s)).  "

## 2023-11-04 NOTE — PROGRESS NOTES
PRIMARY DIAGNOSIS: Olecranon Fracture     OUTPATIENT/OBSERVATION GOALS TO BE MET BEFORE DISCHARGE  1. Orthostatic performed: N/A    2. Tolerating PO medications: Yes    3. Return to near baseline physical activity: No    4. Cleared for discharge by consultants (if involved): N/A    Discharge Planner Nurse   Please review provider order for any additional goals.   Nurse to notify provider when observation goals have been met and patient is ready for discharge.    Pt A&O, VSS, on room air. Scheduled tylenol and PRN dilaudid helpful for left side elbow/arm pain. Pt resting in bed.

## 2023-11-04 NOTE — PROGRESS NOTES
Physical Therapy Discharge Summary    Reason for therapy discharge:    Discharged to transitional care facility.    Progress towards therapy goal(s). See goals on Care Plan in [facility] electronic health record for goal details.  Goals not met.  Barriers to achieving goals:   discharge from facility.    Therapy recommendation(s):    Continued therapy is recommended.  Rationale/Recommendations:  TCU.

## 2023-11-04 NOTE — PROGRESS NOTES
PRIMARY DIAGNOSIS: Olecranon Fracture   OUTPATIENT/OBSERVATION GOALS TO BE MET BEFORE DISCHARGE:  ADLs back to baseline: No    Activity and level of assistance: Up with standby assistance.    Pain status: Improved-controlled with oral pain medications.    Return to near baseline physical activity: No     Discharge Planner Nurse       Please review provider order for any additional goals.   Nurse to notify provider when observation goals have been met and patient is ready for discharge.    Pt A&O, VSS, on room air. Scheduled tylenol and PRN dilaudid (last @0334) helpful for left side elbow/arm pain. Pt resting in bed.

## 2023-11-06 ENCOUNTER — LAB REQUISITION (OUTPATIENT)
Dept: LAB | Facility: CLINIC | Age: 78
End: 2023-11-06
Payer: COMMERCIAL

## 2023-11-06 ENCOUNTER — TRANSITIONAL CARE UNIT VISIT (OUTPATIENT)
Dept: GERIATRICS | Facility: CLINIC | Age: 78
End: 2023-11-06
Payer: COMMERCIAL

## 2023-11-06 VITALS
WEIGHT: 170 LBS | SYSTOLIC BLOOD PRESSURE: 128 MMHG | DIASTOLIC BLOOD PRESSURE: 64 MMHG | RESPIRATION RATE: 20 BRPM | BODY MASS INDEX: 27.44 KG/M2 | OXYGEN SATURATION: 95 % | HEART RATE: 80 BPM | TEMPERATURE: 96.7 F

## 2023-11-06 DIAGNOSIS — Z51.81 ENCOUNTER FOR THERAPEUTIC DRUG LEVEL MONITORING: ICD-10-CM

## 2023-11-06 DIAGNOSIS — S42.412A CLOSED SUPRACONDYLAR FRACTURE OF LEFT ELBOW, INITIAL ENCOUNTER: ICD-10-CM

## 2023-11-06 DIAGNOSIS — G89.4 CHRONIC PAIN DISORDER: Primary | ICD-10-CM

## 2023-11-06 DIAGNOSIS — R53.81 PHYSICAL DECONDITIONING: ICD-10-CM

## 2023-11-06 PROCEDURE — 99310 SBSQ NF CARE HIGH MDM 45: CPT | Performed by: NURSE PRACTITIONER

## 2023-11-06 NOTE — PROGRESS NOTES
[facility] GERIATRIC SERVICES  Chief Complaint   Patient presents with    [facility] F/U     [facility] Medical Record Number:  [id number]  Place of Service where encounter took place:  No question data found.  Code Status:  Full Code     HISTORY:      HPI:  Belgica Salvador  is 77 year old (1945) undergoing physical and occupational therapy.  She is with past medical history anxiety, arthritis, CAD, diabetes type 2, GERD, Graves' disease, hypertension, she underwent a revision for a left elbow ORIF on 11/2/2023 secondary to a fall of note she  also underwent on 8/16/2023 an ORIF of distal left humerus fracture with ulnar nerve transport.    Today she was seen to review vital signs, labs, routine visit and to reestablish care.   She denied chest pain shortness of breath cough congestion.  She reports no BM x4 days.  Abdomen soft nontender bowel sounds active x4 quadrants, she is passing flatus, bowel medications ordered.  She was within a splint left upper extremity she was able to wiggle all her fingers and they were warm to touch.  She rates her pain 7 out of 10 and it comes down to 6 out of 10 after medications.  Her scheduled Tylenol was increased to 4 times a day.  Her oxycodone was renewed today.    ALLERGIES:Glucosamine, Ibuprofen, Iodine, Naproxen, Shellfish allergy, Shellfish-derived products, Amitriptyline, Buspirone, Nystatin, Tramadol, Chlorpheniramine, Nsaids, and Pseudoephedrine    PAST MEDICAL HISTORY:   Past Medical History:   Diagnosis Date    Anxiety     Arthritis     Coronary artery disease     Diabetes (H)     Gastroesophageal reflux disease     Graves disease     Hypertension     Migraine     Motion sickness     Muscle spasm     Renal disease     Thrombosis     Thyroid disease        PAST SURGICAL HISTORY:   has a past surgical history that includes IR Carotid Angiogram (5/22/2012); IR Carotid Angiogram (5/22/2012); IR Aortic Arch 4 Vessel Angiogram (5/22/2012); GASTRIC BYPASS,OBESE<100CM  JAROD-EN-Y; Pr Partial Excision Thyroid,Unilat; REVISE MEDIAN N/CARPAL TUNNEL SURG; CERV SPINE FUSN,ANTER,BELOW C2; KNEE SCOPE,AID POST CRUC REPAIR; appendectomy; Pancreas surgery (N/A); Esophagoscopy, gastroscopy, duodenoscopy (EGD), combined (N/A, 9/24/2017); Pr Implant Periph/Gastric Neurostim/ (N/A, 9/28/2017); joint replacement (Right, 2009); Release carpal tunnel (Left, 06/2018); other surgical history (2013); other surgical history (Left); Ankle surgery (Right); other surgical history; Cervical Fusion (N/A, 3/20/2019); Arthroplasty knee (Left, 11/10/2022); Open reduction internal fixation humerus distal (Left, 8/16/2023); and Open reduction internal fixation elbow (Left, 11/2/2023).    FAMILY HISTORY: family history includes Breast Cancer in her paternal aunt.    SOCIAL HISTORY:  reports that she has quit smoking. She has never used smokeless tobacco. She reports that she does not currently use alcohol after a past usage of about 1.0 - 2.0 standard drink of alcohol per week. She reports current drug use. Drug: Oxycodone.    ROS:  Constitutional: Negative for activity change, appetite change, fatigue and fever.   HENT: Negative for congestion.    Respiratory: Negative for cough, shortness of breath and wheezing.    Cardiovascular: Negative for chest pain and leg swelling.   Gastrointestinal: Negative for abdominal distention, abdominal pain, constipation, diarrhea and nausea.   Genitourinary: Negative for dysuria.   Musculoskeletal: Negative for arthralgia. Negative for back pain.   Skin: Negative for color change and wound.  Left elbow fracture revision   neurological: Negative for dizziness. Intermittent dizziness, PRN Meclizine   Psychiatric/Behavioral: Negative for agitation, behavioral problems and confusion.     Physical Exam:  Constitutional:       Appearance: Patient is well-developed.   HENT:      Head: Normocephalic.   Eyes:      Conjunctiva/sclera: Conjunctivae normal.   Neck:       Musculoskeletal: Normal range of motion.   Cardiovascular:      Rate and Rhythm: Normal rate and regular rhythm.      Heart sounds: Normal heart sounds. No murmur.   Pulmonary:      Effort: No respiratory distress.      Breath sounds: Normal breath sounds. No wheezing or rales.   Abdominal:      General: Bowel sounds are normal. There is no distension.      Palpations: Abdomen is soft.      Tenderness: There is no abdominal tenderness.   Musculoskeletal:       Normal range of motion.  Nonweightbearing left upper extremity.  l left upper extremity in a splint/Ace wrap   skin:General:        Skin is warm.   Neurological:         Mental Status: Patient is alert and oriented to person, place, and time.   Psychiatric:         Behavior: Behavior normal.     Vitals:/64   Pulse 80   Temp (!) 96.7  F (35.9  C)   Resp 20   Wt 77.1 kg (170 lb)   SpO2 95%   BMI 27.44 kg/m   and Body mass index is 27.44 kg/m .    Lab/Diagnostic data:   Recent Results (from the past 240 hour(s))   Glucose by meter    Collection Time: 11/02/23  6:24 AM   Result Value Ref Range    GLUCOSE BY METER POCT 130 (H) 70 - 99 mg/dL   Glucose by meter    Collection Time: 11/02/23  9:08 AM   Result Value Ref Range    GLUCOSE BY METER POCT 122 (H) 70 - 99 mg/dL   Glucose by meter    Collection Time: 11/02/23 11:30 AM   Result Value Ref Range    GLUCOSE BY METER POCT 134 (H) 70 - 99 mg/dL   Creatinine    Collection Time: 11/02/23  3:53 PM   Result Value Ref Range    Creatinine 0.62 0.51 - 0.95 mg/dL    GFR Estimate >90 >60 mL/min/1.73m2   Glucose by meter    Collection Time: 11/02/23  4:41 PM   Result Value Ref Range    GLUCOSE BY METER POCT 101 (H) 70 - 99 mg/dL   Glucose by meter    Collection Time: 11/02/23  9:35 PM   Result Value Ref Range    GLUCOSE BY METER POCT 122 (H) 70 - 99 mg/dL   Glucose by meter    Collection Time: 11/03/23  2:14 AM   Result Value Ref Range    GLUCOSE BY METER POCT 139 (H) 70 - 99 mg/dL   Glucose by meter    Collection  Time: 11/03/23  6:55 AM   Result Value Ref Range    GLUCOSE BY METER POCT 141 (H) 70 - 99 mg/dL   Glucose by meter    Collection Time: 11/03/23 11:34 AM   Result Value Ref Range    GLUCOSE BY METER POCT 113 (H) 70 - 99 mg/dL   Glucose by meter    Collection Time: 11/03/23  4:52 PM   Result Value Ref Range    GLUCOSE BY METER POCT 167 (H) 70 - 99 mg/dL   Glucose by meter    Collection Time: 11/03/23 10:39 PM   Result Value Ref Range    GLUCOSE BY METER POCT 152 (H) 70 - 99 mg/dL   Glucose by meter    Collection Time: 11/04/23  2:38 AM   Result Value Ref Range    GLUCOSE BY METER POCT 141 (H) 70 - 99 mg/dL   Platelet count    Collection Time: 11/04/23  6:09 AM   Result Value Ref Range    Platelet Count 362 150 - 450 10e3/uL   Glucose by meter    Collection Time: 11/04/23  6:43 AM   Result Value Ref Range    GLUCOSE BY METER POCT 122 (H) 70 - 99 mg/dL   Glucose by meter    Collection Time: 11/04/23 12:11 PM   Result Value Ref Range    GLUCOSE BY METER POCT 120 (H) 70 - 99 mg/dL        MEDICATIONS:     Review of your medicines            Accurate as of November 6, 2023  2:14 PM. If you have any questions, ask your nurse or doctor.                CONTINUE these medicines which have NOT CHANGED        Dose / Directions   acetaminophen 500 MG tablet  Commonly known as: TYLENOL      Dose: 1,000 mg  Take 1,000 mg by mouth every 6 hours  Refills: 0     aspirin 81 MG EC tablet      Dose: 81 mg  Take 81 mg by mouth daily  Refills: 0     baclofen 10 MG tablet  Commonly known as: LIORESAL      Dose: 10 mg  Take 10 mg by mouth 3 times daily  Refills: 0     busPIRone 10 MG tablet  Commonly known as: BUSPAR      Dose: 10 mg  Take 10 mg by mouth 2 times daily  Refills: 0     Calcium-Magnesium-Zinc 333-133-5 MG Tabs per tablet      Dose: 1 tablet  Take 1 tablet by mouth daily  Refills: 0     DULoxetine 60 MG capsule  Commonly known as: CYMBALTA      Dose: 60 mg  Take 60 mg by mouth daily  Refills: 0     EPINEPHrine 0.3 MG/0.3ML  injection 2-pack  Commonly known as: ANY BX GENERIC EQUIV      Dose: 0.3 mg  [EPINEPHRINE (EPIPEN 2-SHERRI) 0.3 MG/0.3 ML ATIN] Inject 0.3 mg into the shoulder, thigh, or buttocks once as needed (Anaphylaxis).  Refills: 0     famotidine 20 MG tablet  Commonly known as: PEPCID      Dose: 20 mg  Take 20 mg by mouth 2 times daily  Refills: 0     ferrous sulfate 325 (65 Fe) MG tablet  Commonly known as: FEROSUL      Dose: 1 tablet  Take 1 tablet by mouth every evening  Refills: 0     folic acid 1 MG tablet  Commonly known as: FOLVITE      Dose: 1 mg  [FOLIC ACID (FOLVITE) 1 MG TABLET] Take 1 mg by mouth daily.  Refills: 0     levothyroxine 88 MCG tablet  Commonly known as: SYNTHROID/LEVOTHROID      Dose: 88 mcg  [LEVOTHYROXINE (SYNTHROID, LEVOTHROID) 88 MCG TABLET] Take 88 mcg by mouth Daily at 6:00 am.  Refills: 0     lisinopril 20 MG tablet  Commonly known as: ZESTRIL  Used for: Essential hypertension      Dose: 20 mg  Take 1 tablet (20 mg) by mouth daily Hold for SBP < 120 mmHg.  Refills: 0     loperamide 2 MG capsule  Commonly known as: IMODIUM  Indication: Diarrhea      Dose: 2-4 mg  Take 2-4 mg by mouth 3 times daily as needed for diarrhea Take 1 capsule with first loose stool and take 2 capsules after each subsequent loose stool  Refills: 0     magnesium oxide 400 MG tablet  Commonly known as: MAG-OX      Dose: 400 mg  Take 400 mg by mouth 2 times daily  Refills: 0     meclizine 12.5 MG tablet  Commonly known as: ANTIVERT  Indication: Sensation of Spinning or Whirling      Dose: 12.5 mg  Take 12.5 mg by mouth 3 times daily as needed for dizziness  Refills: 0     metFORMIN 500 MG tablet  Commonly known as: GLUCOPHAGE      Dose: 1,000 mg  [METFORMIN (GLUCOPHAGE) 500 MG TABLET] Take 1,000 mg by mouth 2 (two) times a day with meals.  Refills: 0     omeprazole 20 MG DR capsule  Commonly known as: PriLOSEC      Dose: 20 mg  Take 20 mg by mouth 2 times daily as needed  Refills: 0     oxyCODONE 5 MG tablet  Commonly known  as: ROXICODONE  Used for: Closed supracondylar fracture of left elbow, initial encounter      Dose: 5-10 mg  Take 1-2 tablets (5-10 mg) by mouth every 4 hours as needed for moderate to severe pain  Quantity: 10 tablet  Refills: 0     Prevagen 10 MG Caps  Generic drug: Apoaequorin      Dose: 10 mg  Take 10 mg by mouth daily  Refills: 0     rosuvastatin 5 MG tablet  Commonly known as: CRESTOR      Dose: 5 mg  Take 5 mg by mouth At Bedtime  Refills: 0     sucralfate 1 GM tablet  Commonly known as: CARAFATE      Dose: 1 g  Take 1 g by mouth 4 times daily Before meals and bedtime  Refills: 0     TheraTears Allergy 0.025 % ophthalmic solution  Generic drug: ketotifen fumarate 0.035% (ketotifen 0.025%)      Dose: 1 drop  Place 1 drop into both eyes 2 times daily  Refills: 0     TheraTears Extra 0.25 % Soln  Generic drug: Carboxymethylcellulose Sod PF      Dose: 1 drop  Place 1 drop into both eyes 2 times daily  Refills: 0     Vitamin D (Cholecalciferol) 50 MCG (2000 UT) Caps  Indication: Vitamin D Deficiency      Dose: 2,000 Units  Take 2,000 Units by mouth daily  Refills: 0            STOP taking      valACYclovir 1000 mg tablet  Commonly known as: VALTREX  Stopped by: Billie Santamaria CNP                 ASSESSMENT/PLAN  Encounter Diagnoses   Name Primary?    Chronic pain disorder Yes    Closed supracondylar fracture of left elbow, initial encounter     Physical deconditioning      Left elbow  fracture revision follow-up with orthopedics pain control nonweightbearing left upper extremity    Type 2 diabetes on metformin 1000 mg twice daily A1c 9/25/23 was 5.4    Pain management  ESTylenol 1000 mg every 6hours, baclofen 10 mg 3 times daily, oxycodone 5 to 10 mg every 4 hours as needed    HDL on Crestor    Physical deconditioning PT OT    Cold sores as needed valacyclovir    Anxiety on BuSpar    Mood disorder continue duloxetine    GERD on famotidine    Anemia continue ferrous sulfate    Hypertension on lisinopril 20 mg  daily    Dizziness as needed meclizine    Hypothyroidism on levothyroxine TSH on 5/5/23 was 1.58    Hypomagnesia currently on replacement, recheck in the AM       Electronically signed by: Billie Santamaria CNP

## 2023-11-07 ENCOUNTER — TRANSITIONAL CARE UNIT VISIT (OUTPATIENT)
Dept: GERIATRICS | Facility: CLINIC | Age: 78
End: 2023-11-07
Payer: COMMERCIAL

## 2023-11-07 ENCOUNTER — TELEPHONE (OUTPATIENT)
Dept: GERIATRICS | Facility: CLINIC | Age: 78
End: 2023-11-07

## 2023-11-07 VITALS
TEMPERATURE: 97.5 F | SYSTOLIC BLOOD PRESSURE: 139 MMHG | DIASTOLIC BLOOD PRESSURE: 68 MMHG | RESPIRATION RATE: 18 BRPM | WEIGHT: 170 LBS | HEIGHT: 66 IN | OXYGEN SATURATION: 96 % | HEART RATE: 85 BPM | BODY MASS INDEX: 27.32 KG/M2

## 2023-11-07 DIAGNOSIS — E11.42 DIABETIC PERIPHERAL NEUROPATHY (H): ICD-10-CM

## 2023-11-07 DIAGNOSIS — G89.4 CHRONIC PAIN DISORDER: Primary | ICD-10-CM

## 2023-11-07 DIAGNOSIS — F41.9 ANXIETY DISORDER, UNSPECIFIED TYPE: ICD-10-CM

## 2023-11-07 DIAGNOSIS — S42.412A CLOSED SUPRACONDYLAR FRACTURE OF LEFT ELBOW, INITIAL ENCOUNTER: ICD-10-CM

## 2023-11-07 DIAGNOSIS — E11.01 TYPE 2 DIABETES MELLITUS WITH HYPEROSMOLAR COMA, WITHOUT LONG-TERM CURRENT USE OF INSULIN (H): ICD-10-CM

## 2023-11-07 DIAGNOSIS — R29.6 FALLS FREQUENTLY: ICD-10-CM

## 2023-11-07 PROBLEM — Z86.718 PERSONAL HISTORY OF OTHER VENOUS THROMBOSIS AND EMBOLISM: Status: ACTIVE | Noted: 2023-08-18

## 2023-11-07 PROBLEM — R42 VERTIGO: Status: ACTIVE | Noted: 2023-10-06

## 2023-11-07 PROBLEM — Z96.651 PRESENCE OF RIGHT ARTIFICIAL KNEE JOINT: Status: ACTIVE | Noted: 2023-08-18

## 2023-11-07 PROBLEM — I89.0 LYMPHEDEMA, NOT ELSEWHERE CLASSIFIED: Status: ACTIVE | Noted: 2023-08-18

## 2023-11-07 LAB
ANION GAP SERPL CALCULATED.3IONS-SCNC: 12 MMOL/L (ref 7–15)
BUN SERPL-MCNC: 12.3 MG/DL (ref 8–23)
CALCIUM SERPL-MCNC: 9.6 MG/DL (ref 8.8–10.2)
CHLORIDE SERPL-SCNC: 101 MMOL/L (ref 98–107)
CREAT SERPL-MCNC: 0.67 MG/DL (ref 0.51–0.95)
DEPRECATED HCO3 PLAS-SCNC: 25 MMOL/L (ref 22–29)
EGFRCR SERPLBLD CKD-EPI 2021: 90 ML/MIN/1.73M2
ERYTHROCYTE [DISTWIDTH] IN BLOOD BY AUTOMATED COUNT: 15.8 % (ref 10–15)
GLUCOSE SERPL-MCNC: 106 MG/DL (ref 70–99)
HCT VFR BLD AUTO: 37.7 % (ref 35–47)
HGB BLD-MCNC: 12 G/DL (ref 11.7–15.7)
MAGNESIUM SERPL-MCNC: 1.7 MG/DL (ref 1.7–2.3)
MCH RBC QN AUTO: 32.4 PG (ref 26.5–33)
MCHC RBC AUTO-ENTMCNC: 31.8 G/DL (ref 31.5–36.5)
MCV RBC AUTO: 102 FL (ref 78–100)
PLATELET # BLD AUTO: 462 10E3/UL (ref 150–450)
POTASSIUM SERPL-SCNC: 4.4 MMOL/L (ref 3.4–5.3)
RBC # BLD AUTO: 3.7 10E6/UL (ref 3.8–5.2)
SODIUM SERPL-SCNC: 138 MMOL/L (ref 135–145)
WBC # BLD AUTO: 7.4 10E3/UL (ref 4–11)

## 2023-11-07 PROCEDURE — P9604 ONE-WAY ALLOW PRORATED TRIP: HCPCS | Mod: ORL | Performed by: NURSE PRACTITIONER

## 2023-11-07 PROCEDURE — 99305 1ST NF CARE MODERATE MDM 35: CPT | Performed by: FAMILY MEDICINE

## 2023-11-07 PROCEDURE — 36415 COLL VENOUS BLD VENIPUNCTURE: CPT | Mod: ORL | Performed by: NURSE PRACTITIONER

## 2023-11-07 PROCEDURE — 85027 COMPLETE CBC AUTOMATED: CPT | Mod: ORL | Performed by: NURSE PRACTITIONER

## 2023-11-07 PROCEDURE — 80048 BASIC METABOLIC PNL TOTAL CA: CPT | Mod: ORL | Performed by: NURSE PRACTITIONER

## 2023-11-07 PROCEDURE — 83735 ASSAY OF MAGNESIUM: CPT | Mod: ORL | Performed by: NURSE PRACTITIONER

## 2023-11-07 NOTE — TELEPHONE ENCOUNTER
[facility] Geriatrics Lab Note     Provider: Jamee Trent MD  Facility: [facility]  Facility Type:  TCU    Allergies   Allergen Reactions    Glucosamine Anaphylaxis and Rash     Cardiac arrest    Ibuprofen Hives    Iodine Angioedema, Anaphylaxis and Hives     Patient can uses skin products such as betadine - See shellfish allergy      Naproxen Hives and Rash    Shellfish Allergy Anaphylaxis, Hives and Rash     Cardiac arrest - very severe    Shellfish-Derived Products Anaphylaxis, Hives and Rash     Cardiac arrest - very severe      Amitriptyline Visual Disturbance     Hallucinations    Buspirone Visual Disturbance     Hallucinations    Nystatin Nausea, Diarrhea, Cramps and GI Disturbance    Tramadol Visual Disturbance     Hallucinations    Chlorpheniramine Hives and Rash    Nsaids Unknown    Pseudoephedrine Hives and Rash       Labs Reviewed by provider: Heme 2, BMP, Mg     Verbal Order/Direction given by Provider: No new orders.      Provider giving Order:  Jamee Trent MD    Verbal Order given to: Afa([phone])    Ángel Campbell RN

## 2023-11-07 NOTE — PROGRESS NOTES
[facility] GERIATRIC SERVICES       Patient Belgica Salvador  MRN: [id number]        Reason for Visit     Chief Complaint   Patient presents with    RECHECK     INITIAL       Code Status     Full code    Assessment     Status post revision of the left elbow fracture /ORIF on 11/2/2023  Prior history LEFT ELBOW SUPRACONDYLAR fracture status post ORIF with ulnar nerve transport on 8/16/2023  ABLA  History of a fall with underlying history of recurrent falls  Generalized weakness  Mood disorder  Hypothyroidism  DM-2  PERIPHERAL NEUROPATHY      Plan     Pt is admitted to TCU for strengthening and rehab.  Patient admitted postprocedure -underwent revision of her left olecranon fracture due to nonunion with symptomatic hardware  Continue nonweightbearing left upper extremity  Pain management optimized.  She is on scheduled Tylenol.  Continue scheduled baclofen  She is also on scheduled aspirin 81mg daily  Oxycodone has been added for additional pain relief currently patient is not ready for a taper  Advised aggressive icing.  Outpatient follow-up with orthopedics as scheduled tomorrow  Monitor blood pressures   On lisinopril and stable  Hypoxia concerns have resolved  Recheck labs to monitor hemoglobin due to underlying history of postoperative bleeding  She reports some ongoing diarrhea and has Imodium available as needed  DM controlled with metformin  On high doses of metformin in the last A1c was noted to be 5.4  Discontinue high doses of metformin and start her on 500 mg daily.  Recheck A1c in 4 weeks.  If still below 6 we will discontinue metformin completely  Monitor mood due to anxiety  Cont psych meds  On multiple supplements because of underlying history of gastric bypass we will continue with the same  Continue with PT/OT-walking with  a cane    History     Patient is a very pleasant 77 year old female who is readmitted to TCU  Patient was initially admitted post fall with underlying history of recurrent  falls.  She was noted to have a distal humeral fracture and underwent surgical repair on 8/16/2023.  Unfortunately she says that fracture never healed properly and she had misaligned hardware  She was electively readmitted and underwent a repair and revision of her fracture postoperatively she had increased bleeding but now has been discharged to the TCU      Past Medical & Surgical History     PAST MEDICAL HISTORY:   Past Medical History:   Diagnosis Date    Anxiety     Arthritis     Coronary artery disease     Diabetes (H)     Gastroesophageal reflux disease     Graves disease     Hypertension     Migraine     Motion sickness     Muscle spasm     Renal disease     Thrombosis     Thyroid disease       PAST SURGICAL HISTORY:   has a past surgical history that includes IR Carotid Angiogram (5/22/2012); IR Carotid Angiogram (5/22/2012); IR Aortic Arch 4 Vessel Angiogram (5/22/2012); GASTRIC BYPASS,OBESE<100CM JAROD-EN-Y; Pr Partial Excision Thyroid,Unilat; REVISE MEDIAN N/CARPAL TUNNEL SURG; CERV SPINE FUSN,ANTER,BELOW C2; KNEE SCOPE,AID POST CRUC REPAIR; appendectomy; Pancreas surgery (N/A); Esophagoscopy, gastroscopy, duodenoscopy (EGD), combined (N/A, 9/24/2017); Pr Implant Periph/Gastric Neurostim/ (N/A, 9/28/2017); joint replacement (Right, 2009); Release carpal tunnel (Left, 06/2018); other surgical history (2013); other surgical history (Left); Ankle surgery (Right); other surgical history; Cervical Fusion (N/A, 3/20/2019); Arthroplasty knee (Left, 11/10/2022); Open reduction internal fixation humerus distal (Left, 8/16/2023); and Open reduction internal fixation elbow (Left, 11/2/2023).      Past Social History     Reviewed,  reports that she has quit smoking. She has never used smokeless tobacco. She reports that she does not currently use alcohol after a past usage of about 1.0 - 2.0 standard drink of alcohol per week. She reports current drug use. Drug: Oxycodone.    Family History     Reviewed, and  family history includes Breast Cancer in her paternal aunt.    Medication List     Current Outpatient Medications   Medication    acetaminophen (TYLENOL) 500 MG tablet    Apoaequorin (PREVAGEN) 10 MG CAPS    aspirin 81 MG EC tablet    baclofen (LIORESAL) 10 MG tablet    busPIRone (BUSPAR) 10 MG tablet    Calcium-Magnesium-Zinc 333-133-5 MG TABS per tablet    Carboxymethylcellulose Sod PF (THERATEARS EXTRA) 0.25 % SOLN    DULoxetine (CYMBALTA) 60 MG capsule    EPINEPHrine (EPIPEN 2-SHERRI) 0.3 mg/0.3 mL atIn    famotidine (PEPCID) 20 MG tablet    ferrous sulfate 325 (65 FE) MG tablet    folic acid (FOLVITE) 1 MG tablet    ketotifen fumarate 0.035%, ketotifen 0.025%, (THERATEARS ALLERGY) 0.025 % ophthalmic solution    levothyroxine (SYNTHROID, LEVOTHROID) 88 MCG tablet    lisinopril (ZESTRIL) 20 MG tablet    loperamide (IMODIUM) 2 MG capsule    magnesium oxide (MAG-OX) 400 mg (241.3 mg magnesium) tablet    meclizine (ANTIVERT) 12.5 MG tablet    metFORMIN (GLUCOPHAGE) 500 MG tablet    omeprazole (PRILOSEC) 20 MG DR capsule    oxyCODONE (ROXICODONE) 5 MG tablet    rosuvastatin (CRESTOR) 5 MG tablet    sucralfate (CARAFATE) 1 GM tablet    Vitamin D (Cholecalciferol) 50 MCG (2000 UT) CAPS     No current facility-administered medications for this visit.      MED REC REQUIRED  Post Medication Reconciliation Status: discharge medications reconciled, continue medications without change       Allergies     Allergies   Allergen Reactions    Glucosamine Anaphylaxis and Rash     Cardiac arrest    Ibuprofen Hives    Iodine Angioedema, Anaphylaxis and Hives     Patient can uses skin products such as betadine - See shellfish allergy      Naproxen Hives and Rash    Shellfish Allergy Anaphylaxis, Hives and Rash     Cardiac arrest - very severe    Shellfish-Derived Products Anaphylaxis, Hives and Rash     Cardiac arrest - very severe      Amitriptyline Visual Disturbance     Hallucinations    Buspirone Visual Disturbance      "Hallucinations    Nystatin Nausea, Diarrhea, Cramps and GI Disturbance    Tramadol Visual Disturbance     Hallucinations    Chlorpheniramine Hives and Rash    Nsaids Unknown    Pseudoephedrine Hives and Rash       Review of Systems   A comprehensive review of 14 systems was done. Pertinent findings noted here and in history of present illness. All the rest negative.  Constitutional: Negative.  Negative for fever, chills, she has  activity change, appetite change and fatigue.   HENT: Negative for congestion and facial swelling.    Eyes: Negative for photophobia, redness and visual disturbance.   Respiratory: Negative for cough and chest tightness.    Cardiovascular: Negative for chest pain, palpitations and leg swelling.   Gastrointestinal: Negative for nausea, diarrhea, constipation, blood in stool and abdominal distention.   Had some ongoing diarrhea concerns  Genitourinary: Negative.    Musculoskeletal: Having pain in her left arm and has been using high doses of oxycodone  Reporting that she was able to sleep last night but this morning woke up with 10 out of 10 pain  Skin: Negative.    Neurological: Negative for dizziness, tremors, syncope, weakness, light-headedness and headaches.   Hematological: Does not bruise/bleed easily.   Psychiatric/Behavioral: Negative.        Physical Exam   /68   Pulse 85   Temp 97.5  F (36.4  C)   Resp 18   Ht 1.676 m (5' 6\")   Wt 77.1 kg (170 lb)   SpO2 96%   BMI 27.44 kg/m       Constitutional: Oriented to person, place, and time and appears well-developed.   HEENT:  Normocephalic and atraumatic.  Eyes: Conjunctivae and EOM are normal. Pupils are equal, round, and reactive to light. No discharge.  No scleral icterus. Nose normal. Mouth/Throat: Oropharynx is clear and moist. No oropharyngeal exudate.    NECK: Normal range of motion. Neck supple. No JVD present. No tracheal deviation present. No thyromegaly present.   CARDIOVASCULAR: Normal rate, regular rhythm and " intact distal pulses.  Exam reveals no gallop and no friction rub.  Systolic murmur present.  PULMONARY: Effort normal and breath sounds normal. No respiratory distress.No Wheezing or rales.  ABDOMEN: Soft. Bowel sounds are normal. No distension and no mass.  There is no tenderness. There is no rebound and no guarding. No HSM.  MUSCULOSKELETAL: Normal range of motion. Mild kyphosis, no tenderness.  Left upper extremity is in a  an Ace wrap and a cast  LYMPH NODES: Has no cervical, supraclavicular, axillary and groin adenopathy.   NEUROLOGICAL: Alert and oriented to person, place, and time. No cranial nerve deficit.  Normal muscle tone. Coordination normal.   GENITOURINARY: Deferred exam.  SKIN: Skin is warm and dry. No rash noted. No erythema. No pallor.   EXTREMITIES: No cyanosis, no clubbing, no edema. No Deformity.  PSYCHIATRIC: Normal mood, affect and behavior.  Anxiety noted    Lab Results     Recent Results (from the past 240 hour(s))   Glucose by meter    Collection Time: 11/02/23  6:24 AM   Result Value Ref Range    GLUCOSE BY METER POCT 130 (H) 70 - 99 mg/dL   Glucose by meter    Collection Time: 11/02/23  9:08 AM   Result Value Ref Range    GLUCOSE BY METER POCT 122 (H) 70 - 99 mg/dL   Glucose by meter    Collection Time: 11/02/23 11:30 AM   Result Value Ref Range    GLUCOSE BY METER POCT 134 (H) 70 - 99 mg/dL   Creatinine    Collection Time: 11/02/23  3:53 PM   Result Value Ref Range    Creatinine 0.62 0.51 - 0.95 mg/dL    GFR Estimate >90 >60 mL/min/1.73m2   Glucose by meter    Collection Time: 11/02/23  4:41 PM   Result Value Ref Range    GLUCOSE BY METER POCT 101 (H) 70 - 99 mg/dL   Glucose by meter    Collection Time: 11/02/23  9:35 PM   Result Value Ref Range    GLUCOSE BY METER POCT 122 (H) 70 - 99 mg/dL   Glucose by meter    Collection Time: 11/03/23  2:14 AM   Result Value Ref Range    GLUCOSE BY METER POCT 139 (H) 70 - 99 mg/dL   Glucose by meter    Collection Time: 11/03/23  6:55 AM   Result  Value Ref Range    GLUCOSE BY METER POCT 141 (H) 70 - 99 mg/dL   Glucose by meter    Collection Time: 11/03/23 11:34 AM   Result Value Ref Range    GLUCOSE BY METER POCT 113 (H) 70 - 99 mg/dL   Glucose by meter    Collection Time: 11/03/23  4:52 PM   Result Value Ref Range    GLUCOSE BY METER POCT 167 (H) 70 - 99 mg/dL   Glucose by meter    Collection Time: 11/03/23 10:39 PM   Result Value Ref Range    GLUCOSE BY METER POCT 152 (H) 70 - 99 mg/dL   Glucose by meter    Collection Time: 11/04/23  2:38 AM   Result Value Ref Range    GLUCOSE BY METER POCT 141 (H) 70 - 99 mg/dL   Platelet count    Collection Time: 11/04/23  6:09 AM   Result Value Ref Range    Platelet Count 362 150 - 450 10e3/uL   Glucose by meter    Collection Time: 11/04/23  6:43 AM   Result Value Ref Range    GLUCOSE BY METER POCT 122 (H) 70 - 99 mg/dL   Glucose by meter    Collection Time: 11/04/23 12:11 PM   Result Value Ref Range    GLUCOSE BY METER POCT 120 (H) 70 - 99 mg/dL   Basic metabolic panel    Collection Time: 11/07/23  5:55 AM   Result Value Ref Range    Sodium 138 135 - 145 mmol/L    Potassium 4.4 3.4 - 5.3 mmol/L    Chloride 101 98 - 107 mmol/L    Carbon Dioxide (CO2) 25 22 - 29 mmol/L    Anion Gap 12 7 - 15 mmol/L    Urea Nitrogen 12.3 8.0 - 23.0 mg/dL    Creatinine 0.67 0.51 - 0.95 mg/dL    GFR Estimate 90 >60 mL/min/1.73m2    Calcium 9.6 8.8 - 10.2 mg/dL    Glucose 106 (H) 70 - 99 mg/dL   CBC with platelets    Collection Time: 11/07/23  5:55 AM   Result Value Ref Range    WBC Count 7.4 4.0 - 11.0 10e3/uL    RBC Count 3.70 (L) 3.80 - 5.20 10e6/uL    Hemoglobin 12.0 11.7 - 15.7 g/dL    Hematocrit 37.7 35.0 - 47.0 %     (H) 78 - 100 fL    MCH 32.4 26.5 - 33.0 pg    MCHC 31.8 31.5 - 36.5 g/dL    RDW 15.8 (H) 10.0 - 15.0 %    Platelet Count 462 (H) 150 - 450 10e3/uL   Magnesium    Collection Time: 11/07/23  5:55 AM   Result Value Ref Range    Magnesium 1.7 1.7 - 2.3 mg/dL                 Electronically signed by    Jamee Trent MD

## 2023-11-07 NOTE — LETTER
11/7/2023        RE: Belgica Salvador  [address] [address]        [facility] GERIATRIC SERVICES       Patient Belgica Salvador  MRN: [id number]        Reason for Visit     Chief Complaint   Patient presents with     RECHECK     INITIAL       Code Status     Full code    Assessment     Status post revision of the left elbow fracture /ORIF on 11/2/2023  Prior history LEFT ELBOW SUPRACONDYLAR fracture status post ORIF with ulnar nerve transport on 8/16/2023  ABLA  History of a fall with underlying history of recurrent falls  Generalized weakness  Mood disorder  Hypothyroidism  DM-2  PERIPHERAL NEUROPATHY      Plan     Pt is admitted to TCU for strengthening and rehab.  Patient admitted postprocedure -underwent revision of her left olecranon fracture due to nonunion with symptomatic hardware  Continue nonweightbearing left upper extremity  Pain management optimized.  She is on scheduled Tylenol.  Continue scheduled baclofen  She is also on scheduled aspirin 81mg daily  Oxycodone has been added for additional pain relief currently patient is not ready for a taper  Advised aggressive icing.  Outpatient follow-up with orthopedics as scheduled tomorrow  Monitor blood pressures   On lisinopril and stable  Hypoxia concerns have resolved  Recheck labs to monitor hemoglobin due to underlying history of postoperative bleeding  She reports some ongoing diarrhea and has Imodium available as needed  DM controlled with metformin  On high doses of metformin in the last A1c was noted to be 5.4  Discontinue high doses of metformin and start her on 500 mg daily.  Recheck A1c in 4 weeks.  If still below 6 we will discontinue metformin completely  Monitor mood due to anxiety  Cont psych meds  On multiple supplements because of underlying history of gastric bypass we will continue with the same  Continue with PT/OT-walking with  a cane    History     Patient is a very pleasant 77 year old female who is readmitted  to TCU  Patient was initially admitted post fall with underlying history of recurrent falls.  She was noted to have a distal humeral fracture and underwent surgical repair on 8/16/2023.  Unfortunately she says that fracture never healed properly and she had misaligned hardware  She was electively readmitted and underwent a repair and revision of her fracture postoperatively she had increased bleeding but now has been discharged to the TCU      Past Medical & Surgical History     PAST MEDICAL HISTORY:   Past Medical History:   Diagnosis Date     Anxiety      Arthritis      Coronary artery disease      Diabetes (H)      Gastroesophageal reflux disease      Graves disease      Hypertension      Migraine      Motion sickness      Muscle spasm      Renal disease      Thrombosis      Thyroid disease       PAST SURGICAL HISTORY:   has a past surgical history that includes IR Carotid Angiogram (5/22/2012); IR Carotid Angiogram (5/22/2012); IR Aortic Arch 4 Vessel Angiogram (5/22/2012); GASTRIC BYPASS,OBESE<100CM JAROD-EN-Y; Pr Partial Excision Thyroid,Unilat; REVISE MEDIAN N/CARPAL TUNNEL SURG; CERV SPINE FUSN,ANTER,BELOW C2; KNEE SCOPE,AID POST CRUC REPAIR; appendectomy; Pancreas surgery (N/A); Esophagoscopy, gastroscopy, duodenoscopy (EGD), combined (N/A, 9/24/2017); Pr Implant Periph/Gastric Neurostim/ (N/A, 9/28/2017); joint replacement (Right, 2009); Release carpal tunnel (Left, 06/2018); other surgical history (2013); other surgical history (Left); Ankle surgery (Right); other surgical history; Cervical Fusion (N/A, 3/20/2019); Arthroplasty knee (Left, 11/10/2022); Open reduction internal fixation humerus distal (Left, 8/16/2023); and Open reduction internal fixation elbow (Left, 11/2/2023).      Past Social History     Reviewed,  reports that she has quit smoking. She has never used smokeless tobacco. She reports that she does not currently use alcohol after a past usage of about 1.0 - 2.0 standard drink of  alcohol per week. She reports current drug use. Drug: Oxycodone.    Family History     Reviewed, and family history includes Breast Cancer in her paternal aunt.    Medication List     Current Outpatient Medications   Medication     acetaminophen (TYLENOL) 500 MG tablet     Apoaequorin (PREVAGEN) 10 MG CAPS     aspirin 81 MG EC tablet     baclofen (LIORESAL) 10 MG tablet     busPIRone (BUSPAR) 10 MG tablet     Calcium-Magnesium-Zinc 333-133-5 MG TABS per tablet     Carboxymethylcellulose Sod PF (THERATEARS EXTRA) 0.25 % SOLN     DULoxetine (CYMBALTA) 60 MG capsule     EPINEPHrine (EPIPEN 2-SHERRI) 0.3 mg/0.3 mL atIn     famotidine (PEPCID) 20 MG tablet     ferrous sulfate 325 (65 FE) MG tablet     folic acid (FOLVITE) 1 MG tablet     ketotifen fumarate 0.035%, ketotifen 0.025%, (THERATEARS ALLERGY) 0.025 % ophthalmic solution     levothyroxine (SYNTHROID, LEVOTHROID) 88 MCG tablet     lisinopril (ZESTRIL) 20 MG tablet     loperamide (IMODIUM) 2 MG capsule     magnesium oxide (MAG-OX) 400 mg (241.3 mg magnesium) tablet     meclizine (ANTIVERT) 12.5 MG tablet     metFORMIN (GLUCOPHAGE) 500 MG tablet     omeprazole (PRILOSEC) 20 MG DR capsule     oxyCODONE (ROXICODONE) 5 MG tablet     rosuvastatin (CRESTOR) 5 MG tablet     sucralfate (CARAFATE) 1 GM tablet     Vitamin D (Cholecalciferol) 50 MCG (2000 UT) CAPS     No current facility-administered medications for this visit.      MED REC REQUIRED  Post Medication Reconciliation Status: discharge medications reconciled, continue medications without change       Allergies     Allergies   Allergen Reactions     Glucosamine Anaphylaxis and Rash     Cardiac arrest     Ibuprofen Hives     Iodine Angioedema, Anaphylaxis and Hives     Patient can uses skin products such as betadine - See shellfish allergy       Naproxen Hives and Rash     Shellfish Allergy Anaphylaxis, Hives and Rash     Cardiac arrest - very severe     Shellfish-Derived Products Anaphylaxis, Hives and Rash      "Cardiac arrest - very severe       Amitriptyline Visual Disturbance     Hallucinations     Buspirone Visual Disturbance     Hallucinations     Nystatin Nausea, Diarrhea, Cramps and GI Disturbance     Tramadol Visual Disturbance     Hallucinations     Chlorpheniramine Hives and Rash     Nsaids Unknown     Pseudoephedrine Hives and Rash       Review of Systems   A comprehensive review of 14 systems was done. Pertinent findings noted here and in history of present illness. All the rest negative.  Constitutional: Negative.  Negative for fever, chills, she has  activity change, appetite change and fatigue.   HENT: Negative for congestion and facial swelling.    Eyes: Negative for photophobia, redness and visual disturbance.   Respiratory: Negative for cough and chest tightness.    Cardiovascular: Negative for chest pain, palpitations and leg swelling.   Gastrointestinal: Negative for nausea, diarrhea, constipation, blood in stool and abdominal distention.   Had some ongoing diarrhea concerns  Genitourinary: Negative.    Musculoskeletal: Having pain in her left arm and has been using high doses of oxycodone  Reporting that she was able to sleep last night but this morning woke up with 10 out of 10 pain  Skin: Negative.    Neurological: Negative for dizziness, tremors, syncope, weakness, light-headedness and headaches.   Hematological: Does not bruise/bleed easily.   Psychiatric/Behavioral: Negative.        Physical Exam   /68   Pulse 85   Temp 97.5  F (36.4  C)   Resp 18   Ht 1.676 m (5' 6\")   Wt 77.1 kg (170 lb)   SpO2 96%   BMI 27.44 kg/m       Constitutional: Oriented to person, place, and time and appears well-developed.   HEENT:  Normocephalic and atraumatic.  Eyes: Conjunctivae and EOM are normal. Pupils are equal, round, and reactive to light. No discharge.  No scleral icterus. Nose normal. Mouth/Throat: Oropharynx is clear and moist. No oropharyngeal exudate.    NECK: Normal range of motion. Neck " supple. No JVD present. No tracheal deviation present. No thyromegaly present.   CARDIOVASCULAR: Normal rate, regular rhythm and intact distal pulses.  Exam reveals no gallop and no friction rub.  Systolic murmur present.  PULMONARY: Effort normal and breath sounds normal. No respiratory distress.No Wheezing or rales.  ABDOMEN: Soft. Bowel sounds are normal. No distension and no mass.  There is no tenderness. There is no rebound and no guarding. No HSM.  MUSCULOSKELETAL: Normal range of motion. Mild kyphosis, no tenderness.  Left upper extremity is in a  an Ace wrap and a cast  LYMPH NODES: Has no cervical, supraclavicular, axillary and groin adenopathy.   NEUROLOGICAL: Alert and oriented to person, place, and time. No cranial nerve deficit.  Normal muscle tone. Coordination normal.   GENITOURINARY: Deferred exam.  SKIN: Skin is warm and dry. No rash noted. No erythema. No pallor.   EXTREMITIES: No cyanosis, no clubbing, no edema. No Deformity.  PSYCHIATRIC: Normal mood, affect and behavior.  Anxiety noted    Lab Results     Recent Results (from the past 240 hour(s))   Glucose by meter    Collection Time: 11/02/23  6:24 AM   Result Value Ref Range    GLUCOSE BY METER POCT 130 (H) 70 - 99 mg/dL   Glucose by meter    Collection Time: 11/02/23  9:08 AM   Result Value Ref Range    GLUCOSE BY METER POCT 122 (H) 70 - 99 mg/dL   Glucose by meter    Collection Time: 11/02/23 11:30 AM   Result Value Ref Range    GLUCOSE BY METER POCT 134 (H) 70 - 99 mg/dL   Creatinine    Collection Time: 11/02/23  3:53 PM   Result Value Ref Range    Creatinine 0.62 0.51 - 0.95 mg/dL    GFR Estimate >90 >60 mL/min/1.73m2   Glucose by meter    Collection Time: 11/02/23  4:41 PM   Result Value Ref Range    GLUCOSE BY METER POCT 101 (H) 70 - 99 mg/dL   Glucose by meter    Collection Time: 11/02/23  9:35 PM   Result Value Ref Range    GLUCOSE BY METER POCT 122 (H) 70 - 99 mg/dL   Glucose by meter    Collection Time: 11/03/23  2:14 AM   Result  Value Ref Range    GLUCOSE BY METER POCT 139 (H) 70 - 99 mg/dL   Glucose by meter    Collection Time: 11/03/23  6:55 AM   Result Value Ref Range    GLUCOSE BY METER POCT 141 (H) 70 - 99 mg/dL   Glucose by meter    Collection Time: 11/03/23 11:34 AM   Result Value Ref Range    GLUCOSE BY METER POCT 113 (H) 70 - 99 mg/dL   Glucose by meter    Collection Time: 11/03/23  4:52 PM   Result Value Ref Range    GLUCOSE BY METER POCT 167 (H) 70 - 99 mg/dL   Glucose by meter    Collection Time: 11/03/23 10:39 PM   Result Value Ref Range    GLUCOSE BY METER POCT 152 (H) 70 - 99 mg/dL   Glucose by meter    Collection Time: 11/04/23  2:38 AM   Result Value Ref Range    GLUCOSE BY METER POCT 141 (H) 70 - 99 mg/dL   Platelet count    Collection Time: 11/04/23  6:09 AM   Result Value Ref Range    Platelet Count 362 150 - 450 10e3/uL   Glucose by meter    Collection Time: 11/04/23  6:43 AM   Result Value Ref Range    GLUCOSE BY METER POCT 122 (H) 70 - 99 mg/dL   Glucose by meter    Collection Time: 11/04/23 12:11 PM   Result Value Ref Range    GLUCOSE BY METER POCT 120 (H) 70 - 99 mg/dL   Basic metabolic panel    Collection Time: 11/07/23  5:55 AM   Result Value Ref Range    Sodium 138 135 - 145 mmol/L    Potassium 4.4 3.4 - 5.3 mmol/L    Chloride 101 98 - 107 mmol/L    Carbon Dioxide (CO2) 25 22 - 29 mmol/L    Anion Gap 12 7 - 15 mmol/L    Urea Nitrogen 12.3 8.0 - 23.0 mg/dL    Creatinine 0.67 0.51 - 0.95 mg/dL    GFR Estimate 90 >60 mL/min/1.73m2    Calcium 9.6 8.8 - 10.2 mg/dL    Glucose 106 (H) 70 - 99 mg/dL   CBC with platelets    Collection Time: 11/07/23  5:55 AM   Result Value Ref Range    WBC Count 7.4 4.0 - 11.0 10e3/uL    RBC Count 3.70 (L) 3.80 - 5.20 10e6/uL    Hemoglobin 12.0 11.7 - 15.7 g/dL    Hematocrit 37.7 35.0 - 47.0 %     (H) 78 - 100 fL    MCH 32.4 26.5 - 33.0 pg    MCHC 31.8 31.5 - 36.5 g/dL    RDW 15.8 (H) 10.0 - 15.0 %    Platelet Count 462 (H) 150 - 450 10e3/uL   Magnesium    Collection Time: 11/07/23   5:55 AM   Result Value Ref Range    Magnesium 1.7 1.7 - 2.3 mg/dL                 Electronically signed by    Jamee Trent MD                            Sincerely,        AUGUSTUS Ndiaye

## 2023-11-13 RX ORDER — AMOXICILLIN 250 MG
1 CAPSULE ORAL 2 TIMES DAILY PRN
COMMUNITY

## 2023-11-14 ENCOUNTER — TRANSITIONAL CARE UNIT VISIT (OUTPATIENT)
Dept: GERIATRICS | Facility: CLINIC | Age: 78
End: 2023-11-14
Payer: COMMERCIAL

## 2023-11-14 VITALS
HEART RATE: 65 BPM | DIASTOLIC BLOOD PRESSURE: 62 MMHG | SYSTOLIC BLOOD PRESSURE: 128 MMHG | BODY MASS INDEX: 27.32 KG/M2 | HEIGHT: 66 IN | TEMPERATURE: 97.2 F | RESPIRATION RATE: 18 BRPM | OXYGEN SATURATION: 96 % | WEIGHT: 170 LBS

## 2023-11-14 DIAGNOSIS — R52 PAIN MANAGEMENT: ICD-10-CM

## 2023-11-14 DIAGNOSIS — R53.81 PHYSICAL DECONDITIONING: ICD-10-CM

## 2023-11-14 DIAGNOSIS — S52.022D CLOSED FRACTURE OF OLECRANON PROCESS OF LEFT ULNA WITH ROUTINE HEALING, SUBSEQUENT ENCOUNTER: Primary | ICD-10-CM

## 2023-11-14 PROCEDURE — 99309 SBSQ NF CARE MODERATE MDM 30: CPT | Performed by: NURSE PRACTITIONER

## 2023-11-14 NOTE — LETTER
11/14/2023        RE: Belgica Salvador  [address] [facility] [id number]         HEALTH GERIATRIC SERVICES  Chief Complaint   [facility]     [facility]     [place] Medical Record Number:  [id number]  Place of Service where encounter took place:  [facility] ([facility]) [[id number]]  Code Status:  Full Code     HISTORY:      HPI:  Belgica Salvador  is 77 year old (1945) undergoing physical and occupational therapy.  She is with past medical history anxiety, arthritis, CAD, diabetes type 2, GERD, Graves' disease, hypertension, she underwent a revision for a left elbow ORIF on 11/2/2023 secondary to a fall of note she  also underwent on 8/16/2023 an ORIF of distal left humerus fracture with ulnar nerve transport.    Today she was seen to review vital signs, labs, routine visit.  She denied chest pain shortness of breath cough congestion.  He is moving her bowels.  She is with a hinged brace LUE, Stapled intact. She did follow up with orthopedics on 11/10/23 and now WBAT. She was able to wiggle all her fingers and they were warm to touch.  Her pain is controlled with current medications. She will follow up in 3 weeks for staple removal     ALLERGIES:Glucosamine, Ibuprofen, Iodine, Naproxen, Shellfish allergy, Shellfish-derived products, Amitriptyline, Buspirone, Nystatin, Tramadol, Chlorpheniramine, Nsaids, and Pseudoephedrine    PAST MEDICAL HISTORY:   Past Medical History:   Diagnosis Date     Anxiety      Arthritis      Coronary artery disease      Diabetes (H)      Gastroesophageal reflux disease      Graves disease      Hypertension      Migraine      Motion sickness      Muscle spasm      Renal disease      Thrombosis      Thyroid disease        PAST SURGICAL HISTORY:   has a past surgical history that includes IR Carotid Angiogram (5/22/2012); IR Carotid Angiogram (5/22/2012); IR Aortic Arch 4 Vessel Angiogram (5/22/2012); GASTRIC BYPASS,OBESE<100CM JAROD-EN-Y; Pr Partial Excision  Thyroid,Unilat; REVISE MEDIAN N/CARPAL TUNNEL SURG; CERV SPINE FUSN,ANTER,BELOW C2; KNEE SCOPE,AID POST CRUC REPAIR; appendectomy; Pancreas surgery (N/A); Esophagoscopy, gastroscopy, duodenoscopy (EGD), combined (N/A, 9/24/2017); Pr Implant Periph/Gastric Neurostim/ (N/A, 9/28/2017); joint replacement (Right, 2009); Release carpal tunnel (Left, 06/2018); other surgical history (2013); other surgical history (Left); Ankle surgery (Right); other surgical history; Cervical Fusion (N/A, 3/20/2019); Arthroplasty knee (Left, 11/10/2022); Open reduction internal fixation humerus distal (Left, 8/16/2023); and Open reduction internal fixation elbow (Left, 11/2/2023).    FAMILY HISTORY: family history includes Breast Cancer in her paternal aunt.    SOCIAL HISTORY:  reports that she has quit smoking. She has never used smokeless tobacco. She reports that she does not currently use alcohol after a past usage of about 1.0 - 2.0 standard drink of alcohol per week. She reports current drug use. Drug: Oxycodone.    ROS:  Constitutional: Negative for activity change, appetite change, fatigue and fever.   HENT: Negative for congestion.    Respiratory: Negative for cough, shortness of breath and wheezing.    Cardiovascular: Negative for chest pain and leg swelling.   Gastrointestinal: Negative for abdominal distention, abdominal pain, constipation, diarrhea and nausea.   Genitourinary: Negative for dysuria.   Musculoskeletal: Negative for arthralgia. Negative for back pain.   Skin: Negative for color change and wound.  Left elbow fracture revision   neurological: Negative for dizziness. Intermittent dizziness, PRN Meclizine   Psychiatric/Behavioral: Negative for agitation, behavioral problems and confusion.     Physical Exam:  Constitutional:       Appearance: Patient is well-developed.   HENT:      Head: Normocephalic.   Eyes:      Conjunctiva/sclera: Conjunctivae normal.   Neck:      Musculoskeletal: Normal range of motion.  "  Cardiovascular:      Rate and Rhythm: Normal rate and regular rhythm.      Heart sounds: Normal heart sounds. No murmur.   Pulmonary:      Effort: No respiratory distress.      Breath sounds: Normal breath sounds. No wheezing or rales.   Abdominal:      General: Bowel sounds are normal. There is no distension.      Palpations: Abdomen is soft.      Tenderness: There is no abdominal tenderness.   Musculoskeletal:       Normal range of motion.  WBAT LUE, hinged elbow brace, Staples intact  skin:General:        Skin is warm.   Neurological:         Mental Status: Patient is alert and oriented to person, place, and time.   Psychiatric:         Behavior: Behavior normal.     Vitals:/62   Pulse 65   Temp 97.2  F (36.2  C)   Resp 18   Ht 1.676 m (5' 6\")   Wt 77.1 kg (170 lb)   SpO2 96%   BMI 27.44 kg/m   and Body mass index is 27.44 kg/m .    Lab/Diagnostic data:   Recent Results (from the past 240 hour(s))   Basic metabolic panel    Collection Time: 11/07/23  5:55 AM   Result Value Ref Range    Sodium 138 135 - 145 mmol/L    Potassium 4.4 3.4 - 5.3 mmol/L    Chloride 101 98 - 107 mmol/L    Carbon Dioxide (CO2) 25 22 - 29 mmol/L    Anion Gap 12 7 - 15 mmol/L    Urea Nitrogen 12.3 8.0 - 23.0 mg/dL    Creatinine 0.67 0.51 - 0.95 mg/dL    GFR Estimate 90 >60 mL/min/1.73m2    Calcium 9.6 8.8 - 10.2 mg/dL    Glucose 106 (H) 70 - 99 mg/dL   CBC with platelets    Collection Time: 11/07/23  5:55 AM   Result Value Ref Range    WBC Count 7.4 4.0 - 11.0 10e3/uL    RBC Count 3.70 (L) 3.80 - 5.20 10e6/uL    Hemoglobin 12.0 11.7 - 15.7 g/dL    Hematocrit 37.7 35.0 - 47.0 %     (H) 78 - 100 fL    MCH 32.4 26.5 - 33.0 pg    MCHC 31.8 31.5 - 36.5 g/dL    RDW 15.8 (H) 10.0 - 15.0 %    Platelet Count 462 (H) 150 - 450 10e3/uL   Magnesium    Collection Time: 11/07/23  5:55 AM   Result Value Ref Range    Magnesium 1.7 1.7 - 2.3 mg/dL        MEDICATIONS:     Review of your medicines            Accurate as of November 14, " 2023 12:42 PM. If you have any questions, ask your nurse or doctor.                CONTINUE these medicines which have NOT CHANGED        Dose / Directions   acetaminophen 500 MG tablet  Commonly known as: TYLENOL      Dose: 1,000 mg  Take 1,000 mg by mouth every 6 hours  Refills: 0     aspirin 81 MG EC tablet      Dose: 81 mg  Take 81 mg by mouth daily  Refills: 0     baclofen 10 MG tablet  Commonly known as: LIORESAL      Dose: 10 mg  Take 10 mg by mouth 3 times daily  Refills: 0     busPIRone 10 MG tablet  Commonly known as: BUSPAR      Dose: 10 mg  Take 10 mg by mouth 2 times daily  Refills: 0     Calcium-Magnesium-Zinc 333-133-5 MG Tabs per tablet      Dose: 1 tablet  Take 1 tablet by mouth daily  Refills: 0     DULoxetine 60 MG capsule  Commonly known as: CYMBALTA      Dose: 60 mg  Take 60 mg by mouth daily  Refills: 0     EPINEPHrine 0.3 MG/0.3ML injection 2-pack  Commonly known as: ANY BX GENERIC EQUIV      Dose: 0.3 mg  [EPINEPHRINE (EPIPEN 2-SHERRI) 0.3 MG/0.3 ML ATIN] Inject 0.3 mg into the shoulder, thigh, or buttocks once as needed (Anaphylaxis).  Refills: 0     famotidine 20 MG tablet  Commonly known as: PEPCID      Dose: 20 mg  Take 20 mg by mouth 2 times daily  Refills: 0     ferrous sulfate 325 (65 Fe) MG tablet  Commonly known as: FEROSUL      Dose: 1 tablet  Take 1 tablet by mouth every evening  Refills: 0     folic acid 1 MG tablet  Commonly known as: FOLVITE      Dose: 1 mg  [FOLIC ACID (FOLVITE) 1 MG TABLET] Take 1 mg by mouth daily.  Refills: 0     levothyroxine 88 MCG tablet  Commonly known as: SYNTHROID/LEVOTHROID      Dose: 88 mcg  [LEVOTHYROXINE (SYNTHROID, LEVOTHROID) 88 MCG TABLET] Take 88 mcg by mouth Daily at 6:00 am.  Refills: 0     lisinopril 20 MG tablet  Commonly known as: ZESTRIL  Used for: Essential hypertension      Dose: 20 mg  Take 1 tablet (20 mg) by mouth daily Hold for SBP < 120 mmHg.  Refills: 0     loperamide 2 MG capsule  Commonly known as: IMODIUM  Indication: Diarrhea       Dose: 2-4 mg  Take 2-4 mg by mouth 3 times daily as needed for diarrhea Take 1 capsule with first loose stool and take 2 capsules after each subsequent loose stool  Refills: 0     magnesium oxide 400 MG tablet  Commonly known as: MAG-OX      Dose: 400 mg  Take 400 mg by mouth 2 times daily  Refills: 0     meclizine 12.5 MG tablet  Commonly known as: ANTIVERT  Indication: Sensation of Spinning or Whirling      Dose: 12.5 mg  Take 12.5 mg by mouth 3 times daily as needed for dizziness  Refills: 0     metFORMIN 500 MG tablet  Commonly known as: GLUCOPHAGE      Dose: 500 mg  Take 500 mg by mouth daily (with breakfast)  Refills: 0     omeprazole 20 MG DR capsule  Commonly known as: PriLOSEC      Dose: 20 mg  Take 20 mg by mouth 2 times daily as needed  Refills: 0     oxyCODONE 5 MG tablet  Commonly known as: ROXICODONE  Used for: Closed supracondylar fracture of left elbow, initial encounter      Dose: 5-10 mg  Take 1-2 tablets (5-10 mg) by mouth every 4 hours as needed for moderate to severe pain  Quantity: 10 tablet  Refills: 0     Prevagen 10 MG Caps  Generic drug: Apoaequorin      Dose: 10 mg  Take 10 mg by mouth daily  Refills: 0     rosuvastatin 5 MG tablet  Commonly known as: CRESTOR      Dose: 5 mg  Take 5 mg by mouth At Bedtime  Refills: 0     senna-docusate 8.6-50 MG tablet  Commonly known as: SENOKOT-S/PERICOLACE      Dose: 1 tablet  Take 1 tablet by mouth 2 times daily as needed for constipation  Refills: 0     sucralfate 1 GM tablet  Commonly known as: CARAFATE      Dose: 1 g  Take 1 g by mouth 4 times daily Before meals and bedtime  Refills: 0     TheraTears Extra 0.25 % Soln  Generic drug: Carboxymethylcellulose Sod PF      Dose: 1 drop  Place 1 drop into both eyes 2 times daily  Refills: 0     Vitamin D (Cholecalciferol) 50 MCG (2000 UT) Caps  Indication: Vitamin D Deficiency      Dose: 2,000 Units  Take 2,000 Units by mouth daily  Refills: 0            STOP taking      TheraTears Allergy 0.025 %  ophthalmic solution  Generic drug: ketotifen fumarate 0.035% (ketotifen 0.025%)  Stopped by: Billie Santamaria CNP                 ASSESSMENT/PLAN  Encounter Diagnoses   Name Primary?     Closed fracture of olecranon process of left ulna with routine healing, subsequent encounter Yes     Pain management      Physical deconditioning      Left elbow  fracture revision follow-up with orthopedics pain control weight bearing as tolerated  left upper extremity    Type 2 diabetes on metformin 500 mg daily A1c 9/25/23 was 5.4    Pain management  ESTylenol 1000 mg every 6hours, baclofen 10 mg 3 times daily, oxycodone 5 to 10 mg every 4 hours as needed    HDL on Crestor    Physical deconditioning PT OT    Cold sores as needed valacyclovir    Anxiety on BuSpar    Mood disorder continue duloxetine    GERD on famotidine    Anemia continue ferrous sulfate    Hypertension on lisinopril 20 mg daily    Dizziness as needed meclizine    Hypothyroidism on levothyroxine TSH on 5/5/23 was 1.58    Electronically signed by: Billie Santamaria CNP         Sincerely,        Billie Santamaria CNP

## 2023-11-14 NOTE — PROGRESS NOTES
[facility] GERIATRIC SERVICES  Chief Complaint   Patient presents with    [place]     [place] Medical Record Number:  [id number]  Place of Service where encounter took place:  [facility] ([facility]) [[id number]]  Code Status:  Full Code     HISTORY:      HPI:  Belgica Salvador  is 77 year old (1945) undergoing physical and occupational therapy.  She is with past medical history anxiety, arthritis, CAD, diabetes type 2, GERD, Graves' disease, hypertension, she underwent a revision for a left elbow ORIF on 11/2/2023 secondary to a fall of note she  also underwent on 8/16/2023 an ORIF of distal left humerus fracture with ulnar nerve transport.    Today she was seen to review vital signs, labs, routine visit.  She denied chest pain shortness of breath cough congestion.  He is moving her bowels.  She is with a hinged brace LUE, Stapled intact. She did follow up with orthopedics on 11/10/23 and now WBAT. She was able to wiggle all her fingers and they were warm to touch.  Her pain is controlled with current medications. She will follow up in 3 weeks for staple removal     ALLERGIES:Glucosamine, Ibuprofen, Iodine, Naproxen, Shellfish allergy, Shellfish-derived products, Amitriptyline, Buspirone, Nystatin, Tramadol, Chlorpheniramine, Nsaids, and Pseudoephedrine    PAST MEDICAL HISTORY:   Past Medical History:   Diagnosis Date    Anxiety     Arthritis     Coronary artery disease     Diabetes (H)     Gastroesophageal reflux disease     Graves disease     Hypertension     Migraine     Motion sickness     Muscle spasm     Renal disease     Thrombosis     Thyroid disease        PAST SURGICAL HISTORY:   has a past surgical history that includes IR Carotid Angiogram (5/22/2012); IR Carotid Angiogram (5/22/2012); IR Aortic Arch 4 Vessel Angiogram (5/22/2012); GASTRIC BYPASS,OBESE<100CM JAROD-EN-Y; Pr Partial Excision Thyroid,Unilat; REVISE MEDIAN N/CARPAL TUNNEL SURG; CERV SPINE FUSN,ANTER,BELOW C2; KNEE SCOPE,AID POST CRUC  REPAIR; appendectomy; Pancreas surgery (N/A); Esophagoscopy, gastroscopy, duodenoscopy (EGD), combined (N/A, 9/24/2017); Pr Implant Periph/Gastric Neurostim/ (N/A, 9/28/2017); joint replacement (Right, 2009); Release carpal tunnel (Left, 06/2018); other surgical history (2013); other surgical history (Left); Ankle surgery (Right); other surgical history; Cervical Fusion (N/A, 3/20/2019); Arthroplasty knee (Left, 11/10/2022); Open reduction internal fixation humerus distal (Left, 8/16/2023); and Open reduction internal fixation elbow (Left, 11/2/2023).    FAMILY HISTORY: family history includes Breast Cancer in her paternal aunt.    SOCIAL HISTORY:  reports that she has quit smoking. She has never used smokeless tobacco. She reports that she does not currently use alcohol after a past usage of about 1.0 - 2.0 standard drink of alcohol per week. She reports current drug use. Drug: Oxycodone.    ROS:  Constitutional: Negative for activity change, appetite change, fatigue and fever.   HENT: Negative for congestion.    Respiratory: Negative for cough, shortness of breath and wheezing.    Cardiovascular: Negative for chest pain and leg swelling.   Gastrointestinal: Negative for abdominal distention, abdominal pain, constipation, diarrhea and nausea.   Genitourinary: Negative for dysuria.   Musculoskeletal: Negative for arthralgia. Negative for back pain.   Skin: Negative for color change and wound.  Left elbow fracture revision   neurological: Negative for dizziness. Intermittent dizziness, PRN Meclizine   Psychiatric/Behavioral: Negative for agitation, behavioral problems and confusion.     Physical Exam:  Constitutional:       Appearance: Patient is well-developed.   HENT:      Head: Normocephalic.   Eyes:      Conjunctiva/sclera: Conjunctivae normal.   Neck:      Musculoskeletal: Normal range of motion.   Cardiovascular:      Rate and Rhythm: Normal rate and regular rhythm.      Heart sounds: Normal heart  "sounds. No murmur.   Pulmonary:      Effort: No respiratory distress.      Breath sounds: Normal breath sounds. No wheezing or rales.   Abdominal:      General: Bowel sounds are normal. There is no distension.      Palpations: Abdomen is soft.      Tenderness: There is no abdominal tenderness.   Musculoskeletal:       Normal range of motion.  WBAT LUE, hinged elbow brace, Staples intact  skin:General:        Skin is warm.   Neurological:         Mental Status: Patient is alert and oriented to person, place, and time.   Psychiatric:         Behavior: Behavior normal.     Vitals:/62   Pulse 65   Temp 97.2  F (36.2  C)   Resp 18   Ht 1.676 m (5' 6\")   Wt 77.1 kg (170 lb)   SpO2 96%   BMI 27.44 kg/m   and Body mass index is 27.44 kg/m .    Lab/Diagnostic data:   Recent Results (from the past 240 hour(s))   Basic metabolic panel    Collection Time: 11/07/23  5:55 AM   Result Value Ref Range    Sodium 138 135 - 145 mmol/L    Potassium 4.4 3.4 - 5.3 mmol/L    Chloride 101 98 - 107 mmol/L    Carbon Dioxide (CO2) 25 22 - 29 mmol/L    Anion Gap 12 7 - 15 mmol/L    Urea Nitrogen 12.3 8.0 - 23.0 mg/dL    Creatinine 0.67 0.51 - 0.95 mg/dL    GFR Estimate 90 >60 mL/min/1.73m2    Calcium 9.6 8.8 - 10.2 mg/dL    Glucose 106 (H) 70 - 99 mg/dL   CBC with platelets    Collection Time: 11/07/23  5:55 AM   Result Value Ref Range    WBC Count 7.4 4.0 - 11.0 10e3/uL    RBC Count 3.70 (L) 3.80 - 5.20 10e6/uL    Hemoglobin 12.0 11.7 - 15.7 g/dL    Hematocrit 37.7 35.0 - 47.0 %     (H) 78 - 100 fL    MCH 32.4 26.5 - 33.0 pg    MCHC 31.8 31.5 - 36.5 g/dL    RDW 15.8 (H) 10.0 - 15.0 %    Platelet Count 462 (H) 150 - 450 10e3/uL   Magnesium    Collection Time: 11/07/23  5:55 AM   Result Value Ref Range    Magnesium 1.7 1.7 - 2.3 mg/dL        MEDICATIONS:     Review of your medicines            Accurate as of November 14, 2023 12:42 PM. If you have any questions, ask your nurse or doctor.                CONTINUE these " medicines which have NOT CHANGED        Dose / Directions   acetaminophen 500 MG tablet  Commonly known as: TYLENOL      Dose: 1,000 mg  Take 1,000 mg by mouth every 6 hours  Refills: 0     aspirin 81 MG EC tablet      Dose: 81 mg  Take 81 mg by mouth daily  Refills: 0     baclofen 10 MG tablet  Commonly known as: LIORESAL      Dose: 10 mg  Take 10 mg by mouth 3 times daily  Refills: 0     busPIRone 10 MG tablet  Commonly known as: BUSPAR      Dose: 10 mg  Take 10 mg by mouth 2 times daily  Refills: 0     Calcium-Magnesium-Zinc 333-133-5 MG Tabs per tablet      Dose: 1 tablet  Take 1 tablet by mouth daily  Refills: 0     DULoxetine 60 MG capsule  Commonly known as: CYMBALTA      Dose: 60 mg  Take 60 mg by mouth daily  Refills: 0     EPINEPHrine 0.3 MG/0.3ML injection 2-pack  Commonly known as: ANY BX GENERIC EQUIV      Dose: 0.3 mg  [EPINEPHRINE (EPIPEN 2-SHERRI) 0.3 MG/0.3 ML ATIN] Inject 0.3 mg into the shoulder, thigh, or buttocks once as needed (Anaphylaxis).  Refills: 0     famotidine 20 MG tablet  Commonly known as: PEPCID      Dose: 20 mg  Take 20 mg by mouth 2 times daily  Refills: 0     ferrous sulfate 325 (65 Fe) MG tablet  Commonly known as: FEROSUL      Dose: 1 tablet  Take 1 tablet by mouth every evening  Refills: 0     folic acid 1 MG tablet  Commonly known as: FOLVITE      Dose: 1 mg  [FOLIC ACID (FOLVITE) 1 MG TABLET] Take 1 mg by mouth daily.  Refills: 0     levothyroxine 88 MCG tablet  Commonly known as: SYNTHROID/LEVOTHROID      Dose: 88 mcg  [LEVOTHYROXINE (SYNTHROID, LEVOTHROID) 88 MCG TABLET] Take 88 mcg by mouth Daily at 6:00 am.  Refills: 0     lisinopril 20 MG tablet  Commonly known as: ZESTRIL  Used for: Essential hypertension      Dose: 20 mg  Take 1 tablet (20 mg) by mouth daily Hold for SBP < 120 mmHg.  Refills: 0     loperamide 2 MG capsule  Commonly known as: IMODIUM  Indication: Diarrhea      Dose: 2-4 mg  Take 2-4 mg by mouth 3 times daily as needed for diarrhea Take 1 capsule with  first loose stool and take 2 capsules after each subsequent loose stool  Refills: 0     magnesium oxide 400 MG tablet  Commonly known as: MAG-OX      Dose: 400 mg  Take 400 mg by mouth 2 times daily  Refills: 0     meclizine 12.5 MG tablet  Commonly known as: ANTIVERT  Indication: Sensation of Spinning or Whirling      Dose: 12.5 mg  Take 12.5 mg by mouth 3 times daily as needed for dizziness  Refills: 0     metFORMIN 500 MG tablet  Commonly known as: GLUCOPHAGE      Dose: 500 mg  Take 500 mg by mouth daily (with breakfast)  Refills: 0     omeprazole 20 MG DR capsule  Commonly known as: PriLOSEC      Dose: 20 mg  Take 20 mg by mouth 2 times daily as needed  Refills: 0     oxyCODONE 5 MG tablet  Commonly known as: ROXICODONE  Used for: Closed supracondylar fracture of left elbow, initial encounter      Dose: 5-10 mg  Take 1-2 tablets (5-10 mg) by mouth every 4 hours as needed for moderate to severe pain  Quantity: 10 tablet  Refills: 0     Prevagen 10 MG Caps  Generic drug: Apoaequorin      Dose: 10 mg  Take 10 mg by mouth daily  Refills: 0     rosuvastatin 5 MG tablet  Commonly known as: CRESTOR      Dose: 5 mg  Take 5 mg by mouth At Bedtime  Refills: 0     senna-docusate 8.6-50 MG tablet  Commonly known as: SENOKOT-S/PERICOLACE      Dose: 1 tablet  Take 1 tablet by mouth 2 times daily as needed for constipation  Refills: 0     sucralfate 1 GM tablet  Commonly known as: CARAFATE      Dose: 1 g  Take 1 g by mouth 4 times daily Before meals and bedtime  Refills: 0     TheraTears Extra 0.25 % Soln  Generic drug: Carboxymethylcellulose Sod PF      Dose: 1 drop  Place 1 drop into both eyes 2 times daily  Refills: 0     Vitamin D (Cholecalciferol) 50 MCG (2000 UT) Caps  Indication: Vitamin D Deficiency      Dose: 2,000 Units  Take 2,000 Units by mouth daily  Refills: 0            STOP taking      TheraTears Allergy 0.025 % ophthalmic solution  Generic drug: ketotifen fumarate 0.035% (ketotifen 0.025%)  Stopped by: Billie  OZIEL Santamaria                 ASSESSMENT/PLAN  Encounter Diagnoses   Name Primary?    Closed fracture of olecranon process of left ulna with routine healing, subsequent encounter Yes    Pain management     Physical deconditioning      Left elbow  fracture revision follow-up with orthopedics pain control weight bearing as tolerated  left upper extremity    Type 2 diabetes on metformin 500 mg daily A1c 9/25/23 was 5.4    Pain management  ESTylenol 1000 mg every 6hours, baclofen 10 mg 3 times daily, oxycodone 5 to 10 mg every 4 hours as needed    HDL on Crestor    Physical deconditioning PT OT    Cold sores as needed valacyclovir    Anxiety on BuSpar    Mood disorder continue duloxetine    GERD on famotidine    Anemia continue ferrous sulfate    Hypertension on lisinopril 20 mg daily    Dizziness as needed meclizine    Hypothyroidism on levothyroxine TSH on 5/5/23 was 1.58    Electronically signed by: Billie Santamaria CNP

## 2023-11-17 DIAGNOSIS — S42.412A CLOSED SUPRACONDYLAR FRACTURE OF LEFT ELBOW, INITIAL ENCOUNTER: ICD-10-CM

## 2023-11-17 RX ORDER — OXYCODONE HYDROCHLORIDE 5 MG/1
5-10 TABLET ORAL EVERY 4 HOURS PRN
Qty: 30 TABLET | Refills: 0 | Status: SHIPPED | OUTPATIENT
Start: 2023-11-17

## 2023-11-20 ENCOUNTER — DISCHARGE SUMMARY NURSING HOME (OUTPATIENT)
Dept: GERIATRICS | Facility: CLINIC | Age: 78
End: 2023-11-20
Payer: COMMERCIAL

## 2023-11-20 VITALS
HEIGHT: 66 IN | DIASTOLIC BLOOD PRESSURE: 57 MMHG | TEMPERATURE: 97.9 F | HEART RATE: 64 BPM | WEIGHT: 168.2 LBS | OXYGEN SATURATION: 96 % | BODY MASS INDEX: 27.03 KG/M2 | SYSTOLIC BLOOD PRESSURE: 115 MMHG | RESPIRATION RATE: 18 BRPM

## 2023-11-20 DIAGNOSIS — R53.81 PHYSICAL DECONDITIONING: ICD-10-CM

## 2023-11-20 DIAGNOSIS — S42.412A CLOSED SUPRACONDYLAR FRACTURE OF LEFT ELBOW, INITIAL ENCOUNTER: ICD-10-CM

## 2023-11-20 DIAGNOSIS — G89.4 CHRONIC PAIN DISORDER: Primary | ICD-10-CM

## 2023-11-20 DIAGNOSIS — R52 PAIN MANAGEMENT: ICD-10-CM

## 2023-11-20 PROCEDURE — 99316 NF DSCHRG MGMT 30 MIN+: CPT | Performed by: NURSE PRACTITIONER

## 2023-11-20 NOTE — LETTER
11/20/2023        RE: Belgica Salvador  [address] [facility] [address]         HEALTH GERIATRIC SERVICES  Chief Complaint   Patient presents with     Discharge Summary Nursing [facility] Medical Record Number:  [id number]  Place of Service where encounter took place:  [facility] ([facility]) [[id number]]  Code Status:  Full Code     HISTORY:      HPI:  Belgica Salvador  is 77 year old (1945) undergoing physical and occupational therapy.  She is with past medical history anxiety, arthritis, CAD, diabetes type 2, GERD, Graves' disease, hypertension, she underwent a revision for a left elbow ORIF on 11/2/2023 secondary to a fall of note she  also underwent on 8/16/2023 an ORIF of distal left humerus fracture with ulnar nerve transport.    Today she was seen to review vital signs, labs, routine visit and a face-to-face for discharge.  She will discharge to home on 11/21/2023 with current medications and treatments.  She will have home care services PT OT home health aide RN.  She denied chest pain shortness of breath cough congestion.  SHe is moving her bowels.  She is with a hinged brace LUE, Stapled intact.  She reports she will have the staples removed on December 5, 2023.  She did follow up with orthopedics on 11/10/23 and now WBAT. She was able to wiggle all her fingers and they were warm to touch.  Her pain is controlled with current medications.       ALLERGIES:Glucosamine, Ibuprofen, Iodine, Naproxen, Shellfish allergy, Shellfish-derived products, Amitriptyline, Buspirone, Nystatin, Tramadol, Chlorpheniramine, Nsaids, and Pseudoephedrine    PAST MEDICAL HISTORY:   Past Medical History:   Diagnosis Date     Anxiety      Arthritis      Coronary artery disease      Diabetes (H)      Gastroesophageal reflux disease      Graves disease      Hypertension      Migraine      Motion sickness      Muscle spasm      Renal disease      Thrombosis      Thyroid disease        PAST SURGICAL  HISTORY:   has a past surgical history that includes IR Carotid Angiogram (5/22/2012); IR Carotid Angiogram (5/22/2012); IR Aortic Arch 4 Vessel Angiogram (5/22/2012); GASTRIC BYPASS,OBESE<100CM JAROD-EN-Y; Pr Partial Excision Thyroid,Unilat; REVISE MEDIAN N/CARPAL TUNNEL SURG; CERV SPINE FUSN,ANTER,BELOW C2; KNEE SCOPE,AID POST CRUC REPAIR; appendectomy; Pancreas surgery (N/A); Esophagoscopy, gastroscopy, duodenoscopy (EGD), combined (N/A, 9/24/2017); Pr Implant Periph/Gastric Neurostim/ (N/A, 9/28/2017); joint replacement (Right, 2009); Release carpal tunnel (Left, 06/2018); other surgical history (2013); other surgical history (Left); Ankle surgery (Right); other surgical history; Cervical Fusion (N/A, 3/20/2019); Arthroplasty knee (Left, 11/10/2022); Open reduction internal fixation humerus distal (Left, 8/16/2023); and Open reduction internal fixation elbow (Left, 11/2/2023).    FAMILY HISTORY: family history includes Breast Cancer in her paternal aunt.    SOCIAL HISTORY:  reports that she has quit smoking. She has never used smokeless tobacco. She reports that she does not currently use alcohol after a past usage of about 1.0 - 2.0 standard drink of alcohol per week. She reports current drug use. Drug: Oxycodone.    ROS:  Constitutional: Negative for activity change, appetite change, fatigue and fever.   HENT: Negative for congestion.    Respiratory: Negative for cough, shortness of breath and wheezing.    Cardiovascular: Negative for chest pain and leg swelling.   Gastrointestinal: Negative for abdominal distention, abdominal pain, constipation, diarrhea and nausea.   Genitourinary: Negative for dysuria.   Musculoskeletal: Negative for arthralgia. Negative for back pain.   Skin: Negative for color change and wound.  Left elbow fracture revision   neurological: Negative for dizziness. Intermittent dizziness, PRN Meclizine   Psychiatric/Behavioral: Negative for agitation, behavioral problems and  "confusion.     Physical Exam:  Constitutional:       Appearance: Patient is well-developed.   HENT:      Head: Normocephalic.   Eyes:      Conjunctiva/sclera: Conjunctivae normal.   Neck:      Musculoskeletal: Normal range of motion.   Cardiovascular:      Rate and Rhythm: Normal rate and regular rhythm.      Heart sounds: Normal heart sounds. No murmur.   Pulmonary:      Effort: No respiratory distress.      Breath sounds: Normal breath sounds. No wheezing or rales.   Abdominal:      General: Bowel sounds are normal. There is no distension.      Palpations: Abdomen is soft.      Tenderness: There is no abdominal tenderness.   Musculoskeletal:       Normal range of motion.  WBAT LUE, hinged elbow brace, Staples intact  skin:General:        Skin is warm.   Neurological:         Mental Status: Patient is alert and oriented to person, place, and time.   Psychiatric:         Behavior: Behavior normal.     Vitals:/57   Pulse 64   Temp 97.9  F (36.6  C)   Resp 18   Ht 1.676 m (5' 6\")   Wt 76.3 kg (168 lb 3.2 oz)   SpO2 96%   BMI 27.15 kg/m   and Body mass index is 27.15 kg/m .    Lab/Diagnostic data:   No results found for this or any previous visit (from the past 240 hour(s)).       MEDICATIONS:     Review of your medicines            Accurate as of November 20, 2023  2:57 PM. If you have any questions, ask your nurse or doctor.                CONTINUE these medicines which have NOT CHANGED        Dose / Directions   acetaminophen 500 MG tablet  Commonly known as: TYLENOL      Dose: 1,000 mg  Take 1,000 mg by mouth every 6 hours  Refills: 0     aspirin 81 MG EC tablet      Dose: 81 mg  Take 81 mg by mouth daily  Refills: 0     baclofen 10 MG tablet  Commonly known as: LIORESAL      Dose: 10 mg  Take 10 mg by mouth 3 times daily  Refills: 0     busPIRone 10 MG tablet  Commonly known as: BUSPAR      Dose: 10 mg  Take 10 mg by mouth 2 times daily  Refills: 0     Calcium-Magnesium-Zinc 333-133-5 MG Tabs per " tablet      Dose: 1 tablet  Take 1 tablet by mouth daily  Refills: 0     DULoxetine 60 MG capsule  Commonly known as: CYMBALTA      Dose: 60 mg  Take 60 mg by mouth daily  Refills: 0     EPINEPHrine 0.3 MG/0.3ML injection 2-pack  Commonly known as: ANY BX GENERIC EQUIV      Dose: 0.3 mg  [EPINEPHRINE (EPIPEN 2-SHERRI) 0.3 MG/0.3 ML ATIN] Inject 0.3 mg into the shoulder, thigh, or buttocks once as needed (Anaphylaxis).  Refills: 0     famotidine 20 MG tablet  Commonly known as: PEPCID      Dose: 20 mg  Take 20 mg by mouth 2 times daily  Refills: 0     ferrous sulfate 325 (65 Fe) MG tablet  Commonly known as: FEROSUL      Dose: 1 tablet  Take 1 tablet by mouth every evening  Refills: 0     folic acid 1 MG tablet  Commonly known as: FOLVITE      Dose: 1 mg  [FOLIC ACID (FOLVITE) 1 MG TABLET] Take 1 mg by mouth daily.  Refills: 0     levothyroxine 88 MCG tablet  Commonly known as: SYNTHROID/LEVOTHROID      Dose: 88 mcg  [LEVOTHYROXINE (SYNTHROID, LEVOTHROID) 88 MCG TABLET] Take 88 mcg by mouth Daily at 6:00 am.  Refills: 0     lisinopril 20 MG tablet  Commonly known as: ZESTRIL  Used for: Essential hypertension      Dose: 20 mg  Take 1 tablet (20 mg) by mouth daily Hold for SBP < 120 mmHg.  Refills: 0     loperamide 2 MG capsule  Commonly known as: IMODIUM  Indication: Diarrhea      Dose: 2-4 mg  Take 2-4 mg by mouth 3 times daily as needed for diarrhea Take 1 capsule with first loose stool and take 2 capsules after each subsequent loose stool  Refills: 0     magnesium oxide 400 MG tablet  Commonly known as: MAG-OX      Dose: 400 mg  Take 400 mg by mouth 2 times daily  Refills: 0     meclizine 12.5 MG tablet  Commonly known as: ANTIVERT  Indication: Sensation of Spinning or Whirling      Dose: 12.5 mg  Take 12.5 mg by mouth 3 times daily as needed for dizziness  Refills: 0     metFORMIN 500 MG tablet  Commonly known as: GLUCOPHAGE      Dose: 500 mg  Take 500 mg by mouth daily (with breakfast)  Refills: 0     omeprazole 20  MG DR capsule  Commonly known as: PriLOSEC      Dose: 20 mg  Take 20 mg by mouth 2 times daily as needed  Refills: 0     oxyCODONE 5 MG tablet  Commonly known as: ROXICODONE  Used for: Closed supracondylar fracture of left elbow, initial encounter      Dose: 5-10 mg  Take 1-2 tablets (5-10 mg) by mouth every 4 hours as needed for moderate to severe pain  Quantity: 30 tablet  Refills: 0     Prevagen 10 MG Caps  Generic drug: Apoaequorin      Dose: 10 mg  Take 10 mg by mouth daily  Refills: 0     rosuvastatin 5 MG tablet  Commonly known as: CRESTOR      Dose: 5 mg  Take 5 mg by mouth At Bedtime  Refills: 0     senna-docusate 8.6-50 MG tablet  Commonly known as: SENOKOT-S/PERICOLACE      Dose: 1 tablet  Take 1 tablet by mouth 2 times daily as needed for constipation  Refills: 0     sucralfate 1 GM tablet  Commonly known as: CARAFATE      Dose: 1 g  Take 1 g by mouth 4 times daily Before meals and bedtime  Refills: 0     TheraTears Extra 0.25 % Soln  Generic drug: Carboxymethylcellulose Sod PF      Dose: 1 drop  Place 1 drop into both eyes 2 times daily  Refills: 0     Vitamin D (Cholecalciferol) 50 MCG (2000 UT) Caps  Indication: Vitamin D Deficiency      Dose: 2,000 Units  Take 2,000 Units by mouth daily  Refills: 0              ASSESSMENT/PLAN  Encounter Diagnoses   Name Primary?     Chronic pain disorder Yes     Closed supracondylar fracture of left elbow, initial encounter      Pain management      Physical deconditioning      Left elbow  fracture revision follow-up with orthopedics pain control weight bearing as tolerated  left upper extremity    Type 2 diabetes on metformin 500 mg daily A1c 9/25/23 was 5.4    Pain management  ESTylenol 1000 mg every 6hours, baclofen 10 mg 3 times daily, oxycodone 5 to 10 mg every 4 hours as needed    HDL on Crestor    Physical deconditioning she will have home care services PT OT home health aide RN    Cold sores as needed valacyclovir    Anxiety on BuSpar    Mood disorder  continue duloxetine    GERD on famotidine    Anemia continue ferrous sulfate    Hypertension on lisinopril 20 mg daily    Dizziness as needed meclizine    Hypothyroidism on levothyroxine TSH on 5/5/23 was 1.58    DISCHARGE PLAN/FACE TO FACE:  I certify that services are/were furnished while this patient was under the care of a physician and that a physician or an allowed non-physician practitioner (NPP), had a face-to-face encounter that meets the physician face-to-face encounter requirements. The encounter was in whole, or in part, related to the primary reason for home health. The patient is confined to his/her home and needs intermittent skilled nursing, physical therapy, speech-language pathology, or the continued need for occupational therapy. A plan of care has been established by a physician and is periodically reviewed by a physician.  Date of Face-to-Face Encounter:11/20/23    I certify that, based on my findings, the following services are medically necessary home health services: PT/OT/HHA/RN    My clinical findings support the need for the above skilled services because: PT OT for continued strength and endurance, home health aide to assist with activities of daily living, RN for vital signs medication management and wound cares left elbow    This patient is homebound because: She is deconditioned following left elbow fracture , requires pain medication and unable to drive and leave the home unassisted, she will continue home therapy for strengthening    The patient is, or has been, under my care and I have initiated the establishment of the plan of care. This patient will be followed by a physician who will periodically review the plan of care.    Schedule follow up visit with primary care provider within 7 days to reestablish care.    Electronically signed by: Billie Santamaria CNP               Sincerely,        Billie Santamaria CNP

## 2023-11-20 NOTE — PROGRESS NOTES
[facility] GERIATRIC SERVICES  Chief Complaint   Patient presents with    Discharge Summary [facility] Medical Record Number:  [id number]  Place of Service where encounter took place:  [facility] ([facility]) [[id number]]  Code Status:  Full Code     HISTORY:      HPI:  Belgica Salvador  is 77 year old (1945) undergoing physical and occupational therapy.  She is with past medical history anxiety, arthritis, CAD, diabetes type 2, GERD, Graves' disease, hypertension, she underwent a revision for a left elbow ORIF on 11/2/2023 secondary to a fall of note she  also underwent on 8/16/2023 an ORIF of distal left humerus fracture with ulnar nerve transport.    Today she was seen to review vital signs, labs, routine visit and a face-to-face for discharge.  She will discharge to home on 11/21/2023 with current medications and treatments.  She will have home care services PT OT home health aide RN.  She denied chest pain shortness of breath cough congestion.  SHe is moving her bowels.  She is with a hinged brace LUE, Stapled intact.  She reports she will have the staples removed on December 5, 2023.  She did follow up with orthopedics on 11/10/23 and now WBAT. She was able to wiggle all her fingers and they were warm to touch.  Her pain is controlled with current medications.       ALLERGIES:Glucosamine, Ibuprofen, Iodine, Naproxen, Shellfish allergy, Shellfish-derived products, Amitriptyline, Buspirone, Nystatin, Tramadol, Chlorpheniramine, Nsaids, and Pseudoephedrine    PAST MEDICAL HISTORY:   Past Medical History:   Diagnosis Date    Anxiety     Arthritis     Coronary artery disease     Diabetes (H)     Gastroesophageal reflux disease     Graves disease     Hypertension     Migraine     Motion sickness     Muscle spasm     Renal disease     Thrombosis     Thyroid disease        PAST SURGICAL HISTORY:   has a past surgical history that includes IR Carotid Angiogram (5/22/2012); IR Carotid Angiogram  (5/22/2012); IR Aortic Arch 4 Vessel Angiogram (5/22/2012); GASTRIC BYPASS,OBESE<100CM JAROD-EN-Y; Pr Partial Excision Thyroid,Unilat; REVISE MEDIAN N/CARPAL TUNNEL SURG; CERV SPINE FUSN,ANTER,BELOW C2; KNEE SCOPE,AID POST CRUC REPAIR; appendectomy; Pancreas surgery (N/A); Esophagoscopy, gastroscopy, duodenoscopy (EGD), combined (N/A, 9/24/2017); Pr Implant Periph/Gastric Neurostim/ (N/A, 9/28/2017); joint replacement (Right, 2009); Release carpal tunnel (Left, 06/2018); other surgical history (2013); other surgical history (Left); Ankle surgery (Right); other surgical history; Cervical Fusion (N/A, 3/20/2019); Arthroplasty knee (Left, 11/10/2022); Open reduction internal fixation humerus distal (Left, 8/16/2023); and Open reduction internal fixation elbow (Left, 11/2/2023).    FAMILY HISTORY: family history includes Breast Cancer in her paternal aunt.    SOCIAL HISTORY:  reports that she has quit smoking. She has never used smokeless tobacco. She reports that she does not currently use alcohol after a past usage of about 1.0 - 2.0 standard drink of alcohol per week. She reports current drug use. Drug: Oxycodone.    ROS:  Constitutional: Negative for activity change, appetite change, fatigue and fever.   HENT: Negative for congestion.    Respiratory: Negative for cough, shortness of breath and wheezing.    Cardiovascular: Negative for chest pain and leg swelling.   Gastrointestinal: Negative for abdominal distention, abdominal pain, constipation, diarrhea and nausea.   Genitourinary: Negative for dysuria.   Musculoskeletal: Negative for arthralgia. Negative for back pain.   Skin: Negative for color change and wound.  Left elbow fracture revision   neurological: Negative for dizziness. Intermittent dizziness, PRN Meclizine   Psychiatric/Behavioral: Negative for agitation, behavioral problems and confusion.     Physical Exam:  Constitutional:       Appearance: Patient is well-developed.   HENT:      Head:  "Normocephalic.   Eyes:      Conjunctiva/sclera: Conjunctivae normal.   Neck:      Musculoskeletal: Normal range of motion.   Cardiovascular:      Rate and Rhythm: Normal rate and regular rhythm.      Heart sounds: Normal heart sounds. No murmur.   Pulmonary:      Effort: No respiratory distress.      Breath sounds: Normal breath sounds. No wheezing or rales.   Abdominal:      General: Bowel sounds are normal. There is no distension.      Palpations: Abdomen is soft.      Tenderness: There is no abdominal tenderness.   Musculoskeletal:       Normal range of motion.  WBAT LUE, hinged elbow brace, Staples intact  skin:General:        Skin is warm.   Neurological:         Mental Status: Patient is alert and oriented to person, place, and time.   Psychiatric:         Behavior: Behavior normal.     Vitals:/57   Pulse 64   Temp 97.9  F (36.6  C)   Resp 18   Ht 1.676 m (5' 6\")   Wt 76.3 kg (168 lb 3.2 oz)   SpO2 96%   BMI 27.15 kg/m   and Body mass index is 27.15 kg/m .    Lab/Diagnostic data:   No results found for this or any previous visit (from the past 240 hour(s)).       MEDICATIONS:     Review of your medicines            Accurate as of November 20, 2023  2:57 PM. If you have any questions, ask your nurse or doctor.                CONTINUE these medicines which have NOT CHANGED        Dose / Directions   acetaminophen 500 MG tablet  Commonly known as: TYLENOL      Dose: 1,000 mg  Take 1,000 mg by mouth every 6 hours  Refills: 0     aspirin 81 MG EC tablet      Dose: 81 mg  Take 81 mg by mouth daily  Refills: 0     baclofen 10 MG tablet  Commonly known as: LIORESAL      Dose: 10 mg  Take 10 mg by mouth 3 times daily  Refills: 0     busPIRone 10 MG tablet  Commonly known as: BUSPAR      Dose: 10 mg  Take 10 mg by mouth 2 times daily  Refills: 0     Calcium-Magnesium-Zinc 333-133-5 MG Tabs per tablet      Dose: 1 tablet  Take 1 tablet by mouth daily  Refills: 0     DULoxetine 60 MG capsule  Commonly known " as: CYMBALTA      Dose: 60 mg  Take 60 mg by mouth daily  Refills: 0     EPINEPHrine 0.3 MG/0.3ML injection 2-pack  Commonly known as: ANY BX GENERIC EQUIV      Dose: 0.3 mg  [EPINEPHRINE (EPIPEN 2-SHERRI) 0.3 MG/0.3 ML ATIN] Inject 0.3 mg into the shoulder, thigh, or buttocks once as needed (Anaphylaxis).  Refills: 0     famotidine 20 MG tablet  Commonly known as: PEPCID      Dose: 20 mg  Take 20 mg by mouth 2 times daily  Refills: 0     ferrous sulfate 325 (65 Fe) MG tablet  Commonly known as: FEROSUL      Dose: 1 tablet  Take 1 tablet by mouth every evening  Refills: 0     folic acid 1 MG tablet  Commonly known as: FOLVITE      Dose: 1 mg  [FOLIC ACID (FOLVITE) 1 MG TABLET] Take 1 mg by mouth daily.  Refills: 0     levothyroxine 88 MCG tablet  Commonly known as: SYNTHROID/LEVOTHROID      Dose: 88 mcg  [LEVOTHYROXINE (SYNTHROID, LEVOTHROID) 88 MCG TABLET] Take 88 mcg by mouth Daily at 6:00 am.  Refills: 0     lisinopril 20 MG tablet  Commonly known as: ZESTRIL  Used for: Essential hypertension      Dose: 20 mg  Take 1 tablet (20 mg) by mouth daily Hold for SBP < 120 mmHg.  Refills: 0     loperamide 2 MG capsule  Commonly known as: IMODIUM  Indication: Diarrhea      Dose: 2-4 mg  Take 2-4 mg by mouth 3 times daily as needed for diarrhea Take 1 capsule with first loose stool and take 2 capsules after each subsequent loose stool  Refills: 0     magnesium oxide 400 MG tablet  Commonly known as: MAG-OX      Dose: 400 mg  Take 400 mg by mouth 2 times daily  Refills: 0     meclizine 12.5 MG tablet  Commonly known as: ANTIVERT  Indication: Sensation of Spinning or Whirling      Dose: 12.5 mg  Take 12.5 mg by mouth 3 times daily as needed for dizziness  Refills: 0     metFORMIN 500 MG tablet  Commonly known as: GLUCOPHAGE      Dose: 500 mg  Take 500 mg by mouth daily (with breakfast)  Refills: 0     omeprazole 20 MG DR capsule  Commonly known as: PriLOSEC      Dose: 20 mg  Take 20 mg by mouth 2 times daily as needed  Refills:  0     oxyCODONE 5 MG tablet  Commonly known as: ROXICODONE  Used for: Closed supracondylar fracture of left elbow, initial encounter      Dose: 5-10 mg  Take 1-2 tablets (5-10 mg) by mouth every 4 hours as needed for moderate to severe pain  Quantity: 30 tablet  Refills: 0     Prevagen 10 MG Caps  Generic drug: Apoaequorin      Dose: 10 mg  Take 10 mg by mouth daily  Refills: 0     rosuvastatin 5 MG tablet  Commonly known as: CRESTOR      Dose: 5 mg  Take 5 mg by mouth At Bedtime  Refills: 0     senna-docusate 8.6-50 MG tablet  Commonly known as: SENOKOT-S/PERICOLACE      Dose: 1 tablet  Take 1 tablet by mouth 2 times daily as needed for constipation  Refills: 0     sucralfate 1 GM tablet  Commonly known as: CARAFATE      Dose: 1 g  Take 1 g by mouth 4 times daily Before meals and bedtime  Refills: 0     TheraTears Extra 0.25 % Soln  Generic drug: Carboxymethylcellulose Sod PF      Dose: 1 drop  Place 1 drop into both eyes 2 times daily  Refills: 0     Vitamin D (Cholecalciferol) 50 MCG (2000 UT) Caps  Indication: Vitamin D Deficiency      Dose: 2,000 Units  Take 2,000 Units by mouth daily  Refills: 0              ASSESSMENT/PLAN  Encounter Diagnoses   Name Primary?    Chronic pain disorder Yes    Closed supracondylar fracture of left elbow, initial encounter     Pain management     Physical deconditioning      Left elbow  fracture revision follow-up with orthopedics pain control weight bearing as tolerated  left upper extremity    Type 2 diabetes on metformin 500 mg daily A1c 9/25/23 was 5.4    Pain management  ESTylenol 1000 mg every 6hours, baclofen 10 mg 3 times daily, oxycodone 5 to 10 mg every 4 hours as needed    HDL on Crestor    Physical deconditioning she will have home care services PT OT home health aide RN    Cold sores as needed valacyclovir    Anxiety on BuSpar    Mood disorder continue duloxetine    GERD on famotidine    Anemia continue ferrous sulfate    Hypertension on lisinopril 20 mg  daily    Dizziness as needed meclizine    Hypothyroidism on levothyroxine TSH on 5/5/23 was 1.58    DISCHARGE PLAN/FACE TO FACE:  I certify that services are/were furnished while this patient was under the care of a physician and that a physician or an allowed non-physician practitioner (NPP), had a face-to-face encounter that meets the physician face-to-face encounter requirements. The encounter was in whole, or in part, related to the primary reason for home health. The patient is confined to his/her home and needs intermittent skilled nursing, physical therapy, speech-language pathology, or the continued need for occupational therapy. A plan of care has been established by a physician and is periodically reviewed by a physician.  Date of Face-to-Face Encounter:11/20/23    I certify that, based on my findings, the following services are medically necessary home health services: PT/OT/HHA/RN    My clinical findings support the need for the above skilled services because: PT OT for continued strength and endurance, home health aide to assist with activities of daily living, RN for vital signs medication management and wound cares left elbow    This patient is homebound because: She is deconditioned following left elbow fracture , requires pain medication and unable to drive and leave the home unassisted, she will continue home therapy for strengthening    The patient is, or has been, under my care and I have initiated the establishment of the plan of care. This patient will be followed by a physician who will periodically review the plan of care.    Schedule follow up visit with primary care provider within 7 days to reestablish care.    Electronically signed by: Billie Santamaria CNP

## 2024-01-16 NOTE — ANESTHESIA PREPROCEDURE EVALUATION
Anesthesia Evaluation      Patient summary reviewed   No history of anesthetic complications     Airway   Mallampati: II  Neck ROM: full   Pulmonary - negative ROS and normal exam                          Cardiovascular - normal exam  (+) hypertension, ,   Received beta blockers in last 24 hours prior to incision.  ,     ROS comment: DVT without PE.     Neuro/Psych - negative ROS   (+) neuromuscular disease,  anxiety/panic attacks,     Comments: Scoliosis; Tremor, Polyneuropathy NOS; Claustrophobia.    Endo/Other    (+) diabetes mellitus type 2 well controlled, hypothyroidism, hyperthyroidism,      Comments: Vitamin D Deficiency; Hyperkalemia.    GI/Hepatic/Renal - negative ROS   (+) GERD,        Other findings: 2018: Normal Echo, Carotids; Brain MRI shows small vessel ischemic changes but OW negative.      Dental - normal exam                          Anesthesia Plan  Planned anesthetic: general endotracheal  Scopolamine, Decadron, Zofran.  Glidescope.  6.5 ETT.  Dilaudid.  Tylenol, Toradol PRN.  ASA 3   Induction: intravenous   Anesthetic plan and risks discussed with: patient  Anesthesia plan special considerations: antiemetics,   Post-op plan: routine recovery           87

## 2024-02-02 ENCOUNTER — LAB REQUISITION (OUTPATIENT)
Dept: LAB | Facility: CLINIC | Age: 79
End: 2024-02-02
Payer: COMMERCIAL

## 2024-02-02 DIAGNOSIS — R58 HEMORRHAGE, NOT ELSEWHERE CLASSIFIED: ICD-10-CM

## 2024-02-05 LAB
ANION GAP SERPL CALCULATED.3IONS-SCNC: 9 MMOL/L (ref 7–15)
BUN SERPL-MCNC: 13 MG/DL (ref 8–23)
CALCIUM SERPL-MCNC: 9.3 MG/DL (ref 8.8–10.2)
CHLORIDE SERPL-SCNC: 99 MMOL/L (ref 98–107)
CREAT SERPL-MCNC: 0.51 MG/DL (ref 0.51–0.95)
DEPRECATED HCO3 PLAS-SCNC: 25 MMOL/L (ref 22–29)
EGFRCR SERPLBLD CKD-EPI 2021: >90 ML/MIN/1.73M2
ERYTHROCYTE [DISTWIDTH] IN BLOOD BY AUTOMATED COUNT: 15.9 % (ref 10–15)
GLUCOSE SERPL-MCNC: 111 MG/DL (ref 70–99)
HCT VFR BLD AUTO: 39.3 % (ref 35–47)
HGB BLD-MCNC: 12.7 G/DL (ref 11.7–15.7)
MCH RBC QN AUTO: 33.2 PG (ref 26.5–33)
MCHC RBC AUTO-ENTMCNC: 32.3 G/DL (ref 31.5–36.5)
MCV RBC AUTO: 103 FL (ref 78–100)
PLATELET # BLD AUTO: 563 10E3/UL (ref 150–450)
POTASSIUM SERPL-SCNC: 4.2 MMOL/L (ref 3.4–5.3)
RBC # BLD AUTO: 3.82 10E6/UL (ref 3.8–5.2)
SODIUM SERPL-SCNC: 133 MMOL/L (ref 135–145)
WBC # BLD AUTO: 10.3 10E3/UL (ref 4–11)

## 2024-02-05 PROCEDURE — 36415 COLL VENOUS BLD VENIPUNCTURE: CPT | Mod: ORL | Performed by: FAMILY MEDICINE

## 2024-02-05 PROCEDURE — 80048 BASIC METABOLIC PNL TOTAL CA: CPT | Mod: ORL | Performed by: FAMILY MEDICINE

## 2024-02-05 PROCEDURE — P9603 ONE-WAY ALLOW PRORATED MILES: HCPCS | Mod: ORL | Performed by: FAMILY MEDICINE

## 2024-02-05 PROCEDURE — 85027 COMPLETE CBC AUTOMATED: CPT | Mod: ORL | Performed by: FAMILY MEDICINE

## 2024-03-03 ENCOUNTER — HEALTH MAINTENANCE LETTER (OUTPATIENT)
Age: 79
End: 2024-03-03

## 2024-09-29 ENCOUNTER — HEALTH MAINTENANCE LETTER (OUTPATIENT)
Age: 79
End: 2024-09-29

## 2024-10-15 ENCOUNTER — DOCUMENTATION ONLY (OUTPATIENT)
Dept: OTHER | Facility: CLINIC | Age: 79
End: 2024-10-15
Payer: COMMERCIAL

## 2025-01-16 NOTE — PLAN OF CARE
Dr connor contacted due to difficulty with lab draw and pt wanting to leave ama. Per nd pt can leave sodium lab was only for piece of mind.   Petra Fine RN on 11/12/2022 at 4:52 PM       Calm

## 2025-03-15 ENCOUNTER — HEALTH MAINTENANCE LETTER (OUTPATIENT)
Age: 80
End: 2025-03-15

## 2025-04-06 ENCOUNTER — HEALTH MAINTENANCE LETTER (OUTPATIENT)
Age: 80
End: 2025-04-06

## (undated) DEVICE — ESU CORD BIPOLAR 12' E0512

## (undated) DEVICE — DRILL BIT QUICK COUPLING CAN 5.0X300MM 310.63

## (undated) DEVICE — SYR 10ML LL W/O NDL 302995

## (undated) DEVICE — STPL SKIN PROXIMATE 35 WIDE PMW35

## (undated) DEVICE — SOL WATER IRRIG 1000ML BOTTLE 2F7114

## (undated) DEVICE — SOLIDIFIER FLD 3.2OZ  CL-FREE F/ BIOHZRD WASTE 3000ML CANIST

## (undated) DEVICE — Device

## (undated) DEVICE — SLING ARM FOAM L 0814-0794

## (undated) DEVICE — IMP SCR SYN CORTEX 2.7X40MM SELF TAP SS 202.840: Type: IMPLANTABLE DEVICE | Site: ELBOW | Status: NON-FUNCTIONAL

## (undated) DEVICE — IMP SCR SYN CORTEX 3.5X32MM SELF TAP SS 204.832: Type: IMPLANTABLE DEVICE | Site: ELBOW | Status: NON-FUNCTIONAL

## (undated) DEVICE — BANDAGE ELASTIC 6X550 LF DBL 593-96LF

## (undated) DEVICE — DRSG AQUACEL AG 3.5X13.75" HYDROFIBER 412012

## (undated) DEVICE — BLADE KNIFE SURG 15 371115

## (undated) DEVICE — IMP SCR SYN CORTEX 3.5X22MM SELF TAP SS 204.822: Type: IMPLANTABLE DEVICE | Site: ELBOW | Status: NON-FUNCTIONAL

## (undated) DEVICE — DRAPE MINI C-ARM INSIGHT2 CF-5423

## (undated) DEVICE — DRAPE STOCKINETTE IMPERVIOUS 08" LATEX 1586

## (undated) DEVICE — TRAY PREP DRY SKIN SCRUB 067

## (undated) DEVICE — DRSG GAUZE 4X4" TRAY 6939

## (undated) DEVICE — DRILL BIT QUICK COUPLING 2.5X110MM GOLD 310.25

## (undated) DEVICE — IMPLANTABLE DEVICE: Type: IMPLANTABLE DEVICE | Site: ELBOW | Status: NON-FUNCTIONAL

## (undated) DEVICE — CUFF TOURN 18IN STRL DISP

## (undated) DEVICE — IMP SCR SYN 2.7X24MM T8 SD LOCKING SELF-TAP VA 02.211.024: Type: IMPLANTABLE DEVICE | Site: ELBOW | Status: NON-FUNCTIONAL

## (undated) DEVICE — DRSG GAUZE 4X4" 3033

## (undated) DEVICE — TIP SUCTION FRAIZER 10FR DISP 163

## (undated) DEVICE — SUTURE VICRYL+ 1 27IN CT-1 UND VCP261H

## (undated) DEVICE — GLOVE BIOGEL PI INDICATOR 8.0 LF 41680

## (undated) DEVICE — CUSTOM PACK TOTAL KNEE SOP5BTKHEC

## (undated) DEVICE — DRILL BIT SYN QUICK COUPLING 2.8X165MM 310.288

## (undated) DEVICE — DRILL BIT SYN QUICK COUPLING 2.0X140MM  323.062

## (undated) DEVICE — SPONGE RAY-TEC 4X8" 7318

## (undated) DEVICE — PADDING CAST 3IN WEBRIL STRL 2394

## (undated) DEVICE — ALCOHOL ISOPROPYL 4 OZ 70% IA7004

## (undated) DEVICE — SOL NACL 0.9% IRRIG 1000ML BOTTLE 2F7124

## (undated) DEVICE — PADDING CAST 4IN WEBRIL STRL 2502

## (undated) DEVICE — PLATE GROUNDING ADULT W/CORD 9165L

## (undated) DEVICE — GLOVE SURG PI ULTRA TOUCH M SZ 8 LF

## (undated) DEVICE — SUTURE VICRYL+ 2-0 27IN CT-1 UND VCP259H

## (undated) DEVICE — IMP SCR SYN CORTEX 3.5X44MM SELF TAP SS 204.844: Type: IMPLANTABLE DEVICE | Site: ELBOW | Status: NON-FUNCTIONAL

## (undated) DEVICE — DRAPE C-ARM 60X42" 1013

## (undated) DEVICE — IMP SCR SYN CORTEX 3.5X24MM SELF TAP SS 204.824: Type: IMPLANTABLE DEVICE | Site: ELBOW | Status: NON-FUNCTIONAL

## (undated) DEVICE — PREP CHLORAPREP 26ML TINTED HI-LITE ORANGE 930815

## (undated) DEVICE — CUSTOM PACK TOTAL KNEE ACCESSORY SOP5BTAHEA

## (undated) DEVICE — IMP SCR SYN 3.5X30MM LOCKING W/STARDRIVE SS 212.111: Type: IMPLANTABLE DEVICE | Site: ELBOW | Status: NON-FUNCTIONAL

## (undated) DEVICE — SPLINT ORTH FBGLS ORTHO-GLASS 4INX30IN OG-430PC

## (undated) DEVICE — HOLDER LIMB VELCRO OR 0814-1533

## (undated) DEVICE — WIRE GUIDE 2.8X300MM NON-THRD W/FLUTES 292.81

## (undated) DEVICE — CAST PADDING 4" STERILE 9044S

## (undated) DEVICE — PREP POVIDONE-IODINE 7.5% SCRUB 4OZ BOTTLE MDS093945

## (undated) DEVICE — DECANTER VIAL 2006S

## (undated) DEVICE — DRSG ABD TNDRSRB WET PRUF 8IN X 10IN STRL  9194A

## (undated) DEVICE — SUCTION CANISTER MEDIVAC LINER 3000ML W/LID 65651-530

## (undated) DEVICE — CUSTOM PACK UPPER EXTREMITY SOP5BUEHEC

## (undated) DEVICE — SU ETHIBOND 2 V-37 4X30" MX69G

## (undated) DEVICE — BLADE SAW OSCIL/SAG STRK MICRO 9X31X0.38MM 2296-003-225

## (undated) DEVICE — SUCTION MANIFOLD NEPTUNE 2 SYS 4 PORT 0702-020-000

## (undated) DEVICE — GLOVE BIOGEL PI SZ 8.5 40885

## (undated) DEVICE — SU STRATAFIX PDS PLUS 1 CT-1 18" SXPP1A404

## (undated) DEVICE — IMP SCR SYN 2.7X26MM T8 SD LOCKING SELF-TAP VA 02.211.026: Type: IMPLANTABLE DEVICE | Site: ELBOW | Status: NON-FUNCTIONAL

## (undated) DEVICE — BLADE SAW SAGITTAL STRK WIDE 25.4X85X1.2MM 2108-151-000

## (undated) DEVICE — SUTURE VICRYL+ 0 27IN CT-1 UND VCP260H

## (undated) DEVICE — NEEDLE HYPO 21GA X 1-1/2 SAFETY 305917

## (undated) DEVICE — SYR 50ML CATH TIP W/O NDL 309620

## (undated) DEVICE — GLOVE BIOGEL PI SZ 8.0 40880

## (undated) DEVICE — VESSEL LOOP BLUE MAXI 30-723

## (undated) RX ORDER — FENTANYL CITRATE-0.9 % NACL/PF 10 MCG/ML
PLASTIC BAG, INJECTION (ML) INTRAVENOUS
Status: DISPENSED
Start: 2023-08-16

## (undated) RX ORDER — PROPOFOL 10 MG/ML
INJECTION, EMULSION INTRAVENOUS
Status: DISPENSED
Start: 2023-08-16

## (undated) RX ORDER — PROPOFOL 10 MG/ML
INJECTION, EMULSION INTRAVENOUS
Status: DISPENSED
Start: 2023-08-15

## (undated) RX ORDER — PROPOFOL 10 MG/ML
INJECTION, EMULSION INTRAVENOUS
Status: DISPENSED
Start: 2023-11-02

## (undated) RX ORDER — DEXAMETHASONE SODIUM PHOSPHATE 4 MG/ML
INJECTION, SOLUTION INTRA-ARTICULAR; INTRALESIONAL; INTRAMUSCULAR; INTRAVENOUS; SOFT TISSUE
Status: DISPENSED
Start: 2023-11-02

## (undated) RX ORDER — CEFAZOLIN SODIUM 1 G/3ML
INJECTION, POWDER, FOR SOLUTION INTRAMUSCULAR; INTRAVENOUS
Status: DISPENSED
Start: 2023-08-16

## (undated) RX ORDER — ONDANSETRON 2 MG/ML
INJECTION INTRAMUSCULAR; INTRAVENOUS
Status: DISPENSED
Start: 2023-08-16

## (undated) RX ORDER — LIDOCAINE HYDROCHLORIDE 10 MG/ML
INJECTION, SOLUTION EPIDURAL; INFILTRATION; INTRACAUDAL; PERINEURAL
Status: DISPENSED
Start: 2023-08-15

## (undated) RX ORDER — ESMOLOL HYDROCHLORIDE 10 MG/ML
INJECTION INTRAVENOUS
Status: DISPENSED
Start: 2023-11-02

## (undated) RX ORDER — ONDANSETRON 2 MG/ML
INJECTION INTRAMUSCULAR; INTRAVENOUS
Status: DISPENSED
Start: 2023-11-02

## (undated) RX ORDER — LIDOCAINE HYDROCHLORIDE 10 MG/ML
INJECTION, SOLUTION EPIDURAL; INFILTRATION; INTRACAUDAL; PERINEURAL
Status: DISPENSED
Start: 2023-08-16

## (undated) RX ORDER — LIDOCAINE HYDROCHLORIDE 10 MG/ML
INJECTION, SOLUTION EPIDURAL; INFILTRATION; INTRACAUDAL; PERINEURAL
Status: DISPENSED
Start: 2023-11-02